# Patient Record
Sex: MALE | Race: WHITE | NOT HISPANIC OR LATINO | Employment: OTHER | URBAN - METROPOLITAN AREA
[De-identification: names, ages, dates, MRNs, and addresses within clinical notes are randomized per-mention and may not be internally consistent; named-entity substitution may affect disease eponyms.]

---

## 2017-01-03 ENCOUNTER — OUTPATIENT CASE MANAGEMENT (OUTPATIENT)
Dept: ADMINISTRATIVE | Facility: OTHER | Age: 82
End: 2017-01-03

## 2017-01-03 NOTE — PROGRESS NOTES
Name Nickolas TORRES 1931 Medical Record  # [id number]   Visit Location __ Home __ Clinic _x_ Telephonic __Other  Encounter Date:  1/3/17   Contact Type __ New Case   _x_ Follow Up  __ Monitoring  __ Case Closure Next Follow-up Date      Summary:Jacyter reports that the patient is asleep at this time. Reports that his blood sugar this morning was 96. Reports that his BP was 148/60. Reports that the patient complains of pain at times in the hip, Reports that he has an appointment on the  of this month.   Plan:Inform on DM meal planning

## 2017-01-03 NOTE — PATIENT INSTRUCTIONS
Diabetes: Understanding Carbohydrates, Fats, and Protein  Food is a source of fuel and nourishment for your body. Its also a source of pleasure. Having diabetes doesnt mean you have to eat special foods or give up desserts. Instead, your dietitian can show you how to plan meals to suit your body. To start, learn how different foods affect blood sugar.    Carbohydrates  Carbohydrates are the main source of fuel for the body. Carbohydrates raise blood sugar. Many people think carbohydrates are only found in pasta or bread. But carbohydrates are actually in many kinds of foods:  · Sugars occur naturally in foods such as fruit, milk, honey, and molasses. Sugars can also be added to many foods, from cereals and yogurt to candy and desserts. Sugars raise blood sugar.  · Starches are found in bread, cereals, pasta, and dried beans. Theyre also found in corn, peas, potatoes, yam, acorn squash, and butternut squash. Starches also raise blood sugar.   · Fiber is found in foods such as vegetables, fruits, beans, and whole grains. Unlike other carbs, fiber isnt digested or absorbed. So it doesnt raise blood sugar. In fact, fiber can help keep blood sugar from rising too fast. It also helps keep blood cholesterol at a healthy level.  Did you know?  Even though carbohydrates raise blood sugar, its best to have some in every meal. They are an important part of a healthy diet.   Fat  Fat is an energy source that can be stored until needed. Fat does not raise blood sugar. However, it can raise blood cholesterol, increasing the risk of heart disease. Fat is also high in calories, which can cause weight gain. Not all types of fat are the same.  More Healthy:  · Monounsaturated fats are mostly found in vegetable oils, such as olive, canola, and peanut oils. They are also found in avocados and some nuts. Monounsaturated fats are healthy for your heart. Thats because they lower LDL (unhealthy) cholesterol.  · Polyunsaturated  fats are mostly found in vegetable oils, such as corn, safflower, and soybean oils. They are also found in some seeds, nuts, and fish. Polyunsaturated fats lower LDL (unhealthy) cholesterol. So, choosing them instead of saturated fats is healthy for your heart. Certain unsaturated fats can help lower triglycerides.   Less Healthy:  · Saturated fats are found in animal products, such as meat, poultry, whole milk, lard, and butter. Saturated fats raise LDL cholesterol and are not healthy for your heart.  · Hydrogenated oils and trans fats are formed when vegetable oils are processed into solid fats. They are found in many processed foods. Hydrogenated oils and trans fats raise LDL cholesterol and lower HDL (healthy) cholesterol. They are not healthy for your heart.  Protein  Protein helps the body build and repair muscle and other tissue. Protein has little or no effect on blood sugar. However, many foods that contain protein also contain saturated fat. By choosing low-fat protein sources, you can get the benefits of protein without the extra fat:  · Plant protein is found in dry beans and peas, nuts, and soy products, such as tofu and soymilk. These sources tend to be cholesterol-free and low in saturated fat.  · Animal protein is found in fish, poultry, meat, cheese, milk, and eggs. These contain cholesterol and can be high in saturated fat. Aim for lean, lower-fat choices.  © 2991-9537 [employer]. [address] [place] [address]. All rights reserved. This information is not intended as a substitute for professional medical care. Always follow your healthcare professional's instructions.        Diabetes: Caring for Your Body  When you have diabetes, your body needs special care. This care helps you stay healthy and prevent complications. Exercise and healthy eating are a part of this. You can also protect yourself by taking special care of your feet, skin, and teeth.    Caring for your  feet  Follow these tips to help keep your feet healthy:  · Check your feet every day for redness, blisters, cracks, dry skin, or numbness. Use a mirror to inspect the bottoms of your feet, if needed. Or, ask for help.  · Wash your feet in warm (not hot) water. Dont soak them.  · Use an emery board to keep your toenails even with the ends of your toes. File away sharp edges. A podiatrist (foot doctor) may need to cut your toenails for you.  · Keep your skin soft and smooth by placing a thin layer of skin lotion on the tops and bottoms of your feet. Do not put lotion in between your toes.  · Always wear shoes or slippers, even inside your home. Make sure that shoes are properly fitted. Change your socks daily.  · Call your healthcare provider right away if your feet are numb or painful, or if a cut or sore doesnt heal within a few days.  Preventing skin infections  To prevent skin infections, bathe every day. Dry yourself well, especially between your toes. Wash any cuts with warm, soapy water and cover with a sterile bandage. Call your healthcare provider if a cut or sore doesn't heal in a few days, feels warm, itches, or has a bad odor.  Caring for your teeth  Follow these guidelines for healthy teeth:  · Brush your teeth twice daily.  · Floss your teeth daily.  · See your dentist at least twice yearly.  If you smoke, quit  Smoking is dangerous for everyone, especially people with diabetes. It can harm the blood vessels in your eyes, kidneys, and heart. It raises blood pressure. Smoking can also slow healing, so infections are more likely. Ask your healthcare provider about programs to help you stop smoking.   © 1402-3152 [employer]. [address] [place] [address], [place] [address]. All rights reserved. This information is not intended as a substitute for professional medical care. Always follow your healthcare professional's instructions.

## 2017-01-13 ENCOUNTER — OFFICE VISIT (OUTPATIENT)
Dept: PAIN MEDICINE | Facility: CLINIC | Age: 82
End: 2017-01-13
Attending: ANESTHESIOLOGY
Payer: MEDICARE

## 2017-01-13 VITALS
WEIGHT: 169.06 LBS | TEMPERATURE: 98 F | DIASTOLIC BLOOD PRESSURE: 59 MMHG | RESPIRATION RATE: 18 BRPM | HEIGHT: 71 IN | HEART RATE: 62 BPM | SYSTOLIC BLOOD PRESSURE: 117 MMHG | BODY MASS INDEX: 23.67 KG/M2

## 2017-01-13 DIAGNOSIS — M53.3 SACROILIAC JOINT DYSFUNCTION OF RIGHT SIDE: ICD-10-CM

## 2017-01-13 DIAGNOSIS — M16.11 PRIMARY OSTEOARTHRITIS OF RIGHT HIP: ICD-10-CM

## 2017-01-13 DIAGNOSIS — Z98.1 S/P LAMINECTOMY WITH SPINAL FUSION: Primary | ICD-10-CM

## 2017-01-13 PROCEDURE — 1125F AMNT PAIN NOTED PAIN PRSNT: CPT | Mod: S$GLB,,, | Performed by: ANESTHESIOLOGY

## 2017-01-13 PROCEDURE — 1159F MED LIST DOCD IN RCRD: CPT | Mod: S$GLB,,, | Performed by: ANESTHESIOLOGY

## 2017-01-13 PROCEDURE — 1157F ADVNC CARE PLAN IN RCRD: CPT | Mod: S$GLB,,, | Performed by: ANESTHESIOLOGY

## 2017-01-13 PROCEDURE — 99214 OFFICE O/P EST MOD 30 MIN: CPT | Mod: 25,S$GLB,, | Performed by: ANESTHESIOLOGY

## 2017-01-13 PROCEDURE — 3078F DIAST BP <80 MM HG: CPT | Mod: S$GLB,,, | Performed by: ANESTHESIOLOGY

## 2017-01-13 PROCEDURE — 3074F SYST BP LT 130 MM HG: CPT | Mod: S$GLB,,, | Performed by: ANESTHESIOLOGY

## 2017-01-13 PROCEDURE — 1160F RVW MEDS BY RX/DR IN RCRD: CPT | Mod: S$GLB,,, | Performed by: ANESTHESIOLOGY

## 2017-01-13 PROCEDURE — 99499 UNLISTED E&M SERVICE: CPT | Mod: S$GLB,,, | Performed by: ANESTHESIOLOGY

## 2017-01-13 PROCEDURE — 99999 PR PBB SHADOW E&M-EST. PATIENT-LVL III: CPT | Mod: PBBFAC,,, | Performed by: ANESTHESIOLOGY

## 2017-01-13 PROCEDURE — 20552 NJX 1/MLT TRIGGER POINT 1/2: CPT | Mod: S$GLB,,, | Performed by: ANESTHESIOLOGY

## 2017-01-13 RX ORDER — OXYCODONE AND ACETAMINOPHEN 10; 325 MG/1; MG/1
1 TABLET ORAL EVERY 8 HOURS PRN
Qty: 90 TABLET | Refills: 0 | Status: SHIPPED | OUTPATIENT
Start: 2017-01-13 | End: 2017-02-20 | Stop reason: SDUPTHER

## 2017-01-13 RX ORDER — PEN NEEDLE, DIABETIC 31 GX5/16"
NEEDLE, DISPOSABLE MISCELLANEOUS
COMMUNITY
Start: 2017-01-02 | End: 2017-01-17 | Stop reason: SDUPTHER

## 2017-01-13 RX ORDER — BUPIVACAINE HYDROCHLORIDE 2.5 MG/ML
9 INJECTION, SOLUTION EPIDURAL; INFILTRATION; INTRACAUDAL ONCE
Status: COMPLETED | OUTPATIENT
Start: 2017-01-13 | End: 2017-01-13

## 2017-01-13 RX ORDER — METHYLPREDNISOLONE ACETATE 40 MG/ML
40 INJECTION, SUSPENSION INTRA-ARTICULAR; INTRALESIONAL; INTRAMUSCULAR; SOFT TISSUE ONCE
Status: COMPLETED | OUTPATIENT
Start: 2017-01-13 | End: 2017-01-13

## 2017-01-13 RX ADMIN — METHYLPREDNISOLONE ACETATE 40 MG: 40 INJECTION, SUSPENSION INTRA-ARTICULAR; INTRALESIONAL; INTRAMUSCULAR; SOFT TISSUE at 08:01

## 2017-01-13 RX ADMIN — BUPIVACAINE HYDROCHLORIDE 22.5 MG: 2.5 INJECTION, SOLUTION EPIDURAL; INFILTRATION; INTRACAUDAL at 08:01

## 2017-01-13 NOTE — MR AVS SNAPSHOT
[facility]  [address]  [place] [address]  Phone: [phone]  Fax: [phone]                  Nickolas Blake   2017 8:30 AM   Office Visit    Description:  Male : 1931   Provider:  Liyah Parker MD   Department:  [facility]           Diagnoses this Visit        Comments    S/P laminectomy with spinal fusion    -  Primary     Sacroiliac joint dysfunction of right side         Primary osteoarthritis of right hip                To Do List           Future Appointments        Provider Department Dept Phone    2017 3:00 PM Joe Bunn MD [facility] - Internal Medicine [phone]    2017 9:00 AM Stephenie Cardona NP [facility] [phone]      Your Future Surgeries/Procedures     2017   Surgery with Liyah Parker MD   [facility]    [address]  [facility] [address] [phone]              Goals (5 Years of Data)              1/3/17    12/19/16    11/7/16    Patient/caregiver will accept life style changes to manage and improve blood sugars prior to discharge from [facility].   On track  On track  On track    Notes - Note edited  1/3/2017 10:17 AM by Melissa Montalvo RN    Overall Time to Completion  2 months from 10/17/2016    Short Term Goals  Patient/caregiver will measure and record the capillary blood glucose 2 times per day for 3 weeks.  Interventions   · Mail Blood glucose logs for home use.   · Inquire about available accucheck machine in the home  · Inquire about current frequency of obtaining blood sugars.  · Inform on the importance of home monitoring of blood sugars.   Status  · Met    ·     Patient/caregiver will verbalize 4 food items Diabetic within 3 weeks.  Interventions   · Recognize and provide educational material (CAIT).  · Encourage Dietary Compliance.  · Review eating/nutrition habits.   Status  · Partially  met    Patient/caregiver will verbalize 4 risk factors of Hypertension Diabetes within 2 months.  Interventions   · Recognize and provide educational material (KRAMES).  · Encourage Dietary Compliance.  · Review eating/nutrition habits.  · Complete medication reconciliation.  · Encourage Medication Compliance.   Status  · Partially met    Patient/caregiver will verbalize 2 signs and symptoms of Hypertension Diabetes within 2 months.   Interventions   · Recognize and provide educational material (KRAMES).   Status  · Partially met    Patient/caregiver will verbalize current HbA1c value and HbA1c goal within 2 months.  Interventions   · Empower patient/caregiver to discuss treatment plan with Physician/care team.  · Recognize and provide educational material (KRAMES).  · Encourage Dietary Compliance.  · Review eating/nutrition habits.   Status  · Partially met    Patient/caregiver will verbalize food required to create a well-balanced food plate within 2 months.  Interventions   · Recognize and provide educational material (KRAMES).  · Encourage Dietary Compliance.  · Review eating/nutrition habits.   Status  · Partially met         Clinical Reference Documents Added to Patient Instructions       Document    BLOOD SUGAR LOG, USING A (ENGLISH)        DIABETES, DIET (ENGLISH)    DIABETES, HEALTHY MEALS FOR (ENGLISH)    DIABETES, MANAGING: THE A1C TEST (ENGLISH)    DIABETES, MEAL PLANNING (ENGLISH)    DIABETES, SERVING AND PORTION SIZES, LEARNING ABOUT (ENGLISH)               These Medications        Disp Refills Start End    oxycodone-acetaminophen (PERCOCET)  mg per tablet 90 tablet 0 1/13/2017     Take 1 tablet by mouth every 8 (eight) hours as needed for Pain. - Oral    Pharmacy: [facility] - [facility] [address]  Ph #: [phone]         [facility] On Call     [facility] On Call Nurse Care Line - 24/7 Assistance  Registered nurses in the [facility] On Call Center provide  "clinical advisement, health education, appointment booking, and other advisory services.  Call for this free service at [phone].             Medications           Message regarding Medications     Verify the changes and/or additions to your medication regime listed below are the same as discussed with your clinician today.  If any of these changes or additions are incorrect, please notify your healthcare provider.        START taking these NEW medications        Refills    oxycodone-acetaminophen (PERCOCET)  mg per tablet 0    Sig: Take 1 tablet by mouth every 8 (eight) hours as needed for Pain.    Class: Print    Route: Oral      These medications were administered today        Dose Freq    bupivacaine (PF) 0.25% (2.5 mg/ml) injection 22.5 mg 9 mL Once    Sig: Inject 9 mLs (22.5 mg total) into the muscle once.    Class: Normal    Route: Intramuscular    methylPREDNISolone acetate injection 40 mg 40 mg Once    Sig: Inject 1 mL (40 mg total) into the muscle once.    Class: Normal    Route: Intramuscular           Verify that the below list of medications is an accurate representation of the medications you are currently taking.  If none reported, the list may be blank. If incorrect, please contact your healthcare provider. Carry this list with you in case of emergency.           Current Medications     ascorbic acid, vitamin C, (VITAMIN C) 250 MG tablet Take 1 tablet (250 mg total) by mouth 2 (two) times daily.    aspirin (ECOTRIN) 81 MG EC tablet Take 81 mg by mouth once daily.    atorvastatin (LIPITOR) 20 MG tablet TAKE 1 TABLET ONE TIME DAILY    BD ALCOHOL SWABS PadM USE FOUR TIMES DAILY    BD INSULIN PEN NEEDLE UF SHORT 31 gauge x 5/16" Ndle     blood sugar diagnostic (BLOOD GLUCOSE TEST) Strp 1 strip by Misc.(Non-Drug; Combo Route) route 3 (three) times daily with meals. To use with Accucheck meter    docusate sodium (COLACE) 100 MG capsule Take 1 capsule (100 mg total) by mouth once daily.    " "donepezil (ARICEPT) 10 MG tablet Take 1 tablet (10 mg total) by mouth once daily.    doxazosin (CARDURA) 4 MG tablet Take 1 tablet (4 mg total) by mouth every evening.    ferrous gluconate (FERGON) 324 MG tablet Take 1 tablet (324 mg total) by mouth 2 (two) times daily with meals.    finasteride (PROSCAR) 5 mg tablet TAKE 1 TABLET EVERY DAY    fluticasone (FLONASE) 50 mcg/actuation nasal spray 2 sprays by Each Nare route daily as needed for Allergies.    gabapentin (NEURONTIN) 300 MG capsule Take 1 capsule (300 mg total) by mouth 3 (three) times daily.    insulin aspart (NOVOLOG) 100 unit/mL InPn pen Inject 7 Units into the skin 3 (three) times daily.    insulin glargine (LANTUS) 100 unit/mL injection Inject 21 Units into the skin 2 (two) times daily. Every 12 hours.    insulin syringe-needle U-100 1/2 mL 31 gauge x 15/64" Syrg 1 Syringe by Misc.(Non-Drug; Combo Route) route 2 (two) times daily. With insulin injections twice daily    lisinopril (PRINIVIL,ZESTRIL) 2.5 MG tablet Take 1 tablet (2.5 mg total) by mouth once daily.    metoprolol succinate (TOPROL-XL) 50 MG 24 hr tablet Take 1 tablet (50 mg total) by mouth once daily. Dose increase, stop 25 mg    multivitamin (THERAGRAN) per tablet Take 1 tablet by mouth once daily.    nitroGLYCERIN (NITROSTAT) 0.4 MG SL tablet Place 1 tablet (0.4 mg total) under the tongue every 5 (five) minutes as needed for Chest pain.    omeprazole (PRILOSEC) 20 MG capsule Take 1 capsule (20 mg total) by mouth once daily.    oxycodone-acetaminophen (PERCOCET) 7.5-325 mg per tablet Take 1 tablet by mouth every 6 (six) hours as needed for Pain.    senna-docusate 8.6-50 mg (PERICOLACE) 8.6-50 mg per tablet Take 1 tablet by mouth 2 (two) times daily as needed for Constipation.    sertraline (ZOLOFT) 25 MG tablet Take 1 tablet (25 mg total) by mouth once daily.    tamsulosin (FLOMAX) 0.4 mg Cp24 Take 2 capsules (0.8 mg total) by mouth once daily.    oxycodone-acetaminophen (PERCOCET)  " "mg per tablet Take 1 tablet by mouth every 8 (eight) hours as needed for Pain.           Clinical Reference Information           Vital Signs - Last Recorded  Most recent update: 1/13/2017  8:36 AM by Shazia Muniz MA    BP Pulse Temp Resp Ht Wt    (!) 117/59 62 97.9 °F (36.6 °C) (Oral) 18 5' 11" (1.803 m) 76.7 kg (169 lb 1.5 oz)    BMI                23.58 kg/m2          Blood Pressure          Most Recent Value    BP  (!)  117/59      Allergies as of 1/13/2017     No Known Allergies      Immunizations Administered on Date of Encounter - 1/13/2017     None      Administrations This Visit     bupivacaine (PF) 0.25% (2.5 mg/ml) injection 22.5 mg     Admin Date Action Dose Route Administered By             01/13/2017 Given by Other 22.5 mg Intramuscular Deborah Del Rio LPN                    methylPREDNISolone acetate injection 40 mg     Admin Date Action Dose Route Administered By             01/13/2017 Given by Other 40 mg Intramuscular Deborah Del Rio LPN                      "

## 2017-01-13 NOTE — LETTER
January 13, 2017      Joe Bunn MD  [address]  [facility] [address]           [facility]  [address]  [facility] [address]  Phone: [phone]  Fax: [phone]          Patient: Nickolas Blake   MR Number: [id number]   YOB: 1931   Date of Visit: 1/13/2017       Dear Dr. Joe Bunn:    Thank you for referring Nickolas Blake to me for evaluation. Attached you will find relevant portions of my assessment and plan of care.    If you have questions, please do not hesitate to call me. I look forward to following Nickolas Blake along with you.    Sincerely,    Liyah Parker MD    Enclosure  CC:  No Recipients    If you would like to receive this communication electronically, please contact [email or web address] or [phone] to request more information on [employer] access.    For providers and/or their staff who would like to refer a patient to [facility], please contact us through our one-stop-shop provider referral line, [facility], at [phone].    If you feel you have received this communication in error or would no longer like to receive these types of communications, please e-mail [email or web address]

## 2017-01-13 NOTE — PROGRESS NOTES
Chronic Pain - Follow Up    Referring Physician: Joe Bunn MD    Chief Complaint:   No chief complaint on file.       SUBJECTIVE: Disclaimer: This note has been generated using voice-recognition software. There may be typographical errors that have been missed during proof-reading  Interval history 1/13/2017:  Since previous encounter the patient is status post L3-4 laminectomy and foraminotomy for severe L3-4 stenosis and right L4 radiculopathy.  He has healed from his incision sites and has been performing PT but has been having exacerbations of his pain in his area of his right SI joint and hip for which she has known arthritis and previous improvement from injections.  He was sent for evaluation for medication management for postprocedural pain and injections.  Interval history 10/25/16:  Patient arrives to clinic for a 2 week follow up after getting a Right sacroiliiac joint injection on 10/5/16. Patient stated that he did not get any relief at all from the procedure beyond one day. Patient reports his pain as an 8/10 today. Patient is currently taking Norco to assist with pain. No other health changes since last visit.      Interval history 09/19/2016:  The patient returns today for follow up of lower back and right hip pain.  He is s/p right SI joint and hip joint injection on 8/24/16 with 75% pain relief for 3 weeks.  He states that his pain returned last week.  He reports that this helped more than previous procedures.  His worst area of pain today is his right buttock.  He reports significant relief with Fentanyl 25 mcg patch.  He ran out a couple of days ago and did not contact us for a refill.  He did have some Norco left from previous prescription from PCP.  No other health changes since his last visit.  Patient reports his pain 9/10 today.  He is accompanied by his son today who is active in his care.  He denies any b/b incontinence.     Interval History 08/16/2016:  Since previous  encounter the patient has had a series of lumbar intralaminar epidural steroidal injections for known severe stenosis in his lumbar spine and spondylosis at L3-4.  The patient has had only temporary relief from the epidural steroidal injections lasting no longer than 1 week at a time.  He does continue to have her back pain from the area of the sacroiliac joint and right hip and has also previously had right knee pain.  On previous encounter in the office she had trigger point injections over the area of the sacroiliac joint which only marginally helped and also a right knee joint injection which helped him significantly.  Currently states that he does not have any knee pain.  He did have a recent MRI after being admitted for observation for lumbar radiculopathy after the epidural steroidal injections.  The MRI did not show any evidence of infection or hematoma but did confirm severe stenosis at multiple levels and spondylosis most severe at L3-4.  He was evaluated by neurosurgery in follow-up and is not currently considered a surgical candidate.  The daughter presents with the patient today and states that the patient has been unable to sleep secondary to severe pain.    Interval history 7/12/2016:  Since previous encounter the patient is status post lumbar intralaminar epidural steroidal injection by 2, 2 weeks apart on 6/14/2016 and 6/20/2016.  The patient states that he is not having significant radicular symptoms down to the feet as prior although he is having significant right-sided lower back pain and right knee pain.  He has not tried any prescription compounded pain creams although he states that he has tried BenGay which gives him some relief.  No other health changes since previous encounter.  Patient scheduled follow-up with neurosurgery next week.    Initial encounter:    Nickolas Blake presents to the clinic for the evaluation of lumbar pain. The pain started 1 month  ago following onset and  symptoms have been worsening.    Brief history:  Patient previously had an L5-S1 intralaminar epidural steroidal injection in May 2015 with greater than 6 months relief.      Pain Description:    The pain is located in the low back area and radiates to the right lower extremity .      At BEST  5/10     At WORST  10/10 on the WORST day.      On average pain is rated as 8/10.     Today the pain is rated as 9/10    The pain is described as aching      Symptoms interfere with daily activity.     Exacerbating factors: Sitting, Bending, Walking, Night Time, Morning, Lifting and Getting out of bed/chair.      Mitigating factors laying down, medications, physical therapy, rest and injections.     Patient denies night fever/night sweats, urinary incontinence, bowel incontinence, significant weight loss, significant motor weakness and loss of sensations.  Patient denies any suicidal or homicidal ideations    Pain Medications:  Current: Fentanyl 25 mcg patch- significant relief      Tried in Past:  NSAIDs -Never  TCA -Never  SNRI -Never  Anti-convulsants -Never  Muscle Relaxants -skelaxin -with some relief, recently discontinued.  Opioids-Norco 5/325mg Norco 7.5/325mg, [place]    Physical Therapy/Home Exercise: no       report:  Reviewed and consistent with medication use as prescribed.    Pain Procedures:   10/5/2016 -Right SI joint injection -no relief  8/24/16 Right SI joint and right hip joint injection- 75% relief for 3 weeks  Lumbar IL SCOTT at L5-S1  06/28/16  Lumbar IL SCOTT at L5-S1 06/14/2016 5/18/2015 L5/S1 ILESI    Chiropractor -never  Acupuncture - never  TENS unit -never  Spinal decompression -never  Joint replacement -never    Imaging:  MRI lumbar spine 7/20/2016:  Technique:  Sagittal T1, sagittal T2, sagittal STIR, axial T1 and axial T2 weighted images of the lumbar spine obtained without contrast.     Comparison: Lumbar spine MR 11/2013.  Findings:    The spinal alignment is within normal limits.  There is mild  height loss of the L1 vertebral body, similar to before.  The remaining vertebral body heights are well maintained, with no fracture.    Schmorl's nodes noted L3 and L4 vertebral bodies.  There is disc is generalized disc desiccation.  There is high STIR signal intensity in L3-L4 disc space, similar to before.      No marrow signal abnormality suspicious for an infiltrative process.      The conus is normal in appearance, and terminates at the T12-L1 level.      The adjacent soft tissue structures demonstrate  T2 hyperintense and T1 hypointense lesion in left kidney, likely representing renal cyst.      There are findings of multi-level  lumbar spondylosis, as below.    T12-L1: There is no focal disc herniation and no significant spinal stenosis or neuroforaminal narrowing.    L1-L2: There is moderate facet hypertrophy with no focal disc herniation. No significant spinal canal stenosis or neural foraminal narrowing.    L2-L3: There is asymmetrical disc bulge to the left and facet hypertrophy resulting in mild spinal canal stenosis and mild bilateral neuroforaminal narrowing.    L3-L4: There is circumferential disc bulge with superimposed right paracentral disc extrusion migrating cranially and severe facet hypertrophy resulting in severe spinal canal stenosis and severe bilateral neuroforaminal narrowing.  Additionally, there is a 0.5 cm oval lesion in the posterior right epidural space, which may represent thickened nerve root, synovial cyst, or sequestered disc fragment.    L4-L5: There is circumferential disc bulge and moderate facet hypertrophy resulting in mild spinal canal stenosis without significant neuroforaminal narrowing.    L5-S1: There is bilateral moderate facet hypertrophy resulting in moderate right and mild left neuroforaminal narrowing with no significant spinal canal stenosis.   Impression        Lumbar spondylosis most significant at L3-L4 level resulting in severe spinal canal stenosis and  severe bilateral neuroforaminal narrowing.         MRI lumbar spine 4/21/2015  Findings:    Lumbar spine alignment is within normal limits.  Once again, there is a stable mild compression deformity with anterior wedging of the L1 vertebral body, unchanged compared to prior MRI examination of November 2013.  Remaining vertebral body heights are   well-maintained.  Stable Schmorl's node impressions are present on the inferior end plates of the L1 and L3 vertebral bodies.  There is multilevel disk desiccation.   No marrow signal abnormality suspicious for an infiltrative process.      The conus is normal in appearance, and terminates at the L1 level.  There is a well circumscribed focus of T2 signal hyperintensity within the left renal parenchyma, stable from prior study and likely a cyst.      There are findings of multi-level  lumbar spondylosis, as below.    L1-L2: Bilateral facet arthropathy.  No significant central canal or neuroforaminal narrowing.    L2-L3: There is a posterior disk bulge the asymmetric to the left.  There is bilateral facet arthropathy.  There is ligamentum flavum hypertrophy. These findings results in mild- moderate bilateral neural foraminal narrowing and mild central canal   stenosis.    L3-L4: There is a circumferential disk bulge with superimposed right paracentral disk protrusion with bilateral facet arthropathy and ligamentum flavum hypertrophy.  This results in severe central canal stenosis and severe right neural foraminal   narrowing and moderate-severe left neural foraminal narrowing.    L4-L5: There is a posterior disk bulge and facet arthropathy with ligamentum flavum hypertrophy resulting in mild bilateral neural foraminal narrowing moderate central canal stenosis.    L5-S1: There is a posterior disk bulge, slightly asymmetric to the right and facet osseous hypertrophy with ligamentum flavum hypertrophy resulting in mild central canal stenosis and severe right and moderate left  neural foraminal narrowing.   Impression      Significant multilevel degenerative change of the lumbar spine, most pronounced at the L3-L4 level where there is severe central canal stenosis, severe right neuroforaminal narrowing and moderate-severe left neural foraminal narrowing.    Left renal cyst.         Xray hips bilateral 6/9/2016  2 views bilateral.    Right: There is mild DJD. No fracture dislocation bone destruction seen.    Left: There is mild DJD. No fracture dislocation bone destruction seen.    Xray lumbar spine flex/ext  Mild levoscoliosis, lordosis lumbar spine.  Osteopenia.  Tiny rim calcification overlies left L4 transverse process.  Bridging osteophytes, partial interbody disk calcification L1-L2.  Mild -- moderate spondylosis, degenerative disk.  No fracture.    Primary anterior listhesis L4 on L3 0.7-cm, L5 on L4 0.4-cm.  Facet arthropathy L3 through L5 levels      Past Medical History   Diagnosis Date    Anemia in stage 3 chronic kidney disease 11/10/2016    BPH (benign prostatic hyperplasia) 10/26/2012    Cataract     Coronary artery disease involving native coronary artery of native heart without angina pectoris     Degeneration of lumbar or lumbosacral intervertebral disc 2/4/2014    Essential hypertension 9/30/2015    GERD (gastroesophageal reflux disease)     Hyperlipidemia     Lumbar radiculopathy, right 12/9/2013    Mild cognitive impairment with memory loss 7/18/2016    Osteoarthritis of right hip 8/16/2016    Osteoarthritis of spine with radiculopathy, lumbar region     S/P percutaneous transluminal coronary angioplasty     Spinal stenosis of lumbar region at multiple levels 2/4/2014    Thoracic or lumbosacral neuritis or radiculitis, unspecified 4/21/2015    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 edema, with long-term current use of insulin 11/19/2015    Type 2 diabetes mellitus with diabetic  polyneuropathy, with long-term current use of insulin 11/19/2015    Type 2 diabetes mellitus with stage 3 chronic kidney disease, with long-term current use of insulin 11/19/2015     Past Surgical History   Procedure Laterality Date    Cyst removal       rectum    Cataract extraction      Cardiac catheterization  1980     Social History     Social History    Marital status:      Spouse name: N/A    Number of children: N/A    Years of education: N/A     Occupational History    Not on file.     Social History Main Topics    Smoking status: Former Smoker     Packs/day: 0.25     Years: 50.00     Types: Cigarettes     Quit date: 2/25/1980    Smokeless tobacco: Never Used    Alcohol use 0.6 oz/week     1 Cans of beer per week      Comment: seldom    Drug use: No    Sexual activity: Not Currently     Other Topics Concern    Not on file     Social History Narrative     Family History   Problem Relation Age of Onset    Breast cancer Mother     Cancer Brother     Heart attack Brother     No Known Problems Father     No Known Problems Sister     No Known Problems Maternal Aunt     No Known Problems Maternal Uncle     No Known Problems Paternal Aunt     No Known Problems Paternal Uncle     No Known Problems Maternal Grandmother     No Known Problems Maternal Grandfather     No Known Problems Paternal Grandmother     No Known Problems Paternal Grandfather     Colon cancer Neg Hx     Colon polyps Neg Hx     Amblyopia Neg Hx     Blindness Neg Hx     Cataracts Neg Hx     Diabetes Neg Hx     Glaucoma Neg Hx     Hypertension Neg Hx     Macular degeneration Neg Hx     Retinal detachment Neg Hx     Strabismus Neg Hx     Stroke Neg Hx     Thyroid disease Neg Hx        Review of patient's allergies indicates:  No Known Allergies    Current Outpatient Prescriptions   Medication Sig    ascorbic acid, vitamin C, (VITAMIN C) 250 MG tablet Take 1 tablet (250 mg total) by mouth 2 (two) times  "daily.    aspirin (ECOTRIN) 81 MG EC tablet Take 81 mg by mouth once daily.    atorvastatin (LIPITOR) 20 MG tablet TAKE 1 TABLET ONE TIME DAILY    BD ALCOHOL SWABS PadM USE FOUR TIMES DAILY    BD INSULIN PEN NEEDLE UF SHORT 31 gauge x 5/16" Ndle     blood sugar diagnostic (BLOOD GLUCOSE TEST) Strp 1 strip by Misc.(Non-Drug; Combo Route) route 3 (three) times daily with meals. To use with Accucheck meter (Patient taking differently: 1 strip by Misc.(Non-Drug; Combo Route) route 2 (two) times daily. To use with Accucheck meter)    docusate sodium (COLACE) 100 MG capsule Take 1 capsule (100 mg total) by mouth once daily.    donepezil (ARICEPT) 10 MG tablet Take 1 tablet (10 mg total) by mouth once daily.    doxazosin (CARDURA) 4 MG tablet Take 1 tablet (4 mg total) by mouth every evening.    ferrous gluconate (FERGON) 324 MG tablet Take 1 tablet (324 mg total) by mouth 2 (two) times daily with meals.    finasteride (PROSCAR) 5 mg tablet TAKE 1 TABLET EVERY DAY    fluticasone (FLONASE) 50 mcg/actuation nasal spray 2 sprays by Each Nare route daily as needed for Allergies.    gabapentin (NEURONTIN) 300 MG capsule Take 1 capsule (300 mg total) by mouth 3 (three) times daily.    insulin aspart (NOVOLOG) 100 unit/mL InPn pen Inject 7 Units into the skin 3 (three) times daily.    insulin glargine (LANTUS) 100 unit/mL injection Inject 21 Units into the skin 2 (two) times daily. Every 12 hours.    insulin syringe-needle U-100 1/2 mL 31 gauge x 15/64" Syrg 1 Syringe by Misc.(Non-Drug; Combo Route) route 2 (two) times daily. With insulin injections twice daily    lisinopril (PRINIVIL,ZESTRIL) 2.5 MG tablet Take 1 tablet (2.5 mg total) by mouth once daily.    metoprolol succinate (TOPROL-XL) 50 MG 24 hr tablet Take 1 tablet (50 mg total) by mouth once daily. Dose increase, stop 25 mg    multivitamin (THERAGRAN) per tablet Take 1 tablet by mouth once daily.    nitroGLYCERIN (NITROSTAT) 0.4 MG SL tablet Place 1 " "tablet (0.4 mg total) under the tongue every 5 (five) minutes as needed for Chest pain.    omeprazole (PRILOSEC) 20 MG capsule Take 1 capsule (20 mg total) by mouth once daily.    oxycodone-acetaminophen (PERCOCET) 7.5-325 mg per tablet Take 1 tablet by mouth every 6 (six) hours as needed for Pain.    senna-docusate 8.6-50 mg (PERICOLACE) 8.6-50 mg per tablet Take 1 tablet by mouth 2 (two) times daily as needed for Constipation.    sertraline (ZOLOFT) 25 MG tablet Take 1 tablet (25 mg total) by mouth once daily.    tamsulosin (FLOMAX) 0.4 mg Cp24 Take 2 capsules (0.8 mg total) by mouth once daily.    oxycodone-acetaminophen (PERCOCET)  mg per tablet Take 1 tablet by mouth every 8 (eight) hours as needed for Pain.     Current Facility-Administered Medications   Medication    methylPREDNISolone acetate injection 40 mg       REVIEW OF SYSTEMS:    GENERAL:  No weight loss, malaise or fevers.  RESPIRATORY:  Negative for cough, wheezing or shortness of breath, patient denies any recent URI.  CARDIOVASCULAR:  Negative for chest pain, leg swelling or palpitations.  Positive for hypertension .  GI:  Negative for abdominal discomfort, blood in stools or black stools or change in bowel habits. Occasional GERD controlled with omeprazole.   : CKD III , BPH  MUSCULOSKELETAL:  See HPI. Positive for back pain.  SKIN:  Negative for lesions, rash, and itching.  PSYCH:  No mood disorder or recent psychosocial stressors.  Patients sleep is disturbed secondary to pain.  HEMATOLOGY/LYMPHOLOGY:  Negative for prolonged bleeding, bruising easily or swollen nodes.  Patient is not currently taking any anti-coagulants  ENDO: IDDM.  NEURO:   No history of headaches, syncope, paralysis, seizures or tremors.  All other reviewed and negative other than HPI.    OBJECTIVE:    Visit Vitals    BP (!) 117/59    Pulse 62    Temp 97.9 °F (36.6 °C) (Oral)    Resp 18    Ht 5' 11" (1.803 m)    Wt 76.7 kg (169 lb 1.5 oz)    BMI 23.58 " kg/m2       PHYSICAL EXAMINATION:    GENERAL: Well appearing, in no acute distress, alert and oriented x3.  PSYCH:  Mood and affect appropriate.  SKIN: Skin color, texture, turgor normal, no rashes or lesions.  HEAD/FACE:  Normocephalic, atraumatic.   CV: RRR with palpation of the radial artery.  PULM: No evidence of respiratory difficulty, symmetric chest rise.  BACK: no evidence of infection from surgical site  EXTREMITIES: Peripheral joint ROM is full and pain free without obvious instability or laxity in all four extremities. No deformities, edema, or skin discoloration. Good capillary refill.  MUSCULOSKELETAL:   There is  pain with palpation over the sacroiliac joint on the right.  SHARON is positive on the right.  No pain with internal or external rotation of right hip, although Layton's does cause groin pain.  There is no pain to palpation over the greater trochanteric bursa bilaterally.  FADIRs test is negative.   Bilateral lower extremity strength is normal and symmetric.  No atrophy or tone abnormalities are noted.  NEURO: Cranial nerves grossly intact.  GAIT: Antalgic.    BMP  Lab Results   Component Value Date     12/13/2016    K 4.8 12/13/2016     12/13/2016    CO2 26 12/13/2016    BUN 21 12/13/2016    CREATININE 1.0 12/13/2016    CALCIUM 9.1 12/13/2016    ANIONGAP 6 (L) 12/13/2016    ESTGFRAFRICA >60.0 12/13/2016    EGFRNONAA >60.0 12/13/2016     Lab Results   Component Value Date    HGBA1C 8.5 (H) 12/13/2016     ASSESSMENT: 85 y.o. year old male with pain, consistent with     Encounter Diagnoses   Name Primary?    S/P laminectomy with spinal fusion Yes    Sacroiliac joint dysfunction of right side     Primary osteoarthritis of right hip      PLAN:   Reviewed previous injection images with patient today    -Trigger Point Injection:   The procedure was discussed with the patient including complications of nerve damage,  bleeding, infection, and failure of pain relief.   Trigger points  were identified by palpation and marked. Chlorhexidine prep of sites done. A mixture of 9mL 0.25% bupivacaine +40mg Depo-Medrol was prepared (10 mL total).   A 27-gauge needle was advanced to the point of maximal tenderness, and  1.5 mL  was injected after negative aspiration. All sites done in the same manner. Patient tolerated the procedure well and without complications. Sites injected included:  paraspinal muscles on the right side at the level of L5/S1 and over the area of the SI joint    I will schedule the patient for a right SI joint and Right hip injection -this combination injection has previously helped the patient for arthritic pain    -d/c fentanyl patch    Increase oxycodone/acetaminophen to 10/325 q8 PO PRN, patient understands that this is a temporary increase, and that we will wean him from this medication.     Continue PT    F/u in 3 weeks with APC    Liyah Parker  01/13/2017

## 2017-01-16 RX ORDER — DONEPEZIL HYDROCHLORIDE 10 MG/1
10 TABLET, FILM COATED ORAL DAILY
Qty: 30 TABLET | Refills: 6 | Status: SHIPPED | OUTPATIENT
Start: 2017-01-16 | End: 2017-01-17 | Stop reason: SDUPTHER

## 2017-01-16 RX ORDER — GABAPENTIN 300 MG/1
300 CAPSULE ORAL 3 TIMES DAILY
Qty: 90 CAPSULE | Refills: 3 | Status: SHIPPED | OUTPATIENT
Start: 2017-01-16 | End: 2017-04-21 | Stop reason: SDUPTHER

## 2017-01-17 ENCOUNTER — OUTPATIENT CASE MANAGEMENT (OUTPATIENT)
Dept: ADMINISTRATIVE | Facility: OTHER | Age: 82
End: 2017-01-17

## 2017-01-17 ENCOUNTER — OFFICE VISIT (OUTPATIENT)
Dept: INTERNAL MEDICINE | Facility: CLINIC | Age: 82
End: 2017-01-17
Payer: MEDICARE

## 2017-01-17 ENCOUNTER — TELEPHONE (OUTPATIENT)
Dept: INTERNAL MEDICINE | Facility: CLINIC | Age: 82
End: 2017-01-17

## 2017-01-17 VITALS
TEMPERATURE: 98 F | SYSTOLIC BLOOD PRESSURE: 120 MMHG | DIASTOLIC BLOOD PRESSURE: 50 MMHG | WEIGHT: 173.31 LBS | HEIGHT: 71 IN | HEART RATE: 74 BPM | OXYGEN SATURATION: 98 % | BODY MASS INDEX: 24.26 KG/M2

## 2017-01-17 DIAGNOSIS — E11.42 TYPE 2 DIABETES MELLITUS WITH DIABETIC POLYNEUROPATHY, WITH LONG-TERM CURRENT USE OF INSULIN: ICD-10-CM

## 2017-01-17 DIAGNOSIS — N18.30 TYPE 2 DIABETES MELLITUS WITH STAGE 3 CHRONIC KIDNEY DISEASE, WITH LONG-TERM CURRENT USE OF INSULIN: Primary | ICD-10-CM

## 2017-01-17 DIAGNOSIS — Z79.4 TYPE 2 DIABETES MELLITUS WITH BOTH EYES AFFECTED BY MILD NONPROLIFERATIVE RETINOPATHY WITHOUT MACULAR EDEMA, WITH LONG-TERM CURRENT USE OF INSULIN: ICD-10-CM

## 2017-01-17 DIAGNOSIS — M16.11 PRIMARY OSTEOARTHRITIS OF RIGHT HIP: ICD-10-CM

## 2017-01-17 DIAGNOSIS — I10 HYPERTENSION, ESSENTIAL: ICD-10-CM

## 2017-01-17 DIAGNOSIS — Z98.1 S/P LAMINECTOMY WITH SPINAL FUSION: ICD-10-CM

## 2017-01-17 DIAGNOSIS — M47.26 OSTEOARTHRITIS OF SPINE WITH RADICULOPATHY, LUMBAR REGION: ICD-10-CM

## 2017-01-17 DIAGNOSIS — E78.5 HYPERLIPIDEMIA, UNSPECIFIED HYPERLIPIDEMIA TYPE: ICD-10-CM

## 2017-01-17 DIAGNOSIS — E11.3293 TYPE 2 DIABETES MELLITUS WITH BOTH EYES AFFECTED BY MILD NONPROLIFERATIVE RETINOPATHY WITHOUT MACULAR EDEMA, WITH LONG-TERM CURRENT USE OF INSULIN: ICD-10-CM

## 2017-01-17 DIAGNOSIS — N40.1 BPH (BENIGN PROSTATIC HYPERTROPHY) WITH URINARY RETENTION: ICD-10-CM

## 2017-01-17 DIAGNOSIS — R33.8 BPH (BENIGN PROSTATIC HYPERTROPHY) WITH URINARY RETENTION: ICD-10-CM

## 2017-01-17 DIAGNOSIS — Z79.4 TYPE 2 DIABETES MELLITUS WITH STAGE 3 CHRONIC KIDNEY DISEASE, WITH LONG-TERM CURRENT USE OF INSULIN: Primary | ICD-10-CM

## 2017-01-17 DIAGNOSIS — I25.10 ASCVD (ARTERIOSCLEROTIC CARDIOVASCULAR DISEASE): ICD-10-CM

## 2017-01-17 DIAGNOSIS — E11.22 TYPE 2 DIABETES MELLITUS WITH STAGE 3 CHRONIC KIDNEY DISEASE, WITH LONG-TERM CURRENT USE OF INSULIN: Primary | ICD-10-CM

## 2017-01-17 DIAGNOSIS — Z79.4 TYPE 2 DIABETES MELLITUS WITH DIABETIC POLYNEUROPATHY, WITH LONG-TERM CURRENT USE OF INSULIN: ICD-10-CM

## 2017-01-17 DIAGNOSIS — G31.84 MILD COGNITIVE IMPAIRMENT WITH MEMORY LOSS: ICD-10-CM

## 2017-01-17 PROCEDURE — 99499 UNLISTED E&M SERVICE: CPT | Mod: S$GLB,,, | Performed by: FAMILY MEDICINE

## 2017-01-17 PROCEDURE — 3078F DIAST BP <80 MM HG: CPT | Mod: S$GLB,,, | Performed by: FAMILY MEDICINE

## 2017-01-17 PROCEDURE — 1125F AMNT PAIN NOTED PAIN PRSNT: CPT | Mod: S$GLB,,, | Performed by: FAMILY MEDICINE

## 2017-01-17 PROCEDURE — 3074F SYST BP LT 130 MM HG: CPT | Mod: S$GLB,,, | Performed by: FAMILY MEDICINE

## 2017-01-17 PROCEDURE — 1157F ADVNC CARE PLAN IN RCRD: CPT | Mod: S$GLB,,, | Performed by: FAMILY MEDICINE

## 2017-01-17 PROCEDURE — 1159F MED LIST DOCD IN RCRD: CPT | Mod: S$GLB,,, | Performed by: FAMILY MEDICINE

## 2017-01-17 PROCEDURE — 1160F RVW MEDS BY RX/DR IN RCRD: CPT | Mod: S$GLB,,, | Performed by: FAMILY MEDICINE

## 2017-01-17 PROCEDURE — 99999 PR PBB SHADOW E&M-EST. PATIENT-LVL V: CPT | Mod: PBBFAC,,, | Performed by: FAMILY MEDICINE

## 2017-01-17 PROCEDURE — 99214 OFFICE O/P EST MOD 30 MIN: CPT | Mod: S$GLB,,, | Performed by: FAMILY MEDICINE

## 2017-01-17 RX ORDER — INSULIN ASPART 100 [IU]/ML
7 INJECTION, SOLUTION INTRAVENOUS; SUBCUTANEOUS 3 TIMES DAILY
Qty: 1 BOX | Refills: 5 | Status: SHIPPED | OUTPATIENT
Start: 2017-01-17 | End: 2017-03-28 | Stop reason: SDUPTHER

## 2017-01-17 RX ORDER — LISINOPRIL 2.5 MG/1
2.5 TABLET ORAL DAILY
Qty: 90 TABLET | Refills: 1 | Status: SHIPPED | OUTPATIENT
Start: 2017-01-17 | End: 2017-07-25 | Stop reason: SDUPTHER

## 2017-01-17 RX ORDER — DOXAZOSIN 4 MG/1
4 TABLET ORAL NIGHTLY
Qty: 90 TABLET | Refills: 3 | OUTPATIENT
Start: 2017-01-17

## 2017-01-17 RX ORDER — PEN NEEDLE, DIABETIC 31 GX5/16"
1 NEEDLE, DISPOSABLE MISCELLANEOUS
Qty: 450 EACH | Refills: 11 | Status: SHIPPED | OUTPATIENT
Start: 2017-01-17 | End: 2019-01-28 | Stop reason: SDUPTHER

## 2017-01-17 RX ORDER — DONEPEZIL HYDROCHLORIDE 10 MG/1
10 TABLET, FILM COATED ORAL DAILY
Qty: 30 TABLET | Refills: 5 | Status: SHIPPED | OUTPATIENT
Start: 2017-01-17 | End: 2017-04-10 | Stop reason: SDUPTHER

## 2017-01-17 RX ORDER — TAMSULOSIN HYDROCHLORIDE 0.4 MG/1
0.8 CAPSULE ORAL DAILY
Qty: 60 CAPSULE | Refills: 5 | Status: SHIPPED | OUTPATIENT
Start: 2017-01-17 | End: 2017-03-07 | Stop reason: SDUPTHER

## 2017-01-17 RX ORDER — SERTRALINE HYDROCHLORIDE 25 MG/1
25 TABLET, FILM COATED ORAL DAILY
Qty: 30 TABLET | Refills: 5 | Status: SHIPPED | OUTPATIENT
Start: 2017-01-17 | End: 2017-05-09 | Stop reason: ALTCHOICE

## 2017-01-17 NOTE — TELEPHONE ENCOUNTER
Spoke with patient's daughter who stated that he is taking Flomax and Cardura.  Message was sent to Dr. Bunn.

## 2017-01-17 NOTE — TELEPHONE ENCOUNTER
Chart shows patient to be on both cardura 4mg and tamulosin (flomax) 0.8 mg. Please call him and determine which he is taking.     i denied the cardura refill request until we determine.    Thank you.

## 2017-01-17 NOTE — PROGRESS NOTES
Subjective:       Patient ID: Nickolas Blake is a 85 y.o. male.    Chief Complaint: Follow-up    HPI  Review of Systems   Constitutional: Negative for chills, fatigue and fever.   HENT: Negative for congestion and trouble swallowing.    Eyes: Negative for redness.   Respiratory: Negative for cough, chest tightness and shortness of breath.    Cardiovascular: Negative for chest pain, palpitations and leg swelling.   Gastrointestinal: Negative for abdominal pain and blood in stool.   Genitourinary: Negative for hematuria.   Musculoskeletal: Positive for arthralgias, back pain and gait problem. Negative for joint swelling, myalgias and neck pain.   Skin: Negative for color change and rash.   Neurological: Negative for tremors, speech difficulty, weakness, numbness and headaches.   Hematological: Negative for adenopathy. Does not bruise/bleed easily.   Psychiatric/Behavioral: Positive for sleep disturbance. Negative for behavioral problems and confusion. The patient is not nervous/anxious.        Objective:      Physical Exam   Constitutional: He is oriented to person, place, and time. He appears well-developed and well-nourished. No distress.   Neck: Neck supple.   Pulmonary/Chest: Effort normal.   Musculoskeletal: He exhibits no edema.        Right lower leg: He exhibits no edema.        Left lower leg: He exhibits no edema.   Neurological: He is alert and oriented to person, place, and time.   Skin: Skin is warm and dry. No rash noted.   Psychiatric: He has a normal mood and affect. His behavior is normal. Judgment and thought content normal.   Nursing note and vitals reviewed.      Assessment:       1. Type 2 diabetes mellitus with stage 3 chronic kidney disease, with long-term current use of insulin    2. 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 edema, with long-term current use of insulin    3. Type 2 diabetes mellitus with diabetic polyneuropathy,  "with long-term current use of insulin    4. Mild cognitive impairment with memory loss    5. Osteoarthritis of spine with radiculopathy, lumbar region    6. Primary osteoarthritis of right hip    7. S/P laminectomy with spinal fusion    8. BPH (benign prostatic hypertrophy) with urinary retention    9. Hyperlipidemia, unspecified hyperlipidemia type    10. Hypertension, essential    11. ASCVD (arteriosclerotic cardiovascular disease)        Plan:   Nickolas was seen today for follow-up.    Diagnoses and all orders for this visit:    Type 2 diabetes mellitus with stage 3 chronic kidney disease, with long-term current use of insulin  -     Ambulatory consult to Endocrinology    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 edema, with long-term current use of insulin  -     Ambulatory consult to Endocrinology  -     Ambulatory referral to Optometry    Type 2 diabetes mellitus with diabetic polyneuropathy, with long-term current use of insulin    Mild cognitive impairment with memory loss  -     Ambulatory referral to Neurology    Osteoarthritis of spine with radiculopathy, lumbar region    Primary osteoarthritis of right hip    S/P laminectomy with spinal fusion    BPH (benign prostatic hypertrophy) with urinary retention    Hyperlipidemia, unspecified hyperlipidemia type    Hypertension, essential    ASCVD (arteriosclerotic cardiovascular disease)    Other orders  -     tamsulosin (FLOMAX) 0.4 mg Cp24; Take 2 capsules (0.8 mg total) by mouth once daily.  -     sertraline (ZOLOFT) 25 MG tablet; Take 1 tablet (25 mg total) by mouth once daily.  -     donepezil (ARICEPT) 10 MG tablet; Take 1 tablet (10 mg total) by mouth once daily.  -     insulin aspart (NOVOLOG) 100 unit/mL InPn pen; Inject 7 Units into the skin 3 (three) times daily.  -     BD INSULIN PEN NEEDLE UF SHORT 31 gauge x 5/16" Ndle; Inject 1 application into the skin 5 (five) times daily.      See " meds, orders, follow up, routing and instructions sections of encounter.  The patient presents with his daughter-in-law, Angella Mc, and Gifty who are   caretakers.  There were several questions concerning his medications upon his   recent discharge.  My question was the fact that he is on two different prostate   medications.  In clarifying this and looking at his last Urology note, the   medicine he should be on is tamsulosin 0.4 mg, two tablets daily and also   finasteride.  Cardura was discontinued and I advised to this effect.  His   daughter Anne was also on the telephone during our conversations today.    There was some confusion concerning his insulin dosing.  He was taking the   following regimen:  Pen and shot in the morning.  Pen at noon.  Pen and shot at dinner.  Shot late in the evening.    I reviewed his medications and his regimen should be as follows:  Lantus 21 mg by injection twice daily.  NovoLog 7 units three times daily by FlexPen.    I advised his caretakers that he could take the early and late doses of the   Lantus along with the NovoLog and did remind him that he should not skip meals   when taking these medications.  He has had no hypoglycemic episodes.  His last   A1c was 8.5 in December, down from 10.3 earlier in the year.    He is reporting pain in the right lower abdomen radiating to the right testicle   or groin area and states that this is part of his pain symptoms.  He is not   having any other genitourinary complaints at this time.    He has been evaluated by Dr. Parker recently who stopped his fentanyl patch and   increased his oxycodone to 10 mg t.i.d.  I did delete the 7.5 mg dosing from   his medical record.  He did have x-rays of the right hip in 2016 with mild DJD.    I reiterated the fact that he should not be driving.    I reiterated the fact that he did have some assistance around the home.  They   informed me that he had some other sitters that they had to let go.  He  is   currently having a three-hour in the morning and three-hour in the afternoon.    He has no other acute complaints at this time.  I did recommend some followup   consults and I will see him back in six weeks to make sure that his medications   and care is in order as his family has put great effort to coordinate   everything.      PHYLLIS/HN  dd: 01/17/2017 17:21:16 (CST)  td: 01/18/2017 11:36:21 (CST)  Doc ID   #[id number]  Job ID #[id number]    CC:

## 2017-01-17 NOTE — MR AVS SNAPSHOT
[facility] - [facility] Medicine  [address]  [facility] [address]  Phone: [phone]  Fax: [phone]                  Nickolas Blake   2017 3:00 PM   Office Visit    Description:  Male : 1931   Provider:  Joe Bunn MD   Department:  [facility] - Internal Medicine           Reason for Visit     Follow-up           Diagnoses this Visit        Comments    Type 2 diabetes mellitus with stage 3 chronic kidney disease, with long-term current use of insulin    -  Primary     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 edema, with long-term current use of insulin         Type 2 diabetes mellitus with diabetic polyneuropathy, with long-term current use of insulin         Mild cognitive impairment with memory loss         Osteoarthritis of spine with radiculopathy, lumbar region         Primary osteoarthritis of right hip         S/P laminectomy with spinal fusion         BPH (benign prostatic hypertrophy) with urinary retention         Hyperlipidemia, unspecified hyperlipidemia type         Hypertension, essential         ASCVD (arteriosclerotic cardiovascular disease)                To Do List           Future Appointments        Provider Department Dept Phone    2017 9:00 AM Stephenie Cardona NP [facility] [phone]      Your Future Surgeries/Procedures     2017   Surgery with Liyah Parker MD   [facility] ([facility])    [address] [facility] [id number] [phone]              Goals (5 Years of Data)              1/3/17    12/19/16    11/7/16    Patient/caregiver will accept life style changes to manage and improve blood sugars prior to discharge from [facility].   On track  On track  On track    Notes - Note edited  1/3/2017 10:17 AM by Melissa Montalvo RN    Overall Time to Completion  2 months from 10/17/2016    Short Term Goals  Patient/caregiver  will measure and record the capillary blood glucose 2 times per day for 3 weeks.  Interventions   · Mail Blood glucose logs for home use.   · Inquire about available accucheck machine in the home  · Inquire about current frequency of obtaining blood sugars.  · Inform on the importance of home monitoring of blood sugars.   Status  · Met    ·     Patient/caregiver will verbalize 4 food items Diabetic within 3 weeks.  Interventions   · Recognize and provide educational material (KRAMES).  · Encourage Dietary Compliance.  · Review eating/nutrition habits.   Status  · Partially met    Patient/caregiver will verbalize 4 risk factors of Hypertension Diabetes within 2 months.  Interventions   · Recognize and provide educational material (KRAMES).  · Encourage Dietary Compliance.  · Review eating/nutrition habits.  · Complete medication reconciliation.  · Encourage Medication Compliance.   Status  · Partially met    Patient/caregiver will verbalize 2 signs and symptoms of Hypertension Diabetes within 2 months.   Interventions   · Recognize and provide educational material (KRAMES).   Status  · Partially met    Patient/caregiver will verbalize current HbA1c value and HbA1c goal within 2 months.  Interventions   · Empower patient/caregiver to discuss treatment plan with Physician/care team.  · Recognize and provide educational material (KRAMES).  · Encourage Dietary Compliance.  · Review eating/nutrition habits.   Status  · Partially met    Patient/caregiver will verbalize food required to create a well-balanced food plate within 2 months.  Interventions   · Recognize and provide educational material (KRAMES).  · Encourage Dietary Compliance.  · Review eating/nutrition habits.   Status  · Partially met         Clinical Reference Documents Added to Patient Instructions       Document    BLOOD SUGAR LOG, USING A (ENGLISH)        DIABETES, DIET (ENGLISH)    DIABETES, HEALTHY MEALS FOR (ENGLISH)    DIABETES, MANAGING: THE A1C TEST  "(ENGLISH)    DIABETES, MEAL PLANNING (ENGLISH)    DIABETES, SERVING AND PORTION SIZES, LEARNING ABOUT (ENGLISH)              Follow-Up and Disposition     Return in about 6 weeks (around 2/28/2017) for Keep appointments with specialty providers..    Follow-up and Disposition History       These Medications        Disp Refills Start End    tamsulosin (FLOMAX) 0.4 mg Cp24 60 capsule 5 1/17/2017 1/17/2018    Take 2 capsules (0.8 mg total) by mouth once daily. - Oral    Pharmacy: [facility] - [address] Ph #: [phone]       sertraline (ZOLOFT) 25 MG tablet 30 tablet 5 1/17/2017     Take 1 tablet (25 mg total) by mouth once daily. - Oral    Pharmacy: [facility] - [place] [id number] [facility] Ph #: [phone]       donepezil (ARICEPT) 10 MG tablet 30 tablet 5 1/17/2017     Take 1 tablet (10 mg total) by mouth once daily. - Oral    Pharmacy: [facility] - [address] Ph #: [phone]       insulin aspart (NOVOLOG) 100 unit/mL InPn pen 1 Box 5 1/17/2017     Inject 7 Units into the skin 3 (three) times daily. - Subcutaneous    Pharmacy: [facility] - [place] [id number] [facility] Ph #: [phone]       BD INSULIN PEN NEEDLE UF SHORT 31 gauge x 5/16" Ndle 450 each 11 1/17/2017     Inject 1 application into the skin 5 (five) times daily. - Subcutaneous    Pharmacy: [facility] - [place] [id number] [facility] Ph #: [phone]         [facility]     [facility] On Call Nurse Care Line - 24/7 Assistance  Registered nurses in the [facility] provide clinical advisement, health education, appointment booking, and other advisory services.  Call for this free service at [phone].             Medications           Message regarding Medications     Verify the changes and/or additions to your medication regime listed below are the same as " "discussed with your clinician today.  If any of these changes or additions are incorrect, please notify your healthcare provider.        CHANGE how you are taking these medications     Start Taking Instead of    BD INSULIN PEN NEEDLE UF SHORT 31 gauge x 5/16" Ndle BD INSULIN PEN NEEDLE UF SHORT 31 gauge x 5/16" Ndle    Dosage:  Inject 1 application into the skin 5 (five) times daily.     Reason for Change:  Reorder       STOP taking these medications     doxazosin (CARDURA) 4 MG tablet Take 1 tablet (4 mg total) by mouth every evening.    oxycodone-acetaminophen (PERCOCET) 7.5-325 mg per tablet Take 1 tablet by mouth every 6 (six) hours as needed for Pain.           Verify that the below list of medications is an accurate representation of the medications you are currently taking.  If none reported, the list may be blank. If incorrect, please contact your healthcare provider. Carry this list with you in case of emergency.           Current Medications     ascorbic acid, vitamin C, (VITAMIN C) 250 MG tablet Take 1 tablet (250 mg total) by mouth 2 (two) times daily.    aspirin (ECOTRIN) 81 MG EC tablet Take 81 mg by mouth once daily.    atorvastatin (LIPITOR) 20 MG tablet TAKE 1 TABLET ONE TIME DAILY    BD ALCOHOL SWABS PadM USE FOUR TIMES DAILY    BD INSULIN PEN NEEDLE UF SHORT 31 gauge x 5/16" Ndle Inject 1 application into the skin 5 (five) times daily.    blood sugar diagnostic (BLOOD GLUCOSE TEST) Strp 1 strip by Misc.(Non-Drug; Combo Route) route 3 (three) times daily with meals. To use with Accucheck meter    donepezil (ARICEPT) 10 MG tablet Take 1 tablet (10 mg total) by mouth once daily.    finasteride (PROSCAR) 5 mg tablet TAKE 1 TABLET EVERY DAY    fluticasone (FLONASE) 50 mcg/actuation nasal spray 2 sprays by Each Nare route daily as needed for Allergies.    insulin aspart (NOVOLOG) 100 unit/mL InPn pen Inject 7 Units into the skin 3 (three) times daily.    insulin glargine (LANTUS) 100 unit/mL injection " "Inject 21 Units into the skin 2 (two) times daily. Every 12 hours.    lisinopril (PRINIVIL,ZESTRIL) 2.5 MG tablet Take 1 tablet (2.5 mg total) by mouth once daily.    metoprolol succinate (TOPROL-XL) 50 MG 24 hr tablet Take 1 tablet (50 mg total) by mouth once daily. Dose increase, stop 25 mg    nitroGLYCERIN (NITROSTAT) 0.4 MG SL tablet Place 1 tablet (0.4 mg total) under the tongue every 5 (five) minutes as needed for Chest pain.    omeprazole (PRILOSEC) 20 MG capsule Take 1 capsule (20 mg total) by mouth once daily.    senna-docusate 8.6-50 mg (PERICOLACE) 8.6-50 mg per tablet Take 1 tablet by mouth 2 (two) times daily as needed for Constipation.    sertraline (ZOLOFT) 25 MG tablet Take 1 tablet (25 mg total) by mouth once daily.    tamsulosin (FLOMAX) 0.4 mg Cp24 Take 2 capsules (0.8 mg total) by mouth once daily.    docusate sodium (COLACE) 100 MG capsule Take 1 capsule (100 mg total) by mouth once daily.    ferrous gluconate (FERGON) 324 MG tablet Take 1 tablet (324 mg total) by mouth 2 (two) times daily with meals.    gabapentin (NEURONTIN) 300 MG capsule Take 1 capsule (300 mg total) by mouth 3 (three) times daily.    insulin syringe-needle U-100 1/2 mL 31 gauge x 15/64" Syrg 1 Syringe by Misc.(Non-Drug; Combo Route) route 2 (two) times daily. With insulin injections twice daily    multivitamin (THERAGRAN) per tablet Take 1 tablet by mouth once daily.    oxycodone-acetaminophen (PERCOCET)  mg per tablet Take 1 tablet by mouth every 8 (eight) hours as needed for Pain.           Clinical Reference Information           Vital Signs - Last Recorded  Most recent update: 1/17/2017  3:13 PM by Jena Flynn MA    BP Pulse Temp Ht Wt SpO2    (!) 120/50 74 98 °F (36.7 °C) 5' 11" (1.803 m) 78.6 kg (173 lb 4.5 oz) 98%    BMI                24.17 kg/m2          Blood Pressure          Most Recent Value    BP  (!)  120/50      Allergies as of 1/17/2017     No Known Allergies      Immunizations Administered on " Date of Encounter - 1/17/2017     None      Orders Placed During Today's Visit      Normal Orders This Visit    Ambulatory consult to Endocrinology     Ambulatory referral to Neurology     Ambulatory referral to Optometry       Instructions    Melatonin for sleep over the counter

## 2017-01-20 ENCOUNTER — OUTPATIENT CASE MANAGEMENT (OUTPATIENT)
Dept: ADMINISTRATIVE | Facility: OTHER | Age: 82
End: 2017-01-20

## 2017-01-20 ENCOUNTER — PATIENT MESSAGE (OUTPATIENT)
Dept: INTERNAL MEDICINE | Facility: CLINIC | Age: 82
End: 2017-01-20

## 2017-01-20 ENCOUNTER — TELEPHONE (OUTPATIENT)
Dept: INTERNAL MEDICINE | Facility: CLINIC | Age: 82
End: 2017-01-20

## 2017-01-20 NOTE — TELEPHONE ENCOUNTER
----- Message from Melissa Montalvo RN sent at 1/20/2017 10:44 AM CST -----  Contact: MERCY Gallegos this is Melissa with [facility]. The sitter with this patient reports that his BP this morning was 194/70. Pt reports that he ate peanuts last night and other snacks because his BS was 53.  Thanks

## 2017-01-20 NOTE — PATIENT INSTRUCTIONS
Hypoglycemia (Low Blood Sugar)  Too little sugar (glucose) in your blood is called hypoglycemia or low blood sugar. Low blood sugar usually means anything lower than 70 mg/dL. Talk with your healthcare provider about your target range and what level is too low for you. Diabetes itself doesnt cause low blood sugar. But some of the treatments for diabetes, such as pills or insulin, may raise your risk for it. Low blood sugar may cause you to pass out or have a seizure. So always treat low blood sugar right away, but don't overeat.  Special note: Always carry a source of fast-acting sugar and a snack in case of hypoglycemia.     What you may notice  If you have low blood sugar, you may have one or more of these symptoms:  · Shakiness or dizziness  · Cold, clammy skin or sweating  · Feelings of hunger  · Headache  · Nervousness  · A hard, fast heartbeat  · Weakness  · Confusion or irritability  · Blurred vision  · Having nightmares or waking up confused or sweating  · Numbness or tingling in the lips or tongue  What you should do  Here are tips to follow if you have hypoglycemia:   · First check your blood sugar. If it is too low (out of your target range), eat or drink 15 to 20 grams of fast-acting sugar. This may be 3 to 4 glucose tablets, 4 ounces (half a cup) of fruit juice or regular (nondiet) soda, 8 ounces (1 cup) of fat-free milk, or 1 tablespoon of honey. Dont take more than this, or your blood sugar may go too high.  · Wait 15 minutes. Then recheck your blood sugar if you can.  · If your blood sugar is still too low, repeat the steps above and check your blood sugar again. If your blood sugar still has not returned to your target range, contact your healthcare provider or seek emergency care.  · Once your blood sugar returns to target range, eat a snack or meal.  Preventing low blood sugar  Things you can do include the following:   · If your condition needs a strict treatment plan, eat your meals and  snacks at the same times each day. Dont skip meals!  · If your treatment plan lets you change when you eat and what you eat, learn how to change the time and dose of your rapid-acting insulin to match this.   · Ask your healthcare provider if it is safe for you to drink alcohol. Never drink on an empty stomach.  · Take your medicine at the prescribed times.  · Always carry a source of fast-acting sugar and a snack when youre away from home.  Other things to do  Additional tips include the following:  · Carry a medical ID card, a compact USB drive, or wear a medical alert bracelet or necklace. It should say that you have diabetes. It should also say what to do if you pass out or have a seizure.  · Make sure your family, friends, and coworkers know the signs of low blood sugar. Tell them what to do if your blood sugar falls very low and you cant treat yourself.  · Keep a glucagon emergency kit handy. Be sure your family, friends, and coworkers know how and when to use it. Check it regularly and replace the glucagon before it expires.  · Talk with your health care team about other things you can do to prevent low blood sugar.     If you have unexplained hypoglycemia or hypoglycemia several times, call your healthcare provider.   © 3657-5741 [employer]. [address] [place] [address], [place] [address]. All rights reserved. This information is not intended as a substitute for professional medical care. Always follow your healthcare professional's instructions.        Diet: Diabetes  Food is an important tool that you can use to control diabetes and stay healthy. Eating well-balanced meals in the correct amounts will help you control your blood glucose levels and prevent low blood sugar reactions. It will also help you reduce the health risks of diabetes. There is no one specific diet that is right for everyone with diabetes. But there are general guidelines to follow. A registered dietitian (ALMA) will create a  tailored diet approach thats just right for you. He or she will also help you plan healthy meals and snacks. If you have any questions, call your dietitian for advice.    Guidelines for success  Talk with your healthcare provider before starting a diabetes diet or weight loss program. If you haven't talked with a dietitian yet, ask your provider for a referral. The following guidelines can help you succeed:  · Select foods from the 6 food groups below. Your dietitian will help you find food choices within each group. He or she will also show you serving sizes and how many servings you can have at each meal.  ¨ Grains, beans, and starchy vegetables  ¨ Vegetables  ¨ Fruit  ¨ Milk or yogurt  ¨ Meat, poultry, fish, or tofu  ¨ Healthy fats  · Check your blood sugar levels as directed by your provider. Take any medicine as prescribed by your provider.  · Learn to read food labels and pick the right portion sizes.  · Eat only the amount of food in your meal plan. Eat about the same amount of food at regular times each day. Dont skip meals. Eat meals 4 to 5 hours apart, with snacks in between.  · Limit alcohol. It raises blood sugar levels. Drink water or calorie-free diet drinks that use safe sweeteners.  · Eat less fat to help lower your risk of heart disease. Use nonfat or low-fat dairy products and lean meats. Avoid fried foods. Use cooking oils that are unsaturated, such as olive, canola, or peanut oil.  · Talk with your dietitian about safe sugar substitutes.  · Avoid added salt. It can contribute to high blood pressure, which can cause heart disease. People with diabetes already have a risk of high blood pressure and heart disease.  · Stay at a healthy weight. If you need to lose weight, cut down on your portion sizes. But dont skip meals. Exercise is an important part of any weight management program. Talk with your provider about an exercise program thats right for you.  · For more information about the best  diet plan for you, talk with a registered dietitian (RD). To find an RD in your area, contact:  ¨ Academy of Nutrition and Dietetics www.eatright.org  ¨ The American Diabetes Association [phone] www.diabetes.org  © 3770-0477 [employer]. [address] [place] [address]. All rights reserved. This information is not intended as a substitute for professional medical care. Always follow your healthcare professional's instructions.        Healthy Meals for Diabetes  Ask your healthcare team to help you make a meal plan that fits your needs. Your meal plan tells you when to eat your meals and snacks, what kinds of foods to eat, and how much of each food to eat. You dont have to give up all the foods you like. But you do need to follow some guidelines.  Choose healthy carbohydrates    Starches, sugars, and fiber are all types of carbohydrates. Fiber can help lower your cholesterol and triglycerides. Fiber is also healthy for your heart. You should have 20 to 35 grams of total fiber each day. Fiber-rich foods include:  · Whole-grain breads and cereals  · Bulgur wheat  · Brown rice     · Whole-wheat pasta  · Fruits and vegetables  · Dry beans, and peas   Keep track of the amount of carbohydrates you eat. This can help you keep the right balance of physical activity and medicine. The amount of carbohydrates needed will vary for each person. It depends on many things such as your health, the medicines you take, and how active you are. Your healthcare team will help you figure out the right amount of carbohydrates for you. You may start with around 45 to 60 grams of carbohydrates per meal, depending on your situation.   Here are some examples of foods containing about 15 grams of carbohydrates (1 serving of carbohydrates):  · 1/2 cup of canned or frozen fruit  · A small piece of fresh fruit (4 ounces)  · 1 slice of bread  · 1/2 cup of oatmeal  · 1/3 cup of rice  · 4 to 6 crackers  · 1/2 English  muffin  · 1/2 cup of black beans  · 1/4 of a large baked potato (3 ounces)  · 2/3 cup of plain fat-free yogurt  · 1 cup of soup  · 1/2 cup of casserole  · 6 chicken nuggets  · 2-inch-square brownie or cake without frosting  · 2 small cookies  · 1/2 cup of ice cream or sherbet  Choose healthy protein foods  Eating protein that is low in fat can help you control your weight. It also helps keep your heart healthy. Low-fat protein foods include:  · Fish  · Plant proteins, such as dry beans and peas, nuts, and soy products like tofu and soymilk  · Lean meat with all visible fat removed  · Poultry with the skin removed  · Low-fat or nonfat milk, cheese, and yogurt  Limit unhealthy fats and sugar  Saturated and trans fats are unhealthy for your heart. They raise LDL (bad) cholesterol. Fat is also high in calories, so it can make you gain weight. To cut down on unhealthy fats and sugar, limit these foods:  · Butter or margarine  · Palm and palm kernel oils and coconut oil  · Cream  · Cheese  · [place]  · Lunch meats     · Ice cream  · Sweet bakery goods such as pies, muffins, and donuts  · Jams and jellies  · Candy bars  · Regular sodas   How much to eat  The amount of food you eat affects your blood sugar. It also affects your weight. Your healthcare team will tell you how much of each type of food you should eat.  · Use measuring cups and spoons and a food scale to measure serving sizes.  · Learn what a correct serving size looks like on your plate. This will help when you are away from home and cant measure your servings.  · Eat only the number of servings given on your meal plan for each food. Dont take seconds.  · Learn to read food labels. Be sure to look at serving size, total carbohydrates, fiber, calories, sugar, and saturated and trans fats. Look for healthier alternatives to foods that have added sugar.  · Plan ahead for parties so you can still have a good time without going overboard with unhealthy food  choices. Set a good example yourself by bringing a healthy dish to [employer].   Choose healthy snacks  When it comes to snacks, we usually think about foods with added sugar and fats. But there are many other options for healthier snack choices. Here are a few snack ideas to choose from:  Snacks with less than 5 grams of carbohydrates  · 1 piece of string cheese  · 3 celery sticks plus 1 tablespoon of peanut butter  · 5 cherry tomatoes plus 1 tablespoon of ranch dressing  · 1 hard-boiled egg  · 1/4 cup of fresh blueberries  ·  5 baby carrots  · 1 cup of light popcorn  · 1/2 cup of sugar-free gelatin  · 15 almonds  Snacks with about 10 to 20 grams of carbohydrates  · 1/3 cup of hummus plus 1 cup of fresh cut nonstarchy vegetables (carrots, green peppers, broccoli, celery, or a combination)  · 1/2 cup of fresh or canned fruit plus 1/4 cup of cottage cheese  · 1/2 cup of tuna salad with 4 crackers  · 2 rice cakes and a tablespoon of peanut butter  · 1 small apple or orange  · 3 cups light popcorn  · 1/2 of a turkey sandwich (1 slice of whole-wheat bread, 2 ounces of turkey, and mustard)  Portion sizes are important to controlling your blood sugar and staying at a healthy weight. Stock up on healthy snack items so you always have them on hand.  When to eat  Your meal plan will likely include breakfast, lunch, dinner, and some snacks.  · Try to eat your meals and snacks at about the same times each day.  · Eat all your meals and snacks. Skipping a meal or snack can make your blood sugar drop too low. It can also cause you to eat too much at the next meal or snack. Then your blood sugar could get too high.  © 7466-8724 [employer]. [address] [place] [address], [place] [address]. All rights reserved. This information is not intended as a substitute for professional medical care. Always follow your healthcare professional's instructions.        Diabetes: Meal Planning  You can help keep your blood sugar level in  your target range by eating healthy foods. Your healthcare team can help you create a low-fat, nutritious meal plan. Take an active role in your diabetes management by following your meal plan and working with your healthcare team.    Make your meal plan  A meal plan gives guidelines for the types and amounts of food you should eat. The goal is to balance food and insulin (or other diabetes medications) so your blood sugars will be in your target range. Your dietitian will help you make a flexible meal plan that includes many foods that you like.  Watch serving sizes  Your meal plan will group foods by servings. To learn how much a serving is, start by measuring food portions at each meal. Soon youll know what a serving looks like on your plate. Ask your healthcare provider about how to balance servings of different foods.  Eat from all the food groups  The basis of a healthy meal plan is variety (eating lots of different foods). Choose lean meats, fresh fruits and vegetables, whole grains, and low-fat or nonfat dairy products. Eating a wide variety of foods provides the nutrients your body needs. It can also keep you from getting bored with your meal plan.  Learn about carbohydrates, fats, and protein  · Carbohydrates are starches, sugars, and fiber. They are found in many foods, including fruit, bread, pasta, milk, and sweets. Of all the foods you eat, carbohydrates have the most effect on your blood sugar. Your dietitian may teach you about carb counting, a way to figure out the number of carbohydrates in a meal.  · Fats have the most calories. They also have the most effect on your weight and your risk of heart disease. When you have diabetes, its important to control your weight and protect your heart. Foods that are high in fat include whole milk, cheese, snack foods, and desserts.  · Protein is important for building and repairing muscles and bones. Choose low-fat protein sources, such as fish, egg whites,  and skinless chicken.  Reduce liquid sugars  Extra calories from sodas, sports drinks, and fruit drinks make it hard to keep blood sugar in range. Cut as many liquid sugars from your meal plan as you can.  This includes most fruit juices, which are often high in natural or added sugar. Instead, drink plenty of water and other sugar-free beverages.  Eat less fat  If you need to lose weight, try to reduce the amount of fat in your diet. This can also help lower your cholesterol level to keep blood vessels healthier. Cut fat by using only small amounts of liquid oil for cooking. Read food labels carefully to avoid foods with unhealthy trans fats.  Timing your meals  When it comes to blood sugar control, when you eat is as important as what you eat. You may need to eat several small meals spaced evenly throughout the day to stay in your target range. So dont skip breakfast or wait until late in the day to get most of your calories. Doing so can cause your blood sugar to rise too high or fall too low.   © 8896-3521 [employer]. [address] [place] [address]. All rights reserved. This information is not intended as a substitute for professional medical care. Always follow your healthcare professional's instructions.        Diabetes: Learning About Serving and Portion Sizes  Servings and portions. Whats the difference? These terms can be very confusing. But learning to measure serving sizes can help you figure out how many carbohydrates (carbs) and other foods you eat each day. They are also powerful tools for managing your weight.  Servings and portions     A good rule of thumb: Devote half your plate to vegetables and green salad. Split the other half between protein and starchy carbohydrates. Fruit makes a good dessert.   Many different words are used to describe amounts of food. If your health care provider uses a term youre not sure of, dont be afraid to ask. It helps to know the  difference between servings and portions:  · A serving size is a fixed size. Food producers use this term to describe their products. For example, the label on a cereal box could say that 1 cup of dry cereal = 1 serving.  · A portion (also called a helping) is how much you eat or how much you put on your plate at a meal. For example, you might eat 2 cups of cereal at breakfast.  Using serving information  The portion you choose to eat (such as 2 cups of cereal) may be more than one serving as listed on the food label (such as 1 cup of cereal). Thats why it helps to measure or weigh the food you eat. Because the food label values are based on servings, youll need to know how many servings you eat at one sitting.     Ounces: 2 to 3 ounces is about the size of your palm.       1 Cup: 1 cup (or a medium-sized piece) is about the size of your fist.       1/2 Cup: 1/2 cup is about the size of your cupped hand.      One tablespoon is about the size of your thumb.  One teaspoon is about the size of the tip of your thumb.  Keeping track of serving sizes  When youre planning for a snack or a meal, keep servings in mind. If you dont have measuring cups or a scale handy, there are ways to eyeball serving sizes, such as comparing your food to the size of your hand (see pictures above).  Managing portion sizes  If your weight is a concern, reducing your portions can help. You can eat more than one serving of a food at once. But to keep from eating too much at one meal, learn how to manage your portions. A portion is the amount of each type of food on your plate. See the plate diagram for an example of balanced portions.  © 9468-6018 [employer]. [address] [place] [address], [place] [address]. All rights reserved. This information is not intended as a substitute for professional medical care. Always follow your healthcare professional's instructions.        Diabetes: Shopping for and Preparing Meals  Having  "diabetes doesnt mean you have to shop in a special aisle or look for special foods. But you will need to make choices. By comparing items and reading food labels, you can find the healthiest foods for you and your family.  Comparing items    When you shop, compare items to find the best ones for your needs. Keep these facts in mind:  · No sugar added does not mean a product is sugar-free.  · "Sugar-free" means less than 1/2 gram (g) of sugar per serving.  · Fat free means less than 1/2 g of fat per serving. This does not necessarily mean the product is low in calories.  · Low fat means 3 g fat or less per serving. Reduced fat or less fat means 25% less fat than the regular version. Some of this fat may be saturated or trans fat. And calories per serving may be similar to the regular version.  Making small changes  Dont try to change all of your eating habits at once. Here are some ideas to start with:  · Try fat-free or low-fat cheese, milk, and yogurt. Also try leaner cuts of meat. This will help you cut down on saturated fat.  · Try whole-grain breads, brown rice, and whole-wheat pasta.  · Load up on fresh or frozen vegetables. If you buy canned, choose low-sodium vegetables.  · Avoid processed foods as much as possible. They tend to be low in fiber and high in trans fats and sodium.  · Try tofu, soymilk, or meat substitutes.?They can help you cut cholesterol and saturated fat out of your diet.  Learning to read food labels  To find healthy foods that help you control blood sugar, learn how to read food labels. Look for the Nutrition Facts label on packaged foods. It will tell you how much carbohydrate, sugar, fat, and fiber is in each serving. Then, you can decide whether or not the food fits into your meal plan.  Using the food label  So, once you have the food label, what do you do with it? The food label helps in many ways. Use it to:  · Compare items and decide which is the best for your health " needs.  · Track the number of carbohydrates in your portions.  · Figure out how many servings of a food you can have and still stay within the number of carbohydrates for that meal.  Planning meals  For good blood sugar control, plan what and when youll eat. Start by creating a meal plan that includes all the food groups. Then, time your meals to help keep your blood sugar level steady. You may need to adjust your plan for special situations.  Eat from all the food groups  The basis of a healthy meal plan is variety (eating many different types of foods). Look for lean meats, fresh fruits and vegetables, whole grains, and low-fat or non-fat dairy products. Eating a wide variety of foods provides the nutrients your body needs. It can also keep you from getting bored with your meal plan.  Reduce liquid sugars  Extra calories from sodas, sports drinks, and fruit drinks make it hard to keep blood sugar in range. Cut as many liquid sugars from your meal plan as you can. This includes most fruit juices, which are often high in natural or added sugar. Instead, drink plenty of water and other sugar-free beverages.  Eat less fat  If you need to lose some weight, try to reduce the amount of fat in your diet. This can also help lower your cholesterol level to keep blood vessels healthier. Cut fat by using only small amounts of liquid oil for cooking. Read food labels carefully to avoid foods with unhealthy trans fats.  Timing your meals  When it comes to blood sugar control, when you eat is as important as what you eat. You may need to eat several small meals spaced evenly throughout the day to stay in your target range. So dont skip breakfast or wait until late in the day to get most of your calories. Doing so can cause your blood sugar to rise too high or fall too low.  Cooking wisely     Aim for meals that include foods from all the food groups.    · Broil, steam, bake, or grill meats and vegetables, instead of  "frying.  · Instead of cream-based sauces or sugary glazes, flavor foods with vegetable purée, lemon or lime juice, or herb seasonings.  · Remove skin from chicken and turkey before serving.  · Look in cookbooks for easy, low-fat, low-sugar recipes. When making your usual recipes, cut sugar by 1/2 and fat by 1/3.  © 3676-4035 [employer]. [address] [place] [address], [place] [address]. All rights reserved. This information is not intended as a substitute for professional medical care. Always follow your healthcare professional's instructions.        Diabetes: Shopping for and Preparing Meals  Having diabetes doesnt mean you have to shop in a special aisle or look for special foods. But you will need to make choices. By comparing items and reading food labels, you can find the healthiest foods for you and your family.  Comparing items    When you shop, compare items to find the best ones for your needs. Keep these facts in mind:  · No sugar added does not mean a product is sugar-free.  · "Sugar-free" means less than 1/2 gram (g) of sugar per serving.  · Fat free means less than 1/2 g of fat per serving. This does not necessarily mean the product is low in calories.  · Low fat means 3 g fat or less per serving. Reduced fat or less fat means 25% less fat than the regular version. Some of this fat may be saturated or trans fat. And calories per serving may be similar to the regular version.  Making small changes  Dont try to change all of your eating habits at once. Here are some ideas to start with:  · Try fat-free or low-fat cheese, milk, and yogurt. Also try leaner cuts of meat. This will help you cut down on saturated fat.  · Try whole-grain breads, brown rice, and whole-wheat pasta.  · Load up on fresh or frozen vegetables. If you buy canned, choose low-sodium vegetables.  · Avoid processed foods as much as possible. They tend to be low in fiber and high in trans fats and sodium.  · Try tofu, " soymilk, or meat substitutes.?They can help you cut cholesterol and saturated fat out of your diet.  Learning to read food labels  To find healthy foods that help you control blood sugar, learn how to read food labels. Look for the Nutrition Facts label on packaged foods. It will tell you how much carbohydrate, sugar, fat, and fiber is in each serving. Then, you can decide whether or not the food fits into your meal plan.  Using the food label  So, once you have the food label, what do you do with it? The food label helps in many ways. Use it to:  · Compare items and decide which is the best for your health needs.  · Track the number of carbohydrates in your portions.  · Figure out how many servings of a food you can have and still stay within the number of carbohydrates for that meal.  Planning meals  For good blood sugar control, plan what and when youll eat. Start by creating a meal plan that includes all the food groups. Then, time your meals to help keep your blood sugar level steady. You may need to adjust your plan for special situations.  Eat from all the food groups  The basis of a healthy meal plan is variety (eating many different types of foods). Look for lean meats, fresh fruits and vegetables, whole grains, and low-fat or non-fat dairy products. Eating a wide variety of foods provides the nutrients your body needs. It can also keep you from getting bored with your meal plan.  Reduce liquid sugars  Extra calories from sodas, sports drinks, and fruit drinks make it hard to keep blood sugar in range. Cut as many liquid sugars from your meal plan as you can. This includes most fruit juices, which are often high in natural or added sugar. Instead, drink plenty of water and other sugar-free beverages.  Eat less fat  If you need to lose some weight, try to reduce the amount of fat in your diet. This can also help lower your cholesterol level to keep blood vessels healthier. Cut fat by using only small  amounts of liquid oil for cooking. Read food labels carefully to avoid foods with unhealthy trans fats.  Timing your meals  When it comes to blood sugar control, when you eat is as important as what you eat. You may need to eat several small meals spaced evenly throughout the day to stay in your target range. So dont skip breakfast or wait until late in the day to get most of your calories. Doing so can cause your blood sugar to rise too high or fall too low.  Cooking wisely     Aim for meals that include foods from all the food groups.    · Broil, steam, bake, or grill meats and vegetables, instead of frying.  · Instead of cream-based sauces or sugary glazes, flavor foods with vegetable purée, lemon or lime juice, or herb seasonings.  · Remove skin from chicken and turkey before serving.  · Look in cookbooks for easy, low-fat, low-sugar recipes. When making your usual recipes, cut sugar by 1/2 and fat by 1/3.  © 0650-2951 [employer]. [address] [place] [address], [place] [address]. All rights reserved. This information is not intended as a substitute for professional medical care. Always follow your healthcare professional's instructions.

## 2017-01-20 NOTE — TELEPHONE ENCOUNTER
BSs 54 and 70s last 2 days.    Discussed with Anne - decrease lantus to 10 units twice a day.    Decrease novolog to 5 before meals.    Call me next week with update.    BP was elevated - monitor and ER if sustained over 200 sustained.

## 2017-01-20 NOTE — TELEPHONE ENCOUNTER
Forwarded message to Dr. Bunn as high priority from Melissa Hess RN stating patient's blood pressure was 194/70 to advise

## 2017-01-20 NOTE — PROGRESS NOTES
Name Nickolas TORRES 1931 Medical Record  # [id number]   Visit Location __ Home __ Clinic x__ Telephonic __Other  Encounter Date:  17   Contact Type __ New Case   _x_ Follow Up  __ Monitoring  __ Case Closure Next Follow-up Date      Summary:Pt gave permission to speak with private care ira Payton. Sitter reports that the patient's blood sugar last night was 53 and 57 yesterday morning. Sitter reports that his BP this morning was 194/70. Pt reports that he ate peanuts last night and a lot of other snacks because his daughter was concerned about his blood sugar reading. Pt reports that he is pleased that his blood sugars have been between 70 to 120. Sitter reports that the patient have been experiencing new onset of memory lost. Jacyter reports that she just started working with the patient and would like information resent on diabetes and blood pressure.     Plan:Inform on DM meal planning HTN

## 2017-01-25 ENCOUNTER — HOSPITAL ENCOUNTER (OUTPATIENT)
Facility: OTHER | Age: 82
Discharge: HOME | End: 2017-01-25
Attending: ANESTHESIOLOGY | Admitting: ANESTHESIOLOGY
Payer: MEDICARE

## 2017-01-25 VITALS
HEIGHT: 71 IN | TEMPERATURE: 99 F | WEIGHT: 178 LBS | BODY MASS INDEX: 24.92 KG/M2 | SYSTOLIC BLOOD PRESSURE: 185 MMHG | OXYGEN SATURATION: 99 % | DIASTOLIC BLOOD PRESSURE: 68 MMHG | HEART RATE: 66 BPM | RESPIRATION RATE: 18 BRPM

## 2017-01-25 DIAGNOSIS — M16.11 PRIMARY OSTEOARTHRITIS OF RIGHT HIP: Primary | ICD-10-CM

## 2017-01-25 DIAGNOSIS — M53.3 SACROILIAC JOINT DYSFUNCTION OF RIGHT SIDE: ICD-10-CM

## 2017-01-25 LAB — POCT GLUCOSE: 96 MG/DL (ref 70–110)

## 2017-01-25 PROCEDURE — 63600175 PHARM REV CODE 636 W HCPCS: Performed by: ANESTHESIOLOGY

## 2017-01-25 PROCEDURE — 27096 INJECT SACROILIAC JOINT: CPT | Mod: RT,,, | Performed by: ANESTHESIOLOGY

## 2017-01-25 PROCEDURE — 25500020 PHARM REV CODE 255: Performed by: ANESTHESIOLOGY

## 2017-01-25 PROCEDURE — 27096 INJECT SACROILIAC JOINT: CPT | Performed by: ANESTHESIOLOGY

## 2017-01-25 PROCEDURE — 82947 ASSAY GLUCOSE BLOOD QUANT: CPT | Performed by: ANESTHESIOLOGY

## 2017-01-25 PROCEDURE — 25000003 PHARM REV CODE 250: Performed by: ANESTHESIOLOGY

## 2017-01-25 PROCEDURE — 20610 DRAIN/INJ JOINT/BURSA W/O US: CPT | Performed by: ANESTHESIOLOGY

## 2017-01-25 RX ORDER — LIDOCAINE HYDROCHLORIDE 10 MG/ML
10 INJECTION INFILTRATION; PERINEURAL
Status: COMPLETED | OUTPATIENT
Start: 2017-01-25 | End: 2017-01-25

## 2017-01-25 RX ORDER — BUPIVACAINE HYDROCHLORIDE 2.5 MG/ML
10 INJECTION, SOLUTION EPIDURAL; INFILTRATION; INTRACAUDAL ONCE
Status: COMPLETED | OUTPATIENT
Start: 2017-01-25 | End: 2017-01-25

## 2017-01-25 RX ORDER — METHYLPREDNISOLONE ACETATE 40 MG/ML
INJECTION, SUSPENSION INTRA-ARTICULAR; INTRALESIONAL; INTRAMUSCULAR; SOFT TISSUE
Status: DISCONTINUED | OUTPATIENT
Start: 2017-01-25 | End: 2017-01-25 | Stop reason: HOSPADM

## 2017-01-25 RX ORDER — METHYLPREDNISOLONE ACETATE 40 MG/ML
40 INJECTION, SUSPENSION INTRA-ARTICULAR; INTRALESIONAL; INTRAMUSCULAR; SOFT TISSUE ONCE
Status: DISCONTINUED | OUTPATIENT
Start: 2017-01-25 | End: 2017-01-25 | Stop reason: HOSPADM

## 2017-01-25 NOTE — OP NOTE
Sacroiliac Joint Injection under Fluoroscopy    Date of procedure 01/25/2017    Time-out taken to identify patient and procedure side prior to starting the procedure.                                                                 PROCEDURE:  rightsacroiliac joint injection under fluoroscopy.    REASON FOR PROCEDURE: right sacroiliac joint pain/sacroiliitis.    PHYSICIAN: Liyah Parker MD  ASSISTANTS: >None    MEDICATIONS INJECTED:  Depo-Medrol 40mg and 4 mL Bupivacaine 0.25%    LOCAL ANESTHETIC USED: Xylocaine 1% 5mL    SEDATION MEDICATIONS: None    ESTIMATED BLOOD LOSS:  None.    COMPLICATIONS:  None.    TECHNIQUE:   Laying in the prone position, the patient was prepped and draped in the usual sterile fashion using ChloraPrep and fenestrated drape.  The area was determined under fluoroscopy.  Local Xylocaine was injected by raising a wheel and going down to the periosteum using a 27-gauge hypodermic needle.  The 3.5 inch 22-gauge spinal needle was introduce into the right sacroiliac joint.  Negative pressure applied to confirm no intravascular placement.  Omnipaque was injected to confirm placement and to confirm that there was no vascular runoff.  The medication was then injected slowly.  The patient tolerated the procedure well.      The patient was monitored for approximately 30 minutes after the procedure.  Patient was given post procedure and discharge instructions to follow at home.  We will see the patient back in two weeks or the patient may call to inform of status. The patient was discharged in a stable condition

## 2017-01-25 NOTE — PLAN OF CARE
Patient tolerated procedure well. Pt reports improved pain. VS stable, AO x 3, GCS 15 noted. Pt assisted up for first time. Steady on feet, strong and steady gait noted. All discharge instructions given. Caregiver informed to monitor patient's glucose levels 3-4 times daily.

## 2017-01-25 NOTE — IP AVS SNAPSHOT
[facility]  [address] [place], [place] [id number]  Phone: [phone]           Patient Discharge Instructions     Our goal is to set you up for success. This packet includes information on your condition, medications, and your home care. It will help you to care for yourself so you don't get sicker and need to go back to the hospital.     Please ask your nurse if you have any questions.        There are many details to remember when preparing to leave the hospital. Here is what you will need to do:    1. Take your medicine. If you are prescribed medications, review your Medication List in the following pages. You may have new medications to  at the pharmacy and others that you'll need to stop taking. Review the instructions for how and when to take your medications. Talk with your doctor or nurses if you are unsure of what to do.     2. Go to your follow-up appointments. Specific follow-up information is listed in the following pages. Your may be contacted by a transition nurse or clinical provider about future appointments. Be sure we have all of the phone numbers to reach you, if needed. Please contact your provider's office if you are unable to make an appointment.     3. Watch for warning signs. Your doctor or nurse will give you detailed warning signs to watch for and when to call for assistance. These instructions may also include educational information about your condition. If you experience any of warning signs to your health, call your doctor.               [facility]  Unless otherwise directed by your provider, please contact [facility], our nurse care line that is available for 24/7 assistance.     [phone] (toll-free)    Registered nurses in the [facility] provide clinical advisement, health education, appointment booking, and other advisory services.                    ** Verify the list of medication(s) below is accurate and up to  "date. Carry this with you in case of emergency. If your medications have changed, please notify your healthcare provider.             Medication List      ASK your doctor about these medications        Additional Info                      ascorbic acid (vitamin C) 250 MG tablet   Commonly known as:  VITAMIN C   Refills:  0   Dose:  250 mg    Instructions:  Take 1 tablet (250 mg total) by mouth 2 (two) times daily.     Begin Date    AM    Noon    PM    Bedtime       aspirin 81 MG EC tablet   Commonly known as:  ECOTRIN   Refills:  0   Dose:  81 mg   Indications:  Myocardial Infarction Prevention    Instructions:  Take 81 mg by mouth once daily.     Begin Date    AM    Noon    PM    Bedtime       atorvastatin 20 MG tablet   Commonly known as:  LIPITOR   Quantity:  90 tablet   Refills:  0    Instructions:  TAKE 1 TABLET ONE TIME DAILY     Begin Date    AM    Noon    PM    Bedtime       BD ALCOHOL SWABS Padm   Quantity:  400 each   Refills:  11   Generic drug:  alcohol swabs    Instructions:  USE FOUR TIMES DAILY     Begin Date    AM    Noon    PM    Bedtime       BD INSULIN PEN NEEDLE UF SHORT 31 gauge x 5/16" Ndle   Quantity:  450 each   Refills:  11   Dose:  1 application   Generic drug:  pen needle, diabetic    Instructions:  Inject 1 application into the skin 5 (five) times daily.     Begin Date    AM    Noon    PM    Bedtime       blood sugar diagnostic Strp   Commonly known as:  BLOOD GLUCOSE TEST   Quantity:  100 strip   Refills:  11   Dose:  1 strip    Instructions:  1 strip by Misc.(Non-Drug; Combo Route) route 3 (three) times daily with meals. To use with Accucheck meter     Begin Date    AM    Noon    PM    Bedtime       docusate sodium 100 MG capsule   Commonly known as:  COLACE   Refills:  0   Dose:  100 mg    Instructions:  Take 1 capsule (100 mg total) by mouth once daily.     Begin Date    AM    Noon    PM    Bedtime       donepezil 10 MG tablet   Commonly known as:  ARICEPT   Quantity:  30 tablet " "  Refills:  5   Dose:  10 mg    Instructions:  Take 1 tablet (10 mg total) by mouth once daily.     Begin Date    AM    Noon    PM    Bedtime       ferrous gluconate 324 MG tablet   Commonly known as:  FERGON   Refills:  0   Dose:  324 mg    Instructions:  Take 1 tablet (324 mg total) by mouth 2 (two) times daily with meals.     Begin Date    AM    Noon    PM    Bedtime       finasteride 5 mg tablet   Commonly known as:  PROSCAR   Quantity:  90 tablet   Refills:  3    Instructions:  TAKE 1 TABLET EVERY DAY     Begin Date    AM    Noon    PM    Bedtime       fluticasone 50 mcg/actuation nasal spray   Commonly known as:  FLONASE   Quantity:  1 Bottle   Refills:  5   Dose:  2 spray    Instructions:  2 sprays by Each Nare route daily as needed for Allergies.     Begin Date    AM    Noon    PM    Bedtime       gabapentin 300 MG capsule   Commonly known as:  NEURONTIN   Quantity:  90 capsule   Refills:  3   Dose:  300 mg    Instructions:  Take 1 capsule (300 mg total) by mouth 3 (three) times daily.     Begin Date    AM    Noon    PM    Bedtime       insulin aspart 100 unit/mL Inpn pen   Commonly known as:  NovoLOG   Quantity:  1 Box   Refills:  5   Dose:  7 Units    Instructions:  Inject 7 Units into the skin 3 (three) times daily.     Begin Date    AM    Noon    PM    Bedtime       insulin glargine 100 unit/mL injection   Commonly known as:  LANTUS   Quantity:  40 mL   Refills:  3   Dose:  21 Units    Instructions:  Inject 21 Units into the skin 2 (two) times daily. Every 12 hours.     Begin Date    AM    Noon    PM    Bedtime       insulin syringe-needle U-100 1/2 mL 31 gauge x 15/64" Syrg   Quantity:  100 Syringe   Refills:  12   Dose:  1 Syringe    Instructions:  1 Syringe by Misc.(Non-Drug; Combo Route) route 2 (two) times daily. With insulin injections twice daily     Begin Date    AM    Noon    PM    Bedtime       lisinopril 2.5 MG tablet   Commonly known as:  PRINIVIL,ZESTRIL   Quantity:  90 tablet   Refills:  1 "   Dose:  2.5 mg    Instructions:  Take 1 tablet (2.5 mg total) by mouth once daily.     Begin Date    AM    Noon    PM    Bedtime       metoprolol succinate 50 MG 24 hr tablet   Commonly known as:  TOPROL-XL   Quantity:  90 tablet   Refills:  3   Dose:  50 mg    Instructions:  Take 1 tablet (50 mg total) by mouth once daily. Dose increase, stop 25 mg     Begin Date    AM    Noon    PM    Bedtime       multivitamin per tablet   Commonly known as:  THERAGRAN   Refills:  0   Dose:  1 tablet    Instructions:  Take 1 tablet by mouth once daily.     Begin Date    AM    Noon    PM    Bedtime       nitroGLYCERIN 0.4 MG SL tablet   Commonly known as:  NITROSTAT   Quantity:  25 tablet   Refills:  11   Dose:  0.4 mg    Instructions:  Place 1 tablet (0.4 mg total) under the tongue every 5 (five) minutes as needed for Chest pain.     Begin Date    AM    Noon    PM    Bedtime       omeprazole 20 MG capsule   Commonly known as:  PRILOSEC   Quantity:  90 capsule   Refills:  1   Dose:  20 mg    Instructions:  Take 1 capsule (20 mg total) by mouth once daily.     Begin Date    AM    Noon    PM    Bedtime       oxycodone-acetaminophen  mg per tablet   Commonly known as:  PERCOCET   Quantity:  90 tablet   Refills:  0   Dose:  1 tablet    Instructions:  Take 1 tablet by mouth every 8 (eight) hours as needed for Pain.     Begin Date    AM    Noon    PM    Bedtime       senna-docusate 8.6-50 mg 8.6-50 mg per tablet   Commonly known as:  PERICOLACE   Refills:  0   Dose:  1 tablet    Instructions:  Take 1 tablet by mouth 2 (two) times daily as needed for Constipation.     Begin Date    AM    Noon    PM    Bedtime       sertraline 25 MG tablet   Commonly known as:  ZOLOFT   Quantity:  30 tablet   Refills:  5   Dose:  25 mg    Instructions:  Take 1 tablet (25 mg total) by mouth once daily.     Begin Date    AM    Noon    PM    Bedtime       tamsulosin 0.4 mg Cp24   Commonly known as:  FLOMAX   Quantity:  60 capsule   Refills:  5   Dose:   0.8 mg    Instructions:  Take 2 capsules (0.8 mg total) by mouth once daily.     Begin Date    AM    Noon    PM    Bedtime                  Please bring to all follow up appointments:    1. A copy of your discharge instructions.  2. All medicines you are currently taking in their original bottles.  3. Identification and insurance card.    Please arrive 15 minutes ahead of scheduled appointment time.    Please call 24 hours in advance if you must reschedule your appointment and/or time.        Your Scheduled Appointments     Feb 09, 2017  1:00 PM CST   New Patient with SUSAN Houston neftali - Optometry ([facility] )    [address]  [place] [address]   [phone]            Feb 14, 2017 10:00 AM CST   Consult with Jose Johansen MD   [facility] - Neurology ([facility])    [address]  [place] [address]   [phone]            Feb 20, 2017 10:00 AM CST   Established Patient Visit with Liyah Parker MD   [facility] - Pain Management ([facility])    [address]  [place] [address]   [phone]            Mar 07, 2017 10:40 AM CST   Established Patient Visit with Joe Bunn MD   [facility] - Internal Medicine ([facility])    [address]  [place] [address]   [phone]            Mar 28, 2017  4:00 PM CDT   CONSULT with PIERCE Sanders neftali - Endo/Diab/Metab ([facility] )    [address]  [place] [address]   [phone]              Your Future Surgeries/Procedures     Jan 25, 2017   Surgery with Liyah Parker MD   [facility] ([facility])    [address] [facility] [id number] [phone]                  Discharge Instructions       Home Care Instructions Pain Management:    1. DIET:   You may resume your normal diet today.   2. BATHING:   You may shower with luke warm water.  3. DRESSING:   You may remove your bandage today.   4.  ACTIVITY LEVEL:   You may resume your normal activities 24 hrs after your procedure.  5. MEDICATIONS:   You may resume your normal medications today.   6. SPECIAL INSTRUCTIONS:   No heat to the injection site for 24 hrs including, bath or shower, heating pad, moist heat, or hot tubs.    Use ice pack to injection site for any pain or discomfort.  Apply ice packs for 20 minute intervals as needed.   If you have received any sedatives by mouth today you may not drive for 12 hours.    If you have received any sedation through your IV, you may not drive for 24 hrs.     PLEASE CALL YOUR DOCTOR IF:  1. Redness or swelling around the injection site.  2. Fever of 101 degrees  3. Drainage (pus) from the injection site.  4. For any continuous bleeding (some dried blood over the incision is normal.)    FOR EMERGENCIES:   If any unusual problems or difficulties occur during clinic hours, call [phone] or 137.         Admission Information     Date & Time Provider Department CSN    1/25/2017  1:01 PM Liyah Parker MD [facility] [id number]      Care Providers     Provider Role Specialty Primary office phone    Liyah Parker MD Attending Provider Pain Medicine [phone]      Recent Lab Values        12/22/2014 3/27/2015 10/2/2015 2/15/2016 5/19/2016 7/19/2016 8/19/2016 12/13/2016      9:02 AM  8:10 AM  7:58 AM  7:48 AM  8:00 AM  5:42 PM  8:55 AM 11:57 AM    A1C 9.0 (H) 7.8 (H) 9.5 (H) 9.7 (H) 10.3 (H) 10.3 (H) 10.3 (H) 8.5 (H)    Comment for A1C at  5:42 PM on 7/19/2016:  According to ADA guidelines, hemoglobin A1C <7.0% represents  optimal control in non-pregnant diabetic patients.  Different  metrics may apply to specific populations.   Standards of Medical Care in Diabetes - 2016.  For the purpose of screening for the presence of diabetes:  <5.7%     Consistent with the absence of diabetes  5.7-6.4%  Consistent with increasing risk for diabetes   (prediabetes)  >or=6.5%  Consistent with  diabetes  Currently no consensus exists for use of hemoglobin A1C  for diagnosis of diabetes for children.      Comment for A1C at  8:55 AM on 8/19/2016:  According to ADA guidelines, hemoglobin A1C <7.0% represents  optimal control in non-pregnant diabetic patients.  Different  metrics may apply to specific populations.   Standards of Medical Care in Diabetes - 2016.  For the purpose of screening for the presence of diabetes:  <5.7%     Consistent with the absence of diabetes  5.7-6.4%  Consistent with increasing risk for diabetes   (prediabetes)  >or=6.5%  Consistent with diabetes  Currently no consensus exists for use of hemoglobin A1C  for diagnosis of diabetes for children.      Comment for A1C at 11:57 AM on 12/13/2016:  According to ADA guidelines, hemoglobin A1C <7.0% represents  optimal control in non-pregnant diabetic patients.  Different  metrics may apply to specific populations.   Standards of Medical Care in Diabetes - 2016.  For the purpose of screening for the presence of diabetes:  <5.7%     Consistent with the absence of diabetes  5.7-6.4%  Consistent with increasing risk for diabetes   (prediabetes)  >or=6.5%  Consistent with diabetes  Currently no consensus exists for use of hemoglobin A1C  for diagnosis of diabetes for children.        Allergies as of 1/25/2017     No Known Allergies      Advance Directives     An advance directive is a document which, in the event you are no longer able to make decisions for yourself, tells your healthcare team what kind of treatment you do or do not want to receive, or who you would like to make those decisions for you.  If you do not currently have an advance directive, [facility] encourages you to create one.  For more information call:  [phone]), [phone] ([phone]),  or log on to [email or web address].        Smoking Cessation     If you would like to quit smoking:   You may be eligible for free services if you are a [place]  resident and started smoking cigarettes before September 1, 1988.  Call the Smoking Cessation Trust (SCT) toll free at (836) 300-6326 or (928) 594-7903.   Call 1-800-QUIT-NOW if you do not meet the above criteria.            Language Assistance Services     ATTENTION: Language assistance services are available, free of charge. Please call [phone].      ATENCIÓN: Si habla español, tiene a mo disposición servicios gratuitos de asistencia lingüística. Llame al [phone].     CHÚ Ý: N?u b?n nói Ti?ng Vi?t, có các d?ch v? h? tr? ngôn ng? mi?n phí dành cho b?n. G?i s? [phone].        Chronic Kindey Disease Education             Diabetes Discharge Instructions                                    [facility] complies with applicable Federal civil rights laws and does not discriminate on the basis of race, color, national origin, age, disability, or sex.

## 2017-01-25 NOTE — OP NOTE
Hip Injection/Arthrogram POSTERIOR APPROACH  Time-out taken to identify patient and procedure side prior to starting      the procedure.                                                                                                                                         PROCEDURE:  Right  hip joint injection under fluoroscopy.      PREOPERATIVE DIAGNOSIS:  Right hip arthritis       POSTOPERATIVE DIAGNOSIS: same    PHYSICIAN: Liyah Parker MD    ASSISTANTS:  None                                                                                   EBL: None                                                                                                                                                         SPECIMENS: None                                                                               COMPLICATIONS:  None.                                                                                                                                     TECHNIQUE:  With the patient lying in the prone position The area overlying the hip and femoral neck was identified using fluoroscopy.  The area was prepped and draped in the usual sterile fashion.  Local anesthetic was used, given by raising a wheal and    going down to the hub of the 27-gauge needle.  A 5 inch 22-gauge needle was introduced under fluoroscopy until the tip reached the area of the hip joint capsule over the neck of the femur.  When the tip of the needle was thought to be in appropriate position, 1 mL of 300mg/ml radio opaque contrast was injected to confirm proper placement.  4 mL of 0.25% bupivacaine + 1mL of 40mg/mL depomedrol was then injected slowly.  Displacement of the contrast after injection of the medication confirmed that the medication went into the area of the hip joint capsule.  The patient tolerated the procedure well.                                                                                                                                                The patient was monitored for 20-30 minutes after the procedure and was      given post procedure and discharge instructions to follow at home.  The      patient is to follow-up with me in two weeks by calling.   The patient was discharged in a stable condtion.

## 2017-01-25 NOTE — DISCHARGE SUMMARY
"Discharge Note  Short Stay      SUMMARY     Admit Date: 1/25/2017    Attending Physician: Liyah Parker      Discharge Physician: Liyah Parker      Discharge Date: 1/25/2017 2:36 PM     PROCEDURE:  Right  hip joint injection under fluoroscopy.  Right SI joint injection    PREOPERATIVE DIAGNOSIS:  Right hip arthritis, right sacroiliitis      Disposition: Home or self care    Patient Instructions:   Current Discharge Medication List      CONTINUE these medications which have NOT CHANGED    Details   ascorbic acid, vitamin C, (VITAMIN C) 250 MG tablet Take 1 tablet (250 mg total) by mouth 2 (two) times daily.      aspirin (ECOTRIN) 81 MG EC tablet Take 81 mg by mouth once daily.      atorvastatin (LIPITOR) 20 MG tablet TAKE 1 TABLET ONE TIME DAILY  Qty: 90 tablet, Refills: 0      BD ALCOHOL SWABS PadM USE FOUR TIMES DAILY  Qty: 400 each, Refills: 11      BD INSULIN PEN NEEDLE UF SHORT 31 gauge x 5/16" Ndle Inject 1 application into the skin 5 (five) times daily.  Qty: 450 each, Refills: 11      blood sugar diagnostic (BLOOD GLUCOSE TEST) Strp 1 strip by Misc.(Non-Drug; Combo Route) route 3 (three) times daily with meals. To use with Accucheck meter  Qty: 100 strip, Refills: 11    Associated Diagnoses: Type 2 diabetes mellitus with diabetic chronic kidney disease      docusate sodium (COLACE) 100 MG capsule Take 1 capsule (100 mg total) by mouth once daily.  Refills: 0      donepezil (ARICEPT) 10 MG tablet Take 1 tablet (10 mg total) by mouth once daily.  Qty: 30 tablet, Refills: 5      ferrous gluconate (FERGON) 324 MG tablet Take 1 tablet (324 mg total) by mouth 2 (two) times daily with meals.      finasteride (PROSCAR) 5 mg tablet TAKE 1 TABLET EVERY DAY  Qty: 90 tablet, Refills: 3      fluticasone (FLONASE) 50 mcg/actuation nasal spray 2 sprays by Each Nare route daily as needed for Allergies.  Qty: 1 Bottle, Refills: 5      gabapentin (NEURONTIN) 300 MG capsule Take 1 capsule (300 mg total) by mouth 3 " "(three) times daily.  Qty: 90 capsule, Refills: 3      insulin aspart (NOVOLOG) 100 unit/mL InPn pen Inject 7 Units into the skin 3 (three) times daily.  Qty: 1 Box, Refills: 5      insulin glargine (LANTUS) 100 unit/mL injection Inject 21 Units into the skin 2 (two) times daily. Every 12 hours.  Qty: 40 mL, Refills: 3    Associated Diagnoses: Type 2 diabetes mellitus with diabetic chronic kidney disease      insulin syringe-needle U-100 1/2 mL 31 gauge x 15/64" Syrg 1 Syringe by Misc.(Non-Drug; Combo Route) route 2 (two) times daily. With insulin injections twice daily  Qty: 100 Syringe, Refills: 12    Associated Diagnoses: Type 2 diabetes mellitus with diabetic chronic kidney disease      lisinopril (PRINIVIL,ZESTRIL) 2.5 MG tablet Take 1 tablet (2.5 mg total) by mouth once daily.  Qty: 90 tablet, Refills: 1      metoprolol succinate (TOPROL-XL) 50 MG 24 hr tablet Take 1 tablet (50 mg total) by mouth once daily. Dose increase, stop 25 mg  Qty: 90 tablet, Refills: 3      multivitamin (THERAGRAN) per tablet Take 1 tablet by mouth once daily.      oxycodone-acetaminophen (PERCOCET)  mg per tablet Take 1 tablet by mouth every 8 (eight) hours as needed for Pain.  Qty: 90 tablet, Refills: 0      senna-docusate 8.6-50 mg (PERICOLACE) 8.6-50 mg per tablet Take 1 tablet by mouth 2 (two) times daily as needed for Constipation.      sertraline (ZOLOFT) 25 MG tablet Take 1 tablet (25 mg total) by mouth once daily.  Qty: 30 tablet, Refills: 5      tamsulosin (FLOMAX) 0.4 mg Cp24 Take 2 capsules (0.8 mg total) by mouth once daily.  Qty: 60 capsule, Refills: 5      nitroGLYCERIN (NITROSTAT) 0.4 MG SL tablet Place 1 tablet (0.4 mg total) under the tongue every 5 (five) minutes as needed for Chest pain.  Qty: 25 tablet, Refills: 11      omeprazole (PRILOSEC) 20 MG capsule Take 1 capsule (20 mg total) by mouth once daily.  Qty: 90 capsule, Refills: 1             Resume home diet and activity    "

## 2017-01-25 NOTE — DISCHARGE INSTRUCTIONS

## 2017-01-30 RX ORDER — METOPROLOL SUCCINATE 50 MG/1
50 TABLET, EXTENDED RELEASE ORAL DAILY
Qty: 90 TABLET | Refills: 3 | Status: SHIPPED | OUTPATIENT
Start: 2017-01-30 | End: 2018-01-02 | Stop reason: SDUPTHER

## 2017-02-03 ENCOUNTER — OUTPATIENT CASE MANAGEMENT (OUTPATIENT)
Dept: ADMINISTRATIVE | Facility: OTHER | Age: 82
End: 2017-02-03

## 2017-02-03 NOTE — PROGRESS NOTES
Name Nickolas Blake  1931 Medical Record  # [id number]   Visit Location __ Home __ Clinic _x_ Telephonic __Other  Encounter Date:     Contact Type __ New Case   _x_ Follow Up  __ Monitoring  __ Case Closure Next Follow-up Date      Summary:Call placed to [phone] with no answer.

## 2017-02-09 ENCOUNTER — PATIENT MESSAGE (OUTPATIENT)
Dept: INTERNAL MEDICINE | Facility: CLINIC | Age: 82
End: 2017-02-09

## 2017-02-10 ENCOUNTER — OUTPATIENT CASE MANAGEMENT (OUTPATIENT)
Dept: ADMINISTRATIVE | Facility: OTHER | Age: 82
End: 2017-02-10

## 2017-02-10 NOTE — PROGRESS NOTES
Name Nickolas TORRES 1931 Medical Record  # [id number]   Visit Location __ Home __ Clinic _x_ Telephonic __Other  Encounter Date:  2/10/17   Contact Type __ New Case   _x_ Follow Up  __ Monitoring  __ Case Closure Next Follow-up Date      Summary:Pt reports that he is doing fine this morning. Pt reports that he has some pain that he believes is due to the cold weather. Pt reports that his blood sugar this morning was 123. Pt reports that he is watching his food intake. Pt reports that he is walking without difficulties. Pt reports that he has a sitter with him at this time.       Plan:Inform pt on DM risk factors and diet

## 2017-02-10 NOTE — PATIENT INSTRUCTIONS
Eating Out When You Have Diabetes  Eating right is an important part of keeping your blood sugar in your target range. You just need to make healthy choices.    Tips for restaurant meals  When you eat away from home try these tips:  · Try to schedule your dining-out meal at your normal meal time. Make a reservation if possible, so you don't have to wait to eat. If you can't make a reservation, try to arrive at the restaurant at a less-busy time to cut down your wait time. Eat a small fruit or starch snack at your regular mealtime if your restaurant meal is going to be later than usual.   · Call ahead to see if the restaurant can meet your dietary needs if you've never been there before. Or you can go online to see the menu ahead of time.  · Carry some crackers with you in case the restaurant needs you to wait until you can be served.  · Ask how foods are prepared before you order.  · Instead of fried, sautéed, or breaded foods, choose ones that are broiled, steamed, grilled, or baked.  · Ask for sauces, gravies, and dressings on the side.  · Only eat an amount that fits your meal plan. Remember: You can take home the leftovers.  · Save dessert for special occasions. Then choose a small dessert or share one with a friend or family member.  Make healthy choices  Fast food  · Garden salad with light dressing on the side  · Baked potato with vegetables or herbs  · Broiled, roasted, or grilled chicken sandwich  · Sliced turkey or lean roast beef sandwich  Mexican  · Chicken enchilada, without cheese or sour cream   · Small burrito with whole beans and chicken  · Whole beans (not refried) and rice  · Chicken or fish fajitas  Steakhouse  · Grilled or broiled lean cuts of beef  · Baked potato with vegetables or herbs  · Broiled or baked chicken. Dont eat the skin.  · Steamed vegetables  Asian  · Steamed dumplings or potstickers  · Broiled, boiled, or steamed meats or fish  · Sushi or sashimi  · Steamed rice or boiled  noodles. One serving is equal to 1/3 cup.  Date Last Reviewed: 6/1/2016  © 2380-2055 [employer]. [address] [place] [address], [place] [address]. All rights reserved. This information is not intended as a substitute for professional medical care. Always follow your healthcare professional's instructions.        Your Diabetes Foot Care Program    Every day you depend on your feet to keep you moving. But when you have diabetes, your feet need special care. Even a small foot problem can become very serious. So dont take your feet for granted. By working with your diabetes healthcare team, you can learn how to protect your feet and keep them healthy.  Evaluating your feet  An evaluation helps your healthcare provider check the condition of your feet. The evaluation includes a review of your diabetes history and overall health. It may also include a foot exam, X-rays, or other tests. These can help show problems beneath the skin that you cant see or feel.  Medical history  You will be asked about your overall health and any history of foot problems. Youll also discuss your diabetes history, such as whether your blood sugar level has changed over time. It also includes questions about sensations of pain, tingling, pins and needles, or numbness. Your healthcare provider will also want to know if you have high blood pressure and heart disease, or if you smoke. Be sure to mention any medicines (including over-the-counter), supplements, or herbal remedies you take.  Foot exam  A foot exam checks the condition of different parts of your foot. First, your skin and nails are examined for any signs of infection. Blood flow is checked by feeling for the pulses in each foot. You may also have tests to study the nerves in the foot. These include using a small filament (wire) to see how sensitive your feet are. In certain cases, you will be asked to walk a short distance to check for bone, joint, and muscle  problems.  Diagnostic tests  If needed, your healthcare provider will suggest certain tests to learn more about your feet. These include:  · Doppler tests to measure blood flow in the feet and lower leg.  · X-rays, which can show bone or joint problems.  · Other imaging tests, such as an MRI (magnetic resonance imaging), bone scan, and CT (computed tomography) scan. These can help show bone infections.  · Other tests, such as vascular tests, which study the blood flow in your feet and legs. You may also have nerve studies to learn how sensitive your feet are.  Creating a foot care program  Based on the evaluation, your healthcare provider will create a foot care program for you. Your program may be as simple as starting a daily self-care routine and changing the types of shoes your wear. It may also involve treating minor foot problems, such as a corn or blister. In some cases, surgery will be needed to treat an infection or mechanical problems, such as hammer toes.  Preventing problems  When you have diabetes, its easier to prevent problems than to treat them later on. So see your healthcare team for regular checkups and foot care. Your healthcare team can also help you learn more about caring for your feet at home. For example, you may be told to avoid walking barefoot. Or you may be told that special footwear is needed to protect your feet.  Have regular checkups  Foot problems can develop quickly. So be sure to follow your healthcare teams schedule for regular checkups. During office visits, take off your shoes and socks as soon as you get in the exam room. Ask your healthcare provider to examine your feet for problems. This will make it easier to find and treat small skin irritations before they get worse. Regular checkups can also help keep track of the blood flow and feeling in your feet. If you have neuropathy (lack of feeling in your feet), you will need to have checkups more often.  Learn about  self-care  The more you know about diabetes and your feet, the easier it will be to prevent problems. Members of your healthcare team can teach you how to inspect your feet and teach you to look for warning signs. They can also give you other foot care tips. During office visits, be sure to ask any questions you have.  Date Last Reviewed: 7/1/2016 © 2000-2016 [employer]. [address] [place] [address], [place] [address]. All rights reserved. This information is not intended as a substitute for professional medical care. Always follow your healthcare professional's instructions.        Diabetes: Caring for Your Body    When you have diabetes, your body needs special care. This care helps you stay healthy and prevent complications. Exercise and healthy eating are a part of this. You can also protect yourself by taking special care of your feet, skin, and teeth.  Caring for your feet  Follow these tips to help keep your feet healthy:  · Check your feet every day for redness, blisters, cracks, dry skin, or numbness. Use a mirror to inspect the bottoms of your feet, if needed. Or, ask for help.  · Wash your feet in warm (not hot) water. Dont soak them.  · Use an emery board to keep your toenails even with the ends of your toes. File away sharp edges. A podiatrist (foot doctor) may need to cut your toenails for you.  · Keep your skin soft and smooth by placing a thin layer of skin lotion on the tops and bottoms of your feet. Do not put lotion in between your toes.  · Always wear shoes or slippers, even inside your home. Make sure that shoes are properly fitted. Change your socks daily.  · Call your healthcare provider right away if your feet are numb or painful, or if a cut or sore doesnt heal within a few days.  Preventing skin infections  To prevent skin infections, bathe every day. Dry yourself well, especially between your toes. Wash any cuts with warm, soapy water and cover with a sterile bandage. Call your  healthcare provider if a cut or sore doesn't heal in a few days, feels warm, itches, or has a bad odor.  Caring for your teeth  Follow these guidelines for healthy teeth:  · Brush your teeth twice daily.  · Floss your teeth daily.  · See your dentist at least twice yearly.  If you smoke, quit  Smoking is dangerous for everyone, especially people with diabetes. It can harm the blood vessels in your eyes, kidneys, and heart. It raises blood pressure. Smoking can also slow healing, so infections are more likely. Ask your healthcare provider about programs to help you stop smoking.   Date Last Reviewed: 3/1/2016  © 6784-7719 [employer]. [address] [place] [address], [place] [address]. All rights reserved. This information is not intended as a substitute for professional medical care. Always follow your healthcare professional's instructions.        Diabetes: Exams and Tests    For your diabetes care, you may see your primary care provider or a specialist 2 to 4 times a year. This page lists some of the regular exams and tests recommended for people with diabetes. To learn more, contact the American Diabetes Association (375-546-3692, www.diabetes.org).  Tests and immunizations  These should be done at least as often as stated below:  · Blood pressure check: every healthcare provider visit  · A1C: at first, every 3 months; if controlled, then every 3 to 6 months   · Cholesterol and blood lipid tests: at least every 12 months.  · Urine tests for kidney function: every 12 months  · Flu shots: once a year  · Pneumonia shots: talk with your healthcare provider about which pneumonia vaccines are right for you  · Hepatitis B shots: as soon as possible if youre under 60, or as advised by your healthcare provider if youre older than 60  · Shingles vaccine after age 60, even if you have already had shingles   · Other tests or vaccines: as advised by your healthcare provider  · Individualized medical nutrition therapy:  at least once, then as needed  · Stop smoking counseling, if you still smoke, at each visit   Regular exams  The following exams help keep you healthy:  · Foot exams. Nerve and blood vessel problems can affect your feet sooner than other parts of your body. Make sure that your healthcare provider checks your feet at every office visit.  · Eye exams. You can have problems with your eyes even if you dont have trouble seeing. An ophthalmologist (eye healthcare provider) or specially trained optometrist will give you a dilated eye exam at least once a year. If you see dark spots, see poorly in dim light, have eye pain or pressure, or notice any other problems, tell your healthcare provider right away.  · Dental exams. Gum disease (also called periodontal disease) and other mouth problems are common in people with diabetes. To help prevent these problems, see your dentist two or more times a year.  Ask your healthcare provider what other exams youll need on a regular basis.  Date Last Reviewed: 6/1/2016  © 0214-7469 [employer]. [address] [place] [address], [place] [address]. All rights reserved. This information is not intended as a substitute for professional medical care. Always follow your healthcare professional's instructions.

## 2017-02-14 ENCOUNTER — OFFICE VISIT (OUTPATIENT)
Dept: NEUROLOGY | Facility: CLINIC | Age: 82
End: 2017-02-14
Payer: MEDICARE

## 2017-02-14 VITALS
BODY MASS INDEX: 23.83 KG/M2 | HEIGHT: 71 IN | DIASTOLIC BLOOD PRESSURE: 66 MMHG | WEIGHT: 170.19 LBS | HEART RATE: 68 BPM | SYSTOLIC BLOOD PRESSURE: 134 MMHG

## 2017-02-14 DIAGNOSIS — F01.50 MIXED ALZHEIMER'S AND VASCULAR DEMENTIA: Primary | ICD-10-CM

## 2017-02-14 DIAGNOSIS — Z79.4 TYPE 2 DIABETES MELLITUS WITH STAGE 3 CHRONIC KIDNEY DISEASE, WITH LONG-TERM CURRENT USE OF INSULIN: ICD-10-CM

## 2017-02-14 DIAGNOSIS — I25.10 CORONARY ARTERY DISEASE INVOLVING NATIVE CORONARY ARTERY OF NATIVE HEART WITHOUT ANGINA PECTORIS: Chronic | ICD-10-CM

## 2017-02-14 DIAGNOSIS — F02.80 MIXED ALZHEIMER'S AND VASCULAR DEMENTIA: Primary | ICD-10-CM

## 2017-02-14 DIAGNOSIS — N18.30 TYPE 2 DIABETES MELLITUS WITH STAGE 3 CHRONIC KIDNEY DISEASE, WITH LONG-TERM CURRENT USE OF INSULIN: ICD-10-CM

## 2017-02-14 DIAGNOSIS — I10 HYPERTENSION, ESSENTIAL: ICD-10-CM

## 2017-02-14 DIAGNOSIS — E11.22 TYPE 2 DIABETES MELLITUS WITH STAGE 3 CHRONIC KIDNEY DISEASE, WITH LONG-TERM CURRENT USE OF INSULIN: ICD-10-CM

## 2017-02-14 DIAGNOSIS — G30.9 MIXED ALZHEIMER'S AND VASCULAR DEMENTIA: Primary | ICD-10-CM

## 2017-02-14 PROCEDURE — 3075F SYST BP GE 130 - 139MM HG: CPT | Mod: S$GLB,,, | Performed by: PSYCHIATRY & NEUROLOGY

## 2017-02-14 PROCEDURE — 1160F RVW MEDS BY RX/DR IN RCRD: CPT | Mod: S$GLB,,, | Performed by: PSYCHIATRY & NEUROLOGY

## 2017-02-14 PROCEDURE — 99999 PR PBB SHADOW E&M-EST. PATIENT-LVL III: CPT | Mod: PBBFAC,,, | Performed by: PSYCHIATRY & NEUROLOGY

## 2017-02-14 PROCEDURE — 3078F DIAST BP <80 MM HG: CPT | Mod: S$GLB,,, | Performed by: PSYCHIATRY & NEUROLOGY

## 2017-02-14 PROCEDURE — 99499 UNLISTED E&M SERVICE: CPT | Mod: S$GLB,,, | Performed by: PSYCHIATRY & NEUROLOGY

## 2017-02-14 PROCEDURE — 1159F MED LIST DOCD IN RCRD: CPT | Mod: S$GLB,,, | Performed by: PSYCHIATRY & NEUROLOGY

## 2017-02-14 PROCEDURE — 99215 OFFICE O/P EST HI 40 MIN: CPT | Mod: S$GLB,,, | Performed by: PSYCHIATRY & NEUROLOGY

## 2017-02-14 PROCEDURE — 1157F ADVNC CARE PLAN IN RCRD: CPT | Mod: S$GLB,,, | Performed by: PSYCHIATRY & NEUROLOGY

## 2017-02-14 NOTE — PATIENT INSTRUCTIONS
Patient has been stable with medications.  He will continue the Aricept 5 mg daily in the morning with food.  May use melatonin 5 mg at bedtime when necessary for sleep.  Continue present level of activities at home as he is much more secure and stable from a behavioral point of view at home.  Continue with present supervised care.  Control of vascular risk factors especially hypertension and diabetes.

## 2017-02-14 NOTE — MR AVS SNAPSHOT
[facility]  [address]  [place] [address]  Phone: [phone]  Fax: [phone]                  Nickolas Blake   2017 10:00 AM   Office Visit    Description:  Male : 1931   Provider:  Jose Johansen MD   Department:  [facility]           Reason for Visit     Memory Loss                To Do List           Future Appointments        Provider Department Dept Phone    2017 10:00 AM SUSAN [place] - Optometry [phone]    2017 10:00 AM Liyah Parker MD [facility] [phone]    3/7/2017 10:40 AM MD Andi Shelton - Internal Medicine [phone]    3/28/2017 4:00 PM PIERCE Sanders - Endo/Diab/Metab [phone]    8/15/2017 9:40 AM Jose Johansen MD [facility] [phone]      Goals (5 Years of Data)              2/10/17    1/20/17    1/3/17    Patient/caregiver will accept life style changes to manage and improve blood sugars prior to discharge from [facility].   On track  On track  On track    Notes - Note edited  2/10/2017 10:42 AM by Melissa Montalvo RN    Overall Time to Completion  2 months from 10/17/2016    Short Term Goals  Patient/caregiver will measure and record the capillary blood glucose 2 times per day for 3 weeks.  Interventions   · Mail Blood glucose logs for home use.   · Inquire about available accucheck machine in the home  · Inquire about current frequency of obtaining blood sugars.  · Inform on the importance of home monitoring of blood sugars.   Status  · Met    ·     Patient/caregiver will verbalize 4 food items Diabetic within 3 weeks.  Interventions   · Recognize and provide educational material (CAIT).  · Encourage Dietary Compliance.  · Review eating/nutrition habits.   Status  · Partially met    Patient/caregiver will verbalize 4 risk factors of Hypertension Diabetes within 2 months.  Interventions   · Recognize and provide  educational material (KRAMES).  · Encourage Dietary Compliance.  · Review eating/nutrition habits.  · Complete medication reconciliation.  · Encourage Medication Compliance.   Status  · Met    Patient/caregiver will verbalize 2 signs and symptoms of Hypertension Diabetes within 2 months.   Interventions   · Recognize and provide educational material (KRAMES).   Status  · Met    Patient/caregiver will verbalize current HbA1c value and HbA1c goal within 2 months.  Interventions   · Empower patient/caregiver to discuss treatment plan with Physician/care team.  · Recognize and provide educational material (KRAMES).  · Encourage Dietary Compliance.  · Review eating/nutrition habits.   Status  · Partially met    Patient/caregiver will verbalize food required to create a well-balanced food plate within 2 months.  Interventions   · Recognize and provide educational material (KRAMES).  · Encourage Dietary Compliance.  · Review eating/nutrition habits.   Status  · Partially met         Clinical Reference Documents Added to Patient Instructions       Document    BLOOD SUGAR LOG, USING A (ENGLISH)        DIABETES, DIET (ENGLISH)    DIABETES, HEALTHY MEALS FOR (ENGLISH)    DIABETES, MANAGING: THE A1C TEST (ENGLISH)    DIABETES, MEAL PLANNING (ENGLISH)    DIABETES, SERVING AND PORTION SIZES, LEARNING ABOUT (ENGLISH)              [facility]     [facility] - 24/7 Assistance  Registered nurses in the [facility] provide clinical advisement, health education, appointment booking, and other advisory services.  Call for this free service at [phone].             Medications           Message regarding Medications     Verify the changes and/or additions to your medication regime listed below are the same as discussed with your clinician today.  If any of these changes or additions are incorrect, please notify your healthcare provider.             Verify that the below list of medications is an  "accurate representation of the medications you are currently taking.  If none reported, the list may be blank. If incorrect, please contact your healthcare provider. Carry this list with you in case of emergency.           Current Medications     ascorbic acid, vitamin C, (VITAMIN C) 250 MG tablet Take 1 tablet (250 mg total) by mouth 2 (two) times daily.    aspirin (ECOTRIN) 81 MG EC tablet Take 81 mg by mouth once daily.    atorvastatin (LIPITOR) 20 MG tablet TAKE 1 TABLET ONE TIME DAILY    BD ALCOHOL SWABS PadM USE FOUR TIMES DAILY    BD INSULIN PEN NEEDLE UF SHORT 31 gauge x 5/16" Ndle Inject 1 application into the skin 5 (five) times daily.    blood sugar diagnostic (BLOOD GLUCOSE TEST) Strp 1 strip by Misc.(Non-Drug; Combo Route) route 3 (three) times daily with meals. To use with Accucheck meter    donepezil (ARICEPT) 10 MG tablet Take 1 tablet (10 mg total) by mouth once daily.    finasteride (PROSCAR) 5 mg tablet TAKE 1 TABLET EVERY DAY    fluticasone (FLONASE) 50 mcg/actuation nasal spray 2 sprays by Each Nare route daily as needed for Allergies.    gabapentin (NEURONTIN) 300 MG capsule Take 1 capsule (300 mg total) by mouth 3 (three) times daily.    insulin aspart (NOVOLOG) 100 unit/mL InPn pen Inject 7 Units into the skin 3 (three) times daily.    insulin glargine (LANTUS) 100 unit/mL injection Inject 21 Units into the skin 2 (two) times daily. Every 12 hours.    insulin syringe-needle U-100 1/2 mL 31 gauge x 15/64" Syrg 1 Syringe by Misc.(Non-Drug; Combo Route) route 2 (two) times daily. With insulin injections twice daily    lisinopril (PRINIVIL,ZESTRIL) 2.5 MG tablet Take 1 tablet (2.5 mg total) by mouth once daily.    metoprolol succinate (TOPROL-XL) 50 MG 24 hr tablet Take 1 tablet (50 mg total) by mouth once daily. Dose increase, stop 25 mg    oxycodone-acetaminophen (PERCOCET)  mg per tablet Take 1 tablet by mouth every 8 (eight) hours as needed for Pain.    sertraline (ZOLOFT) 25 MG tablet " "Take 1 tablet (25 mg total) by mouth once daily.    tamsulosin (FLOMAX) 0.4 mg Cp24 Take 2 capsules (0.8 mg total) by mouth once daily.    docusate sodium (COLACE) 100 MG capsule Take 1 capsule (100 mg total) by mouth once daily.    ferrous gluconate (FERGON) 324 MG tablet Take 1 tablet (324 mg total) by mouth 2 (two) times daily with meals.    multivitamin (THERAGRAN) per tablet Take 1 tablet by mouth once daily.    nitroGLYCERIN (NITROSTAT) 0.4 MG SL tablet Place 1 tablet (0.4 mg total) under the tongue every 5 (five) minutes as needed for Chest pain.    omeprazole (PRILOSEC) 20 MG capsule Take 1 capsule (20 mg total) by mouth once daily.    senna-docusate 8.6-50 mg (PERICOLACE) 8.6-50 mg per tablet Take 1 tablet by mouth 2 (two) times daily as needed for Constipation.           Clinical Reference Information           Your Vitals Were     BP Pulse Height Weight BMI    134/66 (BP Location: Right arm, Patient Position: Sitting, BP Method: Automatic) 68 5' 11" (1.803 m) 77.2 kg (170 lb 3.1 oz) 23.74 kg/m2      Blood Pressure          Most Recent Value    BP  134/66      Allergies as of 2/14/2017     No Known Allergies      Immunizations Administered on Date of Encounter - 2/14/2017     None      Language Assistance Services     ATTENTION: Language assistance services are available, free of charge. Please call [phone].      ATENCIÓN: Si habla español, tiene a mo disposición servicios gratuitos de asistencia lingüística. Llame al [phone].     [facility] Ý: N?u b?n nói Ti?ng Vi?t, có các d?ch v? h? tr? ngôn ng? mi?n phí dành cho b?n. G?i s? [phone].         [facility] complies with applicable Federal civil rights laws and does not discriminate on the basis of race, color, national origin, age, disability, or sex.        "

## 2017-02-14 NOTE — PROGRESS NOTES
Subjective:       Patient ID: Nickolas Blake is a 85 y.o. male.    Chief Complaint:  Memory Loss      History of Present Illness  HPI This is an 85-year-old male who returns in follow-up of his cognitive difficulties.  He had been seen by Sammie Ibarra NP, at the memory disorders clinic in the past and was last seen by her about 6 months ago.  He has had problems with retention of recent information, getting confused easily specially outside of the house, word finding difficulty, and repeating himself.  The patient now lives in his own house and has caregivers ([facility] [place]) 7 days a week.  He was initially resistant to them coming in but has now become comfortable.  One of them was present today and was the primary historian.  They manage his medications, take care of his meals and his system with his day-to-day activities.  His ambulation is limited because of chronic back problems.  He does not drive anymore.  No recent behavior problems are reported.  He is presently on Aricept 5 mg daily in the morning which she has tolerated well.  He was advised melatonin 5 mg at bedtime for sleep however does not take on a regular basis and his caregiver does report that occasionally he has been having sleep difficulties.       Review of Systems  Review of Systems   Constitutional: Negative.  Negative for activity change and appetite change.   HENT: Positive for hearing loss.    Eyes: Negative.  Negative for visual disturbance.   Respiratory: Negative.  Negative for cough and shortness of breath.    Cardiovascular: Negative.  Negative for chest pain and palpitations.   Gastrointestinal: Negative for diarrhea.   Genitourinary: Negative for urgency.   Musculoskeletal: Positive for back pain and gait problem.        Right hip pain   Skin: Negative.    Neurological:        Memory loss   Hematological: Negative.    Psychiatric/Behavioral: Positive for confusion, decreased concentration and sleep disturbance.        Objective:      Neurologic Exam    Physical Exam   Constitutional: He appears well-developed and well-nourished.   HENT:   Head: Normocephalic and atraumatic.   Right Ear: Hearing normal.   Left Ear: Hearing normal.   Eyes:   Fundus examination shows sharp disc margins.  Pupils are equal and reactive with EOM being full without nystagmus   Neck: Normal range of motion. Neck supple. Carotid bruit is not present.   Neurological: He is alert. He has normal reflexes. He displays no atrophy. No cranial nerve deficit (Visual fields at bedside testing essentially normal.  No facial asymmetry noted with facial movements and sensory exam being normal/symmetrical.  Corneals/gag reflexes normal.  Tongue & palate movements normal.  Hearing unimpaired.  Shoulder shrug was norm) or sensory deficit. He exhibits normal muscle tone. He displays a negative Romberg sign. Coordination and gait (walks a little stiffly because of back pain.  Uses cane for stability) normal. He displays no Babinski's sign on the right side. He displays no Babinski's sign on the left side.   Mental status examination: Pupils is oriented to place and person but not to time.  He has some difficulty recalling recent information but did recall breakfast that he had this morning.  Immediate recall is 0/3 after 2 minutes.  Cannot spell backwards or with serial sevens.  Judgment and insight is fair.  Language functions are intact with no evidence of aphasia or dysarthria.  Comprehension is unimpaired.  He is able to follow one or two-step commands.  Processing of information was a little slow.  Affect is appropriate, mood was even.  No thought disorder is noted.   Vitals reviewed.        Assessment:        1. Mixed Alzheimer's and vascular dementia    2. Type 2 diabetes mellitus with stage 3 chronic kidney disease, with long-term current use of insulin    3. Hypertension, essential    4. Coronary artery disease involving native coronary artery of native heart  without angina pectoris            Plan:         Patient has been stable with medications.  He will continue the Aricept 5 mg daily in the morning with food.  May use melatonin 5 mg at bedtime when necessary for sleep.  Continue present level of activities at home as he is much more secure and stable from a behavioral point of view at home.  Continue with present supervised care.  Control of vascular risk factors especially hypertension and diabetes.  Follow-up in 6 months if stable.

## 2017-02-20 ENCOUNTER — OFFICE VISIT (OUTPATIENT)
Dept: PAIN MEDICINE | Facility: CLINIC | Age: 82
End: 2017-02-20
Attending: ANESTHESIOLOGY
Payer: MEDICARE

## 2017-02-20 VITALS
SYSTOLIC BLOOD PRESSURE: 137 MMHG | HEIGHT: 71 IN | TEMPERATURE: 98 F | BODY MASS INDEX: 24.07 KG/M2 | WEIGHT: 171.94 LBS | RESPIRATION RATE: 18 BRPM | HEART RATE: 63 BPM | DIASTOLIC BLOOD PRESSURE: 59 MMHG

## 2017-02-20 DIAGNOSIS — Z98.1 S/P LAMINECTOMY WITH SPINAL FUSION: ICD-10-CM

## 2017-02-20 DIAGNOSIS — M53.3 SACROILIAC JOINT DYSFUNCTION OF RIGHT SIDE: Primary | ICD-10-CM

## 2017-02-20 PROCEDURE — 99999 PR PBB SHADOW E&M-EST. PATIENT-LVL IV: CPT | Mod: PBBFAC,,, | Performed by: ANESTHESIOLOGY

## 2017-02-20 PROCEDURE — 99499 UNLISTED E&M SERVICE: CPT | Mod: S$GLB,,, | Performed by: ANESTHESIOLOGY

## 2017-02-20 PROCEDURE — 99213 OFFICE O/P EST LOW 20 MIN: CPT | Mod: GC,S$GLB,, | Performed by: ANESTHESIOLOGY

## 2017-02-20 PROCEDURE — 3078F DIAST BP <80 MM HG: CPT | Mod: S$GLB,,, | Performed by: ANESTHESIOLOGY

## 2017-02-20 PROCEDURE — 1157F ADVNC CARE PLAN IN RCRD: CPT | Mod: S$GLB,,, | Performed by: ANESTHESIOLOGY

## 2017-02-20 PROCEDURE — 3075F SYST BP GE 130 - 139MM HG: CPT | Mod: S$GLB,,, | Performed by: ANESTHESIOLOGY

## 2017-02-20 PROCEDURE — 1159F MED LIST DOCD IN RCRD: CPT | Mod: S$GLB,,, | Performed by: ANESTHESIOLOGY

## 2017-02-20 PROCEDURE — 1125F AMNT PAIN NOTED PAIN PRSNT: CPT | Mod: S$GLB,,, | Performed by: ANESTHESIOLOGY

## 2017-02-20 RX ORDER — OXYCODONE AND ACETAMINOPHEN 10; 325 MG/1; MG/1
1 TABLET ORAL EVERY 8 HOURS PRN
Qty: 90 TABLET | Refills: 0 | Status: SHIPPED | OUTPATIENT
Start: 2017-02-20 | End: 2018-05-31

## 2017-02-20 NOTE — PROGRESS NOTES
"  Chronic Pain - Follow Up    Referring Physician: No ref. provider found    Chief Complaint:   Chief Complaint   Patient presents with    Back Pain        SUBJECTIVE: Disclaimer: This note has been generated using voice-recognition software. There may be typographical errors that have been missed during proof-reading    Interval History 2/20/17:   Mr. Blake presents today in follow up from a right SIJ and Right HIP injection on 1/25/17.  He states that his pain was benefitted for "a couple o' days" but that the pain ultimately returned on the right side without changes.  He endorses a slow weight loss from 228 down to 178lbs presently citing decreased appetite in general and "better sugar control".  He denies any radiations down the leg or numbness or tingling in the feet.  He is accompanied by his caregiver today who is learning about the history of his care throughout the visit.  He continues to benefit from intermittent Percocet 10/325 mg TID PRN which he takes only a few times a week - his caregiver reports a different pattern of use nightly when he complains of his pain at its worst and with great benefit from the medication.  She describes his need for a refill of the medicine.    Interval history 1/13/2017:  Since previous encounter the patient is status post L3-4 laminectomy and foraminotomy on 11/11/16  for severe L3-4 stenosis and right L4 radiculopathy.  He has healed from his incision sites and has been performing PT but has been having exacerbations of his pain in his area of his right SI joint and hip for which she has known arthritis and previous improvement from injections.  He was sent for evaluation for medication management for postprocedural pain and injections.  Interval history 10/25/16:  Patient arrives to clinic for a 2 week follow up after getting a Right sacroiliiac joint injection on 10/5/16. Patient stated that he did not get any relief at all from the procedure beyond one day. Patient " reports his pain as an 8/10 today. Patient is currently taking Norco to assist with pain. No other health changes since last visit.      Interval history 09/19/2016:  The patient returns today for follow up of lower back and right hip pain.  He is s/p right SI joint and hip joint injection on 8/24/16 with 75% pain relief for 3 weeks.  He states that his pain returned last week.  He reports that this helped more than previous procedures.  His worst area of pain today is his right buttock.  He reports significant relief with Fentanyl 25 mcg patch.  He ran out a couple of days ago and did not contact us for a refill.  He did have some Norco left from previous prescription from PCP.  No other health changes since his last visit.  Patient reports his pain 9/10 today.  He is accompanied by his son today who is active in his care.  He denies any b/b incontinence.     Interval History 08/16/2016:  Since previous encounter the patient has had a series of lumbar intralaminar epidural steroidal injections for known severe stenosis in his lumbar spine and spondylosis at L3-4.  The patient has had only temporary relief from the epidural steroidal injections lasting no longer than 1 week at a time.  He does continue to have her back pain from the area of the sacroiliac joint and right hip and has also previously had right knee pain.  On previous encounter in the office she had trigger point injections over the area of the sacroiliac joint which only marginally helped and also a right knee joint injection which helped him significantly.  Currently states that he does not have any knee pain.  He did have a recent MRI after being admitted for observation for lumbar radiculopathy after the epidural steroidal injections.  The MRI did not show any evidence of infection or hematoma but did confirm severe stenosis at multiple levels and spondylosis most severe at L3-4.  He was evaluated by neurosurgery in follow-up and is not currently  considered a surgical candidate.  The daughter presents with the patient today and states that the patient has been unable to sleep secondary to severe pain.    Interval history 7/12/2016:  Since previous encounter the patient is status post lumbar intralaminar epidural steroidal injection by 2, 2 weeks apart on 6/14/2016 and 6/20/2016.  The patient states that he is not having significant radicular symptoms down to the feet as prior although he is having significant right-sided lower back pain and right knee pain.  He has not tried any prescription compounded pain creams although he states that he has tried BenGay which gives him some relief.  No other health changes since previous encounter.  Patient scheduled follow-up with neurosurgery next week.    Initial encounter:    Nickolas Blake presents to the clinic for the evaluation of lumbar pain. The pain started 1 month  ago following onset and symptoms have been worsening.    Brief history:  Patient previously had an L5-S1 intralaminar epidural steroidal injection in May 2015 with greater than 6 months relief.      Pain Description:    The pain is located in the low back area and radiates to the right lower extremity .      At BEST  5/10     At WORST  10/10 on the WORST day.      On average pain is rated as 8/10.     Today the pain is rated as 9/10    The pain is described as aching      Symptoms interfere with daily activity.     Exacerbating factors: Sitting, Bending, Walking, Night Time, Morning, Lifting and Getting out of bed/chair.      Mitigating factors laying down, medications, physical therapy, rest and injections.     Patient denies night fever/night sweats, urinary incontinence, bowel incontinence, significant weight loss, significant motor weakness and loss of sensations.  Patient denies any suicidal or homicidal ideations    Pain Medications:  Current: Fentanyl 25 mcg patch- significant relief      Tried in Past:  NSAIDs -Never  TCA -Never  SNRI  -Never  Anti-convulsants -Never  Muscle Relaxants -skelaxin -with some relief, recently discontinued.  Opioids-Norco 5/325mg Norco 7.5/325mg, [place]    Physical Therapy/Home Exercise: no       report:  Reviewed and consistent with medication use as prescribed.    Pain Procedures:   1/25/17 - R SIJ injection & R Hip injection - 2 days of partial relief.  10/5/2016 -Right SI joint injection -no relief  8/24/16 Right SI joint and right hip joint injection- 75% relief for 3 weeks  Lumbar IL SCOTT at L5-S1  06/28/16  Lumbar IL SCOTT at L5-S1 06/14/2016 5/18/2015 L5/S1 ILESI      Chiropractor -never  Acupuncture - never  TENS unit -never  Spinal decompression -never  Joint replacement -never    Imaging:  MRI lumbar spine 7/20/2016:  Technique:  Sagittal T1, sagittal T2, sagittal STIR, axial T1 and axial T2 weighted images of the lumbar spine obtained without contrast.     Comparison: Lumbar spine MR 11/2013.  Findings:    The spinal alignment is within normal limits.  There is mild height loss of the L1 vertebral body, similar to before.  The remaining vertebral body heights are well maintained, with no fracture.    Schmorl's nodes noted L3 and L4 vertebral bodies.  There is disc is generalized disc desiccation.  There is high STIR signal intensity in L3-L4 disc space, similar to before.      No marrow signal abnormality suspicious for an infiltrative process.      The conus is normal in appearance, and terminates at the T12-L1 level.      The adjacent soft tissue structures demonstrate  T2 hyperintense and T1 hypointense lesion in left kidney, likely representing renal cyst.      There are findings of multi-level  lumbar spondylosis, as below.    T12-L1: There is no focal disc herniation and no significant spinal stenosis or neuroforaminal narrowing.    L1-L2: There is moderate facet hypertrophy with no focal disc herniation. No significant spinal canal stenosis or neural foraminal narrowing.    L2-L3: There is  asymmetrical disc bulge to the left and facet hypertrophy resulting in mild spinal canal stenosis and mild bilateral neuroforaminal narrowing.    L3-L4: There is circumferential disc bulge with superimposed right paracentral disc extrusion migrating cranially and severe facet hypertrophy resulting in severe spinal canal stenosis and severe bilateral neuroforaminal narrowing.  Additionally, there is a 0.5 cm oval lesion in the posterior right epidural space, which may represent thickened nerve root, synovial cyst, or sequestered disc fragment.    L4-L5: There is circumferential disc bulge and moderate facet hypertrophy resulting in mild spinal canal stenosis without significant neuroforaminal narrowing.    L5-S1: There is bilateral moderate facet hypertrophy resulting in moderate right and mild left neuroforaminal narrowing with no significant spinal canal stenosis.   Impression        Lumbar spondylosis most significant at L3-L4 level resulting in severe spinal canal stenosis and severe bilateral neuroforaminal narrowing.         MRI lumbar spine 4/21/2015  Findings:    Lumbar spine alignment is within normal limits.  Once again, there is a stable mild compression deformity with anterior wedging of the L1 vertebral body, unchanged compared to prior MRI examination of November 2013.  Remaining vertebral body heights are   well-maintained.  Stable Schmorl's node impressions are present on the inferior end plates of the L1 and L3 vertebral bodies.  There is multilevel disk desiccation.   No marrow signal abnormality suspicious for an infiltrative process.      The conus is normal in appearance, and terminates at the L1 level.  There is a well circumscribed focus of T2 signal hyperintensity within the left renal parenchyma, stable from prior study and likely a cyst.      There are findings of multi-level  lumbar spondylosis, as below.    L1-L2: Bilateral facet arthropathy.  No significant central canal or neuroforaminal  narrowing.    L2-L3: There is a posterior disk bulge the asymmetric to the left.  There is bilateral facet arthropathy.  There is ligamentum flavum hypertrophy. These findings results in mild- moderate bilateral neural foraminal narrowing and mild central canal   stenosis.    L3-L4: There is a circumferential disk bulge with superimposed right paracentral disk protrusion with bilateral facet arthropathy and ligamentum flavum hypertrophy.  This results in severe central canal stenosis and severe right neural foraminal   narrowing and moderate-severe left neural foraminal narrowing.    L4-L5: There is a posterior disk bulge and facet arthropathy with ligamentum flavum hypertrophy resulting in mild bilateral neural foraminal narrowing moderate central canal stenosis.    L5-S1: There is a posterior disk bulge, slightly asymmetric to the right and facet osseous hypertrophy with ligamentum flavum hypertrophy resulting in mild central canal stenosis and severe right and moderate left neural foraminal narrowing.   Impression      Significant multilevel degenerative change of the lumbar spine, most pronounced at the L3-L4 level where there is severe central canal stenosis, severe right neuroforaminal narrowing and moderate-severe left neural foraminal narrowing.    Left renal cyst.         Xray hips bilateral 6/9/2016  2 views bilateral.    Right: There is mild DJD. No fracture dislocation bone destruction seen.    Left: There is mild DJD. No fracture dislocation bone destruction seen.    Xray lumbar spine flex/ext  Mild levoscoliosis, lordosis lumbar spine.  Osteopenia.  Tiny rim calcification overlies left L4 transverse process.  Bridging osteophytes, partial interbody disk calcification L1-L2.  Mild -- moderate spondylosis, degenerative disk.  No fracture.    Primary anterior listhesis L4 on L3 0.7-cm, L5 on L4 0.4-cm.  Facet arthropathy L3 through L5 levels      Past Medical History   Diagnosis Date    Anemia in stage 3  chronic kidney disease 11/10/2016    BPH (benign prostatic hyperplasia) 10/26/2012    Cataract     Coronary artery disease involving native coronary artery of native heart without angina pectoris     Degeneration of lumbar or lumbosacral intervertebral disc 2/4/2014    Essential hypertension 9/30/2015    GERD (gastroesophageal reflux disease)     Hyperlipidemia     Lumbar radiculopathy, right 12/9/2013    Mild cognitive impairment with memory loss 7/18/2016    Osteoarthritis of right hip 8/16/2016    Osteoarthritis of spine with radiculopathy, lumbar region     S/P percutaneous transluminal coronary angioplasty     Spinal stenosis of lumbar region at multiple levels 2/4/2014    Thoracic or lumbosacral neuritis or radiculitis, unspecified 4/21/2015    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 edema, with long-term current use of insulin 11/19/2015    Type 2 diabetes mellitus with diabetic polyneuropathy, with long-term current use of insulin 11/19/2015    Type 2 diabetes mellitus with stage 3 chronic kidney disease, with long-term current use of insulin 11/19/2015     Past Surgical History   Procedure Laterality Date    Cyst removal       rectum    Cataract extraction      Cardiac catheterization  1980     Social History     Social History    Marital status:      Spouse name: N/A    Number of children: N/A    Years of education: N/A     Occupational History    Not on file.     Social History Main Topics    Smoking status: Former Smoker     Packs/day: 0.25     Years: 50.00     Types: Cigarettes     Quit date: 2/25/1980    Smokeless tobacco: Never Used    Alcohol use 0.6 oz/week     1 Cans of beer per week      Comment: seldom    Drug use: No    Sexual activity: Not Currently     Other Topics Concern    Not on file     Social History Narrative     Family History   Problem Relation Age of Onset    Breast cancer Mother      "Cancer Brother     Heart attack Brother     No Known Problems Father     No Known Problems Sister     No Known Problems Maternal Aunt     No Known Problems Maternal Uncle     No Known Problems Paternal Aunt     No Known Problems Paternal Uncle     No Known Problems Maternal Grandmother     No Known Problems Maternal Grandfather     No Known Problems Paternal Grandmother     No Known Problems Paternal Grandfather     Colon cancer Neg Hx     Colon polyps Neg Hx     Amblyopia Neg Hx     Blindness Neg Hx     Cataracts Neg Hx     Diabetes Neg Hx     Glaucoma Neg Hx     Hypertension Neg Hx     Macular degeneration Neg Hx     Retinal detachment Neg Hx     Strabismus Neg Hx     Stroke Neg Hx     Thyroid disease Neg Hx        Review of patient's allergies indicates:  No Known Allergies    Current Outpatient Prescriptions   Medication Sig    ascorbic acid, vitamin C, (VITAMIN C) 250 MG tablet Take 1 tablet (250 mg total) by mouth 2 (two) times daily.    aspirin (ECOTRIN) 81 MG EC tablet Take 81 mg by mouth once daily.    atorvastatin (LIPITOR) 20 MG tablet TAKE 1 TABLET ONE TIME DAILY    BD ALCOHOL SWABS PadM USE FOUR TIMES DAILY    BD INSULIN PEN NEEDLE UF SHORT 31 gauge x 5/16" Ndle Inject 1 application into the skin 5 (five) times daily.    blood sugar diagnostic (BLOOD GLUCOSE TEST) Strp 1 strip by Misc.(Non-Drug; Combo Route) route 3 (three) times daily with meals. To use with Accucheck meter (Patient taking differently: 1 strip by Misc.(Non-Drug; Combo Route) route 2 (two) times daily. To use with Accucheck meter)    docusate sodium (COLACE) 100 MG capsule Take 1 capsule (100 mg total) by mouth once daily.    donepezil (ARICEPT) 10 MG tablet Take 1 tablet (10 mg total) by mouth once daily.    ferrous gluconate (FERGON) 324 MG tablet Take 1 tablet (324 mg total) by mouth 2 (two) times daily with meals.    finasteride (PROSCAR) 5 mg tablet TAKE 1 TABLET EVERY DAY    fluticasone (FLONASE) " "50 mcg/actuation nasal spray 2 sprays by Each Nare route daily as needed for Allergies.    gabapentin (NEURONTIN) 300 MG capsule Take 1 capsule (300 mg total) by mouth 3 (three) times daily.    insulin aspart (NOVOLOG) 100 unit/mL InPn pen Inject 7 Units into the skin 3 (three) times daily.    insulin glargine (LANTUS) 100 unit/mL injection Inject 21 Units into the skin 2 (two) times daily. Every 12 hours.    insulin syringe-needle U-100 1/2 mL 31 gauge x 15/64" Syrg 1 Syringe by Misc.(Non-Drug; Combo Route) route 2 (two) times daily. With insulin injections twice daily    lisinopril (PRINIVIL,ZESTRIL) 2.5 MG tablet Take 1 tablet (2.5 mg total) by mouth once daily.    metoprolol succinate (TOPROL-XL) 50 MG 24 hr tablet Take 1 tablet (50 mg total) by mouth once daily. Dose increase, stop 25 mg    multivitamin (THERAGRAN) per tablet Take 1 tablet by mouth once daily.    nitroGLYCERIN (NITROSTAT) 0.4 MG SL tablet Place 1 tablet (0.4 mg total) under the tongue every 5 (five) minutes as needed for Chest pain.    omeprazole (PRILOSEC) 20 MG capsule Take 1 capsule (20 mg total) by mouth once daily.    oxycodone-acetaminophen (PERCOCET)  mg per tablet Take 1 tablet by mouth every 8 (eight) hours as needed for Pain.    senna-docusate 8.6-50 mg (PERICOLACE) 8.6-50 mg per tablet Take 1 tablet by mouth 2 (two) times daily as needed for Constipation.    sertraline (ZOLOFT) 25 MG tablet Take 1 tablet (25 mg total) by mouth once daily.    tamsulosin (FLOMAX) 0.4 mg Cp24 Take 2 capsules (0.8 mg total) by mouth once daily.     No current facility-administered medications for this visit.        REVIEW OF SYSTEMS:    GENERAL:  Significant for gradual unintentional 40 lbs weight loss, Denies malaise or fevers.  RESPIRATORY:  Negative for cough, wheezing or shortness of breath, patient denies any recent URI.  CARDIOVASCULAR:  Negative for chest pain, leg swelling or palpitations.  Positive for hypertension .  GI:  " "Negative for abdominal discomfort, blood in stools or black stools or change in bowel habits. Occasional GERD controlled with omeprazole.   : CKD III , BPH  MUSCULOSKELETAL:  See HPI. Positive for back pain.  SKIN:  Negative for lesions, rash, and itching.  PSYCH:  No mood disorder or recent psychosocial stressors.  Patients sleep is disturbed secondary to pain.  HEMATOLOGY/LYMPHOLOGY:  Negative for prolonged bleeding, bruising easily or swollen nodes.  Patient is not currently taking any anti-coagulants  ENDO: IDDM.  NEURO:   No history of headaches, syncope, paralysis, seizures or tremors.  All other reviewed and negative other than HPI.    OBJECTIVE:    Visit Vitals    BP (!) 137/59    Pulse 63    Temp 98.2 °F (36.8 °C) (Oral)    Resp 18    Ht 5' 11" (1.803 m)    Wt 78 kg (171 lb 15.3 oz)    BMI 23.98 kg/m2       PHYSICAL EXAMINATION:    GENERAL: Well appearing, in no acute distress, alert and oriented x3.  PSYCH:  Mood and affect appropriate.  SKIN: Skin color, texture, turgor normal, no rashes or lesions.  HEAD/FACE:  Normocephalic, atraumatic.   CV: RRR with palpation of the radial artery.  PULM: No evidence of respiratory difficulty, symmetric chest rise.  BACK: well healed surgical scar.  EXTREMITIES: Peripheral joint ROM is full and pain free without obvious instability or laxity in all four extremities. No deformities, edema, or skin discoloration. Good capillary refill.  MUSCULOSKELETAL:   There is  pain with palpation over the sacroiliac joint on the right.  SHARON is positive on the right.  No pain with internal or external rotation of right hip.  There is no pain to palpation over the greater trochanteric bursa bilaterally.  FADIRs test is negative.   Bilateral lower extremity strength is normal and symmetric.  No atrophy or tone abnormalities are noted.  NEURO: Cranial nerves grossly intact.  GAIT: Antalgic.    BMP  Lab Results   Component Value Date     12/13/2016    K 4.8 12/13/2016    "  12/13/2016    CO2 26 12/13/2016    BUN 21 12/13/2016    CREATININE 1.0 12/13/2016    CALCIUM 9.1 12/13/2016    ANIONGAP 6 (L) 12/13/2016    ESTGFRAFRICA >60.0 12/13/2016    EGFRNONAA >60.0 12/13/2016     Lab Results   Component Value Date    HGBA1C 8.5 (H) 12/13/2016     ASSESSMENT: 85 y.o. year old male with pain, consistent with     Encounter Diagnoses   Name Primary?    Sacroiliac joint dysfunction of right side Yes    S/P laminectomy with spinal fusion      PLAN:   Reviewed previous injections and images with patient today    - Scheduled the patient for a right SI joint Cooled RFA, SIJ injections have previously provided short-term relief but not long-term benefit.    - If he does not receive lasting relief from the R SIJ Cooled RFA then we will repeat imaging of MRI Lumbar for tentative planning of SCS Trial.  Patient and caregiver educational materials and consultation provided at length today in the clinic.    - Refilled his Percocet 10/325 mg q8 PO PRN. Patient understands that this is a temporary refill intended to bridge while we search for effective interventional treatments for his pain generator, and that we will wean him from this medication.         F/u in 2 weeks with Dr. Parker for discussion of results from the intervention and possibility of SCS trial.      Dipak Dougherty DO  Pain Fellow PGY5     I reviewed and edited the fellow's note, I conducted my own interview and physical examination and agree with the findings.      Liyah Parker  02/20/2017

## 2017-02-23 ENCOUNTER — OUTPATIENT CASE MANAGEMENT (OUTPATIENT)
Dept: ADMINISTRATIVE | Facility: OTHER | Age: 82
End: 2017-02-23

## 2017-02-23 NOTE — PROGRESS NOTES
Pt reports that he is doing well with his blood sugars. Pt reports that he still has the sitters with him during the day. Pt reports that his diet is much better.   Plan: minor

## 2017-02-27 DIAGNOSIS — E11.22 TYPE 2 DIABETES MELLITUS WITH DIABETIC CHRONIC KIDNEY DISEASE: Chronic | ICD-10-CM

## 2017-03-07 ENCOUNTER — OFFICE VISIT (OUTPATIENT)
Dept: INTERNAL MEDICINE | Facility: CLINIC | Age: 82
End: 2017-03-07
Payer: MEDICARE

## 2017-03-07 ENCOUNTER — LAB VISIT (OUTPATIENT)
Dept: LAB | Facility: HOSPITAL | Age: 82
End: 2017-03-07
Attending: FAMILY MEDICINE
Payer: MEDICARE

## 2017-03-07 ENCOUNTER — IMMUNIZATION (OUTPATIENT)
Dept: INTERNAL MEDICINE | Facility: CLINIC | Age: 82
End: 2017-03-07
Payer: MEDICARE

## 2017-03-07 VITALS
HEIGHT: 71 IN | OXYGEN SATURATION: 96 % | BODY MASS INDEX: 24.57 KG/M2 | HEART RATE: 68 BPM | WEIGHT: 175.5 LBS | SYSTOLIC BLOOD PRESSURE: 120 MMHG | DIASTOLIC BLOOD PRESSURE: 46 MMHG

## 2017-03-07 DIAGNOSIS — M53.3 SACROILIAC JOINT DYSFUNCTION OF RIGHT SIDE: ICD-10-CM

## 2017-03-07 DIAGNOSIS — D51.9 ANEMIA DUE TO VITAMIN B12 DEFICIENCY, UNSPECIFIED B12 DEFICIENCY TYPE: ICD-10-CM

## 2017-03-07 DIAGNOSIS — Z79.4 TYPE 2 DIABETES MELLITUS WITH DIABETIC POLYNEUROPATHY, WITH LONG-TERM CURRENT USE OF INSULIN: ICD-10-CM

## 2017-03-07 DIAGNOSIS — E11.42 TYPE 2 DIABETES MELLITUS WITH DIABETIC POLYNEUROPATHY, WITH LONG-TERM CURRENT USE OF INSULIN: ICD-10-CM

## 2017-03-07 DIAGNOSIS — I70.0 AORTIC ATHEROSCLEROSIS: ICD-10-CM

## 2017-03-07 DIAGNOSIS — Z23 ENCOUNTER FOR IMMUNIZATION: Primary | ICD-10-CM

## 2017-03-07 DIAGNOSIS — F02.80 MIXED ALZHEIMER'S AND VASCULAR DEMENTIA: ICD-10-CM

## 2017-03-07 DIAGNOSIS — G30.9 MIXED ALZHEIMER'S AND VASCULAR DEMENTIA: ICD-10-CM

## 2017-03-07 DIAGNOSIS — Z98.1 S/P LAMINECTOMY WITH SPINAL FUSION: ICD-10-CM

## 2017-03-07 DIAGNOSIS — M48.061 SPINAL STENOSIS OF LUMBAR REGION AT MULTIPLE LEVELS: ICD-10-CM

## 2017-03-07 DIAGNOSIS — F01.50 MIXED ALZHEIMER'S AND VASCULAR DEMENTIA: ICD-10-CM

## 2017-03-07 DIAGNOSIS — D64.9 ANEMIA, UNSPECIFIED TYPE: ICD-10-CM

## 2017-03-07 DIAGNOSIS — I10 HYPERTENSION, ESSENTIAL: ICD-10-CM

## 2017-03-07 DIAGNOSIS — M16.11 PRIMARY OSTEOARTHRITIS OF RIGHT HIP: ICD-10-CM

## 2017-03-07 LAB
ERYTHROCYTE [DISTWIDTH] IN BLOOD BY AUTOMATED COUNT: 17.8 %
FERRITIN SERPL-MCNC: 22 NG/ML
HCT VFR BLD AUTO: 37.5 %
HGB BLD-MCNC: 12.2 G/DL
IRON SERPL-MCNC: 57 UG/DL
MCH RBC QN AUTO: 26.8 PG
MCHC RBC AUTO-ENTMCNC: 32.5 %
MCV RBC AUTO: 82 FL
NEUTROPHILS # BLD AUTO: 3.5 K/UL
PLATELET # BLD AUTO: 263 K/UL
PMV BLD AUTO: 10.9 FL
RBC # BLD AUTO: 4.56 M/UL
VIT B12 SERPL-MCNC: 562 PG/ML
WBC # BLD AUTO: 5.47 K/UL

## 2017-03-07 PROCEDURE — 99499 UNLISTED E&M SERVICE: CPT | Mod: S$GLB,,, | Performed by: FAMILY MEDICINE

## 2017-03-07 PROCEDURE — 1159F MED LIST DOCD IN RCRD: CPT | Mod: S$GLB,,, | Performed by: FAMILY MEDICINE

## 2017-03-07 PROCEDURE — 99214 OFFICE O/P EST MOD 30 MIN: CPT | Mod: S$GLB,,, | Performed by: FAMILY MEDICINE

## 2017-03-07 PROCEDURE — G0008 ADMIN INFLUENZA VIRUS VAC: HCPCS | Mod: S$GLB,,, | Performed by: FAMILY MEDICINE

## 2017-03-07 PROCEDURE — 1157F ADVNC CARE PLAN IN RCRD: CPT | Mod: S$GLB,,, | Performed by: FAMILY MEDICINE

## 2017-03-07 PROCEDURE — 3078F DIAST BP <80 MM HG: CPT | Mod: S$GLB,,, | Performed by: FAMILY MEDICINE

## 2017-03-07 PROCEDURE — 83540 ASSAY OF IRON: CPT

## 2017-03-07 PROCEDURE — 99999 PR PBB SHADOW E&M-EST. PATIENT-LVL III: CPT | Mod: PBBFAC,,, | Performed by: FAMILY MEDICINE

## 2017-03-07 PROCEDURE — 82728 ASSAY OF FERRITIN: CPT

## 2017-03-07 PROCEDURE — 3074F SYST BP LT 130 MM HG: CPT | Mod: S$GLB,,, | Performed by: FAMILY MEDICINE

## 2017-03-07 PROCEDURE — 82607 VITAMIN B-12: CPT

## 2017-03-07 PROCEDURE — 1125F AMNT PAIN NOTED PAIN PRSNT: CPT | Mod: S$GLB,,, | Performed by: FAMILY MEDICINE

## 2017-03-07 PROCEDURE — 1160F RVW MEDS BY RX/DR IN RCRD: CPT | Mod: S$GLB,,, | Performed by: FAMILY MEDICINE

## 2017-03-07 PROCEDURE — 85027 COMPLETE CBC AUTOMATED: CPT

## 2017-03-07 PROCEDURE — 36415 COLL VENOUS BLD VENIPUNCTURE: CPT

## 2017-03-07 PROCEDURE — 90662 IIV NO PRSV INCREASED AG IM: CPT | Mod: S$GLB,,, | Performed by: FAMILY MEDICINE

## 2017-03-07 RX ORDER — ACETAMINOPHEN 500 MG/1
1 CAPSULE, LIQUID FILLED ORAL EVERY 8 HOURS PRN
Status: ON HOLD | COMMUNITY
Start: 2017-02-27 | End: 2021-12-18 | Stop reason: SDUPTHER

## 2017-03-07 RX ORDER — TAMSULOSIN HYDROCHLORIDE 0.4 MG/1
0.8 CAPSULE ORAL DAILY
Qty: 60 CAPSULE | Refills: 5 | Status: SHIPPED | OUTPATIENT
Start: 2017-03-07 | End: 2017-03-07 | Stop reason: SDUPTHER

## 2017-03-07 RX ORDER — TAMSULOSIN HYDROCHLORIDE 0.4 MG/1
0.8 CAPSULE ORAL DAILY
Qty: 180 CAPSULE | Refills: 1 | Status: SHIPPED | OUTPATIENT
Start: 2017-03-07 | End: 2017-06-16 | Stop reason: SDUPTHER

## 2017-03-07 NOTE — MR AVS SNAPSHOT
[facility]  [address]  [facility] [address]  Phone: [phone]  Fax: [phone]                  Nickolas Blake   3/7/2017 10:40 AM   Office Visit    Description:  Male : 1931   Provider:  Joe Bunn MD   Department:  [facility]           Reason for Visit     Follow-up     Otalgia           Diagnoses this Visit        Comments    Encounter for immunization    -  Primary     Spinal stenosis of lumbar region at multiple levels         Type 2 diabetes mellitus with diabetic polyneuropathy, with long-term current use of insulin         Mixed Alzheimer's and vascular dementia         Primary osteoarthritis of right hip         S/P laminectomy with spinal fusion         Hypertension, essential         Sacroiliac joint dysfunction of right side         Anemia, unspecified type         Anemia due to vitamin B12 deficiency, unspecified B12 deficiency type                To Do List           Future Appointments        Provider Department Dept Phone    3/28/2017 4:00 PM PIERCE Sanders - Endo/Diab/Metab [phone]    8/15/2017 9:40 AM Jose Johansen MD [facility] Neurology [phone]      Goals (5 Years of Data)              2/23/17    2/10/17    1/20/17    COMPLETED: Patient/caregiver will accept life style changes to manage and improve blood sugars prior to discharge from OPCM.   On track  On track  On track    Notes - Note edited  2017 12:04 PM by Melissa Montalvo RN    Overall Time to Completion  2 months from 10/17/2016    Short Term Goals  Patient/caregiver will measure and record the capillary blood glucose 2 times per day for 3 weeks.  Interventions   · Mail Blood glucose logs for home use.   · Inquire about available accucheck machine in the home  · Inquire about current frequency of obtaining blood sugars.  · Inform on the importance of home monitoring of blood sugars.    Status  · Met    ·     Patient/caregiver will verbalize 4 food items Diabetic within 3 weeks.  Interventions   · Recognize and provide educational material (KRAMES).  · Encourage Dietary Compliance.  · Review eating/nutrition habits.   Status  · Met    Patient/caregiver will verbalize 4 risk factors of Hypertension Diabetes within 2 months.  Interventions   · Recognize and provide educational material (KRAMES).  · Encourage Dietary Compliance.  · Review eating/nutrition habits.  · Complete medication reconciliation.  · Encourage Medication Compliance.   Status  · Met    Patient/caregiver will verbalize 2 signs and symptoms of Hypertension Diabetes within 2 months.   Interventions   · Recognize and provide educational material (KRAMES).   Status  · Met    Patient/caregiver will verbalize current HbA1c value and HbA1c goal within 2 months.  Interventions   · Empower patient/caregiver to discuss treatment plan with Physician/care team.  · Recognize and provide educational material (KRAMES).  · Encourage Dietary Compliance.  · Review eating/nutrition habits.   Status  · Met    Patient/caregiver will verbalize food required to create a well-balanced food plate within 2 months.  Interventions   · Recognize and provide educational material (KRAMES).  · Encourage Dietary Compliance.  · Review eating/nutrition habits.   Status  · Met         Clinical Reference Documents Added to Patient Instructions       Document    BLOOD SUGAR LOG, USING A (ENGLISH)        DIABETES, DIET (ENGLISH)    DIABETES, HEALTHY MEALS FOR (ENGLISH)    DIABETES, MANAGING: THE A1C TEST (ENGLISH)    DIABETES, MEAL PLANNING (ENGLISH)    DIABETES, SERVING AND PORTION SIZES, LEARNING ABOUT (ENGLISH)              Follow-Up and Disposition     Return in about 3 months (around 6/7/2017) for after test results to discuss, to reassess treatment for condition or medication, Keep appointments with specialty providers..    Follow-up and Disposition History     "  [facility] On [facility]     [facility] On [facility] Nurse Care Line - 24/7 Assistance  Registered nurses in the [facility] On Call Center provide clinical advisement, health education, appointment booking, and other advisory services.  Call for this free service at [phone].             Medications           Message regarding Medications     Verify the changes and/or additions to your medication regime listed below are the same as discussed with your clinician today.  If any of these changes or additions are incorrect, please notify your healthcare provider.        STOP taking these medications     multivitamin (THERAGRAN) per tablet Take 1 tablet by mouth once daily.           Verify that the below list of medications is an accurate representation of the medications you are currently taking.  If none reported, the list may be blank. If incorrect, please contact your healthcare provider. Carry this list with you in case of emergency.           Current Medications     acetaminophen (TYLENOL) 500 mg Cap     ascorbic acid, vitamin C, (VITAMIN C) 250 MG tablet Take 1 tablet (250 mg total) by mouth 2 (two) times daily.    aspirin (ECOTRIN) 81 MG EC tablet Take 81 mg by mouth once daily.    atorvastatin (LIPITOR) 20 MG tablet TAKE 1 TABLET ONE TIME DAILY    BD ALCOHOL SWABS PadM USE FOUR TIMES DAILY    BD INSULIN PEN NEEDLE UF SHORT 31 gauge x 5/16" Ndle Inject 1 application into the skin 5 (five) times daily.    blood sugar diagnostic (ACCU-CHEK NEWTON PLUS TEST STRP) Strp TEST TWO TIMES DAILY WITH MEALS    donepezil (ARICEPT) 10 MG tablet Take 1 tablet (10 mg total) by mouth once daily.    finasteride (PROSCAR) 5 mg tablet TAKE 1 TABLET EVERY DAY    fluticasone (FLONASE) 50 mcg/actuation nasal spray 2 sprays by Each Nare route daily as needed for Allergies.    gabapentin (NEURONTIN) 300 MG capsule Take 1 capsule (300 mg total) by mouth 3 (three) times daily.    insulin aspart (NOVOLOG) 100 unit/mL InPn pen Inject 7 Units into the " "skin 3 (three) times daily.    insulin glargine (LANTUS) 100 unit/mL injection Inject 21 Units into the skin 2 (two) times daily. Every 12 hours.    insulin syringe-needle U-100 1/2 mL 31 gauge x 15/64" Syrg 1 Syringe by Misc.(Non-Drug; Combo Route) route 2 (two) times daily. With insulin injections twice daily    lisinopril (PRINIVIL,ZESTRIL) 2.5 MG tablet Take 1 tablet (2.5 mg total) by mouth once daily.    metoprolol succinate (TOPROL-XL) 50 MG 24 hr tablet Take 1 tablet (50 mg total) by mouth once daily. Dose increase, stop 25 mg    omeprazole (PRILOSEC) 20 MG capsule Take 1 capsule (20 mg total) by mouth once daily.    oxycodone-acetaminophen (PERCOCET)  mg per tablet Take 1 tablet by mouth every 8 (eight) hours as needed for Pain.    sertraline (ZOLOFT) 25 MG tablet Take 1 tablet (25 mg total) by mouth once daily.    tamsulosin (FLOMAX) 0.4 mg Cp24 Take 2 capsules (0.8 mg total) by mouth once daily.    docusate sodium (COLACE) 100 MG capsule Take 1 capsule (100 mg total) by mouth once daily.    ferrous gluconate (FERGON) 324 MG tablet Take 1 tablet (324 mg total) by mouth 2 (two) times daily with meals.    nitroGLYCERIN (NITROSTAT) 0.4 MG SL tablet Place 1 tablet (0.4 mg total) under the tongue every 5 (five) minutes as needed for Chest pain.    senna-docusate 8.6-50 mg (PERICOLACE) 8.6-50 mg per tablet Take 1 tablet by mouth 2 (two) times daily as needed for Constipation.           Clinical Reference Information           Your Vitals Were     BP Pulse Height Weight SpO2 BMI    120/46 (BP Location: Right arm, Patient Position: Sitting) 68 5' 11" (1.803 m) 79.6 kg (175 lb 7.8 oz) 96% 24.48 kg/m2      Blood Pressure          Most Recent Value    BP  (!)  120/46      Allergies as of 3/7/2017     No Known Allergies      Immunizations Administered on Date of Encounter - 3/7/2017     None      Orders Placed During Today's Visit      Normal Orders This Visit    Ambulatory referral to Pain Clinic     Future " Labs/Procedures Expected by Expires    CBC Oncology  3/7/2017 3/7/2018    Ferritin  3/7/2017 3/7/2018    Iron  3/7/2017 5/6/2018    MRI Pelvis Without Contrast  3/7/2017 3/7/2018    Vitamin B12  3/7/2017 3/7/2018      Language Assistance Services     ATTENTION: Language assistance services are available, free of charge. Please call [phone].      ATENCIÓN: Si habla español, tiene a mo disposición servicios gratuitos de asistencia lingüística. Llame al [phone].     CHÚ Ý: N?u b?n nói Ti?ng Vi?t, có các d?ch v? h? tr? ngôn ng? mi?n phí dành cho b?n. G?i s? [phone].         [facility] complies with applicable Federal civil rights laws and does not discriminate on the basis of race, color, national origin, age, disability, or sex.

## 2017-03-07 NOTE — PROGRESS NOTES
Subjective:       Patient ID: Nickolas Blake is a 85 y.o. male.    Chief Complaint: Follow-up and Otalgia (possible ear infection right side)    HPI  Review of Systems   Constitutional: Positive for fatigue. Negative for chills and fever.   HENT: Positive for ear pain. Negative for congestion and trouble swallowing.    Eyes: Negative for redness.   Respiratory: Negative for cough, chest tightness and shortness of breath.    Cardiovascular: Negative for chest pain, palpitations and leg swelling.   Gastrointestinal: Negative for abdominal pain and blood in stool.   Genitourinary: Negative for hematuria.   Musculoskeletal: Positive for arthralgias, back pain and gait problem. Negative for joint swelling, myalgias and neck pain.   Skin: Negative for color change and rash.   Neurological: Positive for weakness. Negative for tremors, speech difficulty, numbness and headaches.   Hematological: Negative for adenopathy. Does not bruise/bleed easily.   Psychiatric/Behavioral: Positive for dysphoric mood and sleep disturbance. Negative for behavioral problems and confusion. The patient is not nervous/anxious.        Objective:      Physical Exam   Constitutional: He is oriented to person, place, and time. He appears well-developed and well-nourished. No distress.   HENT:   Head: Normocephalic.   Right Ear: Tympanic membrane, external ear and ear canal normal.   Left Ear: Tympanic membrane, external ear and ear canal normal.   Nose: Nose normal.   Mouth/Throat: Uvula is midline, oropharynx is clear and moist and mucous membranes are normal. No oropharyngeal exudate.   Eyes: Conjunctivae are normal. Right eye exhibits no discharge. Left eye exhibits no discharge. No scleral icterus.   Neck: Normal range of motion. Neck supple. No thyromegaly present.   Cardiovascular: Normal rate, regular rhythm and intact distal pulses.  Exam reveals no gallop and no friction rub.    Murmur heard.  Pulmonary/Chest: Effort normal. No  respiratory distress. He has no wheezes. He has no rales. He exhibits no tenderness.   Abdominal: Soft. There is no tenderness.   Musculoskeletal: He exhibits no edema.   Lymphadenopathy:     He has no cervical adenopathy.   Neurological: He is alert and oriented to person, place, and time.   Skin: Skin is warm and dry. No rash noted. He is not diaphoretic.   Nursing note and vitals reviewed.      Assessment:       1. Encounter for immunization    2. Spinal stenosis of lumbar region at multiple levels    3. Type 2 diabetes mellitus with diabetic polyneuropathy, with long-term current use of insulin    4. Mixed Alzheimer's and vascular dementia    5. Primary osteoarthritis of right hip    6. S/P laminectomy with spinal fusion    7. Hypertension, essential    8. Sacroiliac joint dysfunction of right side    9. Anemia, unspecified type    10. Anemia due to vitamin B12 deficiency, unspecified B12 deficiency type         Plan:   Nickolas was seen today for follow-up and otalgia.    Diagnoses and all orders for this visit:    Encounter for immunization  -     Influenza - High Dose (65+) (PF) (IM)    Spinal stenosis of lumbar region at multiple levels    Type 2 diabetes mellitus with diabetic polyneuropathy, with long-term current use of insulin    Mixed Alzheimer's and vascular dementia    Primary osteoarthritis of right hip    S/P laminectomy with spinal fusion    Hypertension, essential    Sacroiliac joint dysfunction of right side  -     Ambulatory referral to Pain Clinic  -     MRI Pelvis Without Contrast; Future    Anemia, unspecified type  -     Ferritin; Future  -     Iron; Future  -     CBC Oncology; Future  -     Vitamin B12; Future    Anemia due to vitamin B12 deficiency, unspecified B12 deficiency type   -     Vitamin B12; Future      See meds, orders, follow up, routing and instructions sections of encounter.  An 85-year-old, he is in with pain, in for followup today. His complaints   include back and hip  pain, right SI area, nonradiating.  No weakness in the   extremities.  Present constantly without change.  He states shots have not   helped.  They did seek a second opinion out of system.  No records are   available.  His caretaker presents today and states he sleeps most of the day.    He was supposed to see Pain Clinic this month.  No appointments are seen on the   inventory.  It sounds like the outside consultant recommended a nerve stimulator   or other similar device.    Complaining of right ear pain.     RECOMMENDATIONS:  He has a history of anemia, recheck at this time.  Consider   Hematology/Oncology followup.  His last visit that I can see was in 2013.  His   most recent laboratory with me was except he did have an elevated A1c and is   following with Endocrinology.     See current orders and follow up in approximately three months.      PHYLLIS/HN  dd: 03/07/2017 11:57:06 (CST)  td: 03/08/2017 07:31:11 (CST)  Doc ID   #[id number]  Job ID #[id number]    CC:

## 2017-03-15 ENCOUNTER — TELEPHONE (OUTPATIENT)
Dept: INTERNAL MEDICINE | Facility: CLINIC | Age: 82
End: 2017-03-15

## 2017-03-15 NOTE — TELEPHONE ENCOUNTER
Prior authorization for Tamsulosin has been submitted electronically to [employer]. Awaiting a response (may take up to 48-72 hours for response).

## 2017-03-28 ENCOUNTER — OFFICE VISIT (OUTPATIENT)
Dept: ENDOCRINOLOGY | Facility: CLINIC | Age: 82
End: 2017-03-28
Payer: MEDICARE

## 2017-03-28 VITALS
SYSTOLIC BLOOD PRESSURE: 128 MMHG | HEIGHT: 71 IN | WEIGHT: 179.69 LBS | HEART RATE: 74 BPM | RESPIRATION RATE: 16 BRPM | DIASTOLIC BLOOD PRESSURE: 64 MMHG | BODY MASS INDEX: 25.16 KG/M2

## 2017-03-28 DIAGNOSIS — F01.50 MIXED ALZHEIMER'S AND VASCULAR DEMENTIA: ICD-10-CM

## 2017-03-28 DIAGNOSIS — Z79.4 TYPE 2 DIABETES MELLITUS WITH DIABETIC POLYNEUROPATHY, WITH LONG-TERM CURRENT USE OF INSULIN: Primary | ICD-10-CM

## 2017-03-28 DIAGNOSIS — I10 HYPERTENSION, ESSENTIAL: ICD-10-CM

## 2017-03-28 DIAGNOSIS — N18.30 TYPE 2 DIABETES MELLITUS WITH STAGE 3 CHRONIC KIDNEY DISEASE, WITH LONG-TERM CURRENT USE OF INSULIN: ICD-10-CM

## 2017-03-28 DIAGNOSIS — F02.80 MIXED ALZHEIMER'S AND VASCULAR DEMENTIA: ICD-10-CM

## 2017-03-28 DIAGNOSIS — E11.42 TYPE 2 DIABETES MELLITUS WITH DIABETIC POLYNEUROPATHY, WITH LONG-TERM CURRENT USE OF INSULIN: Primary | ICD-10-CM

## 2017-03-28 DIAGNOSIS — E11.22 TYPE 2 DIABETES MELLITUS WITH STAGE 3 CHRONIC KIDNEY DISEASE, WITH LONG-TERM CURRENT USE OF INSULIN: ICD-10-CM

## 2017-03-28 DIAGNOSIS — I25.10 CORONARY ARTERY DISEASE INVOLVING NATIVE CORONARY ARTERY OF NATIVE HEART WITHOUT ANGINA PECTORIS: Chronic | ICD-10-CM

## 2017-03-28 DIAGNOSIS — Z79.4 TYPE 2 DIABETES MELLITUS WITH STAGE 3 CHRONIC KIDNEY DISEASE, WITH LONG-TERM CURRENT USE OF INSULIN: ICD-10-CM

## 2017-03-28 DIAGNOSIS — G30.9 MIXED ALZHEIMER'S AND VASCULAR DEMENTIA: ICD-10-CM

## 2017-03-28 DIAGNOSIS — E78.5 HYPERLIPIDEMIA, UNSPECIFIED HYPERLIPIDEMIA TYPE: ICD-10-CM

## 2017-03-28 PROCEDURE — 1157F ADVNC CARE PLAN IN RCRD: CPT | Mod: S$GLB,,, | Performed by: INTERNAL MEDICINE

## 2017-03-28 PROCEDURE — 99214 OFFICE O/P EST MOD 30 MIN: CPT | Mod: S$GLB,,, | Performed by: INTERNAL MEDICINE

## 2017-03-28 PROCEDURE — 3078F DIAST BP <80 MM HG: CPT | Mod: S$GLB,,, | Performed by: INTERNAL MEDICINE

## 2017-03-28 PROCEDURE — 1159F MED LIST DOCD IN RCRD: CPT | Mod: S$GLB,,, | Performed by: INTERNAL MEDICINE

## 2017-03-28 PROCEDURE — 3074F SYST BP LT 130 MM HG: CPT | Mod: S$GLB,,, | Performed by: INTERNAL MEDICINE

## 2017-03-28 PROCEDURE — 99999 PR PBB SHADOW E&M-EST. PATIENT-LVL III: CPT | Mod: PBBFAC,,, | Performed by: INTERNAL MEDICINE

## 2017-03-28 PROCEDURE — 1160F RVW MEDS BY RX/DR IN RCRD: CPT | Mod: S$GLB,,, | Performed by: INTERNAL MEDICINE

## 2017-03-28 PROCEDURE — 1126F AMNT PAIN NOTED NONE PRSNT: CPT | Mod: S$GLB,,, | Performed by: INTERNAL MEDICINE

## 2017-03-28 RX ORDER — INSULIN ASPART 100 [IU]/ML
9 INJECTION, SOLUTION INTRAVENOUS; SUBCUTANEOUS
Qty: 1 BOX | Refills: 6 | Status: SHIPPED | OUTPATIENT
Start: 2017-03-28 | End: 2017-07-28 | Stop reason: SDUPTHER

## 2017-03-28 NOTE — MR AVS SNAPSHOT
[facility]/Diab/Metab  [address]  [facility] [address]  Phone: [phone]  Fax: [phone]                  Nickolas Blake   3/28/2017 4:00 PM   Office Visit    Description:  Male : 1931   Provider:  Josephine Arias NP   Department:  [facility] - Endo/Diab/Metab           Reason for Visit     Diabetes Mellitus           Diagnoses this Visit        Comments    Type 2 diabetes mellitus with diabetic polyneuropathy, with long-term current use of insulin    -  Primary            To Do List           Future Appointments        Provider Department Dept Phone    2017 10:00 AM Barbra Caputo OD [facility] - Optometry [phone]    2017 9:40 AM Joe Bunn MD [facility] - Internal Medicine [phone]    8/15/2017 9:40 AM Jose Johansen MD [facility] [place] Neurology [phone]      Goals (5 Years of Data)              2/23/17    2/10/17    1/20/17    COMPLETED: Patient/caregiver will accept life style changes to manage and improve blood sugars prior to discharge from OPCM.   On track  On track  On track    Notes - Note edited  2017 12:04 PM by Melissa Montalvo RN    Overall Time to Completion  2 months from 10/17/2016    Short Term Goals  Patient/caregiver will measure and record the capillary blood glucose 2 times per day for 3 weeks.  Interventions   · Mail Blood glucose logs for home use.   · Inquire about available accucheck machine in the home  · Inquire about current frequency of obtaining blood sugars.  · Inform on the importance of home monitoring of blood sugars.   Status  · Met    ·     Patient/caregiver will verbalize 4 food items Diabetic within 3 weeks.  Interventions   · Recognize and provide educational material (KRABROOKE).  · Encourage Dietary Compliance.  · Review eating/nutrition habits.   Status  · Met    Patient/caregiver will verbalize 4 risk factors of Hypertension Diabetes within 2 months.  Interventions   · Recognize and  provide educational material (CAIT).  · Encourage Dietary Compliance.  · Review eating/nutrition habits.  · Complete medication reconciliation.  · Encourage Medication Compliance.   Status  · Met    Patient/caregiver will verbalize 2 signs and symptoms of Hypertension Diabetes within 2 months.   Interventions   · Recognize and provide educational material (ALYSIAMES).   Status  · Met    Patient/caregiver will verbalize current HbA1c value and HbA1c goal within 2 months.  Interventions   · Empower patient/caregiver to discuss treatment plan with Physician/care team.  · Recognize and provide educational material (CAIT).  · Encourage Dietary Compliance.  · Review eating/nutrition habits.   Status  · Met    Patient/caregiver will verbalize food required to create a well-balanced food plate within 2 months.  Interventions   · Recognize and provide educational material (ALYSIAMES).  · Encourage Dietary Compliance.  · Review eating/nutrition habits.   Status  · Met         Clinical Reference Documents Added to Patient Instructions       Document    BLOOD SUGAR LOG, USING A (ENGLISH)        DIABETES, DIET (ENGLISH)    DIABETES, HEALTHY MEALS FOR (ENGLISH)    DIABETES, MANAGING: THE A1C TEST (ENGLISH)    DIABETES, MEAL PLANNING (ENGLISH)    DIABETES, SERVING AND PORTION SIZES, LEARNING ABOUT (ENGLISH)              Follow-Up and Disposition     Return in about 4 months (around 7/28/2017).       These Medications        Disp Refills Start End    insulin aspart (NOVOLOG) 100 unit/mL InPn pen 1 Box 6 3/28/2017     Inject 9 Units into the skin 3 (three) times daily with meals. - Subcutaneous    Pharmacy: [facility]/pharmacy #[id number] - [place], [place] - [address] AT [facility] Ph #: [phone]         [facility] On Call     [facility] On Call Nurse Care Line - 24/7 Assistance  Registered nurses in the [facility] Call Center provide clinical advisement, health education, appointment booking, and other advisory services.  Call  "for this free service at [phone].             Medications           Message regarding Medications     Verify the changes and/or additions to your medication regime listed below are the same as discussed with your clinician today.  If any of these changes or additions are incorrect, please notify your healthcare provider.        CHANGE how you are taking these medications     Start Taking Instead of    insulin aspart (NOVOLOG) 100 unit/mL InPn pen insulin aspart (NOVOLOG) 100 unit/mL InPn pen    Dosage:  Inject 9 Units into the skin 3 (three) times daily with meals. Dosage:  Inject 7 Units into the skin 3 (three) times daily.    Reason for Change:  Reorder            Verify that the below list of medications is an accurate representation of the medications you are currently taking.  If none reported, the list may be blank. If incorrect, please contact your healthcare provider. Carry this list with you in case of emergency.           Current Medications     acetaminophen (TYLENOL) 500 mg Cap     ascorbic acid, vitamin C, (VITAMIN C) 250 MG tablet Take 1 tablet (250 mg total) by mouth 2 (two) times daily.    aspirin (ECOTRIN) 81 MG EC tablet Take 81 mg by mouth once daily.    atorvastatin (LIPITOR) 20 MG tablet TAKE 1 TABLET ONE TIME DAILY    BD ALCOHOL SWABS PadM USE FOUR TIMES DAILY    BD INSULIN PEN NEEDLE UF SHORT 31 gauge x 5/16" Ndle Inject 1 application into the skin 5 (five) times daily.    blood sugar diagnostic (ACCU-CHEK NEWTON PLUS TEST STRP) Strp TEST TWO TIMES DAILY WITH MEALS    docusate sodium (COLACE) 100 MG capsule Take 1 capsule (100 mg total) by mouth once daily.    donepezil (ARICEPT) 10 MG tablet Take 1 tablet (10 mg total) by mouth once daily.    ferrous gluconate (FERGON) 324 MG tablet Take 1 tablet (324 mg total) by mouth 2 (two) times daily with meals.    finasteride (PROSCAR) 5 mg tablet TAKE 1 TABLET EVERY DAY    fluticasone (FLONASE) 50 mcg/actuation nasal spray 2 sprays by Each Nare " "route daily as needed for Allergies.    gabapentin (NEURONTIN) 300 MG capsule Take 1 capsule (300 mg total) by mouth 3 (three) times daily.    insulin aspart (NOVOLOG) 100 unit/mL InPn pen Inject 9 Units into the skin 3 (three) times daily with meals.    insulin glargine (LANTUS) 100 unit/mL injection Inject 21 Units into the skin 2 (two) times daily. Every 12 hours.    insulin syringe-needle U-100 1/2 mL 31 gauge x 15/64" Syrg 1 Syringe by Misc.(Non-Drug; Combo Route) route 2 (two) times daily. With insulin injections twice daily    lisinopril (PRINIVIL,ZESTRIL) 2.5 MG tablet Take 1 tablet (2.5 mg total) by mouth once daily.    metoprolol succinate (TOPROL-XL) 50 MG 24 hr tablet Take 1 tablet (50 mg total) by mouth once daily. Dose increase, stop 25 mg    nitroGLYCERIN (NITROSTAT) 0.4 MG SL tablet Place 1 tablet (0.4 mg total) under the tongue every 5 (five) minutes as needed for Chest pain.    omeprazole (PRILOSEC) 20 MG capsule Take 1 capsule (20 mg total) by mouth once daily.    oxycodone-acetaminophen (PERCOCET)  mg per tablet Take 1 tablet by mouth every 8 (eight) hours as needed for Pain.    senna-docusate 8.6-50 mg (PERICOLACE) 8.6-50 mg per tablet Take 1 tablet by mouth 2 (two) times daily as needed for Constipation.    sertraline (ZOLOFT) 25 MG tablet Take 1 tablet (25 mg total) by mouth once daily.    tamsulosin (FLOMAX) 0.4 mg Cp24 Take 2 capsules (0.8 mg total) by mouth once daily.           Clinical Reference Information           Your Vitals Were     BP Pulse Resp Height Weight BMI    128/64 (BP Location: Left arm, Patient Position: Sitting, BP Method: Manual) 74 16 5' 11" (1.803 m) 81.5 kg (179 lb 10.8 oz) 25.06 kg/m2      Blood Pressure          Most Recent Value    BP  128/64      Allergies as of 3/28/2017     No Known Allergies      Immunizations Administered on Date of Encounter - 3/28/2017     None      Orders Placed During Today's Visit     Future Labs/Procedures Expected by Expires    " Comprehensive metabolic panel  3/28/2017 (Approximate) 3/23/2018    Hemoglobin A1c  3/28/2017 5/27/2018      Language Assistance Services     ATTENTION: Language assistance services are available, free of charge. Please call [phone].      ATENCIÓN: Si habla lamine, tiene a mo disposición servicios gratuitos de asistencia lingüística. Llame al [phone].     CHÚ Ý: N?u b?n nói Ti?ng Vi?t, có các d?ch v? h? tr? ngôn ng? mi?n phí dành cho b?n. G?i s? [phone].         [facility] complies with applicable Federal civil rights laws and does not discriminate on the basis of race, color, national origin, age, disability, or sex.

## 2017-03-28 NOTE — PROGRESS NOTES
CC: Nickolas Blake arrives today for T2DM follow up      HPI: Nickolas Blake was diagnosed with T2 Dm in .   No DM hospitalizations.     Known complications: neuropathy, CKD, and also h/o NPDR     Was followed in [facility]. Began in chronic DM clinic 2016  This is the patient's initial visit with me today. He was last seen by HAL Tapia in 2016     CURRENT DIABETIC MEDS:   Lantus 21 units bid  Novolog      Other medications tried:   Metformin   starlix   Glipizide    Sitter arrives today with Logs   Fastin, 122, 94, 63, 162, 150, 99, 155, 154, 226, 111, 130, 148, 150  Lunch: 144, 161, 144, 194, 186, 220, 138, 173, 158  Bedtime: 167, 160, 126, 172, 256, 154     Hypoglycemia: BG of 63 (fasting) , pt cannot recall if he experienced symptoms   Type of Glucose Meter: Accu-chek    Physical Activity: No formal exercise. Limited by spinal stenosis- physical therapy.     24 hour dietary recall:   Breakfast: 2 waffles with sugar free syrup, banana, 1 glass of milk   Lunch: pizza   Dinner: turkey meatloaf, green vegetables   Mainly drinks water, diet soda     Last Eye Exam: 2016 with Dr. Simpson   Last Podiatry Exam: pt cannot recall     Review of Systems   Constitutional: Positive for malaise/fatigue.   Eyes: Negative for blurred vision and double vision.   Respiratory: Negative for shortness of breath.    Cardiovascular: Negative for chest pain and palpitations.   Gastrointestinal: Negative for constipation and diarrhea.   Genitourinary: Positive for frequency. Negative for dysuria.   Musculoskeletal: Positive for joint pain and myalgias. Negative for falls.   Skin: Negative for rash.   Neurological: Negative for dizziness, tremors, seizures, weakness and headaches.   Endo/Heme/Allergies: Negative for polydipsia.   Psychiatric/Behavioral: Negative for depression. The patient is not nervous/anxious.        Vital Signs  /64 (BP Location: Left arm, Patient Position: Sitting, BP  "Method: Manual)  Pulse 74  Resp 16  Ht 5' 11" (1.803 m)  Wt 81.5 kg (179 lb 10.8 oz)  BMI 25.06 kg/m2    Hemoglobin A1C   Date Value Ref Range Status   12/13/2016 8.5 (H) 4.5 - 6.2 % Final     Comment:     According to ADA guidelines, hemoglobin A1C <7.0% represents  optimal control in non-pregnant diabetic patients.  Different  metrics may apply to specific populations.   Standards of Medical Care in Diabetes - 2016.  For the purpose of screening for the presence of diabetes:  <5.7%     Consistent with the absence of diabetes  5.7-6.4%  Consistent with increasing risk for diabetes   (prediabetes)  >or=6.5%  Consistent with diabetes  Currently no consensus exists for use of hemoglobin A1C  for diagnosis of diabetes for children.     08/19/2016 10.3 (H) 4.5 - 6.2 % Final     Comment:     According to ADA guidelines, hemoglobin A1C <7.0% represents  optimal control in non-pregnant diabetic patients.  Different  metrics may apply to specific populations.   Standards of Medical Care in Diabetes - 2016.  For the purpose of screening for the presence of diabetes:  <5.7%     Consistent with the absence of diabetes  5.7-6.4%  Consistent with increasing risk for diabetes   (prediabetes)  >or=6.5%  Consistent with diabetes  Currently no consensus exists for use of hemoglobin A1C  for diagnosis of diabetes for children.     07/19/2016 10.3 (H) 4.5 - 6.2 % Final     Comment:     According to ADA guidelines, hemoglobin A1C <7.0% represents  optimal control in non-pregnant diabetic patients.  Different  metrics may apply to specific populations.   Standards of Medical Care in Diabetes - 2016.  For the purpose of screening for the presence of diabetes:  <5.7%     Consistent with the absence of diabetes  5.7-6.4%  Consistent with increasing risk for diabetes   (prediabetes)  >or=6.5%  Consistent with diabetes  Currently no consensus exists for use of hemoglobin A1C  for diagnosis of diabetes for children.         Chemistry     "    Component Value Date/Time     03/28/2017 1708    K 5.3 (H) 03/28/2017 1708     03/28/2017 1708    CO2 28 03/28/2017 1708    BUN 22 03/28/2017 1708    CREATININE 1.2 03/28/2017 1708     (H) 03/28/2017 1708        Component Value Date/Time    CALCIUM 9.0 03/28/2017 1708    ALKPHOS 125 03/28/2017 1708    AST 22 03/28/2017 1708    ALT 25 03/28/2017 1708    BILITOT 0.5 03/28/2017 1708          Physical Exam   Constitutional: He appears well-developed. No distress.   Neck: No thyromegaly present.   Cardiovascular: Normal rate.    Pulses:       Dorsalis pedis pulses are 2+ on the right side, and 2+ on the left side.   Pulmonary/Chest: Effort normal.   Abdominal: Soft.   Musculoskeletal:        Right foot: There is no deformity.        Left foot: There is no deformity.   Feet:   Right Foot:   Protective Sensation: 6 sites tested. 6 sites sensed.   Skin Integrity: Positive for dry skin. Negative for ulcer, blister, skin breakdown, erythema, warmth or callus.   Left Foot:   Protective Sensation: 6 sites tested. 6 sites sensed.   Skin Integrity: Positive for dry skin. Negative for ulcer, blister, skin breakdown, erythema, warmth or callus.   Neurological: He is alert.   Feet    Shoes appropriate  sensation intact to vibration and monofilament      injection sites are ok. No lipo hypertropthy or atrophy     Skin: Skin is warm and dry.   Psychiatric: He has a normal mood and affect.   Nursing note and vitals reviewed.        Assessment/Plan    1. Type 2 diabetes mellitus with diabetic chronic kidney disease   - adjust Lantus to 18 units bid   - continue Novolog 7/7/7   - send log in 2 weeks for review and adjustments   - reviewed signs and symptoms of hypoglycemia along with appropriate treatment protocol   - discussed blood glucose goals, avoid hypoglycemia (BG <80 )   - may consider use of DDP4i   - Call for BG concerns or repeatedly outside parameters.      2. Type 2 diabetes mellitus with diabetic  polyneuropathy - Optimize BG     3. 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 Follows with Ophth.   - eye exam is due      4. CKD stage 3 due to type 2 diabetes mellitus - Optimize BG  - encouraged to follow a low sodium diet      5. Hypertension, essential -  - on ACEi per ADA recommendations   - follow a low salt diet      6. Hyperlipidemia -  - on statin per ADA recommendations   - follow a low fat, low chol diet      7. Mixed Alzheimer's and Vascular dementia - on Aricept   - followed by Neurology        FOLLOW UP  Return in about 4 months (around 7/28/2017).     Orders Placed This Encounter   Procedures    Comprehensive metabolic panel     Standing Status:   Future     Number of Occurrences:   1     Standing Expiration Date:   3/23/2018    Hemoglobin A1c     Standing Status:   Future     Number of Occurrences:   1     Standing Expiration Date:   5/27/2018

## 2017-04-10 RX ORDER — DONEPEZIL HYDROCHLORIDE 10 MG/1
10 TABLET, FILM COATED ORAL DAILY
Qty: 30 TABLET | Refills: 5 | Status: SHIPPED | OUTPATIENT
Start: 2017-04-10 | End: 2017-04-19 | Stop reason: SDUPTHER

## 2017-04-14 RX ORDER — ISOPROPYL ALCOHOL 0.75 G/1
SWAB TOPICAL
Qty: 90 EACH | Refills: 11 | Status: SHIPPED | OUTPATIENT
Start: 2017-04-14 | End: 2017-08-21 | Stop reason: SDUPTHER

## 2017-04-19 RX ORDER — DONEPEZIL HYDROCHLORIDE 10 MG/1
10 TABLET, FILM COATED ORAL DAILY
Qty: 30 TABLET | Refills: 5 | Status: SHIPPED | OUTPATIENT
Start: 2017-04-19 | End: 2017-04-20 | Stop reason: SDUPTHER

## 2017-04-19 RX ORDER — DONEPEZIL HYDROCHLORIDE 10 MG/1
10 TABLET, FILM COATED ORAL DAILY
Qty: 90 TABLET | Refills: 1 | Status: SHIPPED | OUTPATIENT
Start: 2017-04-19 | End: 2017-04-19 | Stop reason: SDUPTHER

## 2017-04-19 NOTE — TELEPHONE ENCOUNTER
Refill request pended to Dr. Bunn for authorization.     Initial rx was supposed to go to [facility], but the quantity of Aricept was insufficient. Here is the corrected quantity of medication to be sent to mail order. Thanks

## 2017-04-19 NOTE — TELEPHONE ENCOUNTER
----- Message from Daisha Valero sent at 4/19/2017  3:53 PM CDT -----  Contact: Keke [phone]  Patient medication donepezil (ARICEPT) 10 MG tablet  was sent to [facility] , patient need an 10 day supplies to be sent [facility] . Patient will be out tomorrow.    Please advise , Thanks  !

## 2017-04-20 RX ORDER — DONEPEZIL HYDROCHLORIDE 10 MG/1
10 TABLET, FILM COATED ORAL DAILY
Qty: 30 TABLET | Refills: 5 | Status: SHIPPED | OUTPATIENT
Start: 2017-04-20 | End: 2017-04-21 | Stop reason: SDUPTHER

## 2017-04-20 NOTE — TELEPHONE ENCOUNTER
----- Message from Veronique Richardson sent at 4/20/2017 11:43 AM CDT -----  Contact: Rodrigo/Sarah/[phone]  Sarah said that she is calling in regards to needing to get a rx for donepezil (ARICEPT) 10 MG tablet for a 10 day supply, she stated that she has an old rx for this medication and if nothing has changed she can just give pt a 10 day supply off of that rx. Please call and advise            Thank you

## 2017-04-21 RX ORDER — GABAPENTIN 300 MG/1
300 CAPSULE ORAL 3 TIMES DAILY
Qty: 90 CAPSULE | Refills: 3 | Status: SHIPPED | OUTPATIENT
Start: 2017-04-21 | End: 2017-04-25 | Stop reason: SDUPTHER

## 2017-04-21 RX ORDER — DONEPEZIL HYDROCHLORIDE 10 MG/1
10 TABLET, FILM COATED ORAL DAILY
Qty: 30 TABLET | Refills: 5 | Status: SHIPPED | OUTPATIENT
Start: 2017-04-21 | End: 2017-07-25 | Stop reason: SDUPTHER

## 2017-04-21 NOTE — TELEPHONE ENCOUNTER
----- Message from Ana Petit sent at 4/21/2017  9:41 AM CDT -----  Contact: Call caregiver, Angella, [phone]  RX request - refill or new RX.  Is this a refill or new RX:  New  RX name and strength: Donepezil 10 mg  Directions: 1 T Q D  Is this a 30 day or 90 day RX:  30   Pharmacy name and phone #: [facility], [phone]  Comments:  Angella states pharmacy has not received rx as of 4-21-17, Pt is currently out of medication      RX request - refill or new RX.  Is this a refill or new RX:  New  RX name and strength: Gabapentin 300 mg  Directions: 1 C TID  Is this a 30 day or 90 day RX:  30  Pharmacy name and phone #: [facility], [phone]  Comments:  Pt is currently out of medication

## 2017-04-24 PROBLEM — I70.0 AORTIC ATHEROSCLEROSIS: Status: ACTIVE | Noted: 2017-04-24

## 2017-04-25 RX ORDER — GABAPENTIN 300 MG/1
300 CAPSULE ORAL 3 TIMES DAILY
Qty: 90 CAPSULE | Refills: 3 | Status: SHIPPED | OUTPATIENT
Start: 2017-04-25 | End: 2017-08-21 | Stop reason: SDUPTHER

## 2017-04-26 ENCOUNTER — LAB VISIT (OUTPATIENT)
Dept: LAB | Facility: HOSPITAL | Age: 82
End: 2017-04-26
Attending: FAMILY MEDICINE
Payer: MEDICARE

## 2017-04-26 DIAGNOSIS — E11.22 TYPE 2 DIABETES MELLITUS WITH STAGE 3 CHRONIC KIDNEY DISEASE, WITH LONG-TERM CURRENT USE OF INSULIN: Chronic | ICD-10-CM

## 2017-04-26 DIAGNOSIS — Z79.4 TYPE 2 DIABETES MELLITUS WITH STAGE 3 CHRONIC KIDNEY DISEASE, WITH LONG-TERM CURRENT USE OF INSULIN: Chronic | ICD-10-CM

## 2017-04-26 DIAGNOSIS — N18.30 TYPE 2 DIABETES MELLITUS WITH STAGE 3 CHRONIC KIDNEY DISEASE, WITH LONG-TERM CURRENT USE OF INSULIN: Chronic | ICD-10-CM

## 2017-04-26 LAB
ANION GAP SERPL CALC-SCNC: 9 MMOL/L
BUN SERPL-MCNC: 21 MG/DL
CALCIUM SERPL-MCNC: 9.2 MG/DL
CHLORIDE SERPL-SCNC: 108 MMOL/L
CHOLEST/HDLC SERPL: 3 {RATIO}
CO2 SERPL-SCNC: 26 MMOL/L
CREAT SERPL-MCNC: 1 MG/DL
EST. GFR  (AFRICAN AMERICAN): >60 ML/MIN/1.73 M^2
EST. GFR  (NON AFRICAN AMERICAN): >60 ML/MIN/1.73 M^2
ESTIMATED AVG GLUCOSE: 189 MG/DL
GLUCOSE SERPL-MCNC: 101 MG/DL
HBA1C MFR BLD HPLC: 8.2 %
HDL/CHOLESTEROL RATIO: 33.8 %
HDLC SERPL-MCNC: 160 MG/DL
HDLC SERPL-MCNC: 54 MG/DL
LDLC SERPL CALC-MCNC: 93.2 MG/DL
NONHDLC SERPL-MCNC: 106 MG/DL
POTASSIUM SERPL-SCNC: 4.3 MMOL/L
SODIUM SERPL-SCNC: 143 MMOL/L
TRIGL SERPL-MCNC: 64 MG/DL

## 2017-04-26 PROCEDURE — 80048 BASIC METABOLIC PNL TOTAL CA: CPT

## 2017-04-26 PROCEDURE — 80061 LIPID PANEL: CPT

## 2017-04-26 PROCEDURE — 83036 HEMOGLOBIN GLYCOSYLATED A1C: CPT

## 2017-04-26 PROCEDURE — 36415 COLL VENOUS BLD VENIPUNCTURE: CPT | Mod: PO

## 2017-04-28 ENCOUNTER — PATIENT MESSAGE (OUTPATIENT)
Dept: RESEARCH | Facility: HOSPITAL | Age: 82
End: 2017-04-28

## 2017-05-02 RX ORDER — ATORVASTATIN CALCIUM 20 MG/1
20 TABLET, FILM COATED ORAL DAILY
Qty: 90 TABLET | Refills: 0 | Status: SHIPPED | OUTPATIENT
Start: 2017-05-02 | End: 2017-08-21 | Stop reason: SDUPTHER

## 2017-05-09 ENCOUNTER — OFFICE VISIT (OUTPATIENT)
Dept: OPTOMETRY | Facility: CLINIC | Age: 82
End: 2017-05-09
Payer: MEDICARE

## 2017-05-09 DIAGNOSIS — H43.813 PVD (POSTERIOR VITREOUS DETACHMENT), BILATERAL: ICD-10-CM

## 2017-05-09 DIAGNOSIS — Z79.4 TYPE 2 DIABETES MELLITUS WITH DIABETIC POLYNEUROPATHY, WITH LONG-TERM CURRENT USE OF INSULIN: Primary | ICD-10-CM

## 2017-05-09 DIAGNOSIS — H02.834 DERMATOCHALASIS OF BOTH UPPER EYELIDS: ICD-10-CM

## 2017-05-09 DIAGNOSIS — Z96.1 PSEUDOPHAKIA OF BOTH EYES: ICD-10-CM

## 2017-05-09 DIAGNOSIS — Z79.4 TYPE 2 DIABETES MELLITUS WITH BOTH EYES AFFECTED BY MILD NONPROLIFERATIVE RETINOPATHY WITHOUT MACULAR EDEMA, WITH LONG-TERM CURRENT USE OF INSULIN: ICD-10-CM

## 2017-05-09 DIAGNOSIS — H02.831 DERMATOCHALASIS OF BOTH UPPER EYELIDS: ICD-10-CM

## 2017-05-09 DIAGNOSIS — I10 HYPERTENSION, ESSENTIAL: ICD-10-CM

## 2017-05-09 DIAGNOSIS — E11.3293 TYPE 2 DIABETES MELLITUS WITH BOTH EYES AFFECTED BY MILD NONPROLIFERATIVE RETINOPATHY WITHOUT MACULAR EDEMA, WITH LONG-TERM CURRENT USE OF INSULIN: ICD-10-CM

## 2017-05-09 DIAGNOSIS — E11.42 TYPE 2 DIABETES MELLITUS WITH DIABETIC POLYNEUROPATHY, WITH LONG-TERM CURRENT USE OF INSULIN: Primary | ICD-10-CM

## 2017-05-09 PROCEDURE — 99999 PR PBB SHADOW E&M-EST. PATIENT-LVL II: CPT | Mod: PBBFAC,,, | Performed by: OPTOMETRIST

## 2017-05-09 PROCEDURE — 92014 COMPRE OPH EXAM EST PT 1/>: CPT | Mod: S$GLB,,, | Performed by: OPTOMETRIST

## 2017-05-09 RX ORDER — DULOXETIN HYDROCHLORIDE 30 MG/1
30 CAPSULE, DELAYED RELEASE ORAL DAILY
Refills: 0 | COMMUNITY
Start: 2017-04-26 | End: 2017-10-31 | Stop reason: SDUPTHER

## 2017-05-11 NOTE — PROGRESS NOTES
HPI     Patient's age: 85 y.o.    Approximate date of last eye examination:  1/7/16  Name of last eye doctor seen: Dr. Simpson    Pt states that he is here for regular yearly check up not having any   problems with eyes    Wears glasses? Yes, mainly use for reading      Wears CLs?:  no          Headaches?  no  Eye pain/discomfort?  nono                                                                                     Flashes?  no  Floaters?  no  Diplopia/Double vision?  no    Patient's Ocular History:         Any eye surgeries? yes        Family history of eye disease?  no    Significant patient medical history:         1. Diabetes?  yes       If yes, IDDM or NIDDM? both   2. HBP?  Yes, medication and diet control                 ! OTC eyedrops currently using:  Lubricant drops - OU PRN  seldom   ! Prescription eye meds currently using:  None    Hemoglobin A1C       Date                     Value               Ref Range             Status                04/26/2017               8.2 (H)             4.5 - 6.2 %           Final                    03/28/2017               7.9 (H)             4.5 - 6.2 %           Final                     12/13/2016               8.5 (H)             4.5 - 6.2 %           Final                          Last edited by Terri Roger MA on 5/9/2017  9:45 AM.         Assessment /Plan     For exam results, see Encounter Report.          PVD (posterior vitreous detachment), bilateral   Stable, monitor     Type II or unspecified type diabetes mellitus without mention of complication, uncontrolled  HTN (hypertension)  No retinopathy, monitor yearly     Astigmatism, bilateral  Pseudophakia of both eyes  Rx specs, Good BCVA with specs     Dermatochalasia, unspecified laterality  Monitor         RTC 1 year, sooner PRN

## 2017-06-05 DIAGNOSIS — Z79.4 TYPE 2 DIABETES MELLITUS WITH BOTH EYES AFFECTED BY MILD NONPROLIFERATIVE RETINOPATHY WITHOUT MACULAR EDEMA, WITH LONG-TERM CURRENT USE OF INSULIN: Primary | ICD-10-CM

## 2017-06-05 DIAGNOSIS — E11.3293 TYPE 2 DIABETES MELLITUS WITH BOTH EYES AFFECTED BY MILD NONPROLIFERATIVE RETINOPATHY WITHOUT MACULAR EDEMA, WITH LONG-TERM CURRENT USE OF INSULIN: Primary | ICD-10-CM

## 2017-06-06 ENCOUNTER — OUTPATIENT CASE MANAGEMENT (OUTPATIENT)
Dept: ADMINISTRATIVE | Facility: OTHER | Age: 82
End: 2017-06-06

## 2017-06-06 ENCOUNTER — OFFICE VISIT (OUTPATIENT)
Dept: INTERNAL MEDICINE | Facility: CLINIC | Age: 82
End: 2017-06-06
Payer: MEDICARE

## 2017-06-06 VITALS
HEIGHT: 71 IN | HEART RATE: 80 BPM | WEIGHT: 190.5 LBS | OXYGEN SATURATION: 97 % | DIASTOLIC BLOOD PRESSURE: 68 MMHG | BODY MASS INDEX: 26.67 KG/M2 | SYSTOLIC BLOOD PRESSURE: 138 MMHG

## 2017-06-06 DIAGNOSIS — E11.22 TYPE 2 DIABETES MELLITUS WITH STAGE 3 CHRONIC KIDNEY DISEASE, WITH LONG-TERM CURRENT USE OF INSULIN: ICD-10-CM

## 2017-06-06 DIAGNOSIS — M47.26 OSTEOARTHRITIS OF SPINE WITH RADICULOPATHY, LUMBAR REGION: ICD-10-CM

## 2017-06-06 DIAGNOSIS — Z79.4 TYPE 2 DIABETES MELLITUS WITH STAGE 3 CHRONIC KIDNEY DISEASE, WITH LONG-TERM CURRENT USE OF INSULIN: ICD-10-CM

## 2017-06-06 DIAGNOSIS — M48.061 SPINAL STENOSIS OF LUMBAR REGION AT MULTIPLE LEVELS: ICD-10-CM

## 2017-06-06 DIAGNOSIS — E11.42 TYPE 2 DIABETES MELLITUS WITH DIABETIC POLYNEUROPATHY, WITH LONG-TERM CURRENT USE OF INSULIN: ICD-10-CM

## 2017-06-06 DIAGNOSIS — Z79.4 TYPE 2 DIABETES MELLITUS WITH BOTH EYES AFFECTED BY MILD NONPROLIFERATIVE RETINOPATHY WITHOUT MACULAR EDEMA, WITH LONG-TERM CURRENT USE OF INSULIN: ICD-10-CM

## 2017-06-06 DIAGNOSIS — F02.80 MIXED ALZHEIMER'S AND VASCULAR DEMENTIA: ICD-10-CM

## 2017-06-06 DIAGNOSIS — I10 HYPERTENSION, ESSENTIAL: ICD-10-CM

## 2017-06-06 DIAGNOSIS — Z79.4 TYPE 2 DIABETES MELLITUS WITH DIABETIC POLYNEUROPATHY, WITH LONG-TERM CURRENT USE OF INSULIN: ICD-10-CM

## 2017-06-06 DIAGNOSIS — F01.50 MIXED ALZHEIMER'S AND VASCULAR DEMENTIA: ICD-10-CM

## 2017-06-06 DIAGNOSIS — E11.3293 TYPE 2 DIABETES MELLITUS WITH MILD NONPROLIFERATIVE RETINOPATHY OF BOTH EYES WITHOUT MACULAR EDEMA, UNSPECIFIED LONG TERM INSULIN USE STATUS: Primary | ICD-10-CM

## 2017-06-06 DIAGNOSIS — E11.3293 TYPE 2 DIABETES MELLITUS WITH BOTH EYES AFFECTED BY MILD NONPROLIFERATIVE RETINOPATHY WITHOUT MACULAR EDEMA, WITH LONG-TERM CURRENT USE OF INSULIN: ICD-10-CM

## 2017-06-06 DIAGNOSIS — I25.10 CORONARY ARTERY DISEASE INVOLVING NATIVE CORONARY ARTERY OF NATIVE HEART WITHOUT ANGINA PECTORIS: Chronic | ICD-10-CM

## 2017-06-06 DIAGNOSIS — E78.5 HYPERLIPIDEMIA, UNSPECIFIED HYPERLIPIDEMIA TYPE: ICD-10-CM

## 2017-06-06 DIAGNOSIS — N18.30 TYPE 2 DIABETES MELLITUS WITH STAGE 3 CHRONIC KIDNEY DISEASE, WITH LONG-TERM CURRENT USE OF INSULIN: ICD-10-CM

## 2017-06-06 DIAGNOSIS — G30.9 MIXED ALZHEIMER'S AND VASCULAR DEMENTIA: ICD-10-CM

## 2017-06-06 PROCEDURE — 90732 PPSV23 VACC 2 YRS+ SUBQ/IM: CPT | Mod: S$GLB,,, | Performed by: FAMILY MEDICINE

## 2017-06-06 PROCEDURE — 99214 OFFICE O/P EST MOD 30 MIN: CPT | Mod: S$GLB,,, | Performed by: FAMILY MEDICINE

## 2017-06-06 PROCEDURE — G0009 ADMIN PNEUMOCOCCAL VACCINE: HCPCS | Mod: S$GLB,,, | Performed by: FAMILY MEDICINE

## 2017-06-06 PROCEDURE — 99999 PR PBB SHADOW E&M-EST. PATIENT-LVL IV: CPT | Mod: PBBFAC,,, | Performed by: FAMILY MEDICINE

## 2017-06-06 PROCEDURE — 99499 UNLISTED E&M SERVICE: CPT | Mod: S$GLB,,, | Performed by: FAMILY MEDICINE

## 2017-06-06 PROCEDURE — 1125F AMNT PAIN NOTED PAIN PRSNT: CPT | Mod: S$GLB,,, | Performed by: FAMILY MEDICINE

## 2017-06-06 PROCEDURE — 1159F MED LIST DOCD IN RCRD: CPT | Mod: S$GLB,,, | Performed by: FAMILY MEDICINE

## 2017-06-06 NOTE — PROGRESS NOTES
Subjective:       Patient ID: Nickolas Blake is a 86 y.o. male.    Chief Complaint: Follow-up    HPI  Review of Systems   Constitutional: Positive for fatigue. Negative for chills and fever.   HENT: Negative for congestion and trouble swallowing.    Eyes: Negative for redness.   Respiratory: Negative for cough, chest tightness and shortness of breath.    Cardiovascular: Negative for chest pain, palpitations and leg swelling.   Gastrointestinal: Negative for abdominal pain and blood in stool.   Genitourinary: Negative for hematuria.   Musculoskeletal: Positive for arthralgias, back pain, gait problem, myalgias and neck stiffness. Negative for joint swelling and neck pain.   Skin: Negative for color change and rash.   Neurological: Negative for tremors, speech difficulty, weakness, numbness and headaches.   Hematological: Negative for adenopathy. Does not bruise/bleed easily.   Psychiatric/Behavioral: Positive for decreased concentration. Negative for behavioral problems, confusion and sleep disturbance. The patient is not nervous/anxious.        Objective:      Physical Exam   Constitutional: He is oriented to person, place, and time. He appears well-developed and well-nourished. No distress.   Eyes: No scleral icterus.   Neck: Normal range of motion. Neck supple. Carotid bruit is not present.   Cardiovascular: Normal rate, regular rhythm, normal heart sounds and intact distal pulses.  Exam reveals no gallop and no friction rub.    No murmur heard.  Pulmonary/Chest: Effort normal and breath sounds normal. No respiratory distress. He has no wheezes. He has no rales.   Abdominal: Soft. Bowel sounds are normal.   Musculoskeletal: He exhibits no edema.        Right lower leg: He exhibits no edema.        Left lower leg: He exhibits no edema.   Neurological: He is alert and oriented to person, place, and time. He displays no tremor. No cranial nerve deficit. Coordination and gait normal.   Skin: Skin is warm and dry.  No rash noted. He is not diaphoretic. No erythema.   Psychiatric: He has a normal mood and affect. His behavior is normal. Judgment and thought content normal.   Nursing note and vitals reviewed.      Assessment:       1. Type 2 diabetes mellitus with mild nonproliferative retinopathy of both eyes without macular edema, unspecified long term insulin use status    2. Spinal stenosis of lumbar region at multiple levels    3. Hypertension, essential    4. Coronary artery disease involving native coronary artery of native heart without angina pectoris    5. Hyperlipidemia, unspecified hyperlipidemia type    6. Mixed Alzheimer's and vascular dementia    7. Osteoarthritis of spine with radiculopathy, lumbar region    8. 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 edema, with long-term current use of insulin    9. Type 2 diabetes mellitus with diabetic polyneuropathy, with long-term current use of insulin    10. Type 2 diabetes mellitus with stage 3 chronic kidney disease, with long-term current use of insulin        Plan:   Nickolas was seen today for follow-up.    Diagnoses and all orders for this visit:    Type 2 diabetes mellitus with mild nonproliferative retinopathy of both eyes without macular edema, unspecified long term insulin use status    Spinal stenosis of lumbar region at multiple levels    Hypertension, essential    Coronary artery disease involving native coronary artery of native heart without angina pectoris  -     Ambulatory referral to Cardiology    Hyperlipidemia, unspecified hyperlipidemia type    Mixed Alzheimer's and vascular dementia    Osteoarthritis of spine with radiculopathy, lumbar region    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 edema, with long-term current use of insulin    Type 2 diabetes mellitus with diabetic polyneuropathy, with long-term current use of  insulin    Type 2 diabetes mellitus with stage 3 chronic kidney disease, with long-term current use of insulin    Other orders  -     Cancel: Ambulatory referral to Outpatient Case Management  -     Pneumococcal Polysaccharide Vaccine (23 Valent) (SQ/IM)      See meds, orders, follow up, routing and instructions sections of encounter.  An 86-year-old established male patient 3-4 month up.  He is complaining of   similar issues at his last visit, which is pain in the right SI area radiating   to the right lower extremity.  He is under the care of both internal and   external Pain Management Services.  His medications are noted.    Concerning his diabetes, he states he is currently taking 7 units NovoLog and 18   units of Lantus, which is not reflecting in his medical record.    He sees Patricia Brown, and his recent laboratory was reviewed.  No other acute   issues were brought for our attention today.  We will follow him up in   approximately six months.  There is no evidence of chronic renal disease thus I   think he is clear for an implanted spinal stimulator should he choose to pursue   an outside physician for that.  I asked him to also follow up with his internal   pain management physicians, continue his current medications.    ADDENDUM TO PHYSICAL EXAMINATION:  He was able to stand and walk, additionally   both toe and heel walk were performed without difficulty today.      PHYLLIS/HN  dd: 06/06/2017 12:21:01 (CDT)  td: 06/07/2017 04:11:12 (CDT)  Doc ID   #[id number]  Job ID #[id number]    CC:

## 2017-06-06 NOTE — PROGRESS NOTES
For your Information:    Please note the following patient has been assigned to Melissa Montalvo RN with Outpatient Complex Care Mgmt for screening.    Reason:  High Risk    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 edema, with long-term current use of insulin [E11.2102, Z79.4]    Please contact [facility] at ext [phone] with questions.    Thank you    Ana Parker, SSC

## 2017-06-06 NOTE — PATIENT INSTRUCTIONS
See meds, orders, follow up and instructions sections of encounter. Clear for nerve stimulator and cc to referring physician. Current creatinine is 1.0.

## 2017-06-13 ENCOUNTER — OUTPATIENT CASE MANAGEMENT (OUTPATIENT)
Dept: ADMINISTRATIVE | Facility: OTHER | Age: 82
End: 2017-06-13

## 2017-06-13 NOTE — PROGRESS NOTES
Call placed to [phone] and received pt's daughter. Reports that she is on another call and is not at the pt's home at this time. Reports that she put her phone number because the patient is hard of hearing. Reports that the best time to talk to the patient is between 9 and 12 o'clock when the sitter is present.

## 2017-06-15 ENCOUNTER — PATIENT MESSAGE (OUTPATIENT)
Dept: INTERNAL MEDICINE | Facility: CLINIC | Age: 82
End: 2017-06-15

## 2017-06-16 ENCOUNTER — OUTPATIENT CASE MANAGEMENT (OUTPATIENT)
Dept: ADMINISTRATIVE | Facility: OTHER | Age: 82
End: 2017-06-16

## 2017-06-16 RX ORDER — TAMSULOSIN HYDROCHLORIDE 0.4 MG/1
0.8 CAPSULE ORAL DAILY
Qty: 180 CAPSULE | Refills: 0 | Status: SHIPPED | OUTPATIENT
Start: 2017-06-16 | End: 2017-08-21 | Stop reason: SDUPTHER

## 2017-06-16 NOTE — PROGRESS NOTES
Previously received consent from pt and daughter to speak with pt's sitters on his behalf. Sitter reports that the pt's blood sugars had been over 200. Reports that the pt had been taking his medications without assistance. Reports that 3 weeks ago the sitters started to give the pt his medications and adm the insulin. Reports that the patient loves to go out to eat. Reports that the patient is determined to eat the meals he wants without regard of diabetic diet regimen. Sitter reports that the previously mailed diabetic information is in the home for reference. Reports that the patient complains of back pain. Pt's hemoglobin A1c of 7.9 on 3/28/17. Sitter reports that the patient's blood sugar and medication compliance has improved with the presence of the sitters.   Plan: Inform on Eating out with diabetes and some complications of DM.

## 2017-06-19 ENCOUNTER — OUTPATIENT CASE MANAGEMENT (OUTPATIENT)
Dept: ADMINISTRATIVE | Facility: OTHER | Age: 82
End: 2017-06-19

## 2017-06-19 NOTE — PROGRESS NOTES
Please note an Outpatient Complex Care Management welcome packet and consent form was created and mailed to the patient on 6/19/17.    Please contact \[facility] [place] [facility]\"" at [phone] with any questions.    Thank you,    Melissa Polanco, SSC

## 2017-06-19 NOTE — LETTER
June 19, 2017    Nickolas Blake  [address]  [place] [address]             Outpatient Case Management  [address]  [place] [address] Dear Nickolas Blake:    We understand that receiving many services from different doctors and healthcare providers is overwhelming. There are appointments to make, transportation to arrange, dietary instructions to understand, and new medications to obtain.    This is where [facility] can help.     You are eligible to receive Outpatient Case Management services when you have healthcare needs that require the coordination of many providers, treatments, and services. You also qualify if you need assistance with a new treatment plan.     There is no charge for this support. You may have been referred to this program from your doctor(s), hospital staff member(s), or insurance company but you always have a choice to participate or not participate. To participate, you must give us your permission to be enrolled.     When you are enrolled in the [facility] program, the  who is assigned to you is    Melissa Montalvo RN  [phone]    Depending on your needs and wishes, your  may speak with you by phone, visit you at your place of living (for example your home, skilled nursing facility, or rehabilitation facility), or meet you at your doctors office.     Your  will tell you why you have been selected to participate in the program and will complete an assessment of your needs. Then a personalized plan of care will be developed with you and or your caregiver.             Here are examples of the services your [facility]  provides.     Coordinate communication among multiple providers.   Arrange for transportation, doctors visits, durable medical equipment, home care services, and special clinics.    Provide coaching on how to manage your health condition.     Answer questions about your health condition.   Help you understand your doctors treatment plan.    Provide additional instruction about your health condition, treatments, and medications.    Help you obtain information about your insurance coverage.    Advocate for your individual needs.    Request a Licensed Clinical  (LCSW) to visit you if you need their services. LCSWs help with long term planning (discussing placement options, advanced planning directives), financial planning, and assistance (for example rent, utilities, medication funding).     Your  will coordinate their activities with other outpatient services you are receiving. All services provided by [facility]  are coordinated with and communicated to your primary care physician.    Our goal is to help you manage your health condition(s) safely within your living environment, whether that is your home or a medical facility. We want to help you function at the healthiest and highest level possible.     Sincerely,      Elio Hough MD  Medical Director    Enclosures:    Frequently Asked Questions  Patient Rights and Responsibilities   Reporting a Grievance or Complaint  Consent/Release of Information  Stamped Addressed Envelope                  Frequently Asked Questions about [facility]    What is [facility]?  Outpatient Case management is not Home healthcare services. [facility]  do not provide hands-on care. [facility]  will work with your doctor to arrange for home health services, if needed. Home health services have a limited duration and there are some restrictions as to who can get these services. There is no prescribed limit to the amount of time you receive [facility] services. [facility]  are not agents of your insurance company. However, [facility]  can help you obtain information from your insurance company.     Who are the [facility] ?  [facility]  are Registered Nurses and Social Workers. It is important to remember that you and your  are a team that works together with your primary care physician to create your individualized plan of care. The ultimate goal of your care plan is to help you implement your doctors treatment plan and to help you function at the highest level of health possible.     What are my rights as a patient?  It is important for you to know and understand your rights and responsibilities while receiving services from the [facility] program. We have enclosed a complete description of your rights and responsibilities. You can help to make your care more effective when you understand your right and responsibilities.     What is needed to be enrolled in the program?  You are only enrolled in the [facility] Program when you give us your consent to participate. You will find enclosed a consent form. You are receiving this letter because you or your caregivers have given us a verbal consent to enroll you in [facility] Program. We ask that you sign and return the enclosed written consent in the stamped self-addressed envelope.                           Patient Rights and Responsibilities    We consider you a partner in your care. When you are well informed, participate in treatment decisions and communicate openly with your doctor and other healthcare professionals, you help make your care more effective.     While you are in the Outpatient Case Management Program, your rights include the following:     You have a right to be provided services in a non-discriminatory manner in accordance with the provisions of Title VI of the Civil Rights Act of 1964, Section 504 of the Rehabilitation Act of  1973, the Age Discrimination Act of 1975, the Americans with Disabilities Act as well as any other applicable Federal and State laws and regulations.     You have the right to a reasonable, timely response to your request or need for care, as well as the right to considerate and respectful care including an environment that preserves dignity and contributes to a positive self-image. You are responsible for being considerate and respectful of our staff.     You have a right to information regarding patient rights, advocacy services and complaint mechanisms, and the right to prompt resolution of any complaint. You or a designee has the right to participate in the resolution of ethical issues surrounding your care. You have a right to file a complaint if you feel that your rights have been infringed, without fear or penalty from [facility] or the federal government. You may file a complaint with the Director of Outpatient Case Management by calling [phone]. At any time, you may lodge a grievance with the [facility] and [facility] by calling [phone], or the Joint Commission on Accreditation of Healthcare Organizations at [phone].     You, or someone acting on your behalf, have the right to understandable information on your health status, treatment and progress in order to make decisions. You have the right to know the nature, risks and alternatives to treatment. You have the right to be informed, when appropriate, regarding the outcome of the care that has been provided. You have the right to refuse treatment to the extent permitted by law, and the right to be informed of the alternatives and consequences of refusing treatment.     You, in collaboration with your physician, have the right to make decisions regarding care and the right to participate in the development and implementation of the plan of care and effective pain management. You have the right to know the name and  professional status of those responsible for the delivery of your care and treatment.       You have a right, within legal guidelines, to have a guardian, next-of-kin or legal designee exercises your patient rights when you are unable to do so. You have the right for your wishes regarding end-of-life decisions to be addressed by the healthcare team through advance directives. You have the right to personal privacy and confidentiality and to expect confidentiality of all records and communications pertaining to your care.      You have the right to receive communications about your health information confidentially. You have the right to request restrictions on the uses and disclosures of your health information. You have the right to inspect, copy, request amendments and receive an accounting of to whom we have disclosed your health information.     You have the right to be provided with interpretation services if you do not speak English; to alternative communication techniques if you are hearing or vision impaired; and to have any other resources needed on your behalf to ensure effective communication. These services are provided free of charge.     You have a right to personal safety (free from mental, physical, sexual and verbal abuse, neglect and exploitation). You have the right to access protective and advocacy services.     Advance Directives  A Patient Advocate is available to meet with patients to answer questions regarding advance directives.    Living Will  A document that outlines what medical treatment the patient does or does not want in the event the patient becomes unable to make those decisions at the appropriate time.    Durable Medical Power of   A document by which the patient designates an individual to be responsible for making medical decisions in the event the patient becomes unable to do so.    HIPAA Notice of Privacy Practices  Your medical information is governed by federal  privacy laws. HIPAA protects private medical information and how that information is disclosed. If you have a question regarding the HIPAA Notice of Privacy Practices, or if you believe your privacy rights have been violated, you may call our designated hotline at [phone].            Quality Improvement  Because we consistently strive to improve the care and service provided to our patients, we welcome your feedback. Your comments are an important part of our quality improvement process, as we like to know what we are doing right and which areas are in need of improvement. Our policy is to listen, be responsive and provide you with an appropriate and timely follow-up to your questions or concerns. Our goal is active patient and family involvement in all aspects of the care process.                                                                                  Reporting a Grievance or Complaint    During your time with the [facility] team you may have a grievance or complaint with our services. Your Patients Bill of Rights gives patients, families, and caregivers the right to express concerns and grievances and the right to expect a reasonable and timely response.     Your presentation of your concerns is not viewed negatively. It is an opportunity for us to improve the quality of our care and services we provide to you.     You may report your concerns directly to your , or you can phone in a complaint to:     Director of Outpatient Case Management  [phone]    You may also send a complaint letter to:    Director of Outpatient Case Management Services  [address] [place] [id number]    Tell us the details of your complaint and provide us with a contact phone number so we can contact you to obtain additional information. We will return a call to you within two business days of our receipt of your complaint, and to request additional information as  needed. If you choose to mail a letter, your complaint may take a few days longer to reach us.     All grievances will be addressed as quickly as possible. A grievance or complaint that involves situations or practices which place patients in immediate danger will be addressed as an urgent matter. We will work to resolve all other complaints within seven days of receipt. By that time, you will receive a phone call with either the resolution of your complaint, or a plan for corrective action. A formal written response will be sent to you within 30 days of receipt of your grievance.     If a resolution cannot be completed within 30 days, a letter will be sent to you or your family member with an estimated time for the final response.    Additionally, all patients have the right to file complaints with external agencies, without exception. Complaints/grievances can be addressed to the following agencies:            Patient Safety or Quality of Care Concerns  Office of Quality Monitoring   [facility] [place] [address]  [phone] Toll Free    HIPPA Privacy/Security Concerns  Office for Civil Rights Region IV  [place] Department of Health & Human Services  [address] [place] [address], [address]  [place] [address] [phone] Phone  [phone] TDD  [phone] Toll Free    Medicare/Medicaid Billing Concerns  [place] for Medicare & Medicaid Services  Region [address]  [address] [place] [address], [address]  [place] [address] [phone] Phone  [phone] Toll Free    General Concerns  [place] [facility] ([facility])  [phone] Toll Free Complaint Hotline                                                              Consent Form/Release of Information    By signing--     (1) I agree I have read the Outpatient Case Management information provided to me;     (2) I agree to voluntarily participate in the Outpatient Case Management program;     (3) I understand I  must consent to participation in the Outpatient Case Management program during my first interview with my ;    (4) I consent to the discussion and release of my personal health information to my healthcare team (including my personal physician, my medical home care team, any specialty physician(s), and my [facility] team);     (5) I agree my consent is valid for the length of time I am receiving Outpatient Case Management;    (6) I agree to referrals to community resources which my Case Management team recommends for me. I agree to the release of my personal information and personal health information as necessary to referral sources.    ___________________________________________________________________  Patients Printed Name     ___________________________________________________________________  Patients Signature       Date    If patient is in being cared for, please complete this section:     ___________________________________________________________________  Printed Name of Person Caring For Patient   Relationship To Patient    ___________________________________________________________________   Signature of Person Caring For Patient     Date    PLEASE SIGN AND RETURN IN THE ENCLOSED PRE-ADDRESSED ENVELOPE.

## 2017-06-21 DIAGNOSIS — E11.22 TYPE 2 DIABETES MELLITUS WITH DIABETIC CHRONIC KIDNEY DISEASE: Chronic | ICD-10-CM

## 2017-06-22 ENCOUNTER — OFFICE VISIT (OUTPATIENT)
Dept: INTERNAL MEDICINE | Facility: CLINIC | Age: 82
End: 2017-06-22
Payer: MEDICARE

## 2017-06-22 VITALS
WEIGHT: 179.25 LBS | DIASTOLIC BLOOD PRESSURE: 74 MMHG | HEIGHT: 71 IN | HEART RATE: 76 BPM | BODY MASS INDEX: 25.1 KG/M2 | SYSTOLIC BLOOD PRESSURE: 142 MMHG

## 2017-06-22 DIAGNOSIS — F01.50 MIXED ALZHEIMER'S AND VASCULAR DEMENTIA: ICD-10-CM

## 2017-06-22 DIAGNOSIS — I25.10 CORONARY ARTERY DISEASE INVOLVING NATIVE CORONARY ARTERY OF NATIVE HEART WITHOUT ANGINA PECTORIS: Chronic | ICD-10-CM

## 2017-06-22 DIAGNOSIS — F02.80 MIXED ALZHEIMER'S AND VASCULAR DEMENTIA: ICD-10-CM

## 2017-06-22 DIAGNOSIS — I70.0 AORTIC ATHEROSCLEROSIS: ICD-10-CM

## 2017-06-22 DIAGNOSIS — N18.30 STAGE 3 CHRONIC KIDNEY DISEASE: ICD-10-CM

## 2017-06-22 DIAGNOSIS — G30.9 MIXED ALZHEIMER'S AND VASCULAR DEMENTIA: ICD-10-CM

## 2017-06-22 DIAGNOSIS — E11.42 TYPE 2 DIABETES MELLITUS WITH DIABETIC POLYNEUROPATHY, WITH LONG-TERM CURRENT USE OF INSULIN: ICD-10-CM

## 2017-06-22 DIAGNOSIS — M48.061 SPINAL STENOSIS OF LUMBAR REGION AT MULTIPLE LEVELS: ICD-10-CM

## 2017-06-22 DIAGNOSIS — I77.9 BILATERAL CAROTID ARTERY DISEASE: ICD-10-CM

## 2017-06-22 DIAGNOSIS — E11.22 TYPE 2 DIABETES MELLITUS WITH STAGE 3 CHRONIC KIDNEY DISEASE, WITH LONG-TERM CURRENT USE OF INSULIN: ICD-10-CM

## 2017-06-22 DIAGNOSIS — M47.26 OSTEOARTHRITIS OF SPINE WITH RADICULOPATHY, LUMBAR REGION: ICD-10-CM

## 2017-06-22 DIAGNOSIS — Z79.4 TYPE 2 DIABETES MELLITUS WITH DIABETIC POLYNEUROPATHY, WITH LONG-TERM CURRENT USE OF INSULIN: ICD-10-CM

## 2017-06-22 DIAGNOSIS — I10 HYPERTENSION, ESSENTIAL: ICD-10-CM

## 2017-06-22 DIAGNOSIS — Z00.00 ENCOUNTER FOR PREVENTIVE HEALTH EXAMINATION: Primary | ICD-10-CM

## 2017-06-22 DIAGNOSIS — N18.30 ANEMIA IN STAGE 3 CHRONIC KIDNEY DISEASE: ICD-10-CM

## 2017-06-22 DIAGNOSIS — N18.30 TYPE 2 DIABETES MELLITUS WITH STAGE 3 CHRONIC KIDNEY DISEASE, WITH LONG-TERM CURRENT USE OF INSULIN: ICD-10-CM

## 2017-06-22 DIAGNOSIS — M81.0 AGE-RELATED OSTEOPOROSIS WITHOUT CURRENT PATHOLOGICAL FRACTURE: ICD-10-CM

## 2017-06-22 DIAGNOSIS — E11.3293 TYPE 2 DIABETES MELLITUS WITH BOTH EYES AFFECTED BY MILD NONPROLIFERATIVE RETINOPATHY WITHOUT MACULAR EDEMA, WITH LONG-TERM CURRENT USE OF INSULIN: ICD-10-CM

## 2017-06-22 DIAGNOSIS — Z79.4 TYPE 2 DIABETES MELLITUS WITH BOTH EYES AFFECTED BY MILD NONPROLIFERATIVE RETINOPATHY WITHOUT MACULAR EDEMA, WITH LONG-TERM CURRENT USE OF INSULIN: ICD-10-CM

## 2017-06-22 DIAGNOSIS — I25.10 ASCVD (ARTERIOSCLEROTIC CARDIOVASCULAR DISEASE): ICD-10-CM

## 2017-06-22 DIAGNOSIS — M53.3 SACROILIAC JOINT DYSFUNCTION OF RIGHT SIDE: ICD-10-CM

## 2017-06-22 DIAGNOSIS — M47.812 CERVICAL SPINE ARTHRITIS: ICD-10-CM

## 2017-06-22 DIAGNOSIS — E78.5 HYPERLIPIDEMIA, UNSPECIFIED HYPERLIPIDEMIA TYPE: ICD-10-CM

## 2017-06-22 DIAGNOSIS — Z79.4 TYPE 2 DIABETES MELLITUS WITH OTHER CIRCULATORY COMPLICATION, WITH LONG-TERM CURRENT USE OF INSULIN: ICD-10-CM

## 2017-06-22 DIAGNOSIS — E11.59 TYPE 2 DIABETES MELLITUS WITH OTHER CIRCULATORY COMPLICATION, WITH LONG-TERM CURRENT USE OF INSULIN: ICD-10-CM

## 2017-06-22 DIAGNOSIS — Z79.4 TYPE 2 DIABETES MELLITUS WITH STAGE 3 CHRONIC KIDNEY DISEASE, WITH LONG-TERM CURRENT USE OF INSULIN: ICD-10-CM

## 2017-06-22 DIAGNOSIS — E11.42 DIABETIC POLYNEUROPATHY ASSOCIATED WITH TYPE 2 DIABETES MELLITUS: ICD-10-CM

## 2017-06-22 DIAGNOSIS — E04.2 MULTINODULAR THYROID: ICD-10-CM

## 2017-06-22 DIAGNOSIS — D63.1 ANEMIA IN STAGE 3 CHRONIC KIDNEY DISEASE: ICD-10-CM

## 2017-06-22 PROCEDURE — G0439 PPPS, SUBSEQ VISIT: HCPCS | Mod: S$GLB,,, | Performed by: NURSE PRACTITIONER

## 2017-06-22 PROCEDURE — 99499 UNLISTED E&M SERVICE: CPT | Mod: S$GLB,,, | Performed by: NURSE PRACTITIONER

## 2017-06-22 PROCEDURE — 99999 PR PBB SHADOW E&M-EST. PATIENT-LVL V: CPT | Mod: PBBFAC,,, | Performed by: NURSE PRACTITIONER

## 2017-06-22 RX ORDER — INSULIN GLARGINE 100 [IU]/ML
INJECTION, SOLUTION SUBCUTANEOUS
Qty: 40 ML | Refills: 3 | Status: SHIPPED | OUTPATIENT
Start: 2017-06-22 | End: 2017-07-28 | Stop reason: SDUPTHER

## 2017-06-22 NOTE — PATIENT INSTRUCTIONS
Counseling and Referral of Other Preventative  (Italic type indicates deductible and co-insurance are waived)    Patient Name: Nickolas Blake  Today's Date: 6/22/2017      SERVICE LIMITATIONS RECOMMENDATION    Vaccines    · Pneumococcal (once after 65)    · Influenza (annually)    · Hepatitis B (if medium/high risk)    · Prevnar 13      Hepatitis B medium/high risk factors:       - End-stage renal disease       - Hemophiliacs who received Factor VII or         IX concentrates       - Clients of institutions for the mentally             retarded       - Persons who live in the same house as          a HepB carrier       - Homosexual men       - Illicit injectable drug abusers     Pneumococcal: Done, no repeat necessary     Influenza: Due fall 2017     Hepatitis B: N/A     Prevnar 13: Done, no repeat necessary    Prostate cancer screening (annually to age 75)     Prostate specific antigen (PSA) Shared decision making with Provider. Sometimes a co-pay may be required if the patient decides to have this test. The USPSTF no longer recommends prostate cancer screening routinely in medicine: not to follow    Colorectal cancer screening (to age 75)    · Fecal occult blood test (annual)  · Flexible sigmoidoscopy (5y)  · Screening colonoscopy (10y)  · Barium enema   N/A    Diabetes self-management training (no USPSTF recommendations)  Requires referral by treating physician for patient with diabetes or renal disease. 10 hours of initial DSMT sessions of no less than 30 minutes each in a continuous 12-month period. 2 hours of follow-up DSMT in subsequent years.  per PCP    Glaucoma screening (no USPSTF recommendation)  Diabetes mellitus, family history   , age 50 or over    American, age 65 or over  Done this year, repeat every year    Medical nutrition therapy for diabetes or renal disease (no recommended schedule)  Requires referral by treating physician for patient with diabetes or renal disease  or kidney transplant within the past 3 years.  Can be provided in same year as diabetes self-management training (DSMT), and CMS recommends medical nutrition therapy take place after DSMT. Up to 3 hours for initial year and 2 hours in subsequent years.  per PCP    Cardiovascular screening blood tests (every 5 years)  · Fasting lipid panel  Order as a panel if possible  Done this year, repeat every year    Diabetes screening tests (at least every 3 years, Medicare covers annually or at 6-month intervals for prediabetic patients)  · Fasting blood sugar (FBS) or glucose tolerance test (GTT)  Patient must be diagnosed with one of the following:       - Hypertension       - Dyslipidemia       - Obesity (BMI 30kg/m2)       - Previous elevated impaired FBS or GTT       ... or any two of the following:       - Overweight (BMI 25 but <30)       - Family history of diabetes       - Age 65 or older       - History of gestational diabetes or birth of baby weighing more than 9 pounds  Last done 4/2017, recommend to repeat every 6  months    Abdominal aortic aneurysm screening (once)  · Sonogram   Limited to patients who meet one of the following criteria:       - Men who are 65-75 years old and have smoked more than 100 cigarette in their lifetime       - Anyone with a family history of abdominal aortic aneurysm       - Anyone recommended for screening by the USPSTF  N/A    HIV screening (annually for increased risk patients)  · HIV-1 and HIV-2 by EIA, or TRUDY, rapid antibody test or oral mucosa transudate  Patients must be at increased risk for HIV infection per USPSTF guidelines or pregnant. Tests covered annually for patient at increased risk or as requested by the patient. Pregnant patients may receive up to 3 tests during pregnancy.  Risks discussed, screening is not recommended    Smoking cessation counseling (up to 8 sessions per year)  Patients must be asymptomatic of tobacco-related conditions to receive as a  preventative service.  Non-smoker    Subsequent annual wellness visit  At least 12 months since last AWV  Return in one year     The following information is provided to all patients.  This information is to help you find resources for any of the problems found today that may be affecting your health:                Living healthy guide: www.[facility].[place]      Understanding Diabetes: www.diabetes.org      Eating healthy: www.cdc.gov/healthyweight      CDC home safety checklist: www.cdc.gov/steadi/patient.html      Agency on Aging: www.goea.[place].[facility]      Alcoholics anonymous (AA): www.aa.org      Physical Activity: www.jorge alberto.nih.gov/hj6ayxs      Tobacco use: www.quitwithusla.org

## 2017-06-22 NOTE — PROGRESS NOTES
"Nickolas Blake presented for a  Medicare AWV and comprehensive Health Risk Assessment today. The following components were reviewed and updated:    · Medical history  · Family History  · Social history  · Allergies and Current Medications  · Health Risk Assessment  · Health Maintenance  · Care Team     ** See Completed Assessments for Annual Wellness Visit within the encounter summary.**       The following assessments were completed:  · Living Situation  · CAGE  · Depression Screening  · Timed Get Up and Go  · Whisper Test  · Cognitive Function Screening  · Nutrition Screening  · ADL Screening  · PAQ Screening    Vitals:    06/22/17 1005   BP: (!) 142/74   BP Location: Left arm   Pulse: 76   Weight: 81.3 kg (179 lb 3.7 oz)   Height: 5' 11" (1.803 m)     Body mass index is 25 kg/m².  Physical Exam   Constitutional: He appears well-developed and well-nourished.   HENT:   Head: Normocephalic.   Cardiovascular: Normal rate, regular rhythm and normal heart sounds.    No murmur heard.  Pulmonary/Chest: Effort normal and breath sounds normal.   Abdominal: Soft. Bowel sounds are normal.   Musculoskeletal: He exhibits no edema.   Gait antalgic   Neurological: He is alert. He exhibits normal muscle tone.   Skin: Skin is warm and dry.   Psychiatric: He has a normal mood and affect.   Nursing note and vitals reviewed.        Diagnoses and health risks identified today and associated recommendations/orders:    1. Encounter for preventive health examination  Here for Health Risk Assessment. Follow up in one year.    2. Hypertension, essential  Chronic, stable on current medications. Followed by PCP.    3. Coronary artery disease involving native coronary artery of native heart without angina pectoris  Chronic, stable on current medications. Followed by PCP.    4. Aortic atherosclerosis  Chronic, stable on current medications. Noted on CXR 11/09/16. Followed by PCP.    5. ASCVD (arteriosclerotic cardiovascular disease)  Chronic, " stable on current medications. Followed by PCP.    6. Bilateral carotid artery disease  Chronic, stable on current medications. 1-39% stenosis bilaterally noted on Ultrasound 10/13/15. Followed by PCP.    7. Type 2 diabetes mellitus with other circulatory complication, with long-term current use of insulin  Chronic, stable on current medications. Followed by PCP, Endocrinology.    8. Type 2 diabetes mellitus with diabetic polyneuropathy, with long-term current use of insulin  Chronic, stable on current medications. Followed by PCP, Endocrinology.  - Ambulatory Referral to Podiatry    9. Diabetic polyneuropathy associated with type 2 diabetes mellitus  Chronic, stable on current medications. Followed by PCP.    10. Type 2 diabetes mellitus with stage 3 chronic kidney disease, with long-term current use of insulin  Chronic, stable on current medications. Followed by PCP, Endocrinology.    11. Stage 3 chronic kidney disease  Chronic, stable.  Followed by PCP.    12. 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 edema, with long-term current use of insulin  Chronic, stable on current medications. Followed by Optometry.     13. Mixed Alzheimer's and vascular dementia  Chronic, stable on current medication. Followed by PCP, Neurology.    14. Anemia in stage 3 chronic kidney disease  Chronic, stable. Followed by PCP.    15. Multinodular thyroid  Chronic, stable.  Followed by PCP.    16. Hyperlipidemia, unspecified hyperlipidemia type  Chronic, stable on current medication. Followed by PCP.    17. Age-related osteoporosis without current pathological fracture  Chronic, stable. . Followed by PCP.    18. Spinal stenosis of lumbar region at multiple levels  Chronic, stable on current medications. Followed by PCP, Pain Management.    19. Sacroiliac joint dysfunction of right side  Chronic, stable on current medications. Followed by PCP, Pain Management.    20.  Osteoarthritis of spine with radiculopathy, lumbar region  Chronic, stable on current medications. Followed by PCP, Pain Managment.    21. Cervical spine arthritis  Chronic, stable on current medications. Noted on XR Cervical Spine 4/11/05. Followed by PCP, Pain Management.      Provided Nickolas with a 5-10 year written screening schedule and personal prevention plan. Recommendations were developed using the USPSTF age appropriate recommendations. Education, counseling, and referrals were provided as needed. After Visit Summary printed and given to patient which includes a list of additional screenings\tests needed.    Return in about 5 months (around 12/3/2017).with PCP.    Elizabeth Rodriguez NP

## 2017-06-23 ENCOUNTER — OUTPATIENT CASE MANAGEMENT (OUTPATIENT)
Dept: ADMINISTRATIVE | Facility: OTHER | Age: 82
End: 2017-06-23

## 2017-06-23 ENCOUNTER — TELEPHONE (OUTPATIENT)
Dept: INTERNAL MEDICINE | Facility: CLINIC | Age: 82
End: 2017-06-23

## 2017-06-23 DIAGNOSIS — E11.9 TYPE 2 DIABETES MELLITUS WITHOUT COMPLICATION, WITH LONG-TERM CURRENT USE OF INSULIN: Primary | ICD-10-CM

## 2017-06-23 DIAGNOSIS — Z79.4 TYPE 2 DIABETES MELLITUS WITHOUT COMPLICATION, WITH LONG-TERM CURRENT USE OF INSULIN: Primary | ICD-10-CM

## 2017-06-23 NOTE — TELEPHONE ENCOUNTER
----- Message from Melissa Montalvo RN sent at 6/23/2017  9:13 AM CDT -----  Contact: MERCY Gallegos this is Melissa with [facility]. Pt's sitter reports that his BP this morning was 189/94 before taking his morning medications. I called back for a F/U, but the pt was asleep at the time and the sitter did not want to arouse him from his sleep. She reports that she will recheck and call me with a f/u. Pt had no complaint of HA, blurred vision.    Pt is also requesting a prescription for a new blood glucose monitor. Can Dr. Bunn write a prescription for a new monitor (covered by [employer])?   Thank you

## 2017-06-23 NOTE — PROGRESS NOTES
Pt's sitter reports that the patient's BP this morning was elevated. Reports reading of 189/94. Denies headache or blurred vision. Reports that the patient just took his medications. Reports BS reading of 230.  Sitter request information on the process for the patient to obtain a new blood glucose monitor. Informed sitter on s/s of HTN. Ms. Perales, the sitter, reports that there is no change in the pt's complaint of pain. Informed sitter to recheck the pt's BP in 30 minutes. Informed that this cm will call back for F/U on BP reading. Call placed to f/u. Sitter reports that the pt is now laying down and she does not want to wake him. Reports that she will recheck when he awakes and call this cm with a f/u. Informed that a message will be sent to the PCP. Plan: Review s/s of HTN.

## 2017-06-26 NOTE — TELEPHONE ENCOUNTER
Please schedule a follow up if BP remains hi and a script for diabetic supplies was printed - please provide to patient. Thank you.

## 2017-06-27 NOTE — TELEPHONE ENCOUNTER
Left message for patient to call office.  When he calls back see notes from Dr. Bunn to schedule a follow-up appt if BP is high.

## 2017-06-30 ENCOUNTER — OUTPATIENT CASE MANAGEMENT (OUTPATIENT)
Dept: ADMINISTRATIVE | Facility: OTHER | Age: 82
End: 2017-06-30

## 2017-07-03 ENCOUNTER — OUTPATIENT CASE MANAGEMENT (OUTPATIENT)
Dept: ADMINISTRATIVE | Facility: OTHER | Age: 82
End: 2017-07-03

## 2017-07-13 ENCOUNTER — OUTPATIENT CASE MANAGEMENT (OUTPATIENT)
Dept: ADMINISTRATIVE | Facility: OTHER | Age: 82
End: 2017-07-13

## 2017-07-13 NOTE — PROGRESS NOTES
Call placed to [phone] with message left containing contact information.  Call placed to pt's daughter Anne Rizzo. Daughter reports that the patient continues to have complaints of pain in the back. Daughter reports that she is unsure about the pt's blood sugars. Daughter reports that the sitters have not informed her of any problems with the pt's blood sugars.  Reports that she will find out why the sitters are not answering the telephone at the pt's home. Informed daughter on hemogloblin A1c. Plan: Review complications of DM.

## 2017-07-25 ENCOUNTER — NURSE TRIAGE (OUTPATIENT)
Dept: ADMINISTRATIVE | Facility: CLINIC | Age: 82
End: 2017-07-25

## 2017-07-25 ENCOUNTER — OFFICE VISIT (OUTPATIENT)
Dept: INTERNAL MEDICINE | Facility: CLINIC | Age: 82
End: 2017-07-25
Payer: MEDICARE

## 2017-07-25 VITALS
BODY MASS INDEX: 27.22 KG/M2 | DIASTOLIC BLOOD PRESSURE: 82 MMHG | HEART RATE: 80 BPM | OXYGEN SATURATION: 96 % | HEIGHT: 71 IN | SYSTOLIC BLOOD PRESSURE: 150 MMHG | WEIGHT: 194.44 LBS

## 2017-07-25 DIAGNOSIS — G30.9 MIXED ALZHEIMER'S AND VASCULAR DEMENTIA: ICD-10-CM

## 2017-07-25 DIAGNOSIS — N18.30 STAGE 3 CHRONIC KIDNEY DISEASE: ICD-10-CM

## 2017-07-25 DIAGNOSIS — F02.80 MIXED ALZHEIMER'S AND VASCULAR DEMENTIA: ICD-10-CM

## 2017-07-25 DIAGNOSIS — M48.061 SPINAL STENOSIS OF LUMBAR REGION AT MULTIPLE LEVELS: ICD-10-CM

## 2017-07-25 DIAGNOSIS — Z79.4 TYPE 2 DIABETES MELLITUS WITH DIABETIC POLYNEUROPATHY, WITH LONG-TERM CURRENT USE OF INSULIN: ICD-10-CM

## 2017-07-25 DIAGNOSIS — F01.50 MIXED ALZHEIMER'S AND VASCULAR DEMENTIA: ICD-10-CM

## 2017-07-25 DIAGNOSIS — E11.42 TYPE 2 DIABETES MELLITUS WITH DIABETIC POLYNEUROPATHY, WITH LONG-TERM CURRENT USE OF INSULIN: ICD-10-CM

## 2017-07-25 DIAGNOSIS — N40.0 BENIGN PROSTATIC HYPERPLASIA WITHOUT LOWER URINARY TRACT SYMPTOMS: ICD-10-CM

## 2017-07-25 DIAGNOSIS — I10 HYPERTENSION, ESSENTIAL: Primary | ICD-10-CM

## 2017-07-25 PROCEDURE — 1125F AMNT PAIN NOTED PAIN PRSNT: CPT | Mod: S$GLB,,, | Performed by: NURSE PRACTITIONER

## 2017-07-25 PROCEDURE — 99499 UNLISTED E&M SERVICE: CPT | Mod: S$GLB,,, | Performed by: NURSE PRACTITIONER

## 2017-07-25 PROCEDURE — 1159F MED LIST DOCD IN RCRD: CPT | Mod: S$GLB,,, | Performed by: NURSE PRACTITIONER

## 2017-07-25 PROCEDURE — 99214 OFFICE O/P EST MOD 30 MIN: CPT | Mod: S$GLB,,, | Performed by: NURSE PRACTITIONER

## 2017-07-25 PROCEDURE — 99999 PR PBB SHADOW E&M-EST. PATIENT-LVL IV: CPT | Mod: PBBFAC,,, | Performed by: NURSE PRACTITIONER

## 2017-07-25 RX ORDER — DONEPEZIL HYDROCHLORIDE 10 MG/1
10 TABLET, FILM COATED ORAL DAILY
Qty: 90 TABLET | Refills: 3 | Status: SHIPPED | OUTPATIENT
Start: 2017-07-25 | End: 2017-07-25 | Stop reason: SDUPTHER

## 2017-07-25 RX ORDER — LISINOPRIL 2.5 MG/1
5 TABLET ORAL DAILY
Qty: 90 TABLET | Refills: 1 | Status: SHIPPED | OUTPATIENT
Start: 2017-07-25 | End: 2017-08-10 | Stop reason: SDUPTHER

## 2017-07-25 RX ORDER — LISINOPRIL 2.5 MG/1
5 TABLET ORAL DAILY
Qty: 90 TABLET | Refills: 1 | Status: SHIPPED | OUTPATIENT
Start: 2017-07-25 | End: 2017-07-25 | Stop reason: SDUPTHER

## 2017-07-25 RX ORDER — DONEPEZIL HYDROCHLORIDE 10 MG/1
10 TABLET, FILM COATED ORAL DAILY
Qty: 10 TABLET | Refills: 0 | Status: SHIPPED | OUTPATIENT
Start: 2017-07-25 | End: 2017-07-25 | Stop reason: SDUPTHER

## 2017-07-25 RX ORDER — DONEPEZIL HYDROCHLORIDE 10 MG/1
10 TABLET, FILM COATED ORAL DAILY
Qty: 10 TABLET | Refills: 0 | Status: SHIPPED | OUTPATIENT
Start: 2017-07-25 | End: 2017-08-04

## 2017-07-25 NOTE — PROGRESS NOTES
INTERNAL MEDICINE PROGRESS/URGENT CARE NOTE    CHIEF COMPLAINT     Chief Complaint   Patient presents with    Hypertension     186/98 this after AM after meds    Irregular Heart Beat     110 this AM    Hyperglycemia     258    Medication Refill     10 day supply of ARICEPT and 90 day supply       HPI     Nickolas Blake is a 86 y.o. male who presents for an urgent visit today.    HTN- bp elevated this morning 186/98. Complaint with lisinopril, metoprolol.   Pulse was also elevated 110's. Pt denies chest pain or heart racing. Does reports intermittent palpitations approx once every 2 weeks. Did not feel this today. Reports 140-150/80.     DM- CBG reading was elevated this morning 258. Caregiver states CBG readings have been elevated at 110-170's. She reports there is a correlation with eating. Last night ate fruit cup around 8:30-9:00 pm.   Compliant with lantus 21 units BID and Novolog 8 units three times per day.     BPH- compliant with Proscar and Flomax.     Lower back pain- followed by dr. gutierrez at [facility]. Last epidural 2/2017. Takes percocet nightly, doesn't like to take during the day.     Denies fever, chills, sob, chest pain. Denies urinary symptoms. Denies abd pain, n/v/d/c.     Past Medical History:  Past Medical History:   Diagnosis Date    Anemia in stage 3 chronic kidney disease 11/10/2016    BPH (benign prostatic hyperplasia) 10/26/2012    Cataract     Coronary artery disease involving native coronary artery of native heart without angina pectoris     Degeneration of lumbar or lumbosacral intervertebral disc 2/4/2014    Essential hypertension 9/30/2015    GERD (gastroesophageal reflux disease)     Hyperlipidemia     Lumbar radiculopathy, right 12/9/2013    Mild cognitive impairment with memory loss 7/18/2016    Osteoarthritis of right hip 8/16/2016    Osteoarthritis of spine with radiculopathy, lumbar region     S/P percutaneous transluminal coronary angioplasty     Spinal stenosis  "of lumbar region at multiple levels 2/4/2014    Thoracic or lumbosacral neuritis or radiculitis, unspecified 4/21/2015    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 edema, with long-term current use of insulin 11/19/2015    Type 2 diabetes mellitus with diabetic polyneuropathy, with long-term current use of insulin 11/19/2015    Type 2 diabetes mellitus with stage 3 chronic kidney disease, with long-term current use of insulin 11/19/2015    Type 2 diabetes with peripheral circulatory disorder, controlled        Home Medications:  Prior to Admission medications    Medication Sig Start Date End Date Taking? Authorizing Provider   ascorbic acid, vitamin C, (VITAMIN C) 250 MG tablet Take 1 tablet (250 mg total) by mouth 2 (two) times daily. 11/28/16  Yes Anthony Sethi NP   aspirin (ECOTRIN) 81 MG EC tablet Take 81 mg by mouth once daily.   Yes Historical Provider, MD   atorvastatin (LIPITOR) 20 MG tablet Take 1 tablet (20 mg total) by mouth once daily. 5/2/17  Yes Joe Bunn MD   BD INSULIN PEN NEEDLE UF SHORT 31 gauge x 5/16" Ndle Inject 1 application into the skin 5 (five) times daily. 1/17/17  Yes Joe Bunn MD   blood sugar diagnostic (ACCU-CHEK ENWTON PLUS TEST STRP) Strp TEST TWO TIMES DAILY WITH MEALS 2/27/17  Yes KERRI Galindo   donepezil (ARICEPT) 10 MG tablet Take 1 tablet (10 mg total) by mouth once daily. 4/21/17  Yes Joe Bunn MD   duloxetine (CYMBALTA) 30 MG capsule Take 30 mg by mouth once daily. 4/26/17  Yes Historical Provider, MD   finasteride (PROSCAR) 5 mg tablet TAKE 1 TABLET EVERY DAY 3/3/16  Yes Joe Bunn MD   gabapentin (NEURONTIN) 300 MG capsule Take 1 capsule (300 mg total) by mouth 3 (three) times daily. 4/25/17 4/25/18 Yes Joe Bunn MD   insulin aspart (NOVOLOG) 100 unit/mL InPn pen Inject 9 Units into the skin 3 (three) times daily with meals. 3/28/17  Yes Josephine SWENSON" "PIERCE Arias   LANTUS 100 unit/mL injection INJECT 36 UNITS INTO THE SKIN ONCE DAILY AT THE SAME TIME EVERY DAY 6/22/17  Yes Josephine Arias NP   lisinopril (PRINIVIL,ZESTRIL) 2.5 MG tablet Take 1 tablet (2.5 mg total) by mouth once daily. 1/17/17  Yes Joe Bunn MD   metoprolol succinate (TOPROL-XL) 50 MG 24 hr tablet Take 1 tablet (50 mg total) by mouth once daily. Dose increase, stop 25 mg 1/30/17  Yes Joe Bunn MD   oxycodone-acetaminophen (PERCOCET)  mg per tablet Take 1 tablet by mouth every 8 (eight) hours as needed for Pain.  Patient taking differently: Take 1 tablet by mouth nightly as needed for Pain.  2/20/17  Yes Liyah Parker MD   tamsulosin (FLOMAX) 0.4 mg Cp24 Take 2 capsules (0.8 mg total) by mouth once daily. 6/16/17  Yes Neena Wright MD   acetaminophen (TYLENOL) 500 mg Cap Take 1 capsule by mouth every 6 to 8 hours as needed.  2/27/17   Historical Provider, MD   BD ALCOHOL SWABS PadM USE FOUR TIMES DAILY 4/14/17   Joe Bunn MD   fluticasone (FLONASE) 50 mcg/actuation nasal spray 2 sprays by Each Nare route daily as needed for Allergies. 7/20/16   Jeferson Serrano MD   insulin syringe-needle U-100 1/2 mL 31 gauge x 15/64" Syrg 1 Syringe by Misc.(Non-Drug; Combo Route) route 2 (two) times daily. With insulin injections twice daily 5/30/16   KERRI Galindo   nitroGLYCERIN (NITROSTAT) 0.4 MG SL tablet Place 1 tablet (0.4 mg total) under the tongue every 5 (five) minutes as needed for Chest pain. 11/11/14 9/30/17  Joe Bunn MD   senna-docusate 8.6-50 mg (PERICOLACE) 8.6-50 mg per tablet Take 1 tablet by mouth 2 (two) times daily as needed for Constipation. 11/28/16   Anthony Sethi NP       Review of Systems:  Review of Systems   Constitutional: Negative for chills, fatigue, fever and unexpected weight change.   HENT: Negative for congestion, ear pain, hearing loss, postnasal drip and sinus pressure.    Eyes: Negative " "for pain, redness and visual disturbance.   Respiratory: Negative for cough and shortness of breath.    Cardiovascular: Negative for chest pain and palpitations.   Gastrointestinal: Negative for abdominal distention and abdominal pain.   Endocrine: Negative for polydipsia, polyphagia and polyuria.   Genitourinary: Negative for dysuria, frequency, hematuria and urgency.   Musculoskeletal: Positive for back pain. Negative for arthralgias, gait problem and myalgias.   Skin: Negative for pallor and rash.   Allergic/Immunologic: Negative for environmental allergies and immunocompromised state.   Neurological: Negative for dizziness, weakness, light-headedness and headaches.   Hematological: Negative for adenopathy. Does not bruise/bleed easily.   Psychiatric/Behavioral: Negative for behavioral problems, confusion and sleep disturbance. The patient is not nervous/anxious.        Health Maintainence:   Immunizations:  Health Maintenance       Date Due Completion Date    Influenza Vaccine 08/01/2017 3/7/2017    Hemoglobin A1c 10/26/2017 4/26/2017    Foot Exam 03/28/2018 3/28/2017    Override on 6/9/2016: Done    Lipid Panel 04/26/2018 4/26/2017    Eye Exam 05/09/2018 5/9/2017    PROSTATE-SPECIFIC ANTIGEN 06/06/2018 6/6/2017 (Not Clinical)    Override on 6/6/2017: Not Clinically Appropriate    TETANUS VACCINE 06/22/2027 6/22/2017 (Declined)    Override on 6/22/2017: Declined           PHYSICAL EXAM     BP (!) 162/84 (BP Location: Left arm, Patient Position: Sitting, BP Method: Manual)   Pulse 80   Ht 5' 11" (1.803 m)   Wt 88.2 kg (194 lb 7.1 oz)   SpO2 96%   BMI 27.12 kg/m²     Physical Exam   Constitutional: He is oriented to person, place, and time. He appears well-developed and well-nourished.   HENT:   Head: Normocephalic.   Right Ear: External ear normal.   Left Ear: External ear normal.   Nose: Nose normal.   Mouth/Throat: Oropharynx is clear and moist. No oropharyngeal exudate.   Eyes: Pupils are equal, round, " and reactive to light.   Neck: No JVD present. No tracheal deviation present. No thyromegaly present.   Cardiovascular: Normal rate, regular rhythm and intact distal pulses.  Exam reveals no gallop and no friction rub.    No murmur heard.  Pulmonary/Chest: Breath sounds normal. No respiratory distress. He has no wheezes. He has no rales. He exhibits no tenderness.   Abdominal: Soft. Bowel sounds are normal. He exhibits no distension. There is no tenderness.   Musculoskeletal: Normal range of motion. He exhibits no edema or tenderness.   Lymphadenopathy:     He has no cervical adenopathy.   Neurological: He is alert and oriented to person, place, and time.   Skin: Skin is warm and dry. No rash noted.   Psychiatric: He has a normal mood and affect. His behavior is normal.   Vitals reviewed.      LABS     Lab Results   Component Value Date    HGBA1C 8.2 (H) 04/26/2017     CMP  Sodium   Date Value Ref Range Status   04/26/2017 143 136 - 145 mmol/L Final     Potassium   Date Value Ref Range Status   04/26/2017 4.3 3.5 - 5.1 mmol/L Final     Chloride   Date Value Ref Range Status   04/26/2017 108 95 - 110 mmol/L Final     CO2   Date Value Ref Range Status   04/26/2017 26 23 - 29 mmol/L Final     Glucose   Date Value Ref Range Status   04/26/2017 101 70 - 110 mg/dL Final     BUN, Bld   Date Value Ref Range Status   04/26/2017 21 8 - 23 mg/dL Final     Creatinine   Date Value Ref Range Status   04/26/2017 1.0 0.5 - 1.4 mg/dL Final     Calcium   Date Value Ref Range Status   04/26/2017 9.2 8.7 - 10.5 mg/dL Final     Total Protein   Date Value Ref Range Status   03/28/2017 7.1 6.0 - 8.4 g/dL Final     Albumin   Date Value Ref Range Status   03/28/2017 3.5 3.5 - 5.2 g/dL Final     Total Bilirubin   Date Value Ref Range Status   03/28/2017 0.5 0.1 - 1.0 mg/dL Final     Comment:     For infants and newborns, interpretation of results should be based  on gestational age, weight and in agreement with  clinical  observations.  Premature Infant recommended reference ranges:  Up to 24 hours.............<8.0 mg/dL  Up to 48 hours............<12.0 mg/dL  3-5 days..................<15.0 mg/dL  6-29 days.................<15.0 mg/dL       Alkaline Phosphatase   Date Value Ref Range Status   03/28/2017 125 55 - 135 U/L Final     AST   Date Value Ref Range Status   03/28/2017 22 10 - 40 U/L Final     ALT   Date Value Ref Range Status   03/28/2017 25 10 - 44 U/L Final     Anion Gap   Date Value Ref Range Status   04/26/2017 9 8 - 16 mmol/L Final     eGFR if    Date Value Ref Range Status   04/26/2017 >60.0 >60 mL/min/1.73 m^2 Final     eGFR if non    Date Value Ref Range Status   04/26/2017 >60.0 >60 mL/min/1.73 m^2 Final     Comment:     Calculation used to obtain the estimated glomerular filtration  rate (eGFR) is the CKD-EPI equation. Since race is unknown   in our information system, the eGFR values for   -American and Non--American patients are given   for each creatinine result.       Lab Results   Component Value Date    WBC 5.47 03/07/2017    HGB 12.2 (L) 03/07/2017    HCT 37.5 (L) 03/07/2017    MCV 82 03/07/2017     03/07/2017     Lab Results   Component Value Date    CHOL 160 04/26/2017    CHOL 133 12/13/2016    CHOL 145 10/02/2015     Lab Results   Component Value Date    HDL 54 04/26/2017    HDL 37 (L) 12/13/2016    HDL 33 (L) 10/02/2015     Lab Results   Component Value Date    LDLCALC 93.2 04/26/2017    LDLCALC 64.0 12/13/2016    LDLCALC 68.0 10/02/2015     Lab Results   Component Value Date    TRIG 64 04/26/2017    TRIG 160 (H) 12/13/2016    TRIG 220 (H) 10/02/2015     Lab Results   Component Value Date    CHOLHDL 33.8 04/26/2017    CHOLHDL 27.8 12/13/2016    CHOLHDL 22.8 10/02/2015     Lab Results   Component Value Date    TSH 0.541 02/15/2016       ASSESSMENT/PLAN     Nickolas Morganes is a 86 y.o. male with  Past Medical History:   Diagnosis Date     Anemia in stage 3 chronic kidney disease 11/10/2016    BPH (benign prostatic hyperplasia) 10/26/2012    Cataract     Coronary artery disease involving native coronary artery of native heart without angina pectoris     Degeneration of lumbar or lumbosacral intervertebral disc 2/4/2014    Essential hypertension 9/30/2015    GERD (gastroesophageal reflux disease)     Hyperlipidemia     Lumbar radiculopathy, right 12/9/2013    Mild cognitive impairment with memory loss 7/18/2016    Osteoarthritis of right hip 8/16/2016    Osteoarthritis of spine with radiculopathy, lumbar region     S/P percutaneous transluminal coronary angioplasty     Spinal stenosis of lumbar region at multiple levels 2/4/2014    Thoracic or lumbosacral neuritis or radiculitis, unspecified 4/21/2015    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 edema, with long-term current use of insulin 11/19/2015    Type 2 diabetes mellitus with diabetic polyneuropathy, with long-term current use of insulin 11/19/2015    Type 2 diabetes mellitus with stage 3 chronic kidney disease, with long-term current use of insulin 11/19/2015    Type 2 diabetes with peripheral circulatory disorder, controlled      Hypertension, essential-Above goal. BP logs from home above goal as well. Will increase lisinopril to 5mg daily. Low Na diet. RTC in 1 month for f/u with Dr. Mata and for BP check.   -     lisinopril (PRINIVIL,ZESTRIL) 2.5 MG tablet; Take 2 tablets (5 mg total) by mouth once daily.  Dispense: 90 tablet; Refill: 1      Type 2 diabetes mellitus with diabetic polyneuropathy, with long-term current use of insulin- CBG readings poorly controlled. Will cont lantus and novolog. Discussed compliance with low sugar/carb diet. Pt and caregivers state understanding.     Stage 3 chronic kidney disease- Stable. Needs better CBG and BP control.     Spinal stenosis of lumbar region at multiple levels-  Pain continued. Cont f/u with pain mgmt. May use pain meds as directed. Cont Neurontin.     Benign prostatic hyperplasia without lower urinary tract symptoms- controlled. Cont flomax and proscar.     Alzheimer   -     donepezil (ARICEPT) 10 MG tablet; Take 1 tablet (10 mg total) by mouth once daily.  Dispense: 10 tablet; Refill: 0        Follow up with PCP in 1 month     Patient education provided from [facility]. Patient was counseled on when and how to seek emergent care.       Sarah JORDAN, YOLY, FNP-c   Department of Internal Medicine - [facility]  11:18 AM

## 2017-07-25 NOTE — TELEPHONE ENCOUNTER
Reason for Disposition   Patient wants to be seen    Protocols used: ST HIGH BLOOD PRESSURE-A-OH  Spoke with caregiver, who states Mr. Jim blood pressure and blood sugar has been elevated after taking medications. She states he has been sleeping more than normal, but has no other symptoms. Current blood pressure is 181/95 with pulse of 110 and his blood sugar was 258.

## 2017-07-28 ENCOUNTER — OFFICE VISIT (OUTPATIENT)
Dept: ENDOCRINOLOGY | Facility: CLINIC | Age: 82
End: 2017-07-28
Payer: MEDICARE

## 2017-07-28 ENCOUNTER — OUTPATIENT CASE MANAGEMENT (OUTPATIENT)
Dept: ADMINISTRATIVE | Facility: OTHER | Age: 82
End: 2017-07-28

## 2017-07-28 ENCOUNTER — OFFICE VISIT (OUTPATIENT)
Dept: PODIATRY | Facility: CLINIC | Age: 82
End: 2017-07-28
Payer: MEDICARE

## 2017-07-28 VITALS
BODY MASS INDEX: 27.58 KG/M2 | HEART RATE: 61 BPM | WEIGHT: 197 LBS | RESPIRATION RATE: 18 BRPM | SYSTOLIC BLOOD PRESSURE: 154 MMHG | HEIGHT: 71 IN | DIASTOLIC BLOOD PRESSURE: 79 MMHG

## 2017-07-28 VITALS
HEART RATE: 72 BPM | DIASTOLIC BLOOD PRESSURE: 80 MMHG | WEIGHT: 197.56 LBS | HEIGHT: 71 IN | BODY MASS INDEX: 27.66 KG/M2 | SYSTOLIC BLOOD PRESSURE: 144 MMHG

## 2017-07-28 DIAGNOSIS — E11.49 TYPE II DIABETES MELLITUS WITH NEUROLOGICAL MANIFESTATIONS: Primary | ICD-10-CM

## 2017-07-28 DIAGNOSIS — E04.2 MULTINODULAR THYROID: ICD-10-CM

## 2017-07-28 DIAGNOSIS — B35.1 ONYCHOMYCOSIS DUE TO DERMATOPHYTE: ICD-10-CM

## 2017-07-28 DIAGNOSIS — I10 HYPERTENSION, ESSENTIAL: ICD-10-CM

## 2017-07-28 DIAGNOSIS — E11.65 TYPE 2 DIABETES MELLITUS WITH HYPERGLYCEMIA, WITH LONG-TERM CURRENT USE OF INSULIN: Primary | ICD-10-CM

## 2017-07-28 DIAGNOSIS — Z79.4 TYPE 2 DIABETES MELLITUS WITH HYPERGLYCEMIA, WITH LONG-TERM CURRENT USE OF INSULIN: Primary | ICD-10-CM

## 2017-07-28 DIAGNOSIS — I25.10 CORONARY ARTERY DISEASE INVOLVING NATIVE CORONARY ARTERY OF NATIVE HEART WITHOUT ANGINA PECTORIS: Chronic | ICD-10-CM

## 2017-07-28 DIAGNOSIS — L85.3 DRY SKIN: ICD-10-CM

## 2017-07-28 DIAGNOSIS — F01.50 MIXED ALZHEIMER'S AND VASCULAR DEMENTIA: ICD-10-CM

## 2017-07-28 DIAGNOSIS — E78.2 MIXED HYPERLIPIDEMIA: ICD-10-CM

## 2017-07-28 DIAGNOSIS — F02.80 MIXED ALZHEIMER'S AND VASCULAR DEMENTIA: ICD-10-CM

## 2017-07-28 DIAGNOSIS — G30.9 MIXED ALZHEIMER'S AND VASCULAR DEMENTIA: ICD-10-CM

## 2017-07-28 PROBLEM — E11.42 POLYNEUROPATHY, DIABETIC: Status: RESOLVED | Noted: 2017-06-22 | Resolved: 2017-07-28

## 2017-07-28 PROBLEM — E11.59 TYPE 2 DIABETES MELLITUS WITH CIRCULATORY DISORDER: Status: RESOLVED | Noted: 2017-06-22 | Resolved: 2017-07-28

## 2017-07-28 PROBLEM — N18.30 STAGE 3 CHRONIC KIDNEY DISEASE: Status: RESOLVED | Noted: 2017-06-22 | Resolved: 2017-07-28

## 2017-07-28 PROCEDURE — 1125F AMNT PAIN NOTED PAIN PRSNT: CPT | Mod: S$GLB,,, | Performed by: INTERNAL MEDICINE

## 2017-07-28 PROCEDURE — 99999 PR PBB SHADOW E&M-EST. PATIENT-LVL IV: CPT | Mod: PBBFAC,,, | Performed by: INTERNAL MEDICINE

## 2017-07-28 PROCEDURE — 1159F MED LIST DOCD IN RCRD: CPT | Mod: S$GLB,,, | Performed by: INTERNAL MEDICINE

## 2017-07-28 PROCEDURE — 11721 DEBRIDE NAIL 6 OR MORE: CPT | Mod: Q9,S$GLB,, | Performed by: PODIATRIST

## 2017-07-28 PROCEDURE — 99999 PR PBB SHADOW E&M-EST. PATIENT-LVL III: CPT | Mod: PBBFAC,,, | Performed by: PODIATRIST

## 2017-07-28 PROCEDURE — 99203 OFFICE O/P NEW LOW 30 MIN: CPT | Mod: 25,S$GLB,, | Performed by: PODIATRIST

## 2017-07-28 PROCEDURE — 99214 OFFICE O/P EST MOD 30 MIN: CPT | Mod: S$GLB,,, | Performed by: INTERNAL MEDICINE

## 2017-07-28 PROCEDURE — 99499 UNLISTED E&M SERVICE: CPT | Mod: S$GLB,,, | Performed by: PODIATRIST

## 2017-07-28 RX ORDER — INSULIN ASPART 100 [IU]/ML
12 INJECTION, SOLUTION INTRAVENOUS; SUBCUTANEOUS
Qty: 1 BOX | Refills: 6 | Status: SHIPPED | OUTPATIENT
Start: 2017-07-28 | End: 2017-11-28

## 2017-07-28 RX ORDER — INSULIN GLARGINE 100 [IU]/ML
21 INJECTION, SOLUTION SUBCUTANEOUS 2 TIMES DAILY
Qty: 40 ML | Refills: 3
Start: 2017-07-28 | End: 2017-08-29 | Stop reason: SDUPTHER

## 2017-07-28 NOTE — LETTER
July 28, 2017      Elizabeth Rodriguez, NP  [address]  [place] [address]           [facility] - Podiatry  [address]  [place] [address]  Phone: [phone]          Patient: Nickolas Blake   MR Number: [id number]   YOB: 1931   Date of Visit: 7/28/2017       Dear Elizabeth Rodriguez:    Thank you for referring Nickolas Blake to me for evaluation. Attached you will find relevant portions of my assessment and plan of care.    If you have questions, please do not hesitate to call me. I look forward to following Nickolas Blake along with you.    Sincerely,    Annmarie Keller, SUSAN    Enclosure  CC:  No Recipients    If you would like to receive this communication electronically, please contact [email or web address] or [phone] to request more information on [employer] access.    For providers and/or their staff who would like to refer a patient to [facility], please contact us through our one-stop-shop provider referral line, [facility], at [phone].    If you feel you have received this communication in error or would no longer like to receive these types of communications, please e-mail [email or web address]

## 2017-07-28 NOTE — PROGRESS NOTES
"CC: Nickolas Blake arrives today for T2DM follow up      HPI: Nickolas Blake was diagnosed with T2DM in .   No DM hospitalizations.     Known complications: mild neuropathy, prior history of retinopathy which has resolved     Was followed in [facility]. Began in chronic DM clinic 2016     CURRENT DIABETIC MEDS:   Lantus 21 units bid  Novolog  ( started in 2017 by PCP )     The patient was previously on Metformin, Starlix and Glipizide at one time  He is unclear when the medications were discontinued and I did not see note of this in his chart     The patient's sitter ( Angella)  arrives today with Logs:   Fastin, 182, 226, 116, 258, 254, 186, 290  Lunch: 194, 265, 190   Bedtime: 251, 387, 113, 226, 229    Hypoglycemia: no recent symptoms or episodes   Type of Glucose Meter: Accu-chek    Physical Activity: No formal exercise. Limited by spinal stenosis- physical therapy.     Dietary recall: eats 3 meals daily with last meal between 5-6 pm  He takes a sugar free snack with milk at 8pm     Last Eye Exam: 2017 , with Dr. Caputo   No evidence of retinopathy     Last Podiatry Exam: sitter reports appointment scheduled for today at 11:00     Review of Systems   Constitutional: Positive for malaise/fatigue.   Eyes: Negative for blurred vision and double vision.   Respiratory: Negative for shortness of breath.    Cardiovascular: Negative for chest pain and palpitations.   Gastrointestinal: Negative for constipation and diarrhea.   Genitourinary: Positive for frequency. Negative for dysuria.   Musculoskeletal: Positive for back pain, joint pain and myalgias. Negative for falls.   Skin: Negative for rash.   Neurological: Negative for dizziness, tremors, seizures, weakness and headaches.   Endo/Heme/Allergies: Negative for polydipsia.   Psychiatric/Behavioral: Negative for depression. The patient is not nervous/anxious.      Vital Signs  BP (!) 144/80   Pulse 72   Ht 5' 11" (1.803 m)   Wt " 89.6 kg (197 lb 8.5 oz)   BMI 27.55 kg/m²     Hemoglobin A1C   Date Value Ref Range Status   04/26/2017 8.2 (H) 4.5 - 6.2 % Final     Comment:     According to ADA guidelines, hemoglobin A1C <7.0% represents  optimal control in non-pregnant diabetic patients.  Different  metrics may apply to specific populations.   Standards of Medical Care in Diabetes - 2016.  For the purpose of screening for the presence of diabetes:  <5.7%     Consistent with the absence of diabetes  5.7-6.4%  Consistent with increasing risk for diabetes   (prediabetes)  >or=6.5%  Consistent with diabetes  Currently no consensus exists for use of hemoglobin A1C  for diagnosis of diabetes for children.     03/28/2017 7.9 (H) 4.5 - 6.2 % Final     Comment:     According to ADA guidelines, hemoglobin A1C <7.0% represents  optimal control in non-pregnant diabetic patients.  Different  metrics may apply to specific populations.   Standards of Medical Care in Diabetes - 2016.  For the purpose of screening for the presence of diabetes:  <5.7%     Consistent with the absence of diabetes  5.7-6.4%  Consistent with increasing risk for diabetes   (prediabetes)  >or=6.5%  Consistent with diabetes  Currently no consensus exists for use of hemoglobin A1C  for diagnosis of diabetes for children.     12/13/2016 8.5 (H) 4.5 - 6.2 % Final     Comment:     According to ADA guidelines, hemoglobin A1C <7.0% represents  optimal control in non-pregnant diabetic patients.  Different  metrics may apply to specific populations.   Standards of Medical Care in Diabetes - 2016.  For the purpose of screening for the presence of diabetes:  <5.7%     Consistent with the absence of diabetes  5.7-6.4%  Consistent with increasing risk for diabetes   (prediabetes)  >or=6.5%  Consistent with diabetes  Currently no consensus exists for use of hemoglobin A1C  for diagnosis of diabetes for children.         Chemistry        Component Value Date/Time     04/26/2017 0753    K 4.3  04/26/2017 0753     04/26/2017 0753    CO2 26 04/26/2017 0753    BUN 21 04/26/2017 0753    CREATININE 1.0 04/26/2017 0753     04/26/2017 0753        Component Value Date/Time    CALCIUM 9.2 04/26/2017 0753    ALKPHOS 125 03/28/2017 1708    AST 22 03/28/2017 1708    ALT 25 03/28/2017 1708    BILITOT 0.5 03/28/2017 1708          Physical Exam   Constitutional: He appears well-developed. No distress.   Neck: No thyromegaly present.   Cardiovascular: Normal rate and regular rhythm.    Pulses:       Dorsalis pedis pulses are 1+ on the right side, and 1+ on the left side.   Pulmonary/Chest: Effort normal.   Abdominal: Soft.   Musculoskeletal:        Right foot: There is no deformity.        Left foot: There is no deformity.   Feet:   Right Foot:   Protective Sensation: 10 sites tested. 8 sites sensed.   Skin Integrity: Positive for dry skin. Negative for ulcer, blister, skin breakdown, erythema, warmth or callus.   Left Foot:   Protective Sensation: 10 sites tested. 8 sites sensed.   Skin Integrity: Positive for dry skin. Negative for ulcer, blister, skin breakdown, erythema, warmth or callus.   Neurological: He is alert.   Feet    Shoes appropriate  Vibratory sensation intact bilaterally       Injection sites are ok. No lipo hypertropthy or atrophy     Skin: Skin is warm and dry.   Psychiatric: He has a normal mood and affect.   Nursing note and vitals reviewed.        Assessment/Plan:  1. Type 2 diabetes mellitus with hyperglycemia, with long-term current use of insulin  Comprehensive metabolic panel    Hemoglobin A1c    insulin aspart (NOVOLOG) 100 unit/mL InPn pen    insulin glargine (LANTUS) 100 unit/mL injection   2. Hypertension, essential     3. Mixed hyperlipidemia     4. Multinodular thyroid     5. Coronary artery disease involving native coronary artery of native heart without angina pectoris     6. Mixed Alzheimer's and vascular dementia       1. Type 2 diabetes with hyperglycemia with long term  current use of insulin   - suboptimal control at this time considering age and co-morbidities   - continue Lantus 21 units bid   - increase Novolog to 12/12/12   - continue to smbg 4 x daily and send log in 2-3 weeks for review and adjustments   - reviewed signs and symptoms of hypoglycemia along with appropriate treatment protocol   - continue dietary and lifestyle modifications     2. HTN   - slightly above goal   - lisinopril recently increased to 5 mg daily   - encouraged compliance with medication regimen   - follow up with PCP for further recommendations     3. HLD   - on statin per ADA recommendations   - encouraged to follow a low fat, low chol diet     4. MNG   - reviewed thyroid ultrasound from 2016   - nodules do not meet criteria for FNA   - recommend repeat study every 1-2 years     5. CAD   - stable   - followed by Cardiology     6. Mixed Alzheimer's and vascular dementia   - stable with current regimen   - followed by Neurology     FOLLOW UP  Return in about 4 months (around 11/28/2017).     Orders Placed This Encounter   Procedures    Comprehensive metabolic panel     Standing Status:   Future     Standing Expiration Date:   7/23/2018    Hemoglobin A1c     Standing Status:   Future     Standing Expiration Date:   9/26/2018

## 2017-07-29 NOTE — PROGRESS NOTES
Subjective:      Patient ID: Nickolas Blake is a 86 y.o. male.    Chief Complaint: PCP (Sarah Jacinto NP 7/25/17); Diabetic Foot Exam; and Nail Care    Nickolas is a 86 y.o. male who presents to the clinic for evaluation and treatment of high risk feet. Nickolas has a past medical history of Anemia in stage 3 chronic kidney disease (11/10/2016); BPH (benign prostatic hyperplasia) (10/26/2012); Cataract; Coronary artery disease involving native coronary artery of native heart without angina pectoris; Degeneration of lumbar or lumbosacral intervertebral disc (2/4/2014); Essential hypertension (9/30/2015); GERD (gastroesophageal reflux disease); Hyperlipidemia; Lumbar radiculopathy, right (12/9/2013); Mild cognitive impairment with memory loss (7/18/2016); Osteoarthritis of right hip (8/16/2016); Osteoarthritis of spine with radiculopathy, lumbar region; S/P percutaneous transluminal coronary angioplasty; Spinal stenosis of lumbar region at multiple levels (2/4/2014); Thoracic or lumbosacral neuritis or radiculitis, unspecified (4/21/2015); 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 edema, with long-term current use of insulin (11/19/2015); Type 2 diabetes mellitus with diabetic polyneuropathy, with long-term current use of insulin (11/19/2015); Type 2 diabetes mellitus with stage 3 chronic kidney disease, with long-term current use of insulin (11/19/2015); and Type 2 diabetes with peripheral circulatory disorder, controlled. The patient's chief complaint is long, thick toenails. This patient has documented high risk feet requiring routine maintenance secondary to diabetes mellitis and those secondary complications of diabetes, as mentioned..    PCP: Joe Klein MD    Date Last Seen by PCP:   Chief Complaint   Patient presents with    PCP     Sarah Jacinto NP 7/25/17    Diabetic Foot Exam    Nail Care         Patient Active  "Problem List   Diagnosis    Benign prostatic hyperplasia without lower urinary tract symptoms    Hyperlipidemia    Multinodular thyroid    Pulmonary nodule    Coronary artery disease involving native coronary artery of native heart without angina pectoris    Spinal stenosis of lumbar region at multiple levels    Type 2 diabetes mellitus with stage 3 chronic kidney disease, with long-term current use of insulin    Type 2 diabetes mellitus with diabetic polyneuropathy, with long-term current use of insulin    Mixed Alzheimer's and vascular dementia    Osteoarthritis of spine with radiculopathy, lumbar region    Anemia in stage 3 chronic kidney disease    S/P laminectomy with spinal fusion    Sacroiliac joint dysfunction of right side    Osteoporosis    Hypertension, essential    ASCVD (arteriosclerotic cardiovascular disease)    Anemia    Aortic atherosclerosis    Bilateral carotid artery disease    Cervical spine arthritis       Current Outpatient Prescriptions on File Prior to Visit   Medication Sig Dispense Refill    acetaminophen (TYLENOL) 500 mg Cap Take 1 capsule by mouth every 6 to 8 hours as needed.       ascorbic acid, vitamin C, (VITAMIN C) 250 MG tablet Take 1 tablet (250 mg total) by mouth 2 (two) times daily.      aspirin (ECOTRIN) 81 MG EC tablet Take 81 mg by mouth once daily.      atorvastatin (LIPITOR) 20 MG tablet Take 1 tablet (20 mg total) by mouth once daily. 90 tablet 0    BD ALCOHOL SWABS PadM USE FOUR TIMES DAILY 90 each 11    BD INSULIN PEN NEEDLE UF SHORT 31 gauge x 5/16" Ndle Inject 1 application into the skin 5 (five) times daily. 450 each 11    blood sugar diagnostic (ACCU-CHEK [place] PLUS TEST STRP) Strp TEST TWO TIMES DAILY WITH MEALS 200 strip 3    donepezil (ARICEPT) 10 MG tablet Take 1 tablet (10 mg total) by mouth once daily. 10 tablet 0    duloxetine (CYMBALTA) 30 MG capsule Take 30 mg by mouth once daily.  0    finasteride (PROSCAR) 5 mg tablet TAKE 1 " "TABLET EVERY DAY 90 tablet 3    fluticasone (FLONASE) 50 mcg/actuation nasal spray 2 sprays by Each Nare route daily as needed for Allergies. 1 Bottle 5    gabapentin (NEURONTIN) 300 MG capsule Take 1 capsule (300 mg total) by mouth 3 (three) times daily. 90 capsule 3    insulin aspart (NOVOLOG) 100 unit/mL InPn pen Inject 12 Units into the skin 3 (three) times daily with meals. 1 Box 6    insulin glargine (LANTUS) 100 unit/mL injection Inject 21 Units into the skin 2 (two) times daily. 40 mL 3    insulin syringe-needle U-100 1/2 mL 31 gauge x 15/64" Syrg 1 Syringe by Misc.(Non-Drug; Combo Route) route 2 (two) times daily. With insulin injections twice daily 100 Syringe 12    lisinopril (PRINIVIL,ZESTRIL) 2.5 MG tablet Take 2 tablets (5 mg total) by mouth once daily. 90 tablet 1    metoprolol succinate (TOPROL-XL) 50 MG 24 hr tablet Take 1 tablet (50 mg total) by mouth once daily. Dose increase, stop 25 mg 90 tablet 3    nitroGLYCERIN (NITROSTAT) 0.4 MG SL tablet Place 1 tablet (0.4 mg total) under the tongue every 5 (five) minutes as needed for Chest pain. 25 tablet 11    oxycodone-acetaminophen (PERCOCET)  mg per tablet Take 1 tablet by mouth every 8 (eight) hours as needed for Pain. (Patient taking differently: Take 1 tablet by mouth nightly as needed for Pain. ) 90 tablet 0    senna-docusate 8.6-50 mg (PERICOLACE) 8.6-50 mg per tablet Take 1 tablet by mouth 2 (two) times daily as needed for Constipation.      tamsulosin (FLOMAX) 0.4 mg Cp24 Take 2 capsules (0.8 mg total) by mouth once daily. 180 capsule 0    [DISCONTINUED] insulin aspart (NOVOLOG) 100 unit/mL InPn pen Inject 9 Units into the skin 3 (three) times daily with meals. 1 Box 6    [DISCONTINUED] LANTUS 100 unit/mL injection INJECT 36 UNITS INTO THE SKIN ONCE DAILY AT THE SAME TIME EVERY DAY (Patient taking differently: INJECT 21 UNITS INTO THE SKIN ONCE DAILY AT THE SAME TIME EVERY DAY) 40 mL 3     No current facility-administered " medications on file prior to visit.        Review of patient's allergies indicates:  No Known Allergies    Past Surgical History:   Procedure Laterality Date    ADENOIDECTOMY      CARDIAC CATHETERIZATION  1980    CATARACT EXTRACTION      CYST REMOVAL      rectum    EYE SURGERY      TONSILLECTOMY         Family History   Problem Relation Age of Onset    Breast cancer Mother     Cancer Brother     Heart attack Brother     No Known Problems Father     No Known Problems Sister     No Known Problems Maternal Aunt     No Known Problems Maternal Uncle     No Known Problems Paternal Aunt     No Known Problems Paternal Uncle     No Known Problems Maternal Grandmother     No Known Problems Maternal Grandfather     No Known Problems Paternal Grandmother     No Known Problems Paternal Grandfather     Colon cancer Neg Hx     Colon polyps Neg Hx     Amblyopia Neg Hx     Blindness Neg Hx     Cataracts Neg Hx     Diabetes Neg Hx     Glaucoma Neg Hx     Hypertension Neg Hx     Macular degeneration Neg Hx     Retinal detachment Neg Hx     Strabismus Neg Hx     Stroke Neg Hx     Thyroid disease Neg Hx        Social History     Social History    Marital status:      Spouse name: N/A    Number of children: N/A    Years of education: N/A     Occupational History    Not on file.     Social History Main Topics    Smoking status: Former Smoker     Packs/day: 0.25     Years: 50.00     Types: Cigarettes     Quit date: 2/25/1980    Smokeless tobacco: Never Used    Alcohol use No    Drug use: No    Sexual activity: Not Currently     Other Topics Concern    Not on file     Social History Narrative    No narrative on file                 Hemoglobin A1C   Date Value Ref Range Status   04/26/2017 8.2 (H) 4.5 - 6.2 % Final     Comment:     According to ADA guidelines, hemoglobin A1C <7.0% represents  optimal control in non-pregnant diabetic patients.  Different  metrics may apply to specific  populations.   Standards of Medical Care in Diabetes - 2016.  For the purpose of screening for the presence of diabetes:  <5.7%     Consistent with the absence of diabetes  5.7-6.4%  Consistent with increasing risk for diabetes   (prediabetes)  >or=6.5%  Consistent with diabetes  Currently no consensus exists for use of hemoglobin A1C  for diagnosis of diabetes for children.     03/28/2017 7.9 (H) 4.5 - 6.2 % Final     Comment:     According to ADA guidelines, hemoglobin A1C <7.0% represents  optimal control in non-pregnant diabetic patients.  Different  metrics may apply to specific populations.   Standards of Medical Care in Diabetes - 2016.  For the purpose of screening for the presence of diabetes:  <5.7%     Consistent with the absence of diabetes  5.7-6.4%  Consistent with increasing risk for diabetes   (prediabetes)  >or=6.5%  Consistent with diabetes  Currently no consensus exists for use of hemoglobin A1C  for diagnosis of diabetes for children.     12/13/2016 8.5 (H) 4.5 - 6.2 % Final     Comment:     According to ADA guidelines, hemoglobin A1C <7.0% represents  optimal control in non-pregnant diabetic patients.  Different  metrics may apply to specific populations.   Standards of Medical Care in Diabetes - 2016.  For the purpose of screening for the presence of diabetes:  <5.7%     Consistent with the absence of diabetes  5.7-6.4%  Consistent with increasing risk for diabetes   (prediabetes)  >or=6.5%  Consistent with diabetes  Currently no consensus exists for use of hemoglobin A1C  for diagnosis of diabetes for children.         Review of Systems   Constitution: Negative for chills, decreased appetite and fever.   Cardiovascular: Negative for leg swelling.   Skin: Positive for dry skin and nail changes.   Musculoskeletal: Negative for arthritis, joint pain, joint swelling and myalgias.   Gastrointestinal: Negative for nausea and vomiting.   Neurological: Negative for loss of balance, numbness and  "paresthesias.             Objective:       Vitals:    07/28/17 1132   BP: (!) 154/79   Pulse: 61   Resp: 18   Weight: 89.4 kg (197 lb)   Height: 5' 11" (1.803 m)   PainSc: 0-No pain        Physical Exam   Constitutional: He is oriented to person, place, and time. He appears well-developed and well-nourished.   Cardiovascular:   Dorsalis pedis and posterior tibial pulses are diminished Bilaterally. Toes are cool to touch. Feet are warm proximally.There is decreased digital hair. Skin is atrophic, slightly hyperpigmented, and mildly edematous       Musculoskeletal: Normal range of motion. He exhibits no edema or tenderness.   Adequate joint range of motion without pain, limitation, nor crepitation Bilateral feet and ankle joints. Muscle strength is 5/5 in all groups bilaterally.         Neurological: He is alert and oriented to person, place, and time.   [place] 5.07 monofilamant testing is diminished Jos feet. Sharp/dull sensation diminished Bilaterally. Light touch absent Bilaterally.       Skin: Skin is warm and dry. No ecchymosis and no lesion noted. No erythema.   Nails x 10  are elongated by  2-5mm's, thickened by 2-4 mm's, dystrophic, and are darkened in  coloration . Xerosis Bilaterally. No open lesions noted.    Xerosis b/l feet    Psychiatric: He has a normal mood and affect. His behavior is normal.             Assessment:       Encounter Diagnoses   Name Primary?    Type II diabetes mellitus with neurological manifestations Yes    Onychomycosis due to dermatophyte     Dry skin          Plan:       Nickolas was seen today for pcp, diabetic foot exam and nail care.    Diagnoses and all orders for this visit:    Type II diabetes mellitus with neurological manifestations    Onychomycosis due to dermatophyte    Dry skin      I counseled the patient on his conditions, their implications and medical management.        - Shoe inspection. Diabetic Foot Education. Patient reminded of the importance of " good nutrition and blood sugar control to help prevent podiatric complications of diabetes. Patient instructed on proper foot hygeine. We discussed wearing proper shoe gear, daily foot inspections, never walking without protective shoe gear, never putting sharp instruments to feet, routine podiatric nail visits every 2-3 months.      - With patient's permission, nails were aggressively reduced and debrided x 10 to their soft tissue attachment mechanically and with electric , removing all offending nail and debris. Patient relates relief following the procedure. He will continue to monitor the areas daily, inspect his feet, wear protective shoe gear when ambulatory, moisturizer to maintain skin integrity and follow in this office in approximately 2-3 months, sooner p.r.n.

## 2017-07-31 ENCOUNTER — TELEPHONE (OUTPATIENT)
Dept: INTERNAL MEDICINE | Facility: CLINIC | Age: 82
End: 2017-07-31

## 2017-07-31 NOTE — TELEPHONE ENCOUNTER
Spoke with daughter- Anne to inform her that appt for dad in September needs to be rescheduled. Anne stated that the sitter will call back at a later time to reschedule father's appt due to transportation issues. 9/8/17 appt has been cancelled. Anne verbalized understanding of above information.

## 2017-07-31 NOTE — TELEPHONE ENCOUNTER
----- Message from Dalila Hurley sent at 7/31/2017  3:04 PM CDT -----  Contact: call daughter  magnolia   at [phone]  Patient is returning a phone call.  Who left a message for the patient: javed    Does patient know what this is regarding:  Appointment   Comments:

## 2017-08-01 ENCOUNTER — LAB VISIT (OUTPATIENT)
Dept: LAB | Facility: HOSPITAL | Age: 82
End: 2017-08-01
Attending: FAMILY MEDICINE
Payer: MEDICARE

## 2017-08-01 DIAGNOSIS — Z79.4 TYPE 2 DIABETES MELLITUS WITH HYPERGLYCEMIA, WITH LONG-TERM CURRENT USE OF INSULIN: ICD-10-CM

## 2017-08-01 DIAGNOSIS — E11.65 TYPE 2 DIABETES MELLITUS WITH HYPERGLYCEMIA, WITH LONG-TERM CURRENT USE OF INSULIN: ICD-10-CM

## 2017-08-01 LAB
ALBUMIN SERPL BCP-MCNC: 3.6 G/DL
ALP SERPL-CCNC: 75 U/L
ALT SERPL W/O P-5'-P-CCNC: 25 U/L
ANION GAP SERPL CALC-SCNC: 7 MMOL/L
AST SERPL-CCNC: 26 U/L
BILIRUB SERPL-MCNC: 0.9 MG/DL
BUN SERPL-MCNC: 23 MG/DL
CALCIUM SERPL-MCNC: 8.8 MG/DL
CHLORIDE SERPL-SCNC: 104 MMOL/L
CO2 SERPL-SCNC: 28 MMOL/L
CREAT SERPL-MCNC: 1.2 MG/DL
EST. GFR  (AFRICAN AMERICAN): >60 ML/MIN/1.73 M^2
EST. GFR  (NON AFRICAN AMERICAN): 54.4 ML/MIN/1.73 M^2
ESTIMATED AVG GLUCOSE: 212 MG/DL
GLUCOSE SERPL-MCNC: 238 MG/DL
HBA1C MFR BLD HPLC: 9 %
POTASSIUM SERPL-SCNC: 4.7 MMOL/L
PROT SERPL-MCNC: 7.2 G/DL
SODIUM SERPL-SCNC: 139 MMOL/L

## 2017-08-01 PROCEDURE — 80053 COMPREHEN METABOLIC PANEL: CPT

## 2017-08-01 PROCEDURE — 36415 COLL VENOUS BLD VENIPUNCTURE: CPT | Mod: PO

## 2017-08-01 PROCEDURE — 83036 HEMOGLOBIN GLYCOSYLATED A1C: CPT

## 2017-08-02 ENCOUNTER — OUTPATIENT CASE MANAGEMENT (OUTPATIENT)
Dept: ADMINISTRATIVE | Facility: OTHER | Age: 82
End: 2017-08-02

## 2017-08-02 NOTE — PROGRESS NOTES
Pt's hemoglobin A1c on 8/1/17 of 9.0. Elevation in hemoglobin A1c noted. Pt reports that he will have to monitor his intake of rice, pasta, and bread. Pt reports that the sitter is not present currently in the home. Reviewed on ways to prevent complications due to DM. Plan: Review a well balanced plate.

## 2017-08-10 ENCOUNTER — OFFICE VISIT (OUTPATIENT)
Dept: INTERNAL MEDICINE | Facility: CLINIC | Age: 82
End: 2017-08-10
Attending: FAMILY MEDICINE
Payer: MEDICARE

## 2017-08-10 VITALS
HEART RATE: 81 BPM | WEIGHT: 194.38 LBS | OXYGEN SATURATION: 98 % | SYSTOLIC BLOOD PRESSURE: 115 MMHG | BODY MASS INDEX: 27.21 KG/M2 | DIASTOLIC BLOOD PRESSURE: 60 MMHG | HEIGHT: 71 IN

## 2017-08-10 DIAGNOSIS — I10 HYPERTENSION, ESSENTIAL: Primary | ICD-10-CM

## 2017-08-10 DIAGNOSIS — F02.80 MIXED ALZHEIMER'S AND VASCULAR DEMENTIA: ICD-10-CM

## 2017-08-10 DIAGNOSIS — F01.50 MIXED ALZHEIMER'S AND VASCULAR DEMENTIA: ICD-10-CM

## 2017-08-10 DIAGNOSIS — G30.9 MIXED ALZHEIMER'S AND VASCULAR DEMENTIA: ICD-10-CM

## 2017-08-10 DIAGNOSIS — M47.26 OSTEOARTHRITIS OF SPINE WITH RADICULOPATHY, LUMBAR REGION: ICD-10-CM

## 2017-08-10 DIAGNOSIS — M54.5 LOW BACK PAIN, UNSPECIFIED BACK PAIN LATERALITY, UNSPECIFIED CHRONICITY, WITH SCIATICA PRESENCE UNSPECIFIED: ICD-10-CM

## 2017-08-10 DIAGNOSIS — I25.10 CORONARY ARTERY DISEASE INVOLVING NATIVE CORONARY ARTERY OF NATIVE HEART WITHOUT ANGINA PECTORIS: Chronic | ICD-10-CM

## 2017-08-10 DIAGNOSIS — Z79.4 TYPE 2 DIABETES MELLITUS WITH DIABETIC POLYNEUROPATHY, WITH LONG-TERM CURRENT USE OF INSULIN: ICD-10-CM

## 2017-08-10 DIAGNOSIS — E11.42 TYPE 2 DIABETES MELLITUS WITH DIABETIC POLYNEUROPATHY, WITH LONG-TERM CURRENT USE OF INSULIN: ICD-10-CM

## 2017-08-10 PROCEDURE — 99999 PR PBB SHADOW E&M-EST. PATIENT-LVL III: CPT | Mod: PBBFAC,,, | Performed by: FAMILY MEDICINE

## 2017-08-10 PROCEDURE — 99499 UNLISTED E&M SERVICE: CPT | Mod: S$GLB,,, | Performed by: FAMILY MEDICINE

## 2017-08-10 PROCEDURE — 3008F BODY MASS INDEX DOCD: CPT | Mod: S$GLB,,, | Performed by: FAMILY MEDICINE

## 2017-08-10 PROCEDURE — 1159F MED LIST DOCD IN RCRD: CPT | Mod: S$GLB,,, | Performed by: FAMILY MEDICINE

## 2017-08-10 PROCEDURE — 1125F AMNT PAIN NOTED PAIN PRSNT: CPT | Mod: S$GLB,,, | Performed by: FAMILY MEDICINE

## 2017-08-10 PROCEDURE — 99214 OFFICE O/P EST MOD 30 MIN: CPT | Mod: S$GLB,,, | Performed by: FAMILY MEDICINE

## 2017-08-10 RX ORDER — LISINOPRIL 10 MG/1
10 TABLET ORAL DAILY
Qty: 30 TABLET | Refills: 2 | Status: SHIPPED | OUTPATIENT
Start: 2017-08-10 | End: 2017-12-14 | Stop reason: SDUPTHER

## 2017-08-10 NOTE — PROGRESS NOTES
Subjective:       Patient ID: Nickolas Blake is a 86 y.o. male.    Chief Complaint: Follow-up    HPI  Review of Systems   Constitutional: Negative for chills, fatigue and fever.   HENT: Negative for congestion and trouble swallowing.    Eyes: Negative for redness.   Respiratory: Negative for cough, chest tightness and shortness of breath.    Cardiovascular: Negative for chest pain, palpitations and leg swelling.   Gastrointestinal: Positive for abdominal pain. Negative for blood in stool.   Genitourinary: Negative for hematuria.   Musculoskeletal: Positive for back pain. Negative for arthralgias, gait problem, joint swelling, myalgias and neck pain.   Skin: Negative for color change and rash.   Neurological: Negative for tremors, speech difficulty, weakness, numbness and headaches.   Hematological: Negative for adenopathy. Does not bruise/bleed easily.   Psychiatric/Behavioral: Negative for behavioral problems, confusion and sleep disturbance. The patient is not nervous/anxious.        Objective:      Physical Exam   Constitutional: He is oriented to person, place, and time. He appears well-developed and well-nourished.   Eyes: No scleral icterus.   Neck: Normal range of motion. Neck supple. Carotid bruit is not present.   Cardiovascular: Normal rate, regular rhythm and intact distal pulses.  Exam reveals distant heart sounds. Exam reveals no gallop and no friction rub.    No murmur heard.  Pulmonary/Chest: Effort normal. No respiratory distress. He has decreased breath sounds. He has no wheezes. He has no rales.   Abdominal: Soft. Bowel sounds are normal. He exhibits no distension. There is no tenderness. There is no guarding.   Musculoskeletal: He exhibits no edema.   Neurological: He is alert and oriented to person, place, and time. He displays no tremor. No cranial nerve deficit. Coordination and gait normal.   Skin: Skin is warm and dry. No rash noted. He is not diaphoretic. No erythema.   Psychiatric: He  has a normal mood and affect. His behavior is normal. Judgment and thought content normal.   Nursing note and vitals reviewed.      Assessment:       1. Hypertension, essential    2. Low back pain, unspecified back pain laterality, unspecified chronicity, with sciatica presence unspecified    3. Coronary artery disease involving native coronary artery of native heart without angina pectoris    4. Osteoarthritis of spine with radiculopathy, lumbar region    5. Type 2 diabetes mellitus with diabetic polyneuropathy, with long-term current use of insulin    6. Mixed Alzheimer's and vascular dementia        Plan:   Nickolas was seen today for follow-up.    Diagnoses and all orders for this visit:    Hypertension, essential  -     Hypertension Digital Medicine ([facility]) Enrollment Order  -     Hypertension Digital Medicine ([facility]): Assign Onboarding Questionnaires    Low back pain, unspecified back pain laterality, unspecified chronicity, with sciatica presence unspecified    Coronary artery disease involving native coronary artery of native heart without angina pectoris    Osteoarthritis of spine with radiculopathy, lumbar region    Type 2 diabetes mellitus with diabetic polyneuropathy, with long-term current use of insulin    Mixed Alzheimer's and vascular dementia    Other orders  -     lisinopril 10 MG tablet; Take 1 tablet (10 mg total) by mouth once daily.      See meds, orders, follow up, routing and instructions sections of encounter.  HISTORY OF PRESENT ILLNESS:  An 86-year-old established male patient.  He is in   for a hypertension followup.  He had seen our nurse practitioner recently, was   placed on 10 mg dosing of his lisinopril.  They have a home blood pressure   monitor that has been reading elevated with elevated pulse.  Today, however, I   got a reasonable blood pressure of 115/approximately 60 with a normal pulse.    Other complaints include an abdominal pain from his sitter, but not the patient   and back  pain from the patient located to the right trochanteric area.    PHYSICAL EXAMINATION:  He had minimal abdominal tenderness with no definite   mass.  He had his typical gait with no focal neurologic deficits.    RECOMMENDATIONS:  Blood pressure followup in one month, ____ hypertension   management referral.  Continue care with outside physicians for pain management   and second opinion for spinal stenosis.      PHYLLIS/HN  dd: 08/10/2017 10:44:13 (CDT)  td: 08/10/2017 22:45:23 (CDT)  Doc ID   #[id number]  Job ID #[id number]    CC:

## 2017-08-15 ENCOUNTER — OFFICE VISIT (OUTPATIENT)
Dept: NEUROLOGY | Facility: CLINIC | Age: 82
End: 2017-08-15
Payer: MEDICARE

## 2017-08-15 VITALS
DIASTOLIC BLOOD PRESSURE: 74 MMHG | HEART RATE: 84 BPM | WEIGHT: 192.88 LBS | SYSTOLIC BLOOD PRESSURE: 140 MMHG | HEIGHT: 71 IN | BODY MASS INDEX: 27 KG/M2

## 2017-08-15 DIAGNOSIS — Z79.4 TYPE 2 DIABETES MELLITUS WITH DIABETIC POLYNEUROPATHY, WITH LONG-TERM CURRENT USE OF INSULIN: ICD-10-CM

## 2017-08-15 DIAGNOSIS — F02.80 MIXED ALZHEIMER'S AND VASCULAR DEMENTIA: Primary | ICD-10-CM

## 2017-08-15 DIAGNOSIS — M48.061 SPINAL STENOSIS OF LUMBAR REGION AT MULTIPLE LEVELS: ICD-10-CM

## 2017-08-15 DIAGNOSIS — G30.9 MIXED ALZHEIMER'S AND VASCULAR DEMENTIA: Primary | ICD-10-CM

## 2017-08-15 DIAGNOSIS — I10 HYPERTENSION, ESSENTIAL: ICD-10-CM

## 2017-08-15 DIAGNOSIS — E11.42 TYPE 2 DIABETES MELLITUS WITH DIABETIC POLYNEUROPATHY, WITH LONG-TERM CURRENT USE OF INSULIN: ICD-10-CM

## 2017-08-15 DIAGNOSIS — D63.1 ANEMIA IN STAGE 3 CHRONIC KIDNEY DISEASE: ICD-10-CM

## 2017-08-15 DIAGNOSIS — F01.50 MIXED ALZHEIMER'S AND VASCULAR DEMENTIA: Primary | ICD-10-CM

## 2017-08-15 DIAGNOSIS — I25.10 CORONARY ARTERY DISEASE INVOLVING NATIVE CORONARY ARTERY OF NATIVE HEART WITHOUT ANGINA PECTORIS: Chronic | ICD-10-CM

## 2017-08-15 DIAGNOSIS — N18.30 ANEMIA IN STAGE 3 CHRONIC KIDNEY DISEASE: ICD-10-CM

## 2017-08-15 PROCEDURE — 99214 OFFICE O/P EST MOD 30 MIN: CPT | Mod: S$GLB,,, | Performed by: PSYCHIATRY & NEUROLOGY

## 2017-08-15 PROCEDURE — 1125F AMNT PAIN NOTED PAIN PRSNT: CPT | Mod: S$GLB,,, | Performed by: PSYCHIATRY & NEUROLOGY

## 2017-08-15 PROCEDURE — 99999 PR PBB SHADOW E&M-EST. PATIENT-LVL III: CPT | Mod: PBBFAC,,, | Performed by: PSYCHIATRY & NEUROLOGY

## 2017-08-15 PROCEDURE — 99499 UNLISTED E&M SERVICE: CPT | Mod: S$GLB,,, | Performed by: PSYCHIATRY & NEUROLOGY

## 2017-08-15 PROCEDURE — 1159F MED LIST DOCD IN RCRD: CPT | Mod: S$GLB,,, | Performed by: PSYCHIATRY & NEUROLOGY

## 2017-08-15 PROCEDURE — 3008F BODY MASS INDEX DOCD: CPT | Mod: S$GLB,,, | Performed by: PSYCHIATRY & NEUROLOGY

## 2017-08-15 NOTE — LETTER
August 15, 2017      Joe Bunn MD  [address]  [facility] [address]           [facility]  [address]  [facility] [address]  Phone: [phone]  Fax: [phone]          Patient: Nickolas Blaek   MR Number: [id number]   YOB: 1931   Date of Visit: 8/15/2017       Dear Dr. Joe Bunn:    Thank you for referring Nickolas Blake to me for evaluation. Attached you will find relevant portions of my assessment and plan of care.    If you have questions, please do not hesitate to call me. I look forward to following Nickolas Blake along with you.    Sincerely,    Jose Johansen MD    Enclosure  CC:  No Recipients    If you would like to receive this communication electronically, please contact [email or web address] or [phone] to request more information on [facility] access.    For providers and/or their staff who would like to refer a patient to [facility], please contact us through our one-stop-shop provider referral line, [facility], at [phone].    If you feel you have received this communication in error or would no longer like to receive these types of communications, please e-mail [email or web address]

## 2017-08-15 NOTE — PATIENT INSTRUCTIONS
Patient has been stable with medications.  He will continue the Aricept 5 mg daily in the morning with food.  May use melatonin 5 mg at bedtime when necessary for sleep.  Continue present level of activities at home as he is much more secure and stable from a behavioral point of view at home.  Continue with present supervised care.  Control of vascular risk factors especially hypertension and diabetes.  Advised his caregiver that his hemoglobin A1c was elevated and that good control of diabetes is essential.

## 2017-08-15 NOTE — PROGRESS NOTES
Subjective:       Patient ID: Nickolas Blake is a 86 y.o. male.    Chief Complaint:  Memory Loss      History of Present Illness  HPI  This is an 86-year-old male who returns in follow-up of his cognitive difficulties.  He had been seen by Sammie Ibarra NP, at the memory disorders clinic in the past and was last seen by me about 6 months ago.  He has had problems with retention of recent information, getting confused easily specially outside of the house, word finding difficulty, and repeating himself.  The patient now lives in his own house and has caregivers ([facility]) 7 days a week.  He was initially resistant to them coming in but has now become comfortable.  One of them was present today and was the primary historian.  They manage his medications, take care of his meals and his system with his day-to-day activities.  His ambulation is limited because of chronic back problems.  He does not drive anymore.  No recent behavior problems are reported.  He is presently on Aricept 5 mg daily in the morning which she has tolerated well.  They do report that he does not keep to a diet when they are not there and a recent hemoglobin A1c was 9.0.       Review of Systems  Review of Systems   Constitutional: Negative.  Negative for activity change and appetite change.   HENT: Positive for hearing loss.    Eyes: Negative.  Negative for visual disturbance.   Respiratory: Negative.  Negative for cough and shortness of breath.    Cardiovascular: Negative.  Negative for chest pain and palpitations.   Gastrointestinal: Negative for diarrhea.   Genitourinary: Negative for urgency.   Musculoskeletal: Positive for back pain and gait problem.        Right hip pain   Skin: Negative.    Neurological:        Memory loss   Hematological: Negative.    Psychiatric/Behavioral: Positive for confusion, decreased concentration and sleep disturbance.       Objective:      Neurologic Exam      Physical Exam   Constitutional: He appears  well-developed and well-nourished.   HENT:   Head: Normocephalic and atraumatic.   Right Ear: Hearing normal.   Left Ear: Hearing normal.   Eyes:   Fundus examination shows sharp disc margins.  Pupils are equal and reactive with EOM being full without nystagmus   Neck: Normal range of motion. Neck supple. Carotid bruit is not present.   Neurological: He is alert. He has normal reflexes. He displays no atrophy. No cranial nerve deficit (Visual fields at bedside testing essentially normal.  No facial asymmetry noted with facial movements and sensory exam being normal/symmetrical.  Corneals/gag reflexes normal.  Tongue & palate movements normal.  Hearing unimpaired.  Shoulder shrug was norm) or sensory deficit. He exhibits normal muscle tone. He displays a negative Romberg sign. Coordination and gait (walks a little stiffly because of back pain.  Uses cane for stability) normal. He displays no Babinski's sign on the right side. He displays no Babinski's sign on the left side.   Mental status examination: Pupils is oriented to place and person but not to time.  He has some difficulty recalling recent information but did recall breakfast that he had this morning.  Immediate recall is 0/3 after 2 minutes.  Cannot spell backwards or with serial sevens.  Judgment and insight is fair.  Language functions are intact with no evidence of aphasia or dysarthria.  Comprehension is unimpaired.  He is able to follow one or two-step commands.  Processing of information was a little slow.  Affect is appropriate, mood was even.  No thought disorder is noted.   Vitals reviewed.        Assessment:        1. Mixed Alzheimer's and vascular dementia    2. Type 2 diabetes mellitus with diabetic polyneuropathy, with long-term current use of insulin    3. Spinal stenosis of lumbar region at multiple levels    4. Hypertension, essential    5. Coronary artery disease involving native coronary artery of native heart without angina pectoris    6.  Anemia in stage 3 chronic kidney disease            Plan:         Patient has been stable with medications.  He will continue the Aricept 5 mg daily in the morning with food.  May use melatonin 5 mg at bedtime when necessary for sleep.  Continue present level of activities at home as he is much more secure and stable from a behavioral point of view at home.  Continue with present supervised care.  Control of vascular risk factors especially hypertension and diabetes.  Advised his caregiver that his hemoglobin A1c was elevated and that good control of diabetes is essential.  Follow-up in 6 months if stable.

## 2017-08-16 ENCOUNTER — OUTPATIENT CASE MANAGEMENT (OUTPATIENT)
Dept: ADMINISTRATIVE | Facility: OTHER | Age: 82
End: 2017-08-16

## 2017-08-21 ENCOUNTER — TELEPHONE (OUTPATIENT)
Dept: INTERNAL MEDICINE | Facility: CLINIC | Age: 82
End: 2017-08-21

## 2017-08-21 DIAGNOSIS — E11.22 TYPE 2 DIABETES MELLITUS WITH DIABETIC CHRONIC KIDNEY DISEASE: Chronic | ICD-10-CM

## 2017-08-21 RX ORDER — ATORVASTATIN CALCIUM 20 MG/1
TABLET, FILM COATED ORAL
Qty: 90 TABLET | Refills: 0 | Status: SHIPPED | OUTPATIENT
Start: 2017-08-21 | End: 2017-09-20 | Stop reason: SDUPTHER

## 2017-08-21 RX ORDER — GABAPENTIN 300 MG/1
CAPSULE ORAL
Qty: 270 CAPSULE | Refills: 3 | Status: SHIPPED | OUTPATIENT
Start: 2017-08-21 | End: 2017-08-22 | Stop reason: SDUPTHER

## 2017-08-21 RX ORDER — TAMSULOSIN HYDROCHLORIDE 0.4 MG/1
0.8 CAPSULE ORAL DAILY
Qty: 60 CAPSULE | Refills: 0 | Status: SHIPPED | OUTPATIENT
Start: 2017-08-21 | End: 2019-01-24 | Stop reason: SDUPTHER

## 2017-08-21 RX ORDER — ISOPROPYL ALCOHOL 0.75 G/1
SWAB TOPICAL
Qty: 200 EACH | Refills: 11 | Status: SHIPPED | OUTPATIENT
Start: 2017-08-21 | End: 2020-12-17 | Stop reason: SDUPTHER

## 2017-08-21 RX ORDER — FINASTERIDE 5 MG/1
TABLET, FILM COATED ORAL
Qty: 90 TABLET | Refills: 3 | Status: SHIPPED | OUTPATIENT
Start: 2017-08-21

## 2017-08-21 RX ORDER — TAMSULOSIN HYDROCHLORIDE 0.4 MG/1
0.8 CAPSULE ORAL DAILY
Qty: 180 CAPSULE | Refills: 3 | Status: SHIPPED | OUTPATIENT
Start: 2017-08-21 | End: 2017-08-21 | Stop reason: SDUPTHER

## 2017-08-21 NOTE — TELEPHONE ENCOUNTER
----- Message from Joe Bunn MD sent at 8/21/2017  3:36 PM CDT -----  Contact: liz - [phone]       ----- Message -----  From: Addi Adam MA  Sent: 8/21/2017   3:28 PM  To: Oni DANI[facility] Staff    Requesting to have a tamsulosin (FLOMAX) 0.4 mg Cp24 written by Dr Bunn. Also, requesting to have a 2 week supply sent to [facility]/pharmacy #[id number] [facility], [place] - [address] AT [facility]. Will be out of of this medication before the mail order is received. Please call. Thanks!

## 2017-08-22 NOTE — TELEPHONE ENCOUNTER
----- Message from Nicole Brar sent at 8/21/2017 10:08 AM CDT -----  Contact: amari/caregiver/[phone]  Amari called in regards needing to talk with Dr Bunn about sending a 10 days supplies gabapentin (NEURONTIN) 300 MG capsule to [facility] pharmacy [phone]. Stated that [employer] fax some paper Friday in regards patient prescription. Please call and advise.       Thank you!!!

## 2017-08-23 ENCOUNTER — OFFICE VISIT (OUTPATIENT)
Dept: CARDIOLOGY | Facility: CLINIC | Age: 82
End: 2017-08-23
Attending: FAMILY MEDICINE
Payer: MEDICARE

## 2017-08-23 VITALS
HEIGHT: 71 IN | WEIGHT: 192.25 LBS | HEART RATE: 82 BPM | DIASTOLIC BLOOD PRESSURE: 68 MMHG | BODY MASS INDEX: 26.91 KG/M2 | SYSTOLIC BLOOD PRESSURE: 150 MMHG

## 2017-08-23 DIAGNOSIS — I70.0 AORTIC ATHEROSCLEROSIS: ICD-10-CM

## 2017-08-23 DIAGNOSIS — Z79.4 TYPE 2 DIABETES MELLITUS WITH STAGE 3 CHRONIC KIDNEY DISEASE, WITH LONG-TERM CURRENT USE OF INSULIN: ICD-10-CM

## 2017-08-23 DIAGNOSIS — I77.9 BILATERAL CAROTID ARTERY DISEASE: ICD-10-CM

## 2017-08-23 DIAGNOSIS — E11.22 TYPE 2 DIABETES MELLITUS WITH STAGE 3 CHRONIC KIDNEY DISEASE, WITH LONG-TERM CURRENT USE OF INSULIN: ICD-10-CM

## 2017-08-23 DIAGNOSIS — N18.30 CKD (CHRONIC KIDNEY DISEASE), STAGE III: ICD-10-CM

## 2017-08-23 DIAGNOSIS — N18.30 TYPE 2 DIABETES MELLITUS WITH STAGE 3 CHRONIC KIDNEY DISEASE, WITH LONG-TERM CURRENT USE OF INSULIN: ICD-10-CM

## 2017-08-23 DIAGNOSIS — E78.2 MIXED HYPERLIPIDEMIA: ICD-10-CM

## 2017-08-23 DIAGNOSIS — I10 HYPERTENSION, ESSENTIAL: ICD-10-CM

## 2017-08-23 DIAGNOSIS — I25.10 CORONARY ARTERY DISEASE INVOLVING NATIVE CORONARY ARTERY OF NATIVE HEART WITHOUT ANGINA PECTORIS: Primary | Chronic | ICD-10-CM

## 2017-08-23 PROCEDURE — 1125F AMNT PAIN NOTED PAIN PRSNT: CPT | Mod: S$GLB,,, | Performed by: NURSE PRACTITIONER

## 2017-08-23 PROCEDURE — 3008F BODY MASS INDEX DOCD: CPT | Mod: S$GLB,,, | Performed by: NURSE PRACTITIONER

## 2017-08-23 PROCEDURE — 99214 OFFICE O/P EST MOD 30 MIN: CPT | Mod: S$GLB,,, | Performed by: NURSE PRACTITIONER

## 2017-08-23 PROCEDURE — 1159F MED LIST DOCD IN RCRD: CPT | Mod: S$GLB,,, | Performed by: NURSE PRACTITIONER

## 2017-08-23 PROCEDURE — 99999 PR PBB SHADOW E&M-EST. PATIENT-LVL IV: CPT | Mod: PBBFAC,,, | Performed by: NURSE PRACTITIONER

## 2017-08-23 PROCEDURE — 99499 UNLISTED E&M SERVICE: CPT | Mod: S$GLB,,, | Performed by: NURSE PRACTITIONER

## 2017-08-23 RX ORDER — NITROGLYCERIN 0.4 MG/1
0.4 TABLET SUBLINGUAL EVERY 5 MIN PRN
Qty: 25 TABLET | Refills: 11 | Status: SHIPPED | OUTPATIENT
Start: 2017-08-23 | End: 2017-08-23 | Stop reason: SDUPTHER

## 2017-08-23 RX ORDER — NITROGLYCERIN 0.4 MG/1
0.4 TABLET SUBLINGUAL EVERY 5 MIN PRN
Qty: 25 TABLET | Refills: 11 | Status: ON HOLD | OUTPATIENT
Start: 2017-08-23 | End: 2019-11-11 | Stop reason: CLARIF

## 2017-08-23 RX ORDER — DONEPEZIL HYDROCHLORIDE 10 MG/1
10 TABLET, FILM COATED ORAL NIGHTLY
COMMUNITY
End: 2019-07-09

## 2017-08-23 RX ORDER — GABAPENTIN 300 MG/1
300 CAPSULE ORAL 3 TIMES DAILY
Qty: 30 CAPSULE | Refills: 0 | Status: SHIPPED | OUTPATIENT
Start: 2017-08-23 | End: 2018-05-31 | Stop reason: SDUPTHER

## 2017-08-23 NOTE — PROGRESS NOTES
"Mr. Blake is a patient of Dr. Mendoza and was last seen in [facility] Cardiology 6/3/2016.      Subjective:   Patient ID:  Nickolas Blake is a 86 y.o. male who presents for follow-up of Coronary Artery Disease  .   HPI:    Mr. Blake is an 85yo male with a PMHx of CAD (s/p POBA in 1980), HTN, HLD, DM II, and CKD. Today he has no cardiac complaints. Mr. Blake denies chest pain with exertion or at rest, palpitations, SOB, LAUREN, dizziness, syncope, leg edema, claudication, PND, or orthopnea. He is currently taking ASA 81mg and atorvastatin 20mg for CAD management. LDL is 93.2 on 4/26/2017. He is also taking metoprolol 50mg daily and lisinopril 10mg daily. He takes his BP daily which ranges from 140-160 systolic. HA1C is 9. Creatinine is 1.2. Hepatic transaminases are within normal limits. He is not active and is limited by severe lower back and hip pain.    Recent Cardiac Tests:    EKG (11/9/2016):  Normal sinus rhythm  Normal ECG  When compared with ECG of 25-MAY-2016 18:39,  No significant change was found    Current Outpatient Prescriptions   Medication Sig    acetaminophen (TYLENOL) 500 mg Cap Take 1 capsule by mouth every 6 to 8 hours as needed.     ascorbic acid, vitamin C, (VITAMIN C) 250 MG tablet Take 1 tablet (250 mg total) by mouth 2 (two) times daily.    aspirin (ECOTRIN) 81 MG EC tablet Take 81 mg by mouth once daily.    atorvastatin (LIPITOR) 20 MG tablet TAKE 1 TABLET ONE TIME DAILY    BD ALCOHOL SWABS PadM USE FOUR TIMES DAILY    BD INSULIN PEN NEEDLE UF SHORT 31 gauge x 5/16" Ndle Inject 1 application into the skin 5 (five) times daily.    BD INSULIN SYRINGE ULTRA-FINE 1/2 mL 31 gauge x 15/64" Syrg USE WITH INSULIN INJECTIONS TWICE DAILY    blood sugar diagnostic (ACCU-CHEK NEWTON PLUS TEST STRP) Strp TEST TWO TIMES DAILY WITH MEALS    donepezil (ARICEPT) 10 MG tablet Take 1 tablet (10 mg total) by mouth once daily.    duloxetine (CYMBALTA) 30 MG capsule Take 30 mg by mouth once daily.    " finasteride (PROSCAR) 5 mg tablet TAKE 1 TABLET EVERY DAY    fluticasone (FLONASE) 50 mcg/actuation nasal spray 2 sprays by Each Nare route daily as needed for Allergies.    gabapentin (NEURONTIN) 300 MG capsule Take 1 capsule (300 mg total) by mouth 3 (three) times daily.    insulin aspart (NOVOLOG) 100 unit/mL InPn pen Inject 12 Units into the skin 3 (three) times daily with meals.    insulin glargine (LANTUS) 100 unit/mL injection Inject 21 Units into the skin 2 (two) times daily.    lisinopril 10 MG tablet Take 1 tablet (10 mg total) by mouth once daily.    metoprolol succinate (TOPROL-XL) 50 MG 24 hr tablet Take 1 tablet (50 mg total) by mouth once daily. Dose increase, stop 25 mg    nitroGLYCERIN (NITROSTAT) 0.4 MG SL tablet Place 1 tablet (0.4 mg total) under the tongue every 5 (five) minutes as needed for Chest pain.    oxycodone-acetaminophen (PERCOCET)  mg per tablet Take 1 tablet by mouth every 8 (eight) hours as needed for Pain. (Patient taking differently: Take 1 tablet by mouth nightly as needed for Pain. )    senna-docusate 8.6-50 mg (PERICOLACE) 8.6-50 mg per tablet Take 1 tablet by mouth 2 (two) times daily as needed for Constipation.    tamsulosin (FLOMAX) 0.4 mg Cp24 Take 2 capsules (0.8 mg total) by mouth once daily.     No current facility-administered medications for this visit.        Review of Systems   Constitution: Negative for malaise/fatigue.   HENT: Negative for headaches.    Eyes: Negative for blurred vision.   Cardiovascular: Negative for chest pain, claudication, dyspnea on exertion, irregular heartbeat, leg swelling, orthopnea, palpitations, paroxysmal nocturnal dyspnea and syncope.   Respiratory: Negative for shortness of breath.    Hematologic/Lymphatic: Negative for bleeding problem.   Skin: Negative for rash.   Musculoskeletal: Positive for arthritis, back pain and joint pain. Negative for myalgias.   Gastrointestinal: Positive for heartburn. Negative for  "abdominal pain, constipation, diarrhea and nausea.   Genitourinary: Negative for hematuria.   Neurological: Negative for loss of balance and numbness.   Psychiatric/Behavioral: Negative for altered mental status.   Allergic/Immunologic: Negative for persistent infections.     Objective:     Right Arm BP - Sitting: 160/70 (17 0925)  Left Arm BP - Sittin/68 (17 0925)    BP (!) 150/68   Pulse 82   Ht 5' 11" (1.803 m)   Wt 87.2 kg (192 lb 3.9 oz)   BMI 26.81 kg/m²     Physical Exam   Constitutional: He is oriented to person, place, and time. He appears well-developed and well-nourished.   HENT:   Head: Normocephalic.   Nose: Nose normal.   Eyes: Pupils are equal, round, and reactive to light.   Neck: No JVD present. Carotid bruit is not present.   Cardiovascular: Normal rate, regular rhythm, S1 normal and S2 normal.  Exam reveals no gallop.    Murmur heard.   Harsh midsystolic murmur is present with a grade of 1/6  at the upper right sternal border, upper left sternal border  Pulses:       Carotid pulses are 2+ on the right side, and 2+ on the left side.       Radial pulses are 2+ on the right side, and 2+ on the left side.        Dorsalis pedis pulses are 2+ on the right side, and 2+ on the left side.   Pulmonary/Chest: Breath sounds normal. No respiratory distress.   Abdominal: Soft. Bowel sounds are normal. He exhibits no distension. There is no tenderness.   Musculoskeletal: Normal range of motion. He exhibits no edema.   Neurological: He is alert and oriented to person, place, and time.   Skin: Skin is warm and dry. No erythema.   Psychiatric: He has a normal mood and affect. His speech is normal and behavior is normal.   Nursing note and vitals reviewed.    Lab Results   Component Value Date     2017    K 4.7 2017    MG 1.9 2016     2017    CO2 28 2017    BUN 23 2017    CREATININE 1.2 2017     (H) 2017    HGBA1C 9.0 (H) " 08/01/2017    AST 26 08/01/2017    ALT 25 08/01/2017    ALBUMIN 3.6 08/01/2017    PROT 7.2 08/01/2017    BILITOT 0.9 08/01/2017    WBC 5.47 03/07/2017    HGB 12.2 (L) 03/07/2017    HCT 37.5 (L) 03/07/2017    HCT 24 (L) 11/11/2016    MCV 82 03/07/2017     03/07/2017    TSH 0.541 02/15/2016    CHOL 160 04/26/2017    HDL 54 04/26/2017    LDLCALC 93.2 04/26/2017    TRIG 64 04/26/2017       Recent Labs  Lab 07/19/16  2048 11/09/16  1424   INR 0.9 0.9        Test(s) Reviewed  I have reviewed the following in detail:  [] Stress test   [] Angiography   [] Echocardiogram   [x] Labs   [] Other:         Assessment:         1. Coronary artery disease involving native coronary artery of native heart without angina pectoris. On ASA and statin. Patient has no angina, SOB or other signs of ischemia. No unstable arrhythmia. No symptoms of heart failure.      2. Mixed hyperlipidemia. LDL increased from last year. 93.2 on atorvastatin 20mg. Patient confirms medication compliance and no significant lifestyle changes. Will repeat lipid panel to determine if lipid lowering medication needs to be increased.     3. Hypertension, essential. Elevated. Repeat /65. Will obtain one week of BP measures to determine if antihypertension medication needs to be increased.     4. Bilateral carotid artery disease. On ASA and statin. Asymptomatic.      5. Aortic atherosclerosis. On ASA and statin.     6. Type 2 diabetes mellitus with stage 3 chronic kidney disease, with long-term current use of insulin. HA1C 9.     7. CKD (chronic kidney disease), stage III. Creatinine 1.2.     Plan:     Nickolas was seen today for coronary artery disease.    Diagnoses and all orders for this visit:    Coronary artery disease involving native coronary artery of native heart without angina pectoris  -     nitroGLYCERIN (NITROSTAT) 0.4 MG SL tablet; Place 1 tablet (0.4 mg total) under the tongue every 5 (five) minutes as needed for Chest pain.  -     Lipid  panel; Future; Expected date: 08/30/2017    Mixed hyperlipidemia  -     Lipid panel; Future; Expected date: 08/30/2017    Hypertension, essential    Bilateral carotid artery disease    Aortic atherosclerosis    Type 2 diabetes mellitus with stage 3 chronic kidney disease, with long-term current use of insulin    CKD (chronic kidney disease), stage III    Other orders  -     donepezil (ARICEPT) 10 MG tablet; Take 10 mg by mouth every evening.      Take blood pressures twice daily for one week. Report measures to clinic through [facility].  Fasting blood test in one week.  Low salt diet.  Continue other medications as prescribed.  Patient has been encouraged to exercise a minimum of 30 minutes daily, 5 times per week.   Return to clinic in one year.    Return in about 1 year (around 8/23/2018).    ------------------------------------------------------------------    YOLY Lawrence, NP-C  Consult Cardiology

## 2017-08-23 NOTE — LETTER
August 23, 2017      Joe Bunn MD  [address]  [place] [address]           [facility] - Cardiology  [address]  [place] [address]  Phone: [phone]          Patient: Nickolas Blake   MR Number: [id number]   YOB: 1931   Date of Visit: 8/23/2017       Dear Dr. Joe Bunn:    Thank you for referring Nickolas Blake to me for evaluation. Attached you will find relevant portions of my assessment and plan of care.    If you have questions, please do not hesitate to call me. I look forward to following Nickolas Blake along with you.    Sincerely,    Norma Polanco, NP    Enclosure  CC:  No Recipients    If you would like to receive this communication electronically, please contact [email or web address] or [phone] to request more information on [facility] access.    For providers and/or their staff who would like to refer a patient to [facility], please contact us through our one-stop-shop provider referral line, [facility] , at [phone].    If you feel you have received this communication in error or would no longer like to receive these types of communications, please e-mail [email or web address]

## 2017-08-23 NOTE — PATIENT INSTRUCTIONS
Take blood pressures twice daily for one week. Report measures to clinic through [facility].  Fasting blood test in one week.  Low salt diet.  Continue other medications as prescribed.  Patient has been encouraged to exercise a minimum of 30 minutes daily, 5 times per week.   Return to clinic in one year.    ---    Low-Salt Diet  This diet removes foods that are high in salt. It also limits the amount of salt you use when cooking. It is most often used for people with high blood pressure, edema (fluid retention), and kidney, liver, or heart disease.  Table salt contains the mineral sodium. Your body needs sodium to work normally. But too much sodium can make your health problems worse. Your healthcare provider is recommending a low-salt (also called low-sodium) diet for you. Your total daily allowance of salt is 1,500 to 2,300 milligrams (mg). It is less than 1 teaspoon of table salt. This means you can have only about 500 to 700 mg of sodium at each meal. People with certain health problems should limit salt intake to the lower end of the recommended range.    When you cook, dont add much salt. If you can cook without using salt, even better. Dont add salt to your food at the table.  When shopping, read food labels. Salt is often called sodium on the label. Choose foods that are salt-free, low salt, or very low salt. Note that foods with reduced salt may not lower your salt intake enough.    Beans, potatoes, and pasta  Ok: Dry beans, split peas, lentils, potatoes, rice, macaroni, pasta, spaghetti without added salt  Avoid: Potato chips, tortilla chips, and similar products  Breads and cereals  Ok: Low-sodium breads, rolls, cereals, and cakes; low-salt crackers, matzo crackers  Avoid: Salted crackers, pretzels, popcorn, [place]er toast, pancakes, muffins  Dairy  Ok: Milk, chocolate milk, hot chocolate mix, low-salt cheeses, and yogurt  Avoid: Processed cheese and cheese spreads; Roquefort, Camembert, and cottage  cheese; buttermilk, instant breakfast drink  Desserts  Ok: Ice cream, frozen yogurt, juice bars, gelatin, cookies and pies, sugar, honey, jelly, hard candy  Avoid: Most pies, cakes and cookies prepared or processed with salt; instant pudding  Drinks  Ok: Tea, coffee, fizzy (carbonated) drinks, juices  Avoid: Flavored coffees, electrolyte replacement drinks, sports drinks  Meats  Ok: All fresh meat, fish, poultry, low-salt tuna, eggs, egg substitute  Avoid: Smoked, pickled, brine-cured, or salted meats and fish. This includes katz, chipped beef, corned beef, hot dogs, deli meats, ham, kosher meats, salt pork, sausage, canned tuna, salted codfish, smoked salmon, herring, sardines, or anchovies.  Seasonings and spices  Ok: Most seasonings are okay. Good substitutes for salt include: fresh herb blends, hot sauce, lemon, garlic, nichole, vinegar, dry mustard, parsley, cilantro, horseradish, tomato paste, regular margarine, mayonnaise, unsalted butter, cream cheese, vegetable oil, cream, low-salt salad dressing and gravy.  Avoid: Regular ketchup, relishes, pickles, soy sauce, teriyaki sauce, Worcestershire sauce, BBQ sauce, tartar sauce, meat tenderizer, chili sauce, regular gravy, regular salad dressing, salted butter  Soups  Ok: Low-salt soups and broths made with allowed foods  Avoid: Bouillon cubes, soups with smoked or salted meats, regular soup and broth  Vegetables  Ok: Most vegetables are okay; also low-salt tomato and vegetable juices  Avoid: Sauerkraut and other brine-soaked vegetables; pickles and other pickled vegetables; tomato juice, olives  Date Last Reviewed: 8/1/2016  © 0538-6668 [employer]. [address] [place] [address]. All rights reserved. This information is not intended as a substitute for professional medical care. Always follow your healthcare professional's instructions.

## 2017-08-24 ENCOUNTER — OUTPATIENT CASE MANAGEMENT (OUTPATIENT)
Dept: ADMINISTRATIVE | Facility: OTHER | Age: 82
End: 2017-08-24

## 2017-08-24 NOTE — PROGRESS NOTES
Angella, the pt's sitter, reports that the patient is unable to talk at the present time. Reports that the pt's blood sugar this morning was 141 and 212 at noon. Reports that on yesterday the pt's blood sugar was 159 in the morning, 215 at noon, and 134 in pm. Reports that she is aware of the instructions from the cardiology appt on yesterday. Reviewed s/s and risk factors of HTN.Reports that there are no new concerns or questions. Plan: close case.

## 2017-08-29 DIAGNOSIS — Z79.4 TYPE 2 DIABETES MELLITUS WITH HYPERGLYCEMIA, WITH LONG-TERM CURRENT USE OF INSULIN: ICD-10-CM

## 2017-08-29 DIAGNOSIS — E11.65 TYPE 2 DIABETES MELLITUS WITH HYPERGLYCEMIA, WITH LONG-TERM CURRENT USE OF INSULIN: ICD-10-CM

## 2017-08-29 RX ORDER — INSULIN GLARGINE 100 [IU]/ML
21 INJECTION, SOLUTION SUBCUTANEOUS 2 TIMES DAILY
Qty: 40 ML | Refills: 3 | Status: SHIPPED | OUTPATIENT
Start: 2017-08-29 | End: 2017-11-28 | Stop reason: SDUPTHER

## 2017-08-29 NOTE — TELEPHONE ENCOUNTER
----- Message from Fallon Madera sent at 8/29/2017 10:17 AM CDT -----  Contact: self     [phone]  traci  -  Calling to speak with the nurse in regards to the pt medication   Thanks,

## 2017-08-31 ENCOUNTER — LAB VISIT (OUTPATIENT)
Dept: LAB | Facility: HOSPITAL | Age: 82
End: 2017-08-31
Attending: NURSE PRACTITIONER
Payer: MEDICARE

## 2017-08-31 DIAGNOSIS — E78.2 MIXED HYPERLIPIDEMIA: ICD-10-CM

## 2017-08-31 DIAGNOSIS — I25.10 CORONARY ARTERY DISEASE INVOLVING NATIVE CORONARY ARTERY OF NATIVE HEART WITHOUT ANGINA PECTORIS: Chronic | ICD-10-CM

## 2017-08-31 LAB
CHOLEST SERPL-MCNC: 155 MG/DL
CHOLEST/HDLC SERPL: 4 {RATIO}
HDLC SERPL-MCNC: 39 MG/DL
HDLC SERPL: 25.2 %
LDLC SERPL CALC-MCNC: 86.6 MG/DL
NONHDLC SERPL-MCNC: 116 MG/DL
TRIGL SERPL-MCNC: 147 MG/DL

## 2017-08-31 PROCEDURE — 36415 COLL VENOUS BLD VENIPUNCTURE: CPT | Mod: PO

## 2017-08-31 PROCEDURE — 80061 LIPID PANEL: CPT

## 2017-09-07 ENCOUNTER — TELEPHONE (OUTPATIENT)
Dept: CARDIOLOGY | Facility: CLINIC | Age: 82
End: 2017-09-07

## 2017-09-20 ENCOUNTER — TELEPHONE (OUTPATIENT)
Dept: CARDIOLOGY | Facility: CLINIC | Age: 82
End: 2017-09-20

## 2017-09-20 DIAGNOSIS — I25.10 CORONARY ARTERY DISEASE INVOLVING NATIVE CORONARY ARTERY OF NATIVE HEART WITHOUT ANGINA PECTORIS: Primary | Chronic | ICD-10-CM

## 2017-09-20 DIAGNOSIS — E78.2 MIXED HYPERLIPIDEMIA: ICD-10-CM

## 2017-09-20 RX ORDER — ATORVASTATIN CALCIUM 40 MG/1
40 TABLET, FILM COATED ORAL DAILY
Qty: 90 TABLET | Refills: 3 | Status: SHIPPED | OUTPATIENT
Start: 2017-09-20

## 2017-09-20 NOTE — TELEPHONE ENCOUNTER
Spoke with Keke, patient's other caregiver who handles his medications. He has been taking all his anti-hypertensive medications in the AM. Asked her to move metoprolol dose to the PM and to send a week or 2 of BPs to clinic after. Understanding verbalized.

## 2017-09-20 NOTE — TELEPHONE ENCOUNTER
Called Angella his caregiver to discuss his lipid results. She says that patient does not have much control over his diet and is taken out to eat frequently. Will increase atorvastatin to 40mg and retest in 6 weeks. Understanding verbalized.

## 2017-09-22 ENCOUNTER — OFFICE VISIT (OUTPATIENT)
Dept: INTERNAL MEDICINE | Facility: CLINIC | Age: 82
End: 2017-09-22
Attending: FAMILY MEDICINE
Payer: MEDICARE

## 2017-09-22 ENCOUNTER — IMMUNIZATION (OUTPATIENT)
Dept: INTERNAL MEDICINE | Facility: CLINIC | Age: 82
End: 2017-09-22
Payer: MEDICARE

## 2017-09-22 VITALS
HEART RATE: 78 BPM | WEIGHT: 194 LBS | DIASTOLIC BLOOD PRESSURE: 70 MMHG | BODY MASS INDEX: 27.16 KG/M2 | HEIGHT: 71 IN | SYSTOLIC BLOOD PRESSURE: 136 MMHG

## 2017-09-22 DIAGNOSIS — N18.30 TYPE 2 DIABETES MELLITUS WITH STAGE 3 CHRONIC KIDNEY DISEASE, WITH LONG-TERM CURRENT USE OF INSULIN: Primary | ICD-10-CM

## 2017-09-22 DIAGNOSIS — E11.22 TYPE 2 DIABETES MELLITUS WITH STAGE 3 CHRONIC KIDNEY DISEASE, WITH LONG-TERM CURRENT USE OF INSULIN: Primary | ICD-10-CM

## 2017-09-22 DIAGNOSIS — M48.061 SPINAL STENOSIS OF LUMBAR REGION AT MULTIPLE LEVELS: ICD-10-CM

## 2017-09-22 DIAGNOSIS — I77.9 BILATERAL CAROTID ARTERY DISEASE: ICD-10-CM

## 2017-09-22 DIAGNOSIS — I10 HYPERTENSION, ESSENTIAL: ICD-10-CM

## 2017-09-22 DIAGNOSIS — M53.3 SACROILIAC JOINT DYSFUNCTION OF RIGHT SIDE: ICD-10-CM

## 2017-09-22 DIAGNOSIS — F02.80 MIXED ALZHEIMER'S AND VASCULAR DEMENTIA: ICD-10-CM

## 2017-09-22 DIAGNOSIS — F01.50 MIXED ALZHEIMER'S AND VASCULAR DEMENTIA: ICD-10-CM

## 2017-09-22 DIAGNOSIS — G30.9 MIXED ALZHEIMER'S AND VASCULAR DEMENTIA: ICD-10-CM

## 2017-09-22 DIAGNOSIS — Z79.4 TYPE 2 DIABETES MELLITUS WITH STAGE 3 CHRONIC KIDNEY DISEASE, WITH LONG-TERM CURRENT USE OF INSULIN: Primary | ICD-10-CM

## 2017-09-22 DIAGNOSIS — N18.30 CKD (CHRONIC KIDNEY DISEASE), STAGE III: ICD-10-CM

## 2017-09-22 DIAGNOSIS — E78.5 HYPERLIPIDEMIA, UNSPECIFIED HYPERLIPIDEMIA TYPE: ICD-10-CM

## 2017-09-22 DIAGNOSIS — E11.42 TYPE 2 DIABETES MELLITUS WITH DIABETIC POLYNEUROPATHY, WITH LONG-TERM CURRENT USE OF INSULIN: ICD-10-CM

## 2017-09-22 DIAGNOSIS — I25.10 CORONARY ARTERY DISEASE INVOLVING NATIVE CORONARY ARTERY OF NATIVE HEART WITHOUT ANGINA PECTORIS: Chronic | ICD-10-CM

## 2017-09-22 DIAGNOSIS — N40.0 BENIGN PROSTATIC HYPERPLASIA WITHOUT LOWER URINARY TRACT SYMPTOMS: ICD-10-CM

## 2017-09-22 DIAGNOSIS — Z79.4 TYPE 2 DIABETES MELLITUS WITH DIABETIC POLYNEUROPATHY, WITH LONG-TERM CURRENT USE OF INSULIN: ICD-10-CM

## 2017-09-22 DIAGNOSIS — L98.9 SKIN LESION: ICD-10-CM

## 2017-09-22 PROCEDURE — 90662 IIV NO PRSV INCREASED AG IM: CPT | Mod: S$GLB,,, | Performed by: FAMILY MEDICINE

## 2017-09-22 PROCEDURE — G0008 ADMIN INFLUENZA VIRUS VAC: HCPCS | Mod: S$GLB,,, | Performed by: FAMILY MEDICINE

## 2017-09-22 PROCEDURE — 3008F BODY MASS INDEX DOCD: CPT | Mod: S$GLB,,, | Performed by: FAMILY MEDICINE

## 2017-09-22 PROCEDURE — 99214 OFFICE O/P EST MOD 30 MIN: CPT | Mod: S$GLB,,, | Performed by: FAMILY MEDICINE

## 2017-09-22 PROCEDURE — 1159F MED LIST DOCD IN RCRD: CPT | Mod: S$GLB,,, | Performed by: FAMILY MEDICINE

## 2017-09-22 PROCEDURE — 99999 PR PBB SHADOW E&M-EST. PATIENT-LVL III: CPT | Mod: PBBFAC,,, | Performed by: FAMILY MEDICINE

## 2017-09-22 PROCEDURE — 1125F AMNT PAIN NOTED PAIN PRSNT: CPT | Mod: S$GLB,,, | Performed by: FAMILY MEDICINE

## 2017-09-22 PROCEDURE — 99499 UNLISTED E&M SERVICE: CPT | Mod: S$GLB,,, | Performed by: FAMILY MEDICINE

## 2017-09-22 NOTE — PROGRESS NOTES
Subjective:       Patient ID: Nickolas Blake is a 86 y.o. male.    Chief Complaint: Follow-up    HPI  Review of Systems   Constitutional: Positive for fatigue. Negative for chills and fever.   HENT: Negative for congestion and trouble swallowing.    Eyes: Negative for redness.   Respiratory: Negative for cough, chest tightness and shortness of breath.    Cardiovascular: Negative for chest pain, palpitations and leg swelling.   Gastrointestinal: Negative for abdominal pain and blood in stool.   Genitourinary: Negative for hematuria.   Musculoskeletal: Positive for back pain. Negative for arthralgias, gait problem, joint swelling, myalgias and neck pain.   Skin: Negative for color change and rash.   Neurological: Negative for tremors, speech difficulty, weakness, numbness and headaches.   Hematological: Negative for adenopathy. Does not bruise/bleed easily.   Psychiatric/Behavioral: Positive for decreased concentration and dysphoric mood. Negative for behavioral problems, confusion and sleep disturbance. The patient is not nervous/anxious.        Objective:      Physical Exam   Constitutional: He is oriented to person, place, and time. He appears well-developed and well-nourished.   Eyes: No scleral icterus.   Neck: Normal range of motion. Neck supple. Carotid bruit is not present.   Cardiovascular: Normal rate, regular rhythm and intact distal pulses.  Exam reveals distant heart sounds. Exam reveals no gallop and no friction rub.    No murmur heard.  Pulmonary/Chest: Effort normal. No respiratory distress. He has decreased breath sounds. He has no wheezes. He has no rales.   Abdominal: Soft. Bowel sounds are normal. He exhibits no distension. There is no tenderness.   Musculoskeletal: He exhibits no edema.   Neurological: He is alert and oriented to person, place, and time. He displays no tremor. No cranial nerve deficit. Coordination and gait normal.   Skin: Skin is warm and dry. No rash noted. He is not  diaphoretic. No erythema.   Psychiatric: He has a normal mood and affect. His behavior is normal. Judgment and thought content normal.   Nursing note and vitals reviewed.      Assessment:       1. Type 2 diabetes mellitus with stage 3 chronic kidney disease, with long-term current use of insulin    2. Type 2 diabetes mellitus with diabetic polyneuropathy, with long-term current use of insulin    3. Spinal stenosis of lumbar region at multiple levels    4. Sacroiliac joint dysfunction of right side    5. Mixed Alzheimer's and vascular dementia    6. Hypertension, essential    7. Hyperlipidemia, unspecified hyperlipidemia type    8. Coronary artery disease involving native coronary artery of native heart without angina pectoris    9. CKD (chronic kidney disease), stage III    10. Bilateral carotid artery disease    11. Benign prostatic hyperplasia without lower urinary tract symptoms    12. Skin lesion        Plan:   Nickolas was seen today for follow-up.    Diagnoses and all orders for this visit:    Type 2 diabetes mellitus with stage 3 chronic kidney disease, with long-term current use of insulin    Type 2 diabetes mellitus with diabetic polyneuropathy, with long-term current use of insulin    Spinal stenosis of lumbar region at multiple levels    Sacroiliac joint dysfunction of right side    Mixed Alzheimer's and vascular dementia    Hypertension, essential    Hyperlipidemia, unspecified hyperlipidemia type    Coronary artery disease involving native coronary artery of native heart without angina pectoris    CKD (chronic kidney disease), stage III    Bilateral carotid artery disease    Benign prostatic hyperplasia without lower urinary tract symptoms    Skin lesion  -     Ambulatory referral to Dermatology      See meds, orders, follow up, routing and instructions sections of encounter.  HISTORY OF PRESENT ILLNESS:  This is a gentleman for semi-annual followup.  He   is presenting with a caretaker.    There are no  specific complaints.  He downplays any metabolic issues at this   time, states his home sugars are running in the 140-160 range.  His last A1c was   9.0 on August 21st and his last LDL was 86.  He is under the care of our   Endocrinology Service.    The patient was a Damage [employer] in the [employer] from [employer] to 1954.  States he   served above the [employer].    His insulin doses were reviewed.  He is having no new problems at this time.    All in all, stating about the same as he has been in the past.  Keep followup   appointments with his consultants and follow up with me in approximately six   months.      PHYLLIS/FERNANDA  dd: 09/22/2017 10:50:03 (CDT)  td: 09/23/2017 00:29:01 (CDT)  Doc ID   #[id number]  Job ID #[id number]    CC:

## 2017-10-03 DIAGNOSIS — E11.9 DIABETES MELLITUS WITHOUT COMPLICATION: ICD-10-CM

## 2017-10-12 ENCOUNTER — TELEPHONE (OUTPATIENT)
Dept: INTERNAL MEDICINE | Facility: CLINIC | Age: 82
End: 2017-10-12

## 2017-10-12 NOTE — TELEPHONE ENCOUNTER
----- Message from Mary Aldana sent at 10/9/2017  9:48 AM CDT -----  Contact: Self/ [phone]   Pt is calling to speak with someone in the office about his medication. Please call and advise     Thank you

## 2017-10-13 ENCOUNTER — TELEPHONE (OUTPATIENT)
Dept: CARDIOLOGY | Facility: CLINIC | Age: 82
End: 2017-10-13

## 2017-10-13 NOTE — TELEPHONE ENCOUNTER
----- Message from Miriam Brody sent at 10/13/2017  9:08 AM CDT -----  Contact: Pt called  Caregiver of pt called(Angella Bey), requesting to speak with Tamela in order to discuss BP readings. Pt of PIERCE Polanco and LOV 8/23/17. Ph for [phone]. Thank you

## 2017-10-20 ENCOUNTER — TELEPHONE (OUTPATIENT)
Dept: INTERNAL MEDICINE | Facility: CLINIC | Age: 82
End: 2017-10-20

## 2017-10-25 ENCOUNTER — PATIENT MESSAGE (OUTPATIENT)
Dept: INTERNAL MEDICINE | Facility: CLINIC | Age: 82
End: 2017-10-25

## 2017-10-25 DIAGNOSIS — F01.50 MIXED ALZHEIMER'S AND VASCULAR DEMENTIA: Primary | ICD-10-CM

## 2017-10-25 DIAGNOSIS — R45.4 ANGER: ICD-10-CM

## 2017-10-25 DIAGNOSIS — G30.9 MIXED ALZHEIMER'S AND VASCULAR DEMENTIA: Primary | ICD-10-CM

## 2017-10-25 DIAGNOSIS — F02.80 MIXED ALZHEIMER'S AND VASCULAR DEMENTIA: Primary | ICD-10-CM

## 2017-10-25 NOTE — TELEPHONE ENCOUNTER
I think we should get a geriatric psychiatry consult. Please see referral orders (urgent) and schedule patient for a consult with psychiatry. Thank you.

## 2017-10-25 NOTE — TELEPHONE ENCOUNTER
Spoke with daughter and explained that his current issues are best handled in psych clinic. This can be framed as help with his sleep, nightmares and pain issues. She agrees.

## 2017-10-25 NOTE — TELEPHONE ENCOUNTER
Please see Dr. Quevedo' note to me and see if you can schedule that appointment with the family on 10/31. Please coordinate with Kyra De Los Santos in the psych department. Thank you.

## 2017-10-25 NOTE — TELEPHONE ENCOUNTER
Spoke with Anne, informed her of the time slots available for Tuesday, 10/31 and explained the limited availability. She states that she doesn't think Mr. Blake would be willing to see a psychiatrist if it is labeled psychiatrist. I informed her that I am not sure how the office looks, she is requesting that you call her to speak with her at [phone].   Please advise.

## 2017-10-25 NOTE — TELEPHONE ENCOUNTER
Pt's daughter states that he has been ranting, and over obsessing about unrealstic things.Patient daughter stated that when looking over bank accounts he became upset and stated he was going to run out of money and he has sent the  nurse home.. She also states that when reassured he will be okay and everything will be fine, Mr. Blake becomes very angry. His daughter, Anne, states  That the Cymbalta has helped some but at this point she does not believe it works anymore and would like to discuss medication options as she is very concerned about her father's behavior. Please advise.    Patsy (Lester), MA

## 2017-10-31 ENCOUNTER — LAB VISIT (OUTPATIENT)
Dept: LAB | Facility: HOSPITAL | Age: 82
End: 2017-10-31
Attending: FAMILY MEDICINE
Payer: MEDICARE

## 2017-10-31 ENCOUNTER — OFFICE VISIT (OUTPATIENT)
Dept: PSYCHIATRY | Facility: CLINIC | Age: 82
End: 2017-10-31
Payer: MEDICARE

## 2017-10-31 VITALS
SYSTOLIC BLOOD PRESSURE: 192 MMHG | WEIGHT: 191.38 LBS | DIASTOLIC BLOOD PRESSURE: 78 MMHG | BODY MASS INDEX: 26.79 KG/M2 | HEART RATE: 72 BPM | HEIGHT: 71 IN

## 2017-10-31 DIAGNOSIS — F02.80 MIXED ALZHEIMER'S AND VASCULAR DEMENTIA: Primary | ICD-10-CM

## 2017-10-31 DIAGNOSIS — I25.10 CORONARY ARTERY DISEASE INVOLVING NATIVE CORONARY ARTERY OF NATIVE HEART WITHOUT ANGINA PECTORIS: Chronic | ICD-10-CM

## 2017-10-31 DIAGNOSIS — E78.2 MIXED HYPERLIPIDEMIA: ICD-10-CM

## 2017-10-31 DIAGNOSIS — F01.50 MIXED ALZHEIMER'S AND VASCULAR DEMENTIA: Primary | ICD-10-CM

## 2017-10-31 DIAGNOSIS — G30.9 MIXED ALZHEIMER'S AND VASCULAR DEMENTIA: Primary | ICD-10-CM

## 2017-10-31 LAB
ALBUMIN SERPL BCP-MCNC: 3.5 G/DL
ALP SERPL-CCNC: 92 U/L
ALT SERPL W/O P-5'-P-CCNC: 66 U/L
ANION GAP SERPL CALC-SCNC: 6 MMOL/L
AST SERPL-CCNC: 44 U/L
BILIRUB SERPL-MCNC: 0.8 MG/DL
BUN SERPL-MCNC: 24 MG/DL
CALCIUM SERPL-MCNC: 9.1 MG/DL
CHLORIDE SERPL-SCNC: 107 MMOL/L
CHOLEST SERPL-MCNC: 158 MG/DL
CHOLEST/HDLC SERPL: 3.6 {RATIO}
CO2 SERPL-SCNC: 27 MMOL/L
CREAT SERPL-MCNC: 1.2 MG/DL
EST. GFR  (AFRICAN AMERICAN): >60 ML/MIN/1.73 M^2
EST. GFR  (NON AFRICAN AMERICAN): 54.4 ML/MIN/1.73 M^2
GLUCOSE SERPL-MCNC: 182 MG/DL
HDLC SERPL-MCNC: 44 MG/DL
HDLC SERPL: 27.8 %
LDLC SERPL CALC-MCNC: 88.6 MG/DL
NONHDLC SERPL-MCNC: 114 MG/DL
POTASSIUM SERPL-SCNC: 4.7 MMOL/L
PROT SERPL-MCNC: 7.5 G/DL
SODIUM SERPL-SCNC: 140 MMOL/L
TRIGL SERPL-MCNC: 127 MG/DL

## 2017-10-31 PROCEDURE — 99999 PR PBB SHADOW E&M-EST. PATIENT-LVL IV: CPT | Mod: PBBFAC,GC,, | Performed by: PSYCHIATRY & NEUROLOGY

## 2017-10-31 PROCEDURE — 36415 COLL VENOUS BLD VENIPUNCTURE: CPT | Mod: PO

## 2017-10-31 PROCEDURE — 80053 COMPREHEN METABOLIC PANEL: CPT

## 2017-10-31 PROCEDURE — 99204 OFFICE O/P NEW MOD 45 MIN: CPT | Mod: GC,S$GLB,, | Performed by: PSYCHIATRY & NEUROLOGY

## 2017-10-31 PROCEDURE — 80061 LIPID PANEL: CPT

## 2017-10-31 RX ORDER — DULOXETIN HYDROCHLORIDE 60 MG/1
60 CAPSULE, DELAYED RELEASE ORAL DAILY
Qty: 90 CAPSULE | Refills: 0 | Status: SHIPPED | OUTPATIENT
Start: 2017-10-31 | End: 2019-04-29 | Stop reason: SDUPTHER

## 2017-10-31 RX ORDER — DULOXETIN HYDROCHLORIDE 60 MG/1
60 CAPSULE, DELAYED RELEASE ORAL DAILY
Qty: 30 CAPSULE | Refills: 0 | Status: SHIPPED | OUTPATIENT
Start: 2017-10-31 | End: 2017-10-31 | Stop reason: SDUPTHER
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
## 2017-11-27 RX ORDER — DULOXETIN HYDROCHLORIDE 60 MG/1
60 CAPSULE, DELAYED RELEASE ORAL DAILY
Qty: 30 CAPSULE | Refills: 0 | OUTPATIENT
Start: 2017-11-27

## 2017-11-28 ENCOUNTER — OFFICE VISIT (OUTPATIENT)
Dept: ENDOCRINOLOGY | Facility: CLINIC | Age: 82
End: 2017-11-28
Payer: MEDICARE

## 2017-11-28 VITALS
DIASTOLIC BLOOD PRESSURE: 70 MMHG | WEIGHT: 192.69 LBS | HEART RATE: 64 BPM | HEIGHT: 71 IN | BODY MASS INDEX: 26.98 KG/M2 | RESPIRATION RATE: 16 BRPM | SYSTOLIC BLOOD PRESSURE: 120 MMHG

## 2017-11-28 DIAGNOSIS — E78.2 MIXED HYPERLIPIDEMIA: ICD-10-CM

## 2017-11-28 DIAGNOSIS — Z79.4 TYPE 2 DIABETES MELLITUS WITH STAGE 3 CHRONIC KIDNEY DISEASE, WITH LONG-TERM CURRENT USE OF INSULIN: Primary | ICD-10-CM

## 2017-11-28 DIAGNOSIS — I10 HYPERTENSION, ESSENTIAL: ICD-10-CM

## 2017-11-28 DIAGNOSIS — G30.9 MIXED ALZHEIMER'S AND VASCULAR DEMENTIA: ICD-10-CM

## 2017-11-28 DIAGNOSIS — F01.50 MIXED ALZHEIMER'S AND VASCULAR DEMENTIA: ICD-10-CM

## 2017-11-28 DIAGNOSIS — F02.80 MIXED ALZHEIMER'S AND VASCULAR DEMENTIA: ICD-10-CM

## 2017-11-28 DIAGNOSIS — N18.30 TYPE 2 DIABETES MELLITUS WITH STAGE 3 CHRONIC KIDNEY DISEASE, WITH LONG-TERM CURRENT USE OF INSULIN: Primary | ICD-10-CM

## 2017-11-28 DIAGNOSIS — E11.22 TYPE 2 DIABETES MELLITUS WITH STAGE 3 CHRONIC KIDNEY DISEASE, WITH LONG-TERM CURRENT USE OF INSULIN: Primary | ICD-10-CM

## 2017-11-28 LAB
CREAT UR-MCNC: 139 MG/DL
MICROALBUMIN UR DL<=1MG/L-MCNC: 103 UG/ML
MICROALBUMIN/CREATININE RATIO: 74.1 UG/MG

## 2017-11-28 PROCEDURE — 82570 ASSAY OF URINE CREATININE: CPT

## 2017-11-28 PROCEDURE — 99214 OFFICE O/P EST MOD 30 MIN: CPT | Mod: S$GLB,,, | Performed by: INTERNAL MEDICINE

## 2017-11-28 PROCEDURE — 99999 PR PBB SHADOW E&M-EST. PATIENT-LVL III: CPT | Mod: PBBFAC,,, | Performed by: INTERNAL MEDICINE

## 2017-11-28 RX ORDER — METFORMIN HYDROCHLORIDE 500 MG/1
500 TABLET, EXTENDED RELEASE ORAL 2 TIMES DAILY WITH MEALS
Qty: 180 TABLET | Refills: 3 | Status: SHIPPED | OUTPATIENT
Start: 2017-11-28 | End: 2018-03-28

## 2017-11-28 RX ORDER — INSULIN GLARGINE 100 [IU]/ML
35 INJECTION, SOLUTION SUBCUTANEOUS DAILY
Qty: 10 ML | Refills: 3
Start: 2017-11-28 | End: 2018-03-28 | Stop reason: SDUPTHER

## 2017-11-28 NOTE — PROGRESS NOTES
CC: Nickolas Blake arrives today for T2DM follow up      HPI: Nickolas Blake was diagnosed with T2DM in .   No DM hospitalizations.     Known complications: mild neuropathy, prior history of retinopathy which has resolved     Was followed in [facility]. Began in chronic DM clinic 2016     CURRENT DIABETIC MEDS:   Lantus 21 units bid  Novolog 12  (started in 2017 by PCP )     The patient was previously on Metformin, Starlix and Glipizide at one time  He is unclear when the medications were discontinued     The patient's sitter (Keke) arrives today with Logs:   Fasting: 160, 197, 137, 90, 112, 93, 148, 118, 133, 126  Lunch: 121, 144, 129, 128, 118, 185, 87, 166  Evenin, 65, 42  Bedtime: 118, 84, 81, 109, 134    Hypoglycemia: see above  Symptoms include feeling jittery, blurred vision   Treats appropriately by drinking a small coke or orange juice     The patient sitter thinks the patient may be administering additional insulin during the day     Type of Glucose Meter: Accu-chek    Physical Activity: No formal exercise. Limited by spinal stenosis- physical therapy.     Dietary recall: eats 3 meals daily with last meal between 5-6 pm  He takes a sugar free snack with milk at 8pm     Last Eye Exam: 2017 , with Dr. Caputo   No evidence of retinopathy     Last Podiatry Exam: 2017 with Dr. Pearl Grant     Review of Systems   Constitutional: Positive for malaise/fatigue.   Eyes: Negative for blurred vision and double vision.   Respiratory: Negative for shortness of breath.    Cardiovascular: Negative for chest pain and palpitations.   Gastrointestinal: Negative for constipation and diarrhea.   Genitourinary: Positive for frequency. Negative for dysuria.   Musculoskeletal: Positive for back pain, joint pain and myalgias. Negative for falls.   Skin: Negative for rash.   Neurological: Negative for dizziness, tremors, seizures, weakness and headaches.   Endo/Heme/Allergies:  "Negative for polydipsia.   Psychiatric/Behavioral: Negative for depression. The patient is not nervous/anxious.      Vital Signs  /70 (BP Location: Left arm, Patient Position: Sitting, BP Method: Medium (Manual))   Pulse 64   Resp 16   Ht 5' 11" (1.803 m)   Wt 87.4 kg (192 lb 10.9 oz)   BMI 26.87 kg/m²     Hemoglobin A1C   Date Value Ref Range Status   08/01/2017 9.0 (H) 4.0 - 5.6 % Final     Comment:     According to ADA guidelines, hemoglobin A1c <7.0% represents  optimal control in non-pregnant diabetic patients. Different  metrics may apply to specific patient populations.   Standards of Medical Care in Diabetes-2016.  For the purpose of screening for the presence of diabetes:  <5.7%     Consistent with the absence of diabetes  5.7-6.4%  Consistent with increasing risk for diabetes   (prediabetes)  >or=6.5%  Consistent with diabetes  Currently, no consensus exists for use of hemoglobin A1c  for diagnosis of diabetes for children.  This Hemoglobin A1c assay has significant interference with fetal   hemoglobin   (HbF). The results are invalid for patients with abnormal amounts of   HbF,   including those with known Hereditary Persistence   of Fetal Hemoglobin. Heterozygous hemoglobin variants (HbAS, HbAC,   HbAD, HbAE, HbA2) do not significantly interfere with this assay;   however, presence of multiple variants in a sample may impact the %   interference.     04/26/2017 8.2 (H) 4.5 - 6.2 % Final     Comment:     According to ADA guidelines, hemoglobin A1C <7.0% represents  optimal control in non-pregnant diabetic patients.  Different  metrics may apply to specific populations.   Standards of Medical Care in Diabetes - 2016.  For the purpose of screening for the presence of diabetes:  <5.7%     Consistent with the absence of diabetes  5.7-6.4%  Consistent with increasing risk for diabetes   (prediabetes)  >or=6.5%  Consistent with diabetes  Currently no consensus exists for use of hemoglobin A1C  for " diagnosis of diabetes for children.     03/28/2017 7.9 (H) 4.5 - 6.2 % Final     Comment:     According to ADA guidelines, hemoglobin A1C <7.0% represents  optimal control in non-pregnant diabetic patients.  Different  metrics may apply to specific populations.   Standards of Medical Care in Diabetes - 2016.  For the purpose of screening for the presence of diabetes:  <5.7%     Consistent with the absence of diabetes  5.7-6.4%  Consistent with increasing risk for diabetes   (prediabetes)  >or=6.5%  Consistent with diabetes  Currently no consensus exists for use of hemoglobin A1C  for diagnosis of diabetes for children.         Chemistry        Component Value Date/Time     10/31/2017 0904    K 4.7 10/31/2017 0904     10/31/2017 0904    CO2 27 10/31/2017 0904    BUN 24 (H) 10/31/2017 0904    CREATININE 1.2 10/31/2017 0904     (H) 10/31/2017 0904        Component Value Date/Time    CALCIUM 9.1 10/31/2017 0904    ALKPHOS 92 10/31/2017 0904    AST 44 (H) 10/31/2017 0904    ALT 66 (H) 10/31/2017 0904    BILITOT 0.8 10/31/2017 0904          Physical Exam   Constitutional: He appears well-developed. No distress.   Neck: No thyromegaly present.   Cardiovascular: Normal rate and regular rhythm.    Pulses:       Dorsalis pedis pulses are 1+ on the right side, and 1+ on the left side.   Pulmonary/Chest: Effort normal.   Abdominal: Soft.   Musculoskeletal:        Right foot: There is no deformity.        Left foot: There is no deformity.   Feet:   Right Foot:   Protective Sensation: 10 sites tested. 8 sites sensed.   Skin Integrity: Positive for dry skin. Negative for ulcer, blister, skin breakdown, erythema, warmth or callus.   Left Foot:   Protective Sensation: 10 sites tested. 8 sites sensed.   Skin Integrity: Positive for dry skin. Negative for ulcer, blister, skin breakdown, erythema, warmth or callus.   Neurological: He is alert.   Feet    Shoes appropriate  Vibratory sensation intact bilaterally        Injection sites are ok. No lipo hypertropthy or atrophy     Skin: Skin is warm and dry.   Psychiatric: He has a normal mood and affect.   Nursing note and vitals reviewed.        Assessment/Plan:  1. Type 2 diabetes mellitus with stage 3 chronic kidney disease, with long-term current use of insulin  Hemoglobin A1c    Microalbumin/creatinine urine ratio    insulin glargine (LANTUS) 100 unit/mL injection    metFORMIN (GLUCOPHAGE-XR) 500 MG 24 hr tablet    SITagliptin (JANUVIA) 100 MG Tab    Comprehensive metabolic panel    Hemoglobin A1c   2. Hypertension, essential     3. Mixed hyperlipidemia     4. Mixed Alzheimer's and vascular dementia          1. Type 2 diabetes with stage 3 CKD with long term current use of insulin   -- case discussed in consultation with Dr. Diana   -- will simplify regimen at this time  -- recommend Lantus 35 units once daily   -- Metformin  mg - 1 tab bid   -- Januvia 100 mg qd   -- discussed A1c goals   -- check labs today   -- continue to [employer] 3-4 x daily and send log in 2-3 weeks for review and adjustments   -- reviewed signs and symptoms of hypoglycemia along with appropriate treatment protocol   -- continue dietary and lifestyle modifications   -- urine today     2. HTN   -- at goal with current regimen   -- continue medications as prescribed     3. HLD   - on statin per ADA recommendations   - encouraged to follow a low fat, low chol diet     5. Mixed Alzheimer's and vascular dementia   - stable with current regimen   - followed by Neurology     FOLLOW UP  Return in about 4 months (around 3/28/2018).     Orders Placed This Encounter   Procedures    Hemoglobin A1c     Standing Status:   Future     Standing Expiration Date:   1/27/2019    Microalbumin/creatinine urine ratio     Order Specific Question:   Specimen Source     Answer:   Urine    Comprehensive metabolic panel     Standing Status:   Future     Standing Expiration Date:   11/23/2018    Hemoglobin A1c      Standing Status:   Future     Number of Occurrences:   1     Standing Expiration Date:   1/27/2019

## 2017-12-07 ENCOUNTER — OFFICE VISIT (OUTPATIENT)
Dept: INTERNAL MEDICINE | Facility: CLINIC | Age: 82
End: 2017-12-07
Attending: FAMILY MEDICINE
Payer: MEDICARE

## 2017-12-07 VITALS
BODY MASS INDEX: 27.06 KG/M2 | DIASTOLIC BLOOD PRESSURE: 72 MMHG | HEIGHT: 71 IN | SYSTOLIC BLOOD PRESSURE: 126 MMHG | WEIGHT: 193.31 LBS

## 2017-12-07 DIAGNOSIS — I70.0 AORTIC ATHEROSCLEROSIS: ICD-10-CM

## 2017-12-07 DIAGNOSIS — E11.22 TYPE 2 DIABETES MELLITUS WITH STAGE 3 CHRONIC KIDNEY DISEASE, WITH LONG-TERM CURRENT USE OF INSULIN: Primary | ICD-10-CM

## 2017-12-07 DIAGNOSIS — M48.061 SPINAL STENOSIS OF LUMBAR REGION AT MULTIPLE LEVELS: ICD-10-CM

## 2017-12-07 DIAGNOSIS — N18.30 TYPE 2 DIABETES MELLITUS WITH STAGE 3 CHRONIC KIDNEY DISEASE, WITH LONG-TERM CURRENT USE OF INSULIN: Primary | ICD-10-CM

## 2017-12-07 DIAGNOSIS — Z79.4 TYPE 2 DIABETES MELLITUS WITH DIABETIC POLYNEUROPATHY, WITH LONG-TERM CURRENT USE OF INSULIN: ICD-10-CM

## 2017-12-07 DIAGNOSIS — F02.80 MIXED ALZHEIMER'S AND VASCULAR DEMENTIA: ICD-10-CM

## 2017-12-07 DIAGNOSIS — M53.3 SACROILIAC JOINT DYSFUNCTION OF RIGHT SIDE: ICD-10-CM

## 2017-12-07 DIAGNOSIS — E78.2 MIXED HYPERLIPIDEMIA: ICD-10-CM

## 2017-12-07 DIAGNOSIS — I10 HYPERTENSION, ESSENTIAL: ICD-10-CM

## 2017-12-07 DIAGNOSIS — F01.50 MIXED ALZHEIMER'S AND VASCULAR DEMENTIA: ICD-10-CM

## 2017-12-07 DIAGNOSIS — G30.9 MIXED ALZHEIMER'S AND VASCULAR DEMENTIA: ICD-10-CM

## 2017-12-07 DIAGNOSIS — F32.4 MAJOR DEPRESSIVE DISORDER WITH SINGLE EPISODE, IN PARTIAL REMISSION: ICD-10-CM

## 2017-12-07 DIAGNOSIS — I25.10 CORONARY ARTERY DISEASE INVOLVING NATIVE CORONARY ARTERY OF NATIVE HEART WITHOUT ANGINA PECTORIS: Chronic | ICD-10-CM

## 2017-12-07 DIAGNOSIS — E11.42 TYPE 2 DIABETES MELLITUS WITH DIABETIC POLYNEUROPATHY, WITH LONG-TERM CURRENT USE OF INSULIN: ICD-10-CM

## 2017-12-07 DIAGNOSIS — Z79.4 TYPE 2 DIABETES MELLITUS WITH STAGE 3 CHRONIC KIDNEY DISEASE, WITH LONG-TERM CURRENT USE OF INSULIN: Primary | ICD-10-CM

## 2017-12-07 PROBLEM — D64.9 ANEMIA: Status: RESOLVED | Noted: 2017-03-07 | Resolved: 2017-12-07

## 2017-12-07 PROCEDURE — 99499 UNLISTED E&M SERVICE: CPT | Mod: S$GLB,,, | Performed by: FAMILY MEDICINE

## 2017-12-07 PROCEDURE — 99999 PR PBB SHADOW E&M-EST. PATIENT-LVL III: CPT | Mod: PBBFAC,,, | Performed by: FAMILY MEDICINE

## 2017-12-07 PROCEDURE — 99214 OFFICE O/P EST MOD 30 MIN: CPT | Mod: S$GLB,,, | Performed by: FAMILY MEDICINE

## 2017-12-07 NOTE — PROGRESS NOTES
Subjective:       Patient ID: Nickolas Blake is a 86 y.o. male.    Chief Complaint: Follow-up    HPI  Review of Systems   Constitutional: Positive for fatigue. Negative for chills and fever.   HENT: Negative for congestion and trouble swallowing.    Eyes: Negative for redness.   Respiratory: Negative for cough, chest tightness and shortness of breath.    Cardiovascular: Negative for chest pain, palpitations and leg swelling.   Gastrointestinal: Negative for abdominal pain and blood in stool.   Genitourinary: Negative for hematuria.   Musculoskeletal: Positive for arthralgias, back pain and gait problem. Negative for joint swelling, myalgias and neck pain.   Skin: Negative for color change and rash.   Neurological: Negative for tremors, speech difficulty, weakness, numbness and headaches.   Hematological: Negative for adenopathy. Does not bruise/bleed easily.   Psychiatric/Behavioral: Positive for behavioral problems. Negative for confusion and sleep disturbance. The patient is not nervous/anxious.        Objective:      Physical Exam   Constitutional: He is oriented to person, place, and time. He appears well-developed and well-nourished. No distress.   Neck: Neck supple.   Pulmonary/Chest: Effort normal.   Musculoskeletal: He exhibits no edema.        Right lower leg: He exhibits no edema.        Left lower leg: He exhibits no edema.   Neurological: He is alert and oriented to person, place, and time.   Skin: Skin is warm and dry. No rash noted.   Psychiatric: He has a normal mood and affect. His behavior is normal. Judgment and thought content normal.   Nursing note and vitals reviewed.      Assessment:       1. Type 2 diabetes mellitus with stage 3 chronic kidney disease, with long-term current use of insulin    2. Type 2 diabetes mellitus with diabetic polyneuropathy, with long-term current use of insulin    3. Spinal stenosis of lumbar region at multiple levels    4. Sacroiliac joint dysfunction of right  side    5. Mixed Alzheimer's and vascular dementia    6. Hypertension, essential    7. Mixed hyperlipidemia    8. Coronary artery disease involving native coronary artery of native heart without angina pectoris    9. Aortic atherosclerosis    10. Major depressive disorder with single episode, in partial remission        Plan:   Nickolas was seen today for follow-up.    Diagnoses and all orders for this visit:    Type 2 diabetes mellitus with stage 3 chronic kidney disease, with long-term current use of insulin    Type 2 diabetes mellitus with diabetic polyneuropathy, with long-term current use of insulin  -     Ambulatory consult to Podiatry    Spinal stenosis of lumbar region at multiple levels  -     Ambulatory referral to Pain Clinic    Sacroiliac joint dysfunction of right side  -     Ambulatory referral to Pain Clinic    Mixed Alzheimer's and vascular dementia    Hypertension, essential    Mixed hyperlipidemia    Coronary artery disease involving native coronary artery of native heart without angina pectoris    Aortic atherosclerosis    Major depressive disorder with single episode, in partial remission      See meds, orders, follow up, routing and instructions sections of encounter.  The patient presents with a caretaker today.  She describes several complaints.    His back is still hurting.  He would like a referral back to our Pain   Management Service.    We spent the majority of the visit reviewing his laboratory, A1c was 7.3.  His   diabetic provider had changed his medications recently from Lantus insulin 42   units composite daily (21 x 2) to a decreased dose of 35 units every day.    Additionally, his NovoLog was stopped and they would like him to go back on his   Januvia and metformin.  Caretaker states the patient has not started his new   regimen yet; however, I did advise that I think that our diabetic provider's   advice is sound and would like him to go in that direction with a recheck in a   few  weeks.    He has no other acute findings at this time.  I did review several of his recent   consult notes and we will follow him up in approximately six months' time.      PHYLLIS/HN  dd: 12/07/2017 13:41:52 (CST)  td: 12/08/2017 06:19:13 (CST)  Doc ID   #[id number]  Job ID #[id number]    CC:

## 2017-12-14 RX ORDER — LISINOPRIL 10 MG/1
10 TABLET ORAL DAILY
Qty: 30 TABLET | Refills: 2 | Status: SHIPPED | OUTPATIENT
Start: 2017-12-14 | End: 2018-01-10 | Stop reason: SDUPTHER

## 2017-12-19 ENCOUNTER — TELEPHONE (OUTPATIENT)
Dept: ENDOCRINOLOGY | Facility: CLINIC | Age: 82
End: 2017-12-19

## 2017-12-19 NOTE — TELEPHONE ENCOUNTER
----- Message from Amy Sr sent at 12/19/2017  9:06 AM CST -----  Contact: Keke / Mary [phone]  Keke states the changes to the pts medication are not working. She can be reached at [phone].

## 2017-12-20 NOTE — TELEPHONE ENCOUNTER
"Spoke with Keke. She stated the patient's blood sugar has been elevated since making the changes as requested by the family to simplify his insulin regimen. She states blood sugars are over 250 mg/dL . She also states the patient is not following his diet and that he is eating "whatever he wants" when no one is with him. Advised to resume previous diabetes plan - Lantus 21 units bid and Novolog 12 units tid ac meals. He may discontinue Januvia and Metformin at this time. Advised to send logs in 2 weeks for review and adjustments. The sitter stated that she will inform the patient's daughter. She voiced understanding of the above information.   "

## 2017-12-28 DIAGNOSIS — Z79.4 TYPE 2 DIABETES MELLITUS WITH STAGE 3 CHRONIC KIDNEY DISEASE, WITH LONG-TERM CURRENT USE OF INSULIN: Primary | ICD-10-CM

## 2017-12-28 DIAGNOSIS — E11.22 TYPE 2 DIABETES MELLITUS WITH STAGE 3 CHRONIC KIDNEY DISEASE, WITH LONG-TERM CURRENT USE OF INSULIN: Primary | ICD-10-CM

## 2017-12-28 DIAGNOSIS — N18.30 TYPE 2 DIABETES MELLITUS WITH STAGE 3 CHRONIC KIDNEY DISEASE, WITH LONG-TERM CURRENT USE OF INSULIN: Primary | ICD-10-CM

## 2017-12-28 NOTE — TELEPHONE ENCOUNTER
----- Message from Amy Sr sent at 12/28/2017  8:53 AM CST -----  Contact: Chelsy / [phone]  .Refill requests   blood sugar diagnostic (ACCU-CHEK NEWTON PLUS TEST STRP) Strp & Lancets   ..    [id number] - [place], [place] - [address] [place] [id number]  [facility] [address]  Phone: [phone] Fax: [phone]

## 2018-01-02 DIAGNOSIS — E11.22 TYPE 2 DIABETES MELLITUS WITH STAGE 3 CHRONIC KIDNEY DISEASE, WITH LONG-TERM CURRENT USE OF INSULIN: ICD-10-CM

## 2018-01-02 DIAGNOSIS — Z79.4 TYPE 2 DIABETES MELLITUS WITH STAGE 3 CHRONIC KIDNEY DISEASE, WITH LONG-TERM CURRENT USE OF INSULIN: ICD-10-CM

## 2018-01-02 DIAGNOSIS — N18.30 TYPE 2 DIABETES MELLITUS WITH STAGE 3 CHRONIC KIDNEY DISEASE, WITH LONG-TERM CURRENT USE OF INSULIN: ICD-10-CM

## 2018-01-02 RX ORDER — LANCETS
1 EACH MISCELLANEOUS 2 TIMES DAILY
Qty: 200 EACH | Refills: 3 | Status: SHIPPED | OUTPATIENT
Start: 2018-01-02 | End: 2020-12-17 | Stop reason: SDUPTHER

## 2018-01-02 RX ORDER — METOPROLOL SUCCINATE 50 MG/1
TABLET, EXTENDED RELEASE ORAL
Qty: 90 TABLET | Refills: 3 | Status: ON HOLD | OUTPATIENT
Start: 2018-01-02 | End: 2019-11-12 | Stop reason: HOSPADM

## 2018-01-02 NOTE — TELEPHONE ENCOUNTER
----- Message from Zenaida Muniz sent at 1/2/2018  9:15 AM CST -----  Contact: Sitter: Gifty   tel:  [phone]   Pt.needs  Accuchek patria  lancets and test strips .   /   Requested this last week.   No response from your office yet.  Pls call :   [facility]:   [phone]  .   Out of this .

## 2018-01-04 RX ORDER — INSULIN ASPART 100 [IU]/ML
12 INJECTION, SOLUTION INTRAVENOUS; SUBCUTANEOUS
Qty: 1 BOX | Refills: 6 | Status: SHIPPED | OUTPATIENT
Start: 2018-01-04 | End: 2018-11-20

## 2018-01-04 NOTE — TELEPHONE ENCOUNTER
----- Message from Zenaida Muniz sent at 1/4/2018  8:35 AM CST -----  Contact: Keke                      tel [phone] / sitter  Sitter of pt. Says this is her 4th mesg.    Pls call [facility]  [phone] .  They keep telling her that they do not have the strips/ lancets/ and needs a new script for Novalog.     Pls call them today, to get this resolved.    In the meantime, pt. Is out of medication.   As per caller.

## 2018-01-04 NOTE — TELEPHONE ENCOUNTER
Spoke with pharmacy. Rx is ready and waiting to be picked up. Informed Keke that the Novolog script will be sent in today.

## 2018-01-09 ENCOUNTER — TELEPHONE (OUTPATIENT)
Dept: CARDIOLOGY | Facility: CLINIC | Age: 83
End: 2018-01-09

## 2018-01-09 NOTE — TELEPHONE ENCOUNTER
----- Message from Wendy Méndez MA sent at 1/9/2018  8:51 AM CST -----  Contact: Keke Murray please call Keke the patient caregiver she need to talk to the nurse about the patient blood pressure today it's  182/110. Last visit was on 8- Norma rosales. Please call [phone]  Thank you.

## 2018-01-09 NOTE — TELEPHONE ENCOUNTER
Spoke with ira , pt has been on Mucinex DM. /95 Norma Polanco notified. Pt ira instructed to stop Mucinex . Appointment scheduled for 1/10/18 at 1000 am. Ira verbalized knowledge.

## 2018-01-09 NOTE — PROGRESS NOTES
"Mr. Blake is a patient of Dr. Mendoza and was last seen in [facility] 8/23/2017.      Subjective:   Patient ID:  Nickolas Blake is a 86 y.o. male who presents for follow-up of Coronary Artery Disease (4 month f/u ) and Hypertension  .   HPI:    Mr. Blake is an 87yo male with a PMHx of CAD (s/p POBA in 1980), HTN, HLD, DM II, and CKD here with complaints of elevated blood pressures for 3 weeks. He reports that he has been taking mucinex D for a cold but has since stopped this medication about 2.5 weeks ago. Home log of BPs show morning pressures in the 170s-190s systolic with some high BPs (180s-190s) persisting into lunch. His BP is controlled in the 130s today. Today he is complaining of right sided hip pain but has no cardiac complaints. Mr. Blake denies chest pain with exertion or at rest, palpitations, SOB, LAUREN, dizziness, syncope, leg edema, claudication, PND, or orthopnea.    He is currently taking ASA 81mg and atorvastatin 40mg for CAD management. LDL is 88.6 on 10/31/2017. He is also taking metoprolol succinate 50mg daily and lisinopril 10mg daily. HA1C is 7.3 on insulin. Creatinine is 1.2. K is 4.7. Hepatic transaminases are within normal limits. HA1C is 7.3 on insulin.    Recent Cardiac Tests:    EKG (11/9/2016):  Normal sinus rhythm  Normal ECG  When compared with ECG of 25-MAY-2016 18:39,  No significant change was found    Current Outpatient Prescriptions   Medication Sig    acetaminophen (TYLENOL) 500 mg Cap Take 1 capsule by mouth every 6 to 8 hours as needed.     ascorbic acid, vitamin C, (VITAMIN C) 250 MG tablet Take 1 tablet (250 mg total) by mouth 2 (two) times daily.    aspirin (ECOTRIN) 81 MG EC tablet Take 81 mg by mouth once daily.    atorvastatin (LIPITOR) 40 MG tablet Take 1 tablet (40 mg total) by mouth once daily.    BD ALCOHOL SWABS PadM USE FOUR TIMES DAILY    BD INSULIN PEN NEEDLE UF SHORT 31 gauge x 5/16" Ndle Inject 1 application into the skin 5 (five) times daily. " "   BD INSULIN SYRINGE ULTRA-FINE 1/2 mL 31 gauge x 15/64" Syrg USE WITH INSULIN INJECTIONS TWICE DAILY    blood sugar diagnostic (ACCU-CHEK NEWTON PLUS TEST STRP) Strp TEST TWO TIMES DAILY WITH MEALS    donepezil (ARICEPT) 10 MG tablet Take 10 mg by mouth every evening.    DULoxetine (CYMBALTA) 60 MG capsule Take 1 capsule (60 mg total) by mouth once daily.    finasteride (PROSCAR) 5 mg tablet TAKE 1 TABLET EVERY DAY    fluticasone (FLONASE) 50 mcg/actuation nasal spray 2 sprays by Each Nare route daily as needed for Allergies.    gabapentin (NEURONTIN) 300 MG capsule Take 1 capsule (300 mg total) by mouth 3 (three) times daily.    insulin aspart (NOVOLOG) 100 unit/mL InPn pen Inject 12 Units into the skin 3 (three) times daily with meals.    insulin glargine (LANTUS) 100 unit/mL injection Inject 35 Units into the skin once daily.    lancets Misc 1 each by Misc.(Non-Drug; Combo Route) route 2 (two) times daily.    lisinopril 10 MG tablet TAKE 1 TABLET (10 MG TOTAL) BY MOUTH ONCE DAILY.    metFORMIN (GLUCOPHAGE-XR) 500 MG 24 hr tablet Take 1 tablet (500 mg total) by mouth 2 (two) times daily with meals.    metoprolol succinate (TOPROL-XL) 50 MG 24 hr tablet TAKE 1 TABLET DAILY. DOSE INCREASE, STOP 25 MG    nitroGLYCERIN (NITROSTAT) 0.4 MG SL tablet Place 1 tablet (0.4 mg total) under the tongue every 5 (five) minutes as needed for Chest pain.    oxycodone-acetaminophen (PERCOCET)  mg per tablet Take 1 tablet by mouth every 8 (eight) hours as needed for Pain. (Patient taking differently: Take 1 tablet by mouth nightly as needed for Pain. )    senna-docusate 8.6-50 mg (PERICOLACE) 8.6-50 mg per tablet Take 1 tablet by mouth 2 (two) times daily as needed for Constipation.    SITagliptin (JANUVIA) 100 MG Tab Take 1 tablet (100 mg total) by mouth once daily.    tamsulosin (FLOMAX) 0.4 mg Cp24 Take 2 capsules (0.8 mg total) by mouth once daily.     No current facility-administered medications for " "this visit.      Review of Systems   Constitution: Negative for malaise/fatigue.   Eyes: Negative for blurred vision.   Cardiovascular: Negative for chest pain, claudication, dyspnea on exertion, irregular heartbeat, leg swelling, orthopnea, palpitations, paroxysmal nocturnal dyspnea and syncope.   Respiratory: Negative for shortness of breath.    Hematologic/Lymphatic: Negative for bleeding problem.   Skin: Negative for rash.   Musculoskeletal: Positive for joint pain (right sided hip pain). Negative for myalgias.   Gastrointestinal: Negative for abdominal pain, constipation, diarrhea and nausea.   Genitourinary: Negative for hematuria.   Neurological: Negative for headaches, loss of balance and numbness.   Psychiatric/Behavioral: Negative for altered mental status.   Allergic/Immunologic: Negative for persistent infections.     Objective:     Right Arm BP - Sittin/67 (01/10/18 1025)  Left Arm BP - Sittin/70 (01/10/18 1025)    /70 (BP Location: Left arm, Patient Position: Sitting, BP Method: Large (Automatic))   Pulse 84   Ht 5' 11" (1.803 m)   Wt 87.5 kg (192 lb 14.4 oz)   BMI 26.90 kg/m²     Physical Exam   Constitutional: He is oriented to person, place, and time. He appears well-developed and well-nourished.   HENT:   Head: Normocephalic.   Nose: Nose normal.   Eyes: Pupils are equal, round, and reactive to light.   Neck: No JVD present. Carotid bruit is not present.   Cardiovascular: Normal rate, regular rhythm, S1 normal and S2 normal.  Exam reveals no gallop.    Murmur heard.   Harsh midsystolic murmur is present with a grade of 1/6  at the upper right sternal border radiating to the neck  Pulses:       Carotid pulses are 2+ on the right side, and 2+ on the left side.       Radial pulses are 2+ on the right side, and 2+ on the left side.        Dorsalis pedis pulses are 2+ on the right side, and 2+ on the left side.   Pulmonary/Chest: Breath sounds normal. No respiratory distress. "   Abdominal: Soft. Bowel sounds are normal. He exhibits no distension. There is no tenderness.   Musculoskeletal: Normal range of motion. He exhibits no edema.   Neurological: He is alert and oriented to person, place, and time.   Skin: Skin is warm and dry. No erythema.   Psychiatric: He has a normal mood and affect. His speech is normal and behavior is normal.   Nursing note and vitals reviewed.    Lab Results   Component Value Date     10/31/2017    K 4.7 10/31/2017    MG 1.9 11/28/2016     10/31/2017    CO2 27 10/31/2017    BUN 24 (H) 10/31/2017    CREATININE 1.2 10/31/2017     (H) 10/31/2017    HGBA1C 7.3 (H) 11/28/2017    AST 44 (H) 10/31/2017    ALT 66 (H) 10/31/2017    ALBUMIN 3.5 10/31/2017    PROT 7.5 10/31/2017    BILITOT 0.8 10/31/2017    WBC 5.47 03/07/2017    HGB 12.2 (L) 03/07/2017    HCT 37.5 (L) 03/07/2017    HCT 24 (L) 11/11/2016    MCV 82 03/07/2017     03/07/2017    TSH 0.541 02/15/2016    CHOL 158 10/31/2017    HDL 44 10/31/2017    LDLCALC 88.6 10/31/2017    TRIG 127 10/31/2017         Recent Labs  Lab 07/19/16  2048 11/09/16  1424   INR 0.9 0.9        Test(s) Reviewed  I have reviewed the following in detail:  [] Stress test   [] Angiography   [] Echocardiogram   [x] Labs   [] Other:         Assessment:         1. Hypertension, essential. BPs remain elevated several weeks since stopping decongestant. Will increase lisinopril to 20mg daily.      2. Coronary artery disease involving native coronary artery of native heart without angina pectoris. On ASA and statin. Patient has no angina, SOB or other signs of ischemia. No unstable arrhythmia. No symptoms of heart failure.     3. Mixed hyperlipidemia. LDL 88 on atorvastatin 40mg. Liver enzymes mildly elevated. Will not increase dose.     4. Bilateral carotid artery disease. On ASA and statin.     5. Type 2 diabetes mellitus with stage 3 chronic kidney disease, with long-term current use of insulin. Better controlled. HA1C  7.3.     6. CKD (chronic kidney disease), stage III. Creatinine 1.2.     Plan:     Nickolas was seen today for coronary artery disease and hypertension.    Diagnoses and all orders for this visit:    Hypertension, essential  -     lisinopril (PRINIVIL,ZESTRIL) 20 MG tablet; Take 1 tablet (20 mg total) by mouth once daily.  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 Continue other medications.  Blood test in 2 weeks.    Return in about 6 months (around 7/10/2018).    ------------------------------------------------------------------    YOLY Lawrence, NP-C  Consult Cardiology

## 2018-01-10 ENCOUNTER — OFFICE VISIT (OUTPATIENT)
Dept: CARDIOLOGY | Facility: CLINIC | Age: 83
End: 2018-01-10
Payer: MEDICARE

## 2018-01-10 VITALS
DIASTOLIC BLOOD PRESSURE: 70 MMHG | HEART RATE: 84 BPM | HEIGHT: 71 IN | WEIGHT: 192.88 LBS | SYSTOLIC BLOOD PRESSURE: 135 MMHG | BODY MASS INDEX: 27 KG/M2

## 2018-01-10 DIAGNOSIS — I77.9 BILATERAL CAROTID ARTERY DISEASE: ICD-10-CM

## 2018-01-10 DIAGNOSIS — I10 HYPERTENSION, ESSENTIAL: Primary | ICD-10-CM

## 2018-01-10 DIAGNOSIS — E78.2 MIXED HYPERLIPIDEMIA: ICD-10-CM

## 2018-01-10 DIAGNOSIS — N18.30 TYPE 2 DIABETES MELLITUS WITH STAGE 3 CHRONIC KIDNEY DISEASE, WITH LONG-TERM CURRENT USE OF INSULIN: ICD-10-CM

## 2018-01-10 DIAGNOSIS — I25.10 CORONARY ARTERY DISEASE INVOLVING NATIVE CORONARY ARTERY OF NATIVE HEART WITHOUT ANGINA PECTORIS: Chronic | ICD-10-CM

## 2018-01-10 DIAGNOSIS — E11.22 TYPE 2 DIABETES MELLITUS WITH STAGE 3 CHRONIC KIDNEY DISEASE, WITH LONG-TERM CURRENT USE OF INSULIN: ICD-10-CM

## 2018-01-10 DIAGNOSIS — Z79.4 TYPE 2 DIABETES MELLITUS WITH STAGE 3 CHRONIC KIDNEY DISEASE, WITH LONG-TERM CURRENT USE OF INSULIN: ICD-10-CM

## 2018-01-10 DIAGNOSIS — N18.30 CKD (CHRONIC KIDNEY DISEASE), STAGE III: ICD-10-CM

## 2018-01-10 PROCEDURE — 99214 OFFICE O/P EST MOD 30 MIN: CPT | Mod: S$GLB,,, | Performed by: NURSE PRACTITIONER

## 2018-01-10 PROCEDURE — 99999 PR PBB SHADOW E&M-EST. PATIENT-LVL III: CPT | Mod: PBBFAC,,, | Performed by: NURSE PRACTITIONER

## 2018-01-10 PROCEDURE — 99499 UNLISTED E&M SERVICE: CPT | Mod: S$GLB,,, | Performed by: NURSE PRACTITIONER

## 2018-01-10 RX ORDER — LISINOPRIL 20 MG/1
20 TABLET ORAL DAILY
Qty: 90 TABLET | Refills: 3 | Status: SHIPPED | OUTPATIENT
Start: 2018-01-10

## 2018-01-10 NOTE — PATIENT INSTRUCTIONS
Increase lisinopril to 20mg daily. Continue other medications.  Blood test in 2 weeks.  No additional cardiac testing is required prior to back injection. Stop aspirin 7 days prior to procedure. Should restart aspirin after procedure.

## 2018-01-18 RX ORDER — LISINOPRIL 2.5 MG/1
2.5 TABLET ORAL DAILY
Qty: 90 TABLET | Refills: 0 | OUTPATIENT
Start: 2018-01-18

## 2018-01-23 NOTE — PROGRESS NOTES
STAFF NOTE:  Patient's case was formally presented to me by Dr. Moreno and patient was seen by me in supervision on the same day.  I agree with his assessment and plan as specified to increase Cymbalta to 60 mg/day to hopefully help better with chronic pain and mood/irritability Sx management.

## 2018-01-25 ENCOUNTER — LAB VISIT (OUTPATIENT)
Dept: LAB | Facility: HOSPITAL | Age: 83
End: 2018-01-25
Attending: FAMILY MEDICINE
Payer: MEDICARE

## 2018-01-25 DIAGNOSIS — I10 HYPERTENSION, ESSENTIAL: ICD-10-CM

## 2018-01-25 LAB
ANION GAP SERPL CALC-SCNC: 7 MMOL/L
BUN SERPL-MCNC: 16 MG/DL
CALCIUM SERPL-MCNC: 9.2 MG/DL
CHLORIDE SERPL-SCNC: 103 MMOL/L
CO2 SERPL-SCNC: 29 MMOL/L
CREAT SERPL-MCNC: 1.1 MG/DL
EST. GFR  (AFRICAN AMERICAN): >60 ML/MIN/1.73 M^2
EST. GFR  (NON AFRICAN AMERICAN): >60 ML/MIN/1.73 M^2
GLUCOSE SERPL-MCNC: 186 MG/DL
POTASSIUM SERPL-SCNC: 4.3 MMOL/L
SODIUM SERPL-SCNC: 139 MMOL/L

## 2018-01-25 PROCEDURE — 80048 BASIC METABOLIC PNL TOTAL CA: CPT

## 2018-01-25 PROCEDURE — 36415 COLL VENOUS BLD VENIPUNCTURE: CPT | Mod: PO

## 2018-03-03 RX ORDER — DULOXETIN HYDROCHLORIDE 60 MG/1
CAPSULE, DELAYED RELEASE ORAL
Qty: 90 CAPSULE | Refills: 0 | OUTPATIENT
Start: 2018-03-03

## 2018-03-05 ENCOUNTER — TELEPHONE (OUTPATIENT)
Dept: INTERNAL MEDICINE | Facility: CLINIC | Age: 83
End: 2018-03-05

## 2018-03-05 DIAGNOSIS — E11.42 TYPE 2 DIABETES MELLITUS WITH DIABETIC POLYNEUROPATHY, WITH LONG-TERM CURRENT USE OF INSULIN: Primary | ICD-10-CM

## 2018-03-05 DIAGNOSIS — Z79.4 TYPE 2 DIABETES MELLITUS WITH DIABETIC POLYNEUROPATHY, WITH LONG-TERM CURRENT USE OF INSULIN: Primary | ICD-10-CM

## 2018-03-06 DIAGNOSIS — N18.30 TYPE 2 DIABETES MELLITUS WITH STAGE 3 CHRONIC KIDNEY DISEASE, WITH LONG-TERM CURRENT USE OF INSULIN: ICD-10-CM

## 2018-03-06 DIAGNOSIS — Z79.4 TYPE 2 DIABETES MELLITUS WITH STAGE 3 CHRONIC KIDNEY DISEASE, WITH LONG-TERM CURRENT USE OF INSULIN: ICD-10-CM

## 2018-03-06 DIAGNOSIS — E11.22 TYPE 2 DIABETES MELLITUS WITH STAGE 3 CHRONIC KIDNEY DISEASE, WITH LONG-TERM CURRENT USE OF INSULIN: ICD-10-CM

## 2018-03-06 RX ORDER — LANCETS
EACH MISCELLANEOUS
Qty: 300 EACH | Refills: 99 | Status: SHIPPED | OUTPATIENT
Start: 2018-03-06 | End: 2018-05-31

## 2018-03-06 RX ORDER — LANCETS
1 EACH MISCELLANEOUS 2 TIMES DAILY
Qty: 200 EACH | Refills: 3 | OUTPATIENT
Start: 2018-03-06

## 2018-03-06 NOTE — TELEPHONE ENCOUNTER
"----- Message from Vanesa Anguiano sent at 3/6/2018  8:58 AM CST -----  Contact: patient- [phone]  RX request - refill or new RX.  Is this a refill or new RX:    RX name and strength: blood sugar diagnostic (ACCU-CHEK NEWTON PLUS TEST STRP) Strp  Directions:   Is this a 30 day or 90 day RX:    Pharmacy name and phone # (DON'T enter "on file" or "in chart"): [facility] mail order [phone] (Phone)  [phone] (Fax)  Comments:      RX request - refill or new RX.  Is this a refill or new RX:  refill  RX name and strength: lancets [facility] [place]   Directions:   Is this a 30 day or 90 day RX:                   "

## 2018-03-09 ENCOUNTER — TELEPHONE (OUTPATIENT)
Dept: INTERNAL MEDICINE | Facility: CLINIC | Age: 83
End: 2018-03-09

## 2018-03-09 NOTE — TELEPHONE ENCOUNTER
----- Message from Nilsa Grimes sent at 3/7/2018  4:48 PM CST -----  Contact: Pt [phone]  Patient is returning a phone call.  Who left a message for the patient: Tono  Does patient know what this is regarding:  A message was left   Comments:

## 2018-03-20 ENCOUNTER — LAB VISIT (OUTPATIENT)
Dept: LAB | Facility: HOSPITAL | Age: 83
End: 2018-03-20
Attending: FAMILY MEDICINE
Payer: MEDICARE

## 2018-03-20 DIAGNOSIS — E11.22 TYPE 2 DIABETES MELLITUS WITH STAGE 3 CHRONIC KIDNEY DISEASE, WITH LONG-TERM CURRENT USE OF INSULIN: ICD-10-CM

## 2018-03-20 DIAGNOSIS — Z79.4 TYPE 2 DIABETES MELLITUS WITH STAGE 3 CHRONIC KIDNEY DISEASE, WITH LONG-TERM CURRENT USE OF INSULIN: ICD-10-CM

## 2018-03-20 DIAGNOSIS — N18.30 TYPE 2 DIABETES MELLITUS WITH STAGE 3 CHRONIC KIDNEY DISEASE, WITH LONG-TERM CURRENT USE OF INSULIN: ICD-10-CM

## 2018-03-20 LAB
ALBUMIN SERPL BCP-MCNC: 3.7 G/DL
ALP SERPL-CCNC: 85 U/L
ALT SERPL W/O P-5'-P-CCNC: 25 U/L
ANION GAP SERPL CALC-SCNC: 9 MMOL/L
AST SERPL-CCNC: 25 U/L
BILIRUB SERPL-MCNC: 1 MG/DL
BUN SERPL-MCNC: 24 MG/DL
CALCIUM SERPL-MCNC: 9.2 MG/DL
CHLORIDE SERPL-SCNC: 105 MMOL/L
CO2 SERPL-SCNC: 27 MMOL/L
CREAT SERPL-MCNC: 1.2 MG/DL
EST. GFR  (AFRICAN AMERICAN): >60 ML/MIN/1.73 M^2
EST. GFR  (NON AFRICAN AMERICAN): 54.4 ML/MIN/1.73 M^2
ESTIMATED AVG GLUCOSE: 169 MG/DL
GLUCOSE SERPL-MCNC: 203 MG/DL
HBA1C MFR BLD HPLC: 7.5 %
POTASSIUM SERPL-SCNC: 4.9 MMOL/L
PROT SERPL-MCNC: 7.3 G/DL
SODIUM SERPL-SCNC: 141 MMOL/L

## 2018-03-20 PROCEDURE — 80053 COMPREHEN METABOLIC PANEL: CPT

## 2018-03-20 PROCEDURE — 83036 HEMOGLOBIN GLYCOSYLATED A1C: CPT

## 2018-03-20 PROCEDURE — 36415 COLL VENOUS BLD VENIPUNCTURE: CPT | Mod: PO

## 2018-03-27 NOTE — PROGRESS NOTES
CC: Nickolas Blake arrives today for T2DM follow up      HPI: Nickolas Balke was diagnosed with T2DM in .   No DM hospitalizations.     Known complications: mild neuropathy, prior history of retinopathy which has resolved     Was followed in [facility]. Began in chronic DM clinic 2016     CURRENT DIABETIC MEDS:   Lantus 21 units bid  Novolog 12  (initiated in 2017 )     The patient was previously on Metformin, Starlix and Glipizide at one time  He is unclear when the medications were discontinued     Other medications tried: januvia  - discontinued due to elevated BG     Most recent A1c - 7.5 % from 3/2018     The patient's sitter (Ms. Perales) arrives today with Logs:   Fastin, 175, 221, 242, 261, 264  Lunch: 183, 226, 226, 243  Bedtime: 178, 124, 115, 249, 218    Hypoglycemia: denies recent episodes or symptoms   Symptoms include feeling jittery, blurred vision   Treats appropriately by drinking a small coke or orange juice     Type of Glucose Meter: Accu-chek    Physical Activity: No formal exercise. Limited by spinal stenosis- physical therapy.     Dietary recall:   Breakfast: 2 small waffles with a glass of milk   Lunch: ham sandwich, chips   Dinner: stuffed bell pepper, corn, red potatoes     Occasionally he takes a sugar free snack with milk at 8pm     Last Eye Exam: 2017 , with Dr. Caputo   No evidence of retinopathy   Follow up due in 2018     Last Podiatry Exam: 2017 with Dr. Pearl Grant     Review of Systems   Constitutional: Positive for malaise/fatigue.   Eyes: Negative for blurred vision and double vision.   Respiratory: Negative for shortness of breath.    Cardiovascular: Negative for chest pain and palpitations.   Gastrointestinal: Negative for constipation and diarrhea.   Genitourinary: Positive for frequency. Negative for dysuria.   Musculoskeletal: Positive for back pain, joint pain and myalgias. Negative for falls.   Skin: Negative for rash.  "  Neurological: Negative for dizziness, tremors, seizures, weakness and headaches.   Endo/Heme/Allergies: Negative for polydipsia.   Psychiatric/Behavioral: Negative for depression. The patient is not nervous/anxious.      Vital Signs  BP (!) 140/76 (BP Location: Left arm, Patient Position: Sitting, BP Method: Large (Manual))   Pulse 68   Resp 16   Ht 5' 11" (1.803 m)   Wt 88.3 kg (194 lb 10.7 oz)   BMI 27.15 kg/m²     Hemoglobin A1C   Date Value Ref Range Status   03/20/2018 7.5 (H) 4.0 - 5.6 % Final     Comment:     According to ADA guidelines, hemoglobin A1c <7.0% represents  optimal control in non-pregnant diabetic patients. Different  metrics may apply to specific patient populations.   Standards of Medical Care in Diabetes-2016.  For the purpose of screening for the presence of diabetes:  <5.7%     Consistent with the absence of diabetes  5.7-6.4%  Consistent with increasing risk for diabetes   (prediabetes)  >or=6.5%  Consistent with diabetes  Currently, no consensus exists for use of hemoglobin A1c  for diagnosis of diabetes for children.  This Hemoglobin A1c assay has significant interference with fetal   hemoglobin   (HbF). The results are invalid for patients with abnormal amounts of   HbF,   including those with known Hereditary Persistence   of Fetal Hemoglobin. Heterozygous hemoglobin variants (HbAS, HbAC,   HbAD, HbAE, HbA2) do not significantly interfere with this assay;   however, presence of multiple variants in a sample may impact the %   interference.     11/28/2017 7.3 (H) 4.0 - 5.6 % Final     Comment:     According to ADA guidelines, hemoglobin A1c <7.0% represents  optimal control in non-pregnant diabetic patients. Different  metrics may apply to specific patient populations.   Standards of Medical Care in Diabetes-2016.  For the purpose of screening for the presence of diabetes:  <5.7%     Consistent with the absence of diabetes  5.7-6.4%  Consistent with increasing risk for diabetes "   (prediabetes)  >or=6.5%  Consistent with diabetes  Currently, no consensus exists for use of hemoglobin A1c  for diagnosis of diabetes for children.  This Hemoglobin A1c assay has significant interference with fetal   hemoglobin   (HbF). The results are invalid for patients with abnormal amounts of   HbF,   including those with known Hereditary Persistence   of Fetal Hemoglobin. Heterozygous hemoglobin variants (HbAS, HbAC,   HbAD, HbAE, HbA2) do not significantly interfere with this assay;   however, presence of multiple variants in a sample may impact the %   interference.     08/01/2017 9.0 (H) 4.0 - 5.6 % Final     Comment:     According to ADA guidelines, hemoglobin A1c <7.0% represents  optimal control in non-pregnant diabetic patients. Different  metrics may apply to specific patient populations.   Standards of Medical Care in Diabetes-2016.  For the purpose of screening for the presence of diabetes:  <5.7%     Consistent with the absence of diabetes  5.7-6.4%  Consistent with increasing risk for diabetes   (prediabetes)  >or=6.5%  Consistent with diabetes  Currently, no consensus exists for use of hemoglobin A1c  for diagnosis of diabetes for children.  This Hemoglobin A1c assay has significant interference with fetal   hemoglobin   (HbF). The results are invalid for patients with abnormal amounts of   HbF,   including those with known Hereditary Persistence   of Fetal Hemoglobin. Heterozygous hemoglobin variants (HbAS, HbAC,   HbAD, HbAE, HbA2) do not significantly interfere with this assay;   however, presence of multiple variants in a sample may impact the %   interference.       Results for ÁNGEL AWAD (MRN [id number]) as of 3/27/2018 17:28   Ref. Range 3/20/2018 08:51   Sodium Latest Ref Range: 136 - 145 mmol/L 141   Potassium Latest Ref Range: 3.5 - 5.1 mmol/L 4.9   Chloride Latest Ref Range: 95 - 110 mmol/L 105   CO2 Latest Ref Range: 23 - 29 mmol/L 27   Anion Gap Latest Ref Range: 8 - 16 mmol/L  9   BUN, Bld Latest Ref Range: 8 - 23 mg/dL 24 (H)   Creatinine Latest Ref Range: 0.5 - 1.4 mg/dL 1.2   eGFR if non African American Latest Ref Range: >60 mL/min/1.73 m^2 54.4 (A)   eGFR if African American Latest Ref Range: >60 mL/min/1.73 m^2 >60.0   Glucose Latest Ref Range: 70 - 110 mg/dL 203 (H)   Calcium Latest Ref Range: 8.7 - 10.5 mg/dL 9.2   Alkaline Phosphatase Latest Ref Range: 55 - 135 U/L 85   Total Protein Latest Ref Range: 6.0 - 8.4 g/dL 7.3   Albumin Latest Ref Range: 3.5 - 5.2 g/dL 3.7   Total Bilirubin Latest Ref Range: 0.1 - 1.0 mg/dL 1.0   AST Latest Ref Range: 10 - 40 U/L 25   ALT Latest Ref Range: 10 - 44 U/L 25       Physical Exam   Constitutional: He appears well-developed. No distress.   Neck: No thyromegaly present.   Cardiovascular: Normal rate and regular rhythm.    Pulses:       Dorsalis pedis pulses are 1+ on the right side, and 1+ on the left side.   Pulmonary/Chest: Effort normal.   Abdominal: Soft.   Musculoskeletal:        Right foot: There is no deformity.        Left foot: There is no deformity.   Feet:   Right Foot:   Protective Sensation: 10 sites tested. 8 sites sensed.   Skin Integrity: Positive for dry skin. Negative for ulcer, blister, skin breakdown, erythema, warmth or callus.   Left Foot:   Protective Sensation: 10 sites tested. 8 sites sensed.   Skin Integrity: Positive for dry skin. Negative for ulcer, blister, skin breakdown, erythema, warmth or callus.   Neurological: He is alert.   Feet    Shoes appropriate  Vibratory sensation intact bilaterally       Injection sites are ok. No lipo hypertropthy or atrophy     Skin: Skin is warm and dry.   Psychiatric: He has a normal mood and affect.   Nursing note and vitals reviewed.        Assessment/Plan:  1. Type 2 diabetes mellitus with stage 3 chronic kidney disease, with long-term current use of insulin  Hemoglobin A1c    Comprehensive metabolic panel    insulin glargine (LANTUS U-100 INSULIN) 100 unit/mL injection     insulin syringe-needle U-100 1/2 mL 30 gauge x 5/16 Syrg   2. Hypertension, essential     3. Mixed hyperlipidemia     4. Mixed Alzheimer's and vascular dementia          1. Type 2 diabetes with stage 3 CKD with long term current use of insulin   -- A1c at goal considering age and co-morbidities   -- increase Lantus to 22 units bid,  continue Novolog as prescribed   -- continue to smbg 3-4 x daily and send log in 2-3 weeks for review and adjustments   -- reviewed signs and symptoms of hypoglycemia along with appropriate treatment protocol   -- continue dietary and lifestyle modifications   -- emphasized appropriate footwear and daily foot inspections   -- never walk barefoot   -- never put sharp objects to feet     2. HTN   -- not at goal   -- encouraged medication compliance     3. HLD   - on statin per ADA recommendations   - encouraged to follow a low fat, low chol diet     5. Mixed Alzheimer's and vascular dementia   - stable with current regimen   - followed by Neurology     FOLLOW UP  Follow-up in about 6 months (around 9/28/2018).     Orders Placed This Encounter   Procedures    Hemoglobin A1c     Standing Status:   Future     Standing Expiration Date:   5/27/2019    Comprehensive metabolic panel     Standing Status:   Future     Standing Expiration Date:   3/23/2019       The patient is instructed to notify the office with BG concerns or questions

## 2018-03-28 ENCOUNTER — OFFICE VISIT (OUTPATIENT)
Dept: ENDOCRINOLOGY | Facility: CLINIC | Age: 83
End: 2018-03-28
Payer: MEDICARE

## 2018-03-28 VITALS
RESPIRATION RATE: 16 BRPM | HEART RATE: 68 BPM | HEIGHT: 71 IN | WEIGHT: 194.69 LBS | BODY MASS INDEX: 27.26 KG/M2 | SYSTOLIC BLOOD PRESSURE: 140 MMHG | DIASTOLIC BLOOD PRESSURE: 76 MMHG

## 2018-03-28 DIAGNOSIS — F01.50 MIXED ALZHEIMER'S AND VASCULAR DEMENTIA: ICD-10-CM

## 2018-03-28 DIAGNOSIS — G30.9 MIXED ALZHEIMER'S AND VASCULAR DEMENTIA: ICD-10-CM

## 2018-03-28 DIAGNOSIS — F02.80 MIXED ALZHEIMER'S AND VASCULAR DEMENTIA: ICD-10-CM

## 2018-03-28 DIAGNOSIS — E78.2 MIXED HYPERLIPIDEMIA: ICD-10-CM

## 2018-03-28 DIAGNOSIS — E11.22 TYPE 2 DIABETES MELLITUS WITH STAGE 3 CHRONIC KIDNEY DISEASE, WITH LONG-TERM CURRENT USE OF INSULIN: Primary | ICD-10-CM

## 2018-03-28 DIAGNOSIS — N18.30 TYPE 2 DIABETES MELLITUS WITH STAGE 3 CHRONIC KIDNEY DISEASE, WITH LONG-TERM CURRENT USE OF INSULIN: Primary | ICD-10-CM

## 2018-03-28 DIAGNOSIS — I10 HYPERTENSION, ESSENTIAL: ICD-10-CM

## 2018-03-28 DIAGNOSIS — Z79.4 TYPE 2 DIABETES MELLITUS WITH STAGE 3 CHRONIC KIDNEY DISEASE, WITH LONG-TERM CURRENT USE OF INSULIN: Primary | ICD-10-CM

## 2018-03-28 PROCEDURE — 99999 PR PBB SHADOW E&M-EST. PATIENT-LVL III: CPT | Mod: PBBFAC,,, | Performed by: INTERNAL MEDICINE

## 2018-03-28 PROCEDURE — 99214 OFFICE O/P EST MOD 30 MIN: CPT | Mod: S$GLB,,, | Performed by: INTERNAL MEDICINE

## 2018-03-28 RX ORDER — SYRINGE,SAFETY WITH NEEDLE,1ML 25GX1"
SYRINGE (EA) MISCELLANEOUS
Qty: 200 EACH | Refills: 6 | Status: SHIPPED | OUTPATIENT
Start: 2018-03-28 | End: 2019-02-08 | Stop reason: SDUPTHER

## 2018-03-28 RX ORDER — INSULIN GLARGINE 100 [IU]/ML
22 INJECTION, SOLUTION SUBCUTANEOUS 2 TIMES DAILY
Qty: 2 VIAL | Refills: 6 | Status: SHIPPED | OUTPATIENT
Start: 2018-03-28 | End: 2018-11-20

## 2018-04-02 ENCOUNTER — PES CALL (OUTPATIENT)
Dept: ADMINISTRATIVE | Facility: CLINIC | Age: 83
End: 2018-04-02

## 2018-05-31 ENCOUNTER — HOSPITAL ENCOUNTER (OUTPATIENT)
Dept: RADIOLOGY | Facility: HOSPITAL | Age: 83
Discharge: HOME | End: 2018-05-31
Attending: FAMILY MEDICINE
Payer: MEDICARE

## 2018-05-31 ENCOUNTER — TELEPHONE (OUTPATIENT)
Dept: INTERNAL MEDICINE | Facility: CLINIC | Age: 83
End: 2018-05-31

## 2018-05-31 ENCOUNTER — OFFICE VISIT (OUTPATIENT)
Dept: INTERNAL MEDICINE | Facility: CLINIC | Age: 83
End: 2018-05-31
Attending: FAMILY MEDICINE
Payer: MEDICARE

## 2018-05-31 VITALS
WEIGHT: 197.63 LBS | BODY MASS INDEX: 27.67 KG/M2 | DIASTOLIC BLOOD PRESSURE: 60 MMHG | HEIGHT: 71 IN | HEART RATE: 86 BPM | SYSTOLIC BLOOD PRESSURE: 115 MMHG

## 2018-05-31 DIAGNOSIS — M48.061 SPINAL STENOSIS OF LUMBAR REGION AT MULTIPLE LEVELS: ICD-10-CM

## 2018-05-31 DIAGNOSIS — Z98.1 S/P LAMINECTOMY WITH SPINAL FUSION: ICD-10-CM

## 2018-05-31 DIAGNOSIS — G30.9 MIXED ALZHEIMER'S AND VASCULAR DEMENTIA: ICD-10-CM

## 2018-05-31 DIAGNOSIS — N18.30 TYPE 2 DIABETES MELLITUS WITH STAGE 3 CHRONIC KIDNEY DISEASE, WITH LONG-TERM CURRENT USE OF INSULIN: ICD-10-CM

## 2018-05-31 DIAGNOSIS — F32.4 MAJOR DEPRESSIVE DISORDER WITH SINGLE EPISODE, IN PARTIAL REMISSION: ICD-10-CM

## 2018-05-31 DIAGNOSIS — M53.3 SACROILIAC JOINT DYSFUNCTION OF RIGHT SIDE: ICD-10-CM

## 2018-05-31 DIAGNOSIS — E11.42 TYPE 2 DIABETES MELLITUS WITH DIABETIC POLYNEUROPATHY, WITH LONG-TERM CURRENT USE OF INSULIN: ICD-10-CM

## 2018-05-31 DIAGNOSIS — R91.1 PULMONARY NODULE: ICD-10-CM

## 2018-05-31 DIAGNOSIS — I10 HYPERTENSION, ESSENTIAL: Primary | ICD-10-CM

## 2018-05-31 DIAGNOSIS — M47.812 CERVICAL SPINE ARTHRITIS: ICD-10-CM

## 2018-05-31 DIAGNOSIS — E11.22 TYPE 2 DIABETES MELLITUS WITH STAGE 3 CHRONIC KIDNEY DISEASE, WITH LONG-TERM CURRENT USE OF INSULIN: ICD-10-CM

## 2018-05-31 DIAGNOSIS — F01.50 MIXED ALZHEIMER'S AND VASCULAR DEMENTIA: ICD-10-CM

## 2018-05-31 DIAGNOSIS — Z79.4 TYPE 2 DIABETES MELLITUS WITH DIABETIC POLYNEUROPATHY, WITH LONG-TERM CURRENT USE OF INSULIN: ICD-10-CM

## 2018-05-31 DIAGNOSIS — E78.2 MIXED HYPERLIPIDEMIA: ICD-10-CM

## 2018-05-31 DIAGNOSIS — F02.80 MIXED ALZHEIMER'S AND VASCULAR DEMENTIA: ICD-10-CM

## 2018-05-31 DIAGNOSIS — Z79.4 TYPE 2 DIABETES MELLITUS WITH STAGE 3 CHRONIC KIDNEY DISEASE, WITH LONG-TERM CURRENT USE OF INSULIN: ICD-10-CM

## 2018-05-31 PROCEDURE — 99999 PR PBB SHADOW E&M-EST. PATIENT-LVL IV: CPT | Mod: PBBFAC,,, | Performed by: FAMILY MEDICINE

## 2018-05-31 PROCEDURE — 99214 OFFICE O/P EST MOD 30 MIN: CPT | Mod: S$GLB,,, | Performed by: FAMILY MEDICINE

## 2018-05-31 PROCEDURE — 71046 X-RAY EXAM CHEST 2 VIEWS: CPT | Mod: 26,,, | Performed by: RADIOLOGY

## 2018-05-31 PROCEDURE — 99499 UNLISTED E&M SERVICE: CPT | Mod: S$GLB,,, | Performed by: FAMILY MEDICINE

## 2018-05-31 PROCEDURE — 71046 X-RAY EXAM CHEST 2 VIEWS: CPT | Mod: TC

## 2018-05-31 RX ORDER — GABAPENTIN 300 MG/1
300 CAPSULE ORAL 3 TIMES DAILY
Qty: 30 CAPSULE | Refills: 0 | Status: SHIPPED | OUTPATIENT
Start: 2018-05-31 | End: 2018-05-31 | Stop reason: SDUPTHER

## 2018-05-31 RX ORDER — GABAPENTIN 300 MG/1
300 CAPSULE ORAL 3 TIMES DAILY
Qty: 270 CAPSULE | Refills: 1 | Status: SHIPPED | OUTPATIENT
Start: 2018-05-31 | End: 2019-12-12

## 2018-05-31 NOTE — MEDICAL/APP STUDENT
Subjective:       Patient ID: Nickolas Blake is a 87 y.o. male.    Chief Complaint: Follow-up    HPI PT reports good appetite and sleep (8 hrs). He gets dizziness from sitting to standing. He has consistant burning sensation on 4th and 5th distal phalangies.    DM:   Deneis vision changes  Denies polydipsia/polyuria  Meds taken regularly - lantis x2 novolog x4 a day    Pt reports back pain is well tolerated with tylenol.    Denies ha/n/v/d/c, sob, chest pain, fever, chills    Review of Systems   Constitutional: Negative for activity change, appetite change, chills and fever.   HENT: Negative for ear pain.    Respiratory: Negative for chest tightness, shortness of breath and wheezing.    Cardiovascular: Negative for chest pain, palpitations and leg swelling.   Gastrointestinal: Negative for abdominal distention, abdominal pain, constipation and diarrhea.   Endocrine: Negative for polydipsia and polyuria.   Genitourinary: Negative for dysuria, frequency and hematuria.   Musculoskeletal: Positive for back pain and gait problem. Negative for arthralgias and joint swelling.   Neurological: Positive for dizziness.       Objective:      Physical Exam   Constitutional: He is oriented to person, place, and time. He appears well-developed and well-nourished.   HENT:   Head: Normocephalic and atraumatic.   Right Ear: External ear normal.   Left Ear: External ear normal.   Eyes: EOM are normal. Pupils are equal, round, and reactive to light.   Neck: Normal range of motion. Neck supple.   Cardiovascular: Normal rate, regular rhythm, normal heart sounds and intact distal pulses.  Exam reveals no friction rub.    No murmur heard.  Pulmonary/Chest: Effort normal and breath sounds normal. No respiratory distress. He has no wheezes.   Decreased breath sounds on L lung base   Abdominal: Soft. Bowel sounds are normal. He exhibits no distension. There is no tenderness. There is no guarding.   Musculoskeletal: He exhibits no edema or  tenderness.   Neurological: He is alert and oriented to person, place, and time.   Skin: Skin is warm and dry. Capillary refill takes less than 2 seconds.   Psychiatric: He has a normal mood and affect. His behavior is normal. Judgment and thought content normal.       Assessment:       Type 2 Diabetes mellitus  Pulmonary nodule  Spinal stenosis  HLD    Plan:       Type 2 Diabetes mellitus   HbA1c   Continue current meds   CBC   Consult Podiatry   Pt [facility]/u needed with documentation  Hyperlipidemia   Lipid panel  Pulmonary nodule   Chest X-ray  Spinal stenosis    Continue current meds

## 2018-05-31 NOTE — TELEPHONE ENCOUNTER
----- Message from Dalila Hurley sent at 5/31/2018  1:40 PM CDT -----  Contact: call daughter marissa   [phone]  or [phone]  Patient is returning a phone call.  Who left a message for the patient: Dr. Klein   Does patient know what this is regarding:  Test results   Comments:

## 2018-05-31 NOTE — PROGRESS NOTES
Subjective:       Patient ID: Nickolas Blake is a 87 y.o. male.    Chief Complaint: Follow-up    HPI  Review of Systems   Constitutional: Positive for fatigue. Negative for chills and fever.   HENT: Negative for congestion and trouble swallowing.    Eyes: Negative for redness.   Respiratory: Negative for cough, chest tightness and shortness of breath.    Cardiovascular: Negative for chest pain, palpitations and leg swelling.   Gastrointestinal: Negative for abdominal pain and blood in stool.   Genitourinary: Negative for hematuria.   Musculoskeletal: Positive for back pain and gait problem. Negative for arthralgias, joint swelling, myalgias and neck pain.   Skin: Negative for color change and rash.   Neurological: Negative for tremors, speech difficulty, weakness, numbness and headaches.   Hematological: Negative for adenopathy. Does not bruise/bleed easily.   Psychiatric/Behavioral: Negative for behavioral problems, confusion and sleep disturbance. The patient is not nervous/anxious.        Objective:      Physical Exam   Constitutional: He is oriented to person, place, and time. He appears well-developed and well-nourished.   Eyes: No scleral icterus.   Neck: Normal range of motion. Neck supple. Carotid bruit is not present.   Cardiovascular: Normal rate, regular rhythm and intact distal pulses.  Exam reveals distant heart sounds. Exam reveals no gallop and no friction rub.    No murmur heard.  Pulmonary/Chest: Effort normal. No respiratory distress. He has decreased breath sounds. He has no wheezes. He has no rales.   Abdominal: Soft. Bowel sounds are normal.   Musculoskeletal: He exhibits no edema.   Neurological: He is alert and oriented to person, place, and time. He displays no tremor. No cranial nerve deficit. Coordination and gait normal.   Skin: Skin is warm and dry. No rash noted. He is not diaphoretic. No erythema.   Psychiatric: He has a normal mood and affect. His behavior is normal. Judgment and  thought content normal.   Nursing note and vitals reviewed.      Assessment:       1. Hypertension, essential    2. Mixed hyperlipidemia    3. Spinal stenosis of lumbar region at multiple levels    4. Sacroiliac joint dysfunction of right side    5. S/P laminectomy with spinal fusion    6. Cervical spine arthritis    7. Type 2 diabetes mellitus with diabetic polyneuropathy, with long-term current use of insulin    8. Type 2 diabetes mellitus with stage 3 chronic kidney disease, with long-term current use of insulin    9. Major depressive disorder with single episode, in partial remission    10. Mixed Alzheimer's and vascular dementia    11. Pulmonary nodule        Plan:   Nickolas was seen today for follow-up.    Diagnoses and all orders for this visit:    Hypertension, essential    Mixed hyperlipidemia    Spinal stenosis of lumbar region at multiple levels    Sacroiliac joint dysfunction of right side  Comments:  recent injection Dr. Wu - no records    S/P laminectomy with spinal fusion    Cervical spine arthritis  Comments:  states doing well now    Type 2 diabetes mellitus with diabetic polyneuropathy, with long-term current use of insulin    Type 2 diabetes mellitus with stage 3 chronic kidney disease, with long-term current use of insulin  -     Ambulatory referral to Optometry  -     Ambulatory consult to Podiatry    Major depressive disorder with single episode, in partial remission    Mixed Alzheimer's and vascular dementia    Pulmonary nodule  -     X-Ray Chest PA And Lateral; Future    Other orders  -     Discontinue: gabapentin (NEURONTIN) 300 MG capsule; Take 1 capsule (300 mg total) by mouth 3 (three) times daily.  -     gabapentin (NEURONTIN) 300 MG capsule; Take 1 capsule (300 mg total) by mouth 3 (three) times daily.      See meds, orders, follow up, routing and instructions sections of encounter.  An 87-year-old established male patient.  He is in for a followup.  His   cardiologist had  increased his lisinopril in January.  I also reviewed his   Endocrinology notes in March.  His last A1c in March was 7.5 and his LDL in   October was 88.  Psychiatrist increased his Cymbalta in October 2017, which   seems to be doing a great job controlling his anger and is helping with his pain   symptoms.  He has discontinued oxycodone and he is currently continuing to take   gabapentin 300 mg t.i.d.  He apparently had a right SI injection in March 2018   by Dr. Wu, outside.    He is currently taking Lantus 22 units twice daily and NovoLog 12 units three   times daily.    RECOMMENDATIONS:  He may have decreased breath sounds on the left side.  This is   an old finding.  He had an abnormal chest x-ray in 2016, repeat chest x-ray   today.  Follow up with me in four to six months.  Keep appointments with   Endocrinology and Cardiology.      GIULIANO  dd: 05/31/2018 10:54:56 (CDT)  td: 06/01/2018 02:32:09 (CDT)  Doc ID   #[id number]  Job ID #[id number]    CC:

## 2018-06-01 ENCOUNTER — PATIENT MESSAGE (OUTPATIENT)
Dept: INTERNAL MEDICINE | Facility: CLINIC | Age: 83
End: 2018-06-01

## 2018-06-01 ENCOUNTER — TELEPHONE (OUTPATIENT)
Dept: INTERNAL MEDICINE | Facility: CLINIC | Age: 83
End: 2018-06-01

## 2018-06-01 DIAGNOSIS — J18.9 PLEURAL EFFUSION ASSOCIATED WITH PULMONARY INFECTION: Primary | ICD-10-CM

## 2018-06-01 DIAGNOSIS — J91.8 PLEURAL EFFUSION ASSOCIATED WITH PULMONARY INFECTION: Primary | ICD-10-CM

## 2018-06-01 NOTE — TELEPHONE ENCOUNTER
Called patient daughter and discussed labs and or test results. Patient expressed understanding and had the opportunity to ask questions. Any questions were answered. See meds, orders, follow up and instructions sections of encounter.    Specific issues include:  ?? pulm effusion - pulm consult

## 2018-06-04 ENCOUNTER — TELEPHONE (OUTPATIENT)
Dept: INTERNAL MEDICINE | Facility: CLINIC | Age: 83
End: 2018-06-04

## 2018-06-04 ENCOUNTER — PATIENT MESSAGE (OUTPATIENT)
Dept: INTERNAL MEDICINE | Facility: CLINIC | Age: 83
End: 2018-06-04

## 2018-06-04 NOTE — TELEPHONE ENCOUNTER
----- Message from Nesha Esparza sent at 6/4/2018  4:11 PM CDT -----  Contact: Trish/daughter/ [phone]  Daughter has some questions and concern about her father and the appointment that is to be scheduled with pulmonary.

## 2018-07-12 ENCOUNTER — TELEPHONE (OUTPATIENT)
Dept: PULMONOLOGY | Facility: CLINIC | Age: 83
End: 2018-07-12

## 2018-07-12 ENCOUNTER — OFFICE VISIT (OUTPATIENT)
Dept: OPTOMETRY | Facility: CLINIC | Age: 83
End: 2018-07-12
Attending: FAMILY MEDICINE
Payer: COMMERCIAL

## 2018-07-12 DIAGNOSIS — I10 HYPERTENSION, ESSENTIAL: Primary | ICD-10-CM

## 2018-07-12 DIAGNOSIS — E11.9 TYPE 2 DIABETES MELLITUS WITHOUT OPHTHALMIC MANIFESTATIONS: ICD-10-CM

## 2018-07-12 DIAGNOSIS — H02.834 DERMATOCHALASIS OF BOTH UPPER EYELIDS: ICD-10-CM

## 2018-07-12 DIAGNOSIS — J90 PLEURAL EFFUSION: Primary | ICD-10-CM

## 2018-07-12 DIAGNOSIS — E11.42 TYPE 2 DIABETES MELLITUS WITH DIABETIC POLYNEUROPATHY, WITH LONG-TERM CURRENT USE OF INSULIN: ICD-10-CM

## 2018-07-12 DIAGNOSIS — Z79.4 TYPE 2 DIABETES MELLITUS WITH DIABETIC POLYNEUROPATHY, WITH LONG-TERM CURRENT USE OF INSULIN: ICD-10-CM

## 2018-07-12 DIAGNOSIS — H02.831 DERMATOCHALASIS OF BOTH UPPER EYELIDS: ICD-10-CM

## 2018-07-12 DIAGNOSIS — Z96.1 PSEUDOPHAKIA OF BOTH EYES: ICD-10-CM

## 2018-07-12 DIAGNOSIS — N18.30 CKD (CHRONIC KIDNEY DISEASE), STAGE III: ICD-10-CM

## 2018-07-12 DIAGNOSIS — H43.813 PVD (POSTERIOR VITREOUS DETACHMENT), BILATERAL: ICD-10-CM

## 2018-07-12 PROCEDURE — 99999 PR PBB SHADOW E&M-EST. PATIENT-LVL III: CPT | Mod: PBBFAC,,, | Performed by: OPTOMETRIST

## 2018-07-12 PROCEDURE — 92014 COMPRE OPH EXAM EST PT 1/>: CPT | Mod: S$GLB,,, | Performed by: OPTOMETRIST

## 2018-07-12 NOTE — PROGRESS NOTES
HPI     87yr old male present for IDDM Annual DFE. Pt last  this AM   fasting. Hemoglobin A1C       Date                     Value               Ref Range             Status                03/20/2018               7.5 (H)             4.0 - 5.6 %           Final            Pt states he has no trouble with vision OU. Pt seeing floaters sometimes   when he looks into the sunlight, with no flashes of light. Pt denies   headaches.     Last edited by Carlos Wilcox on 7/12/2018  9:55 AM. (History)            Assessment /Plan     For exam results, see Encounter Report.    Hypertension, essential  CKD (chronic kidney disease), stage III  Type 2 diabetes mellitus with diabetic polyneuropathy, with long-term current use of insulin  Type 2 diabetes mellitus without ophthalmic manifestations  No retinopathy, monitor yearly      PVD (posterior vitreous detachment), bilateral             Stable, monitor     Astigmatism, bilateral  Pseudophakia of both eyes  Rx specs, Good BCVA with specs     Dermatochalasia, unspecified laterality  Monitor         RTC 1 year, sooner PRN

## 2018-07-12 NOTE — TELEPHONE ENCOUNTER
----- Message from Sunshine Marroquin LPN sent at 7/12/2018  9:57 AM CDT -----  Coming in for effusion, lung infection. Last CXR 5/18. Repeat?

## 2018-07-12 NOTE — LETTER
July 12, 2018      Joe Bunn MD  [address]  [facility] [address]           [facility]  [address]  [facility] [address]  Phone: [phone]  Fax: [phone]          Patient: Nickolas Blake   MR Number: [id number]   YOB: 1931   Date of Visit: 7/12/2018       Dear Dr. Joe Bunn:    Thank you for referring Nickolas Blake to me for evaluation. Attached you will find relevant portions of my assessment and plan of care.    If you have questions, please do not hesitate to call me. I look forward to following Nickolas Blake along with you.    Sincerely,    Barbra Caputo, OD    Enclosure  CC:  No Recipients    If you would like to receive this communication electronically, please contact [email or web address] or [phone] to request more information on [employer] Link access.    For providers and/or their staff who would like to refer a patient to [facility], please contact us through our one-stop-shop provider referral line, [facility], at [phone].    If you feel you have received this communication in error or would no longer like to receive these types of communications, please e-mail [email or web address]

## 2018-07-18 ENCOUNTER — OFFICE VISIT (OUTPATIENT)
Dept: PULMONOLOGY | Facility: CLINIC | Age: 83
End: 2018-07-18
Attending: FAMILY MEDICINE
Payer: MEDICARE

## 2018-07-18 VITALS
SYSTOLIC BLOOD PRESSURE: 134 MMHG | BODY MASS INDEX: 27.84 KG/M2 | HEIGHT: 71 IN | OXYGEN SATURATION: 96 % | HEART RATE: 70 BPM | WEIGHT: 198.88 LBS | DIASTOLIC BLOOD PRESSURE: 66 MMHG

## 2018-07-18 DIAGNOSIS — R93.89 ABNORMAL CHEST X-RAY: ICD-10-CM

## 2018-07-18 PROCEDURE — 99204 OFFICE O/P NEW MOD 45 MIN: CPT | Mod: S$GLB,,, | Performed by: INTERNAL MEDICINE

## 2018-07-18 PROCEDURE — 99999 PR PBB SHADOW E&M-EST. PATIENT-LVL III: CPT | Mod: PBBFAC,,, | Performed by: INTERNAL MEDICINE

## 2018-07-18 NOTE — LETTER
July 18, 2018      Joe Bunn MD  [address]  [place] [address]           [facility] - Pulmonary Services  [address]  [facility] [address]  Phone: [phone]          Patient: Nickolas Blake   MR Number: [id number]   YOB: 1931   Date of Visit: 7/18/2018       Dear Dr. Joe Bunn:    Thank you for referring Nickolas Blake to me for evaluation. Attached you will find relevant portions of my assessment and plan of care.    If you have questions, please do not hesitate to call me. I look forward to following Nickolas Blake along with you.    Sincerely,    Chelsea Crespo MD    Enclosure  CC:  No Recipients    If you would like to receive this communication electronically, please contact [email or web address] or [phone] to request more information on [employer] Link access.    For providers and/or their staff who would like to refer a patient to [facility], please contact us through our one-stop-shop provider referral line, [facility] [place], at [phone].    If you feel you have received this communication in error or would no longer like to receive these types of communications, please e-mail [email or web address]

## 2018-07-18 NOTE — PROGRESS NOTES
"Subjective:       Patient ID: Nickolas Blake is a 87 y.o. male.    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  Had asbestos covers when worked and slept at [employer].  Denies any shortness of breath or cough.  Only complaint is left hip pain.  No chest pain.  No leg swelling.        Pleural Effusion   Pertinent negatives include no arthralgias, chest pain, coughing, headaches or rash.     Review of Systems   Constitutional: Negative for weight loss and weight gain.   HENT: Negative for trouble swallowing.    Eyes: Negative for itching.   Respiratory: Negative for cough.    Cardiovascular: Negative for chest pain and leg swelling.   Genitourinary: Negative for difficulty urinating.   Endocrine: Negative for cold intolerance and heat intolerance.    Musculoskeletal: Negative for arthralgias.   Skin: Negative for rash.   Gastrointestinal: Negative for acid reflux.   Neurological: Negative for headaches.   Hematological: Negative for adenopathy.   Psychiatric/Behavioral: Negative for confusion.       Past medical and surgical history reviewed.  Social and family history reviewed.  Allergies and medications reviewed.  Objective:       Vitals:    07/18/18 0940   BP: 134/66   Pulse: 70   SpO2: 96%   Weight: 90.2 kg (198 lb 13.7 oz)   Height: 5' 11" (1.803 m)     Physical Exam   Constitutional: He is oriented to person, place, and time. He appears well-developed and well-nourished. No distress.   HENT:   Head: Normocephalic.   Right Ear: External ear normal.   Left Ear: External ear normal.   Nose: Nose normal.   Mouth/Throat: Oropharynx is clear and moist.   Neck: Normal range of motion. Neck supple. No tracheal deviation present. No thyromegaly present.   Cardiovascular: Normal rate, regular rhythm, normal heart sounds and intact distal pulses.    Pulmonary/Chest: Normal expansion and symmetric chest wall expansion. He has no wheezes. He has no " "rales. He exhibits no tenderness.   Abdominal: Soft. Bowel sounds are normal. He exhibits no distension and no mass. There is no hepatosplenomegaly. There is no tenderness.   Musculoskeletal: Normal range of motion.   Lymphadenopathy: No supraclavicular adenopathy is present.     He has no cervical adenopathy.   Neurological: He is alert and oriented to person, place, and time. No cranial nerve deficit.   Psychiatric: He has a normal mood and affect.   Vitals reviewed.    Personal Diagnostic Review  Chest x-ray: 5/31/2018 compared to 2016 blunting of costophrenic angle possibly on the left seen on lateral view.  Minor change from previous    Bedside ultrasound performed  No flowsheet data found.      Assessment:       1. Abnormal chest x-ray        Outpatient Encounter Prescriptions as of 7/18/2018   Medication Sig Dispense Refill    acetaminophen (TYLENOL) 500 mg Cap Take 1 capsule by mouth every 6 to 8 hours as needed.       ascorbic acid, vitamin C, (VITAMIN C) 250 MG tablet Take 1 tablet (250 mg total) by mouth 2 (two) times daily.      aspirin (ECOTRIN) 81 MG EC tablet Take 81 mg by mouth once daily.      atorvastatin (LIPITOR) 40 MG tablet Take 1 tablet (40 mg total) by mouth once daily. 90 tablet 3    BD ALCOHOL SWABS PadM USE FOUR TIMES DAILY 200 each 11    BD INSULIN PEN NEEDLE UF SHORT 31 gauge x 5/16" Ndle Inject 1 application into the skin 5 (five) times daily. 450 each 11    blood sugar diagnostic (ACCU-CHEK NEWTON PLUS TEST STRP) Strp TEST TWO TIMES DAILY WITH MEALS 200 strip 3    blood sugar diagnostic Strp To check BG 3 times daily, to use with insurance preferred meter 300 strip 99    donepezil (ARICEPT) 10 MG tablet Take 10 mg by mouth every evening.      DULoxetine (CYMBALTA) 60 MG capsule Take 1 capsule (60 mg total) by mouth once daily. 90 capsule 0    finasteride (PROSCAR) 5 mg tablet TAKE 1 TABLET EVERY DAY 90 tablet 3    fluticasone (FLONASE) 50 mcg/actuation nasal spray 2 sprays " by Each Nare route daily as needed for Allergies. 1 Bottle 5    gabapentin (NEURONTIN) 300 MG capsule Take 1 capsule (300 mg total) by mouth 3 (three) times daily. 270 capsule 1    insulin aspart (NOVOLOG) 100 unit/mL InPn pen Inject 12 Units into the skin 3 (three) times daily with meals. 1 Box 6    insulin glargine (LANTUS U-100 INSULIN) 100 unit/mL injection Inject 22 Units into the skin 2 (two) times daily. 2 vial 6    insulin syringe-needle U-100 1/2 mL 30 gauge x 5/16 Syrg To use with Lantus vials - 2 times daily 200 each 6    lancets Misc 1 each by Misc.(Non-Drug; Combo Route) route 2 (two) times daily. 200 each 3    lisinopril (PRINIVIL,ZESTRIL) 20 MG tablet Take 1 tablet (20 mg total) by mouth once daily. 90 tablet 3    metoprolol succinate (TOPROL-XL) 50 MG 24 hr tablet TAKE 1 TABLET DAILY. DOSE INCREASE, STOP 25 MG 90 tablet 3    nitroGLYCERIN (NITROSTAT) 0.4 MG SL tablet Place 1 tablet (0.4 mg total) under the tongue every 5 (five) minutes as needed for Chest pain. 25 tablet 11    senna-docusate 8.6-50 mg (PERICOLACE) 8.6-50 mg per tablet Take 1 tablet by mouth 2 (two) times daily as needed for Constipation.      tamsulosin (FLOMAX) 0.4 mg Cp24 Take 2 capsules (0.8 mg total) by mouth once daily. 60 capsule 0     No facility-administered encounter medications on file as of 7/18/2018.      No orders of the defined types were placed in this encounter.    Plan:     Problem List Items Addressed This Visit     Abnormal chest x-ray    Overview     Primary may follow up with repeat CXR in 3-6 months.  Bedside ultrasound personally performed without significant effusion.  If abnormality is worse, please contact me after CT of chest is performed.

## 2018-09-24 ENCOUNTER — TELEPHONE (OUTPATIENT)
Dept: INTERNAL MEDICINE | Facility: CLINIC | Age: 83
End: 2018-09-24

## 2018-09-24 DIAGNOSIS — N18.30 CKD (CHRONIC KIDNEY DISEASE), STAGE III: ICD-10-CM

## 2018-09-24 DIAGNOSIS — I10 HYPERTENSION, ESSENTIAL: ICD-10-CM

## 2018-09-24 DIAGNOSIS — E78.5 HYPERLIPIDEMIA, UNSPECIFIED HYPERLIPIDEMIA TYPE: ICD-10-CM

## 2018-09-24 DIAGNOSIS — Z00.00 ANNUAL PHYSICAL EXAM: Primary | ICD-10-CM

## 2018-09-24 NOTE — TELEPHONE ENCOUNTER
----- Message from Yasmine Ward sent at 9/24/2018 11:58 AM CDT -----  Contact: [phone]  Patient is requesting lab orders for six month follow up [facility] 11-27-18.  Patient would like have it done a week before the visit.    Please call when labs are in computer for patient to schedule.    Thank you

## 2018-09-25 NOTE — TELEPHONE ENCOUNTER
Patient has an existing appointment - See A1C and CMP lab orders (Dr. Read) and please please call patient to schedule labs before appointment. Add on my lipid panel. Thank you

## 2018-10-10 ENCOUNTER — NURSE TRIAGE (OUTPATIENT)
Dept: ADMINISTRATIVE | Facility: CLINIC | Age: 83
End: 2018-10-10

## 2018-10-10 ENCOUNTER — TELEPHONE (OUTPATIENT)
Dept: ENDOCRINOLOGY | Facility: CLINIC | Age: 83
End: 2018-10-10

## 2018-10-10 NOTE — TELEPHONE ENCOUNTER
----- Message from Patricia Renae sent at 10/10/2018  3:35 PM CDT -----  Contact: pt                 (SFT)  Pt would like to be called back regarding blood sugar level low  Pt can be reached at [phone]

## 2018-10-10 NOTE — TELEPHONE ENCOUNTER
Blood glucose has been extremely low over the last 2 days, in the 70's in the am, 49 at lunch, he ate lunch and candy was given, which brought the glucose up to 78.   Patient reports blurry vision.  Caller wants patient seen tomorrow.    Reason for Disposition   Patient wants to be seen    Protocols used:  DIABETES - LOW BLOOD SUGAR-A-OH

## 2018-10-11 ENCOUNTER — OFFICE VISIT (OUTPATIENT)
Dept: INTERNAL MEDICINE | Facility: CLINIC | Age: 83
End: 2018-10-11
Payer: MEDICARE

## 2018-10-11 VITALS
OXYGEN SATURATION: 96 % | TEMPERATURE: 98 F | HEIGHT: 71 IN | SYSTOLIC BLOOD PRESSURE: 128 MMHG | WEIGHT: 198 LBS | HEART RATE: 62 BPM | BODY MASS INDEX: 27.72 KG/M2 | DIASTOLIC BLOOD PRESSURE: 74 MMHG

## 2018-10-11 DIAGNOSIS — E11.22 TYPE 2 DIABETES MELLITUS WITH STAGE 3 CHRONIC KIDNEY DISEASE, WITH LONG-TERM CURRENT USE OF INSULIN: Primary | ICD-10-CM

## 2018-10-11 DIAGNOSIS — N18.30 TYPE 2 DIABETES MELLITUS WITH STAGE 3 CHRONIC KIDNEY DISEASE, WITH LONG-TERM CURRENT USE OF INSULIN: Primary | ICD-10-CM

## 2018-10-11 DIAGNOSIS — Z79.4 TYPE 2 DIABETES MELLITUS WITH STAGE 3 CHRONIC KIDNEY DISEASE, WITH LONG-TERM CURRENT USE OF INSULIN: Primary | ICD-10-CM

## 2018-10-11 PROCEDURE — 99213 OFFICE O/P EST LOW 20 MIN: CPT | Mod: S$PBB,,, | Performed by: INTERNAL MEDICINE

## 2018-10-11 PROCEDURE — 99213 OFFICE O/P EST LOW 20 MIN: CPT | Mod: PBBFAC | Performed by: INTERNAL MEDICINE

## 2018-10-11 PROCEDURE — 99999 PR PBB SHADOW E&M-EST. PATIENT-LVL III: CPT | Mod: PBBFAC,,, | Performed by: INTERNAL MEDICINE

## 2018-10-11 PROCEDURE — 1101F PT FALLS ASSESS-DOCD LE1/YR: CPT | Mod: CPTII,,, | Performed by: INTERNAL MEDICINE

## 2018-10-11 NOTE — TELEPHONE ENCOUNTER
Please contact patient and see if we can set up an appointment with available provider today. Also advise to hold insulin for now.    Thank you.

## 2018-10-11 NOTE — PROGRESS NOTES
Subjective:       Patient ID: Nickolas Blake is a 87 y.o. male.    Chief Complaint: Blood Sugar Problem (running low)    Patient presents with caretaker because he occasionally has very low BS's. Lowest I saw on his log was in the 40's  He takes 22 units of lantus at bedtime and about 12 u novolog with meals. Novolog dose may vary from 10 u to 15 u depending on BS at the meal time and what he plans to eat.  Caretaker says sometimes he just doesn't eat enough or sometimes eat at all and BS goes low.  When this happens she gives him a snack and limits next dose of insulin.  Son got worried and made them come in.  BS this am fasting was 110.  Patient is awake and alert.  Caretaker has good control of what she can control.  I admonished the patient to eat something about every 4 hours.  He says he eats when he is hungry and drinks when he is thirsty.  He will follow up with PCP and ENDO      Review of Systems   Constitutional: Negative for activity change, appetite change and fever.   HENT: Negative for congestion, postnasal drip and sore throat.    Respiratory: Negative for cough, shortness of breath and wheezing.    Cardiovascular: Negative for chest pain and palpitations.   Gastrointestinal: Negative for abdominal pain, blood in stool, constipation, diarrhea, nausea and vomiting.   Genitourinary: Negative for decreased urine volume, difficulty urinating, flank pain and frequency.   Musculoskeletal: Negative for arthralgias.   Neurological: Negative for dizziness, weakness and headaches.       Objective:      Physical Exam   Constitutional: He appears well-developed and well-nourished.   Cardiovascular: Regular rhythm.   Pulmonary/Chest: Effort normal and breath sounds normal.       Assessment:       No diagnosis found.    Plan:   There are no diagnoses linked to this encounter.

## 2018-11-20 ENCOUNTER — TELEPHONE (OUTPATIENT)
Dept: ENDOCRINOLOGY | Facility: CLINIC | Age: 83
End: 2018-11-20

## 2018-11-20 ENCOUNTER — LAB VISIT (OUTPATIENT)
Dept: LAB | Facility: HOSPITAL | Age: 83
End: 2018-11-20
Payer: MEDICARE

## 2018-11-20 DIAGNOSIS — Z79.4 TYPE 2 DIABETES MELLITUS WITH STAGE 3 CHRONIC KIDNEY DISEASE, WITH LONG-TERM CURRENT USE OF INSULIN: ICD-10-CM

## 2018-11-20 DIAGNOSIS — N18.30 TYPE 2 DIABETES MELLITUS WITH STAGE 3 CHRONIC KIDNEY DISEASE, WITH LONG-TERM CURRENT USE OF INSULIN: ICD-10-CM

## 2018-11-20 DIAGNOSIS — E78.5 HYPERLIPIDEMIA, UNSPECIFIED HYPERLIPIDEMIA TYPE: ICD-10-CM

## 2018-11-20 DIAGNOSIS — E11.22 TYPE 2 DIABETES MELLITUS WITH STAGE 3 CHRONIC KIDNEY DISEASE, WITH LONG-TERM CURRENT USE OF INSULIN: ICD-10-CM

## 2018-11-20 LAB
ALBUMIN SERPL BCP-MCNC: 3.6 G/DL
ALP SERPL-CCNC: 97 U/L
ALT SERPL W/O P-5'-P-CCNC: 32 U/L
ANION GAP SERPL CALC-SCNC: 8 MMOL/L
AST SERPL-CCNC: 30 U/L
BILIRUB SERPL-MCNC: 0.9 MG/DL
BUN SERPL-MCNC: 21 MG/DL
CALCIUM SERPL-MCNC: 8.9 MG/DL
CHLORIDE SERPL-SCNC: 107 MMOL/L
CHOLEST SERPL-MCNC: 143 MG/DL
CHOLEST/HDLC SERPL: 3.8 {RATIO}
CO2 SERPL-SCNC: 27 MMOL/L
CREAT SERPL-MCNC: 1.2 MG/DL
EST. GFR  (AFRICAN AMERICAN): >60 ML/MIN/1.73 M^2
EST. GFR  (NON AFRICAN AMERICAN): 54 ML/MIN/1.73 M^2
ESTIMATED AVG GLUCOSE: 146 MG/DL
GLUCOSE SERPL-MCNC: 143 MG/DL
HBA1C MFR BLD HPLC: 6.7 %
HDLC SERPL-MCNC: 38 MG/DL
HDLC SERPL: 26.6 %
LDLC SERPL CALC-MCNC: 81.8 MG/DL
NONHDLC SERPL-MCNC: 105 MG/DL
POTASSIUM SERPL-SCNC: 4 MMOL/L
PROT SERPL-MCNC: 7.4 G/DL
SODIUM SERPL-SCNC: 142 MMOL/L
TRIGL SERPL-MCNC: 116 MG/DL

## 2018-11-20 PROCEDURE — 80061 LIPID PANEL: CPT | Mod: HCNC

## 2018-11-20 PROCEDURE — 83036 HEMOGLOBIN GLYCOSYLATED A1C: CPT | Mod: HCNC

## 2018-11-20 PROCEDURE — 36415 COLL VENOUS BLD VENIPUNCTURE: CPT | Mod: HCNC,PO

## 2018-11-20 PROCEDURE — 80053 COMPREHEN METABOLIC PANEL: CPT | Mod: HCNC

## 2018-11-20 RX ORDER — INSULIN GLARGINE 100 [IU]/ML
20 INJECTION, SOLUTION SUBCUTANEOUS DAILY
Qty: 2 VIAL | Refills: 6 | Status: SHIPPED | OUTPATIENT
Start: 2018-11-20 | End: 2020-12-17 | Stop reason: SDUPTHER

## 2018-11-20 RX ORDER — INSULIN ASPART 100 [IU]/ML
8 INJECTION, SOLUTION INTRAVENOUS; SUBCUTANEOUS
Qty: 1 BOX | Refills: 6
Start: 2018-11-20 | End: 2020-12-17

## 2018-11-27 ENCOUNTER — OFFICE VISIT (OUTPATIENT)
Dept: INTERNAL MEDICINE | Facility: CLINIC | Age: 83
End: 2018-11-27
Attending: FAMILY MEDICINE
Payer: MEDICARE

## 2018-11-27 ENCOUNTER — IMMUNIZATION (OUTPATIENT)
Dept: INTERNAL MEDICINE | Facility: CLINIC | Age: 83
End: 2018-11-27
Payer: MEDICARE

## 2018-11-27 ENCOUNTER — HOSPITAL ENCOUNTER (OUTPATIENT)
Dept: RADIOLOGY | Facility: HOSPITAL | Age: 83
Discharge: HOME | End: 2018-11-27
Attending: FAMILY MEDICINE
Payer: MEDICARE

## 2018-11-27 VITALS
OXYGEN SATURATION: 96 % | BODY MASS INDEX: 29.59 KG/M2 | DIASTOLIC BLOOD PRESSURE: 56 MMHG | SYSTOLIC BLOOD PRESSURE: 122 MMHG | HEIGHT: 69 IN | WEIGHT: 199.75 LBS | TEMPERATURE: 97 F | HEART RATE: 77 BPM

## 2018-11-27 DIAGNOSIS — R93.89 ABNORMAL CHEST X-RAY: ICD-10-CM

## 2018-11-27 DIAGNOSIS — I25.10 CORONARY ARTERY DISEASE INVOLVING NATIVE CORONARY ARTERY OF NATIVE HEART WITHOUT ANGINA PECTORIS: Chronic | ICD-10-CM

## 2018-11-27 DIAGNOSIS — E11.22 TYPE 2 DIABETES MELLITUS WITH STAGE 3 CHRONIC KIDNEY DISEASE, WITH LONG-TERM CURRENT USE OF INSULIN: ICD-10-CM

## 2018-11-27 DIAGNOSIS — N18.30 TYPE 2 DIABETES MELLITUS WITH STAGE 3 CHRONIC KIDNEY DISEASE, WITH LONG-TERM CURRENT USE OF INSULIN: ICD-10-CM

## 2018-11-27 DIAGNOSIS — Z98.1 S/P LAMINECTOMY WITH SPINAL FUSION: ICD-10-CM

## 2018-11-27 DIAGNOSIS — F01.50 MIXED ALZHEIMER'S AND VASCULAR DEMENTIA: ICD-10-CM

## 2018-11-27 DIAGNOSIS — M48.061 SPINAL STENOSIS OF LUMBAR REGION AT MULTIPLE LEVELS: ICD-10-CM

## 2018-11-27 DIAGNOSIS — F02.80 MIXED ALZHEIMER'S AND VASCULAR DEMENTIA: ICD-10-CM

## 2018-11-27 DIAGNOSIS — E11.42 TYPE 2 DIABETES MELLITUS WITH DIABETIC POLYNEUROPATHY, WITH LONG-TERM CURRENT USE OF INSULIN: ICD-10-CM

## 2018-11-27 DIAGNOSIS — I10 HYPERTENSION, ESSENTIAL: ICD-10-CM

## 2018-11-27 DIAGNOSIS — Z00.00 ANNUAL PHYSICAL EXAM: Primary | ICD-10-CM

## 2018-11-27 DIAGNOSIS — Z79.4 TYPE 2 DIABETES MELLITUS WITH DIABETIC POLYNEUROPATHY, WITH LONG-TERM CURRENT USE OF INSULIN: ICD-10-CM

## 2018-11-27 DIAGNOSIS — F32.4 MAJOR DEPRESSIVE DISORDER WITH SINGLE EPISODE, IN PARTIAL REMISSION: ICD-10-CM

## 2018-11-27 DIAGNOSIS — Z79.4 TYPE 2 DIABETES MELLITUS WITH STAGE 3 CHRONIC KIDNEY DISEASE, WITH LONG-TERM CURRENT USE OF INSULIN: ICD-10-CM

## 2018-11-27 DIAGNOSIS — G30.9 MIXED ALZHEIMER'S AND VASCULAR DEMENTIA: ICD-10-CM

## 2018-11-27 DIAGNOSIS — E78.2 MIXED HYPERLIPIDEMIA: ICD-10-CM

## 2018-11-27 PROCEDURE — 71046 X-RAY EXAM CHEST 2 VIEWS: CPT | Mod: TC,HCNC

## 2018-11-27 PROCEDURE — 71046 X-RAY EXAM CHEST 2 VIEWS: CPT | Mod: 26,HCNC,, | Performed by: RADIOLOGY

## 2018-11-27 PROCEDURE — G0008 ADMIN INFLUENZA VIRUS VAC: HCPCS | Mod: HCNC,S$GLB,, | Performed by: FAMILY MEDICINE

## 2018-11-27 PROCEDURE — 99397 PER PM REEVAL EST PAT 65+ YR: CPT | Mod: 25,HCNC,S$GLB, | Performed by: FAMILY MEDICINE

## 2018-11-27 PROCEDURE — 90662 IIV NO PRSV INCREASED AG IM: CPT | Mod: HCNC,S$GLB,, | Performed by: FAMILY MEDICINE

## 2018-11-27 PROCEDURE — 99999 PR PBB SHADOW E&M-EST. PATIENT-LVL V: CPT | Mod: PBBFAC,HCNC,, | Performed by: FAMILY MEDICINE

## 2018-11-27 NOTE — PROGRESS NOTES
Subjective:       Patient ID: Nickolas Blake is a 87 y.o. male.    Chief Complaint: Follow-up    Established patient for an annual wellness check/physical exam and also chronic disease management. Specific complaints - see dictation and please see ROS.  P, S, Fm, Soc Hx's; Meds, allergies reviewed and reconciled.  Health maintenance file reviewed and addressed items due.        Review of Systems   Constitutional: Negative for chills, fatigue, fever and unexpected weight change.   HENT: Negative for congestion and trouble swallowing.    Eyes: Negative for redness and visual disturbance.   Respiratory: Negative for cough, chest tightness and shortness of breath.    Cardiovascular: Negative for chest pain, palpitations and leg swelling.   Gastrointestinal: Negative for abdominal pain and blood in stool.   Genitourinary: Negative for difficulty urinating and hematuria.   Musculoskeletal: Positive for back pain. Negative for arthralgias, gait problem, joint swelling, myalgias and neck pain.   Skin: Negative for color change and rash.   Neurological: Negative for tremors, speech difficulty, weakness, numbness and headaches.   Hematological: Negative for adenopathy. Does not bruise/bleed easily.   Psychiatric/Behavioral: Positive for behavioral problems, hallucinations and sleep disturbance. Negative for confusion. The patient is not nervous/anxious.        Objective:      Physical Exam   Constitutional: He is oriented to person, place, and time. He appears well-developed and well-nourished. No distress.   Eyes: No scleral icterus.   Neck: Normal range of motion. Neck supple. Carotid bruit is not present.   Cardiovascular: Normal rate, regular rhythm and intact distal pulses. Exam reveals distant heart sounds. Exam reveals no gallop and no friction rub.   Murmur heard.  Pulmonary/Chest: Effort normal. No respiratory distress. He has decreased breath sounds. He has no wheezes. He has no rales.   Abdominal: Soft. He  exhibits no distension and no mass. There is no tenderness. There is no rebound and no guarding.   Musculoskeletal: He exhibits no edema.        Right lower leg: He exhibits no edema.        Left lower leg: He exhibits no edema.   Neurological: He is alert and oriented to person, place, and time. He displays no tremor. No cranial nerve deficit. Coordination and gait normal.   Skin: Skin is warm and dry. No rash noted. He is not diaphoretic. No erythema.   Psychiatric: He has a normal mood and affect. His behavior is normal. Judgment and thought content normal.   Nursing note and vitals reviewed.      Assessment:       1. Annual physical exam    2. Coronary artery disease involving native coronary artery of native heart without angina pectoris    3. Hypertension, essential    4. Mixed hyperlipidemia    5. Major depressive disorder with single episode, in partial remission    6. Mixed Alzheimer's and vascular dementia    7. Spinal stenosis of lumbar region at multiple levels    8. S/P laminectomy with spinal fusion    9. Type 2 diabetes mellitus with diabetic polyneuropathy, with long-term current use of insulin    10. Type 2 diabetes mellitus with stage 3 chronic kidney disease, with long-term current use of insulin    11. Abnormal chest x-ray        Plan:   Nickolas was seen today for follow-up.    Diagnoses and all orders for this visit:    Annual physical exam    Coronary artery disease involving native coronary artery of native heart without angina pectoris  -     Ambulatory referral to Cardiology    Hypertension, essential    Mixed hyperlipidemia    Major depressive disorder with single episode, in partial remission  -     Ambulatory referral to Psychiatry    Mixed Alzheimer's and vascular dementia  -     Ambulatory referral to Neurology    Spinal stenosis of lumbar region at multiple levels    S/P laminectomy with spinal fusion    Type 2 diabetes mellitus with diabetic polyneuropathy, with long-term current use  of insulin    Type 2 diabetes mellitus with stage 3 chronic kidney disease, with long-term current use of insulin    Abnormal chest x-ray  -     X-Ray Chest PA And Lateral; Future      See meds, orders, follow up, routing and instructions sections of encounter.  This is an established male patient.  He is in essentially for his annual   physical examination.  He presents with a caretaker.  He states he feels fine.    Caretaker states, otherwise.  She describes some recent agitation and failure to   eat dinner.  The patient states that it is due to not feeling hungry.  He   denies any dysphagia, early satiety, abdominal pain, significant reflux, chest   pain, dyspnea, etc.  His blood sugars have been running a little low.  They did   communicate to Dr. Read who discontinued his evening Lantus, but continued   the daytime only.  The patient is taking NovoLog 12 units t.i.d. as well.  His   A1c was 6.7, which is the best it has been in a long, long time.  He has a   followup scheduled.    The patient was seeing Dr. Wu at [facility] for back pain, has not   seen in a year.  Back pain is improved.  No current complaints.    The patient was having some agitation, hallucinations.  I did review the chart.    He had seen Psychiatry and Neurology in the past and is overdue to follow up   with both.  Apparently, he has some personal resistance to seeing the   psychiatrist; however, I explained to his caretaker that for type of complaint,   I would recommend Psychiatric and Neurology followups.  I provided them with the   Psychiatry number to call and schedule an appointment and we will place a   referral for his Neurology followup.    He is also overdue for followup on his coronary artery disease, though he has no   symptoms at this time.    I recommend a discontinuation of the evening dose of NovoLog should he not eat   the evening meal.  Explained the importance of matching NovoLog with meal.  At   this time, I  do not feel he needs a gastro workup as he is not complaining of   any ominous symptoms.  We will watch his weight and the above items.  I   suggested a six-month followup, referrals were placed.  Continue current   medications otherwise.  I did review his A1c, CMP and recent lipid panel.  He   also has a history of minimal carotid disease, had a scan a couple of years ago   and today, he appears to be in no acute distress with minimal back symptoms.      PHYLLIS/HN  dd: 11/27/2018 12:09:56 (CST)  td: 11/28/2018 06:47:48 (CST)  Doc ID   #[id number]  Job ID #[id number]    CC:

## 2018-11-28 ENCOUNTER — TELEPHONE (OUTPATIENT)
Dept: INTERNAL MEDICINE | Facility: CLINIC | Age: 83
End: 2018-11-28

## 2018-11-28 DIAGNOSIS — R91.1 PULMONARY NODULE: Primary | ICD-10-CM

## 2018-11-28 DIAGNOSIS — J18.9 PNEUMONIA DUE TO INFECTIOUS ORGANISM, UNSPECIFIED LATERALITY, UNSPECIFIED PART OF LUNG: ICD-10-CM

## 2018-11-29 NOTE — TELEPHONE ENCOUNTER
Called patient daughter and discussed labs and or test results. Patient expressed understanding and had the opportunity to ask questions. Any questions were answered. See meds, orders, follow up and instructions sections of encounter.    Specific issues include:  CXR noted - no change - discussed w/ Dr. Sariah owens w/ CT due to small effusion on the L

## 2018-11-29 NOTE — TELEPHONE ENCOUNTER
----- Message from Chelsea Crespo MD sent at 11/28/2018  5:49 PM CST -----  Let's repeat a CT. Do you need me to see me?  Chelsea    ----- Message -----  From: Joe Bunn MD  Sent: 11/28/2018   5:38 PM  To: Chelsea Crespo MD    No new symptoms, but I reviewed your note and was unclear whether you wanted to repeat the CXR or get a CT. Let me know what you think. Thank you. Jeremy Bunn        ----- Message -----  From: Chelsea Crespo MD  Sent: 11/28/2018   2:32 PM  To: Joe Bunn MD    I feel that it looks unchanged from previous.  Any symptoms? You could always order a CT to see if it is real.  Chelsea    ----- Message -----  From: Joe Bunn MD  Sent: 11/28/2018   1:15 PM  To: MD Chelsea Fink - this is a patient you saw in the summer - please see the repeat xray and let me know if you need to follow him up. Thank you. Jeremy Bunn

## 2018-12-04 ENCOUNTER — OFFICE VISIT (OUTPATIENT)
Dept: ENDOCRINOLOGY | Facility: CLINIC | Age: 83
End: 2018-12-04
Payer: MEDICARE

## 2018-12-04 VITALS
HEIGHT: 69 IN | RESPIRATION RATE: 16 BRPM | BODY MASS INDEX: 29.19 KG/M2 | WEIGHT: 197.06 LBS | DIASTOLIC BLOOD PRESSURE: 74 MMHG | SYSTOLIC BLOOD PRESSURE: 122 MMHG

## 2018-12-04 DIAGNOSIS — Z79.4 TYPE 2 DIABETES MELLITUS WITH STAGE 3 CHRONIC KIDNEY DISEASE, WITH LONG-TERM CURRENT USE OF INSULIN: Primary | ICD-10-CM

## 2018-12-04 DIAGNOSIS — F01.50 MIXED ALZHEIMER'S AND VASCULAR DEMENTIA: ICD-10-CM

## 2018-12-04 DIAGNOSIS — N18.30 TYPE 2 DIABETES MELLITUS WITH STAGE 3 CHRONIC KIDNEY DISEASE, WITH LONG-TERM CURRENT USE OF INSULIN: Primary | ICD-10-CM

## 2018-12-04 DIAGNOSIS — E78.2 MIXED HYPERLIPIDEMIA: ICD-10-CM

## 2018-12-04 DIAGNOSIS — I10 HYPERTENSION, ESSENTIAL: ICD-10-CM

## 2018-12-04 DIAGNOSIS — G30.9 MIXED ALZHEIMER'S AND VASCULAR DEMENTIA: ICD-10-CM

## 2018-12-04 DIAGNOSIS — F02.80 MIXED ALZHEIMER'S AND VASCULAR DEMENTIA: ICD-10-CM

## 2018-12-04 DIAGNOSIS — E11.22 TYPE 2 DIABETES MELLITUS WITH STAGE 3 CHRONIC KIDNEY DISEASE, WITH LONG-TERM CURRENT USE OF INSULIN: Primary | ICD-10-CM

## 2018-12-04 LAB
ALBUMIN/CREAT UR: 44.9 UG/MG
CREAT UR-MCNC: 138 MG/DL
MICROALBUMIN UR DL<=1MG/L-MCNC: 62 UG/ML

## 2018-12-04 PROCEDURE — 99214 OFFICE O/P EST MOD 30 MIN: CPT | Mod: HCNC,S$GLB,, | Performed by: INTERNAL MEDICINE

## 2018-12-04 PROCEDURE — 99999 PR PBB SHADOW E&M-EST. PATIENT-LVL III: CPT | Mod: PBBFAC,HCNC,, | Performed by: INTERNAL MEDICINE

## 2018-12-04 PROCEDURE — 82043 UR ALBUMIN QUANTITATIVE: CPT | Mod: HCNC

## 2018-12-04 PROCEDURE — 1101F PT FALLS ASSESS-DOCD LE1/YR: CPT | Mod: CPTII,HCNC,S$GLB, | Performed by: INTERNAL MEDICINE

## 2018-12-04 NOTE — PROGRESS NOTES
CC: Nickolas Blake arrives today for T2DM follow up   Accompanied by sitter      HPI: Nickolas Blake was diagnosed with T2DM in .   No DM hospitalizations.     Known complications: mild neuropathy, prior history of retinopathy which has resolved     Was followed in [facility]. Began in chronic DM clinic 2016     CURRENT DIABETIC MEDS:   Lantus 22 units daily   Novolog 12 units with meals - pt prescribed 8 units but sitter self adjusts insulin dosage based on BG readings.     The patient was previously on Metformin, Starlix and Glipizide at one time  He is unclear when the medications were discontinued     Other medications tried: januvia  - discontinued due to elevated BG     Most recent A1c -  6.7 % from 2018     The patient's sitter (Ms. Begum)  arrives today with Logs:   Fastin, 162, 213, 181, 203, 209  Lunch: 164, 232, 244, 212  Bedtime: 208, 172, 165, 198    Hypoglycemia: denies recent episodes since dosage adjustments   Symptoms include feeling jittery, blurred vision   Treats appropriately by drinking a small coke or orange juice     Type of Glucose Meter: Accu-chek    Physical Activity: No formal exercise. Limited by spinal stenosis- physical therapy.     Dietary recall:   Breakfast: oatmeal with milk, 1/2 banana  Lunch: cannot recall   Dinner: pot roast, mac-n-cheese, corn     Occasionally he takes a sugar free snack with milk at 8pm     Diabetes Management Status  Statin: Taking   ACE/ARB: Taking  Screening or Prevention Patient's value Goal Complete/Controlled?   HgA1C Testing and Control   Lab Results   Component Value Date    HGBA1C 6.7 (H) 2018      Annually/Less than 8% Yes   Lipid profile : 2018 Annually Yes   LDL control Lab Results   Component Value Date    LDLCALC 81.8 2018    Annually/Less than 100 mg/dl  Yes   Nephropathy screening Lab Results   Component Value Date    LABMICR 103.0 2017     Lab Results   Component Value Date    PROTEINUA  "Negative 11/14/2016    Annually No   Blood pressure BP Readings from Last 1 Encounters:   12/04/18 122/74    Less than 140/90 Yes   Dilated retinal exam : 07/12/2018 Annually Yes   Foot exam   : 12/04/2018 Annually Yes       Review of Systems   Constitutional: Positive for malaise/fatigue.   Eyes: Negative for blurred vision and double vision.   Respiratory: Negative for shortness of breath.    Cardiovascular: Negative for chest pain and palpitations.   Gastrointestinal: Negative for constipation and diarrhea.   Genitourinary: Positive for frequency. Negative for dysuria.   Musculoskeletal: Positive for back pain, joint pain and myalgias. Negative for falls.   Skin: Negative for rash.   Neurological: Negative for dizziness, tremors, weakness and headaches.   Endo/Heme/Allergies: Negative for polydipsia.   Psychiatric/Behavioral: Positive for memory loss. Negative for depression. The patient is not nervous/anxious.      Vital Signs  /74 (BP Location: Right arm, Patient Position: Sitting, BP Method: Medium (Manual))   Resp 16   Ht 5' 8.5" (1.74 m)   Wt 89.4 kg (197 lb 1.5 oz)   BMI 29.53 kg/m²     Hemoglobin A1C   Date Value Ref Range Status   11/20/2018 6.7 (H) 4.0 - 5.6 % Final     Comment:     ADA Screening Guidelines:  5.7-6.4%  Consistent with prediabetes  >or=6.5%  Consistent with diabetes  High levels of fetal hemoglobin interfere with the HbA1C  assay. Heterozygous hemoglobin variants (HbS, HgC, etc)do  not significantly interfere with this assay.   However, presence of multiple variants may affect accuracy.     03/20/2018 7.5 (H) 4.0 - 5.6 % Final     Comment:     According to ADA guidelines, hemoglobin A1c <7.0% represents  optimal control in non-pregnant diabetic patients. Different  metrics may apply to specific patient populations.   Standards of Medical Care in Diabetes-2016.  For the purpose of screening for the presence of diabetes:  <5.7%     Consistent with the absence of diabetes  5.7-6.4% "  Consistent with increasing risk for diabetes   (prediabetes)  >or=6.5%  Consistent with diabetes  Currently, no consensus exists for use of hemoglobin A1c  for diagnosis of diabetes for children.  This Hemoglobin A1c assay has significant interference with fetal   hemoglobin   (HbF). The results are invalid for patients with abnormal amounts of   HbF,   including those with known Hereditary Persistence   of Fetal Hemoglobin. Heterozygous hemoglobin variants (HbAS, HbAC,   HbAD, HbAE, HbA2) do not significantly interfere with this assay;   however, presence of multiple variants in a sample may impact the %   interference.     11/28/2017 7.3 (H) 4.0 - 5.6 % Final     Comment:     According to ADA guidelines, hemoglobin A1c <7.0% represents  optimal control in non-pregnant diabetic patients. Different  metrics may apply to specific patient populations.   Standards of Medical Care in Diabetes-2016.  For the purpose of screening for the presence of diabetes:  <5.7%     Consistent with the absence of diabetes  5.7-6.4%  Consistent with increasing risk for diabetes   (prediabetes)  >or=6.5%  Consistent with diabetes  Currently, no consensus exists for use of hemoglobin A1c  for diagnosis of diabetes for children.  This Hemoglobin A1c assay has significant interference with fetal   hemoglobin   (HbF). The results are invalid for patients with abnormal amounts of   HbF,   including those with known Hereditary Persistence   of Fetal Hemoglobin. Heterozygous hemoglobin variants (HbAS, HbAC,   HbAD, HbAE, HbA2) do not significantly interfere with this assay;   however, presence of multiple variants in a sample may impact the %   interference.       Results for ÁNGEL AWAD (MRN [id number]) as of 12/3/2018 22:41   Ref. Range 11/20/2018 08:27   Sodium Latest Ref Range: 136 - 145 mmol/L 142   Potassium Latest Ref Range: 3.5 - 5.1 mmol/L 4.0   Chloride Latest Ref Range: 95 - 110 mmol/L 107   CO2 Latest Ref Range: 23 - 29 mmol/L  27   Anion Gap Latest Ref Range: 8 - 16 mmol/L 8   BUN, Bld Latest Ref Range: 8 - 23 mg/dL 21   Creatinine Latest Ref Range: 0.5 - 1.4 mg/dL 1.2   eGFR if non African American Latest Ref Range: >60 mL/min/1.73 m^2 54.0 (A)   eGFR if African American Latest Ref Range: >60 mL/min/1.73 m^2 >60.0   Glucose Latest Ref Range: 70 - 110 mg/dL 143 (H)   Calcium Latest Ref Range: 8.7 - 10.5 mg/dL 8.9   Alkaline Phosphatase Latest Ref Range: 55 - 135 U/L 97   Total Protein Latest Ref Range: 6.0 - 8.4 g/dL 7.4   Albumin Latest Ref Range: 3.5 - 5.2 g/dL 3.6   Total Bilirubin Latest Ref Range: 0.1 - 1.0 mg/dL 0.9   AST Latest Ref Range: 10 - 40 U/L 30   ALT Latest Ref Range: 10 - 44 U/L 32     Results for ÁNGEL AWAD (MRN [id number]) as of 12/3/2018 22:41   Ref. Range 11/20/2018 08:27   Cholesterol Latest Ref Range: 120 - 199 mg/dL 143   HDL Latest Ref Range: 40 - 75 mg/dL 38 (L)   HDL/Chol Ratio Latest Ref Range: 20.0 - 50.0 % 26.6   LDL Cholesterol Latest Ref Range: 63.0 - 159.0 mg/dL 81.8   Non-HDL Cholesterol Latest Units: mg/dL 105   Total Cholesterol/HDL Ratio Latest Ref Range: 2.0 - 5.0  3.8   Triglycerides Latest Ref Range: 30 - 150 mg/dL 116     Physical Exam   Constitutional: He appears well-developed and well-nourished. No distress.   Cardiovascular: Normal rate and regular rhythm.   Pulses:       Dorsalis pedis pulses are 1+ on the right side, and 1+ on the left side.   Pulmonary/Chest: Effort normal.   Musculoskeletal: He exhibits no edema.        Right foot: There is no deformity.        Left foot: There is no deformity.   Feet:   Right Foot:   Protective Sensation: 10 sites tested. 8 sites sensed.   Skin Integrity: Negative for ulcer, blister, skin breakdown, erythema, warmth, callus or dry skin.   Left Foot:   Protective Sensation: 10 sites tested. 8 sites sensed.   Skin Integrity: Negative for ulcer, blister, skin breakdown, erythema, warmth, callus or dry skin.   Neurological: He is alert.   Feet    Shoes  appropriate  Vibratory sensation intact bilaterally      Skin: Skin is warm and dry.   Psychiatric: He has a normal mood and affect.   Nursing note and vitals reviewed.        Assessment/Plan:  1. Type 2 diabetes mellitus with stage 3 chronic kidney disease, with long-term current use of insulin  Microalbumin/creatinine urine ratio   2. Hypertension, essential     3. Mixed hyperlipidemia     4. Mixed Alzheimer's and vascular dementia          1. Type 2 diabetes with stage 3 CKD with long term current use of insulin   -- discussed the goal of therapy is to obtain the best control we can without hypoglycemia   -- recommend A1c goal <8.0% due to age and co-morbidities   -- recommend MDI closer to weight based goal: Lantus 22 units daily, Novolog 8 units tid ac meals + low dose correction starting at 180 mg/dL   -- discussed discontinuing Novolog and adding orals but sitter states that insulin works better   -- continue to smbg 3-4 x daily and send log in 2-3 weeks for review and adjustments   -- reviewed signs and symptoms of hypoglycemia along with appropriate treatment protocol   -- continue dietary and lifestyle modifications   -- emphasized appropriate footwear and daily foot inspections   -- advised to never walk barefoot   -- advised to never put sharp objects to feet   -- recommend periodic follow ups for routine eye, foot and dental care     2. HTN   -- BP at goal   -- followed and managed by PCP     3. HLD   - on statin per ADA recommendations   - encouraged to follow a low fat, low chol diet     5. Mixed Alzheimer's and vascular dementia   - stable with current regimen   - followed by Neurology     FOLLOW UP  Follow-up in about 4 months (around 4/4/2019).     Orders Placed This Encounter   Procedures    Microalbumin/creatinine urine ratio     Order Specific Question:   Specimen Source     Answer:   Urine       The patient is instructed to notify the office with BG concerns or questions

## 2018-12-11 ENCOUNTER — HOSPITAL ENCOUNTER (OUTPATIENT)
Dept: RADIOLOGY | Facility: HOSPITAL | Age: 83
Discharge: HOME | End: 2018-12-11
Attending: FAMILY MEDICINE
Payer: MEDICARE

## 2018-12-11 DIAGNOSIS — J18.9 PNEUMONIA DUE TO INFECTIOUS ORGANISM, UNSPECIFIED LATERALITY, UNSPECIFIED PART OF LUNG: ICD-10-CM

## 2018-12-11 DIAGNOSIS — R91.1 PULMONARY NODULE: ICD-10-CM

## 2018-12-11 PROCEDURE — 71250 CT THORAX DX C-: CPT | Mod: TC,HCNC

## 2018-12-11 PROCEDURE — 71250 CT THORAX DX C-: CPT | Mod: 26,HCNC,, | Performed by: RADIOLOGY

## 2018-12-12 ENCOUNTER — TELEPHONE (OUTPATIENT)
Dept: INTERNAL MEDICINE | Facility: CLINIC | Age: 83
End: 2018-12-12

## 2018-12-12 DIAGNOSIS — R91.1 PULMONARY NODULE: Primary | ICD-10-CM

## 2018-12-12 NOTE — TELEPHONE ENCOUNTER
No appointments available.  Message sent to Dr. Crespo staff to call and schedule an appointment for patient.

## 2018-12-12 NOTE — TELEPHONE ENCOUNTER
Called patient daughter and discussed labs and or test results. Patient expressed understanding and had the opportunity to ask questions. Any questions were answered. See meds, orders, follow up and instructions sections of encounter.    Specific issues include:  Small pl eff on L - ? Stable - will sched FU w/ pulm -

## 2019-01-24 RX ORDER — TAMSULOSIN HYDROCHLORIDE 0.4 MG/1
CAPSULE ORAL
Qty: 180 CAPSULE | Refills: 3 | Status: SHIPPED | OUTPATIENT
Start: 2019-01-24 | End: 2019-11-07

## 2019-01-28 RX ORDER — PEN NEEDLE, DIABETIC 31 GX5/16"
1 NEEDLE, DISPOSABLE MISCELLANEOUS
Qty: 450 EACH | Refills: 11 | Status: SHIPPED | OUTPATIENT
Start: 2019-01-28 | End: 2020-12-17 | Stop reason: SDUPTHER

## 2019-01-28 NOTE — TELEPHONE ENCOUNTER
----- Message from Luh Daigle sent at 1/28/2019  8:52 AM CST -----  Contact: [facility] [phone]  Prescription Request:     Name of medication: BD UF INS Syr 31g 0.5 ML 15/64in (8MM) Joe    Reason for request: Refill    Pharmacy: [facility] - [facility] [address]    Please advise.    Thank You

## 2019-02-08 DIAGNOSIS — N18.30 TYPE 2 DIABETES MELLITUS WITH STAGE 3 CHRONIC KIDNEY DISEASE, WITH LONG-TERM CURRENT USE OF INSULIN: ICD-10-CM

## 2019-02-08 DIAGNOSIS — E11.22 TYPE 2 DIABETES MELLITUS WITH STAGE 3 CHRONIC KIDNEY DISEASE, WITH LONG-TERM CURRENT USE OF INSULIN: ICD-10-CM

## 2019-02-08 DIAGNOSIS — Z79.4 TYPE 2 DIABETES MELLITUS WITH STAGE 3 CHRONIC KIDNEY DISEASE, WITH LONG-TERM CURRENT USE OF INSULIN: ICD-10-CM

## 2019-02-08 RX ORDER — SYRINGE,SAFETY WITH NEEDLE,1ML 25GX1"
SYRINGE (EA) MISCELLANEOUS
Qty: 200 EACH | Refills: 6 | Status: SHIPPED | OUTPATIENT
Start: 2019-02-08 | End: 2019-02-13 | Stop reason: SDUPTHER

## 2019-02-08 NOTE — TELEPHONE ENCOUNTER
----- Message from Mary Gallo sent at 2/8/2019 10:21 AM CST -----  Contact: [facility] - [phone]  RX request - refill or new RX.  Is this a refill or new RX:    RX name and strength: insulin syringe-needle U-100 1/2 mL 30 gauge x 5/16 Syrg    Local pharmacy or mail order pharmacy:  [facility] - [address]   [phone] (Phone)  [phone] (Fax)        Comments:  Please advise, thanks

## 2019-02-13 DIAGNOSIS — E11.22 TYPE 2 DIABETES MELLITUS WITH STAGE 3 CHRONIC KIDNEY DISEASE, WITH LONG-TERM CURRENT USE OF INSULIN: ICD-10-CM

## 2019-02-13 DIAGNOSIS — Z79.4 TYPE 2 DIABETES MELLITUS WITH STAGE 3 CHRONIC KIDNEY DISEASE, WITH LONG-TERM CURRENT USE OF INSULIN: ICD-10-CM

## 2019-02-13 DIAGNOSIS — N18.30 TYPE 2 DIABETES MELLITUS WITH STAGE 3 CHRONIC KIDNEY DISEASE, WITH LONG-TERM CURRENT USE OF INSULIN: ICD-10-CM

## 2019-02-13 RX ORDER — SYRINGE,SAFETY WITH NEEDLE,1ML 25GX1"
SYRINGE (EA) MISCELLANEOUS
Qty: 200 EACH | Refills: 6 | Status: SHIPPED | OUTPATIENT
Start: 2019-02-13

## 2019-02-20 ENCOUNTER — PES CALL (OUTPATIENT)
Dept: ADMINISTRATIVE | Facility: CLINIC | Age: 84
End: 2019-02-20

## 2019-04-02 ENCOUNTER — TELEPHONE (OUTPATIENT)
Dept: ENDOCRINOLOGY | Facility: CLINIC | Age: 84
End: 2019-04-02

## 2019-04-02 NOTE — TELEPHONE ENCOUNTER
----- Message from Fallon Madera sent at 4/2/2019 11:04 AM CDT -----  Contact:    Angella   [phone]  Needs Advice   /  Patient  Daughter     Reason for call:     Pt  Needs another appt schedule Tuesday or Thursday in the Am between hours of 10 and 11  ..  Call back to schedule         Communication Preference:  Phone     Additional Information:

## 2019-04-02 NOTE — TELEPHONE ENCOUNTER
Spoke with pt daughter and offer Janet August appointment but pt daughter wants early than August. Pt was upset.

## 2019-04-29 ENCOUNTER — TELEPHONE (OUTPATIENT)
Dept: INTERNAL MEDICINE | Facility: CLINIC | Age: 84
End: 2019-04-29

## 2019-04-29 RX ORDER — DULOXETIN HYDROCHLORIDE 60 MG/1
60 CAPSULE, DELAYED RELEASE ORAL DAILY
Qty: 90 CAPSULE | Refills: 0 | Status: SHIPPED | OUTPATIENT
Start: 2019-04-29 | End: 2019-05-28 | Stop reason: SDUPTHER

## 2019-04-29 NOTE — TELEPHONE ENCOUNTER
LVM to pt informing that requested medication refilled and sent by electronic prescription to the pharmacy. Advised pt to call pharmacy prior .

## 2019-04-29 NOTE — TELEPHONE ENCOUNTER
----- Message from Cecilia Nicholas sent at 4/29/2019  8:02 AM CDT -----  Contact: care taker Ms PAYTON [phone]  Type: Rx    Name of medication(s):  DULoxetine (CYMBALTA) 60 MG capsule    Is this a refill? New rx? refill    Who prescribed medication?Dr Amaya    Pharmacy Name, Phone, & Location: [facility]/pharmacy #[id number] -    Comments: Patient is out of above medication and needs it today, please call Ms Payton back    Thank you

## 2019-05-28 ENCOUNTER — LAB VISIT (OUTPATIENT)
Dept: LAB | Facility: HOSPITAL | Age: 84
End: 2019-05-28
Attending: FAMILY MEDICINE
Payer: MEDICARE

## 2019-05-28 ENCOUNTER — OFFICE VISIT (OUTPATIENT)
Dept: INTERNAL MEDICINE | Facility: CLINIC | Age: 84
End: 2019-05-28
Attending: FAMILY MEDICINE
Payer: MEDICARE

## 2019-05-28 VITALS
HEIGHT: 71 IN | SYSTOLIC BLOOD PRESSURE: 116 MMHG | WEIGHT: 188.81 LBS | HEART RATE: 74 BPM | OXYGEN SATURATION: 96 % | DIASTOLIC BLOOD PRESSURE: 60 MMHG | BODY MASS INDEX: 26.43 KG/M2 | TEMPERATURE: 97 F

## 2019-05-28 DIAGNOSIS — I10 HYPERTENSION, ESSENTIAL: ICD-10-CM

## 2019-05-28 DIAGNOSIS — Z79.4 TYPE 2 DIABETES MELLITUS WITH DIABETIC POLYNEUROPATHY, WITH LONG-TERM CURRENT USE OF INSULIN: Primary | ICD-10-CM

## 2019-05-28 DIAGNOSIS — G30.9 MIXED ALZHEIMER'S AND VASCULAR DEMENTIA: ICD-10-CM

## 2019-05-28 DIAGNOSIS — E78.2 MIXED HYPERLIPIDEMIA: ICD-10-CM

## 2019-05-28 DIAGNOSIS — N18.30 ANEMIA IN STAGE 3 CHRONIC KIDNEY DISEASE: ICD-10-CM

## 2019-05-28 DIAGNOSIS — D63.1 ANEMIA IN STAGE 3 CHRONIC KIDNEY DISEASE: ICD-10-CM

## 2019-05-28 DIAGNOSIS — F02.80 MIXED ALZHEIMER'S AND VASCULAR DEMENTIA: ICD-10-CM

## 2019-05-28 DIAGNOSIS — E11.42 TYPE 2 DIABETES MELLITUS WITH DIABETIC POLYNEUROPATHY, WITH LONG-TERM CURRENT USE OF INSULIN: Primary | ICD-10-CM

## 2019-05-28 DIAGNOSIS — F01.50 MIXED ALZHEIMER'S AND VASCULAR DEMENTIA: ICD-10-CM

## 2019-05-28 DIAGNOSIS — E11.42 TYPE 2 DIABETES MELLITUS WITH DIABETIC POLYNEUROPATHY, WITH LONG-TERM CURRENT USE OF INSULIN: ICD-10-CM

## 2019-05-28 DIAGNOSIS — Z79.4 TYPE 2 DIABETES MELLITUS WITH STAGE 3 CHRONIC KIDNEY DISEASE, WITH LONG-TERM CURRENT USE OF INSULIN: ICD-10-CM

## 2019-05-28 DIAGNOSIS — M47.26 OSTEOARTHRITIS OF SPINE WITH RADICULOPATHY, LUMBAR REGION: ICD-10-CM

## 2019-05-28 DIAGNOSIS — N18.30 CKD (CHRONIC KIDNEY DISEASE), STAGE III: ICD-10-CM

## 2019-05-28 DIAGNOSIS — R93.89 ABNORMAL CHEST X-RAY: ICD-10-CM

## 2019-05-28 DIAGNOSIS — I25.10 CORONARY ARTERY DISEASE INVOLVING NATIVE CORONARY ARTERY OF NATIVE HEART WITHOUT ANGINA PECTORIS: Chronic | ICD-10-CM

## 2019-05-28 DIAGNOSIS — Z79.4 TYPE 2 DIABETES MELLITUS WITH DIABETIC POLYNEUROPATHY, WITH LONG-TERM CURRENT USE OF INSULIN: ICD-10-CM

## 2019-05-28 DIAGNOSIS — E11.22 TYPE 2 DIABETES MELLITUS WITH STAGE 3 CHRONIC KIDNEY DISEASE, WITH LONG-TERM CURRENT USE OF INSULIN: ICD-10-CM

## 2019-05-28 DIAGNOSIS — N18.30 TYPE 2 DIABETES MELLITUS WITH STAGE 3 CHRONIC KIDNEY DISEASE, WITH LONG-TERM CURRENT USE OF INSULIN: ICD-10-CM

## 2019-05-28 LAB
ALBUMIN SERPL BCP-MCNC: 3.6 G/DL (ref 3.5–5.2)
ALP SERPL-CCNC: 90 U/L (ref 55–135)
ALT SERPL W/O P-5'-P-CCNC: 30 U/L (ref 10–44)
ANION GAP SERPL CALC-SCNC: 10 MMOL/L (ref 8–16)
AST SERPL-CCNC: 33 U/L (ref 10–40)
BILIRUB SERPL-MCNC: 1.1 MG/DL (ref 0.1–1)
BUN SERPL-MCNC: 22 MG/DL (ref 8–23)
CALCIUM SERPL-MCNC: 9.3 MG/DL (ref 8.7–10.5)
CHLORIDE SERPL-SCNC: 103 MMOL/L (ref 95–110)
CHOLEST SERPL-MCNC: 145 MG/DL (ref 120–199)
CHOLEST/HDLC SERPL: 3.9 {RATIO} (ref 2–5)
CO2 SERPL-SCNC: 23 MMOL/L (ref 23–29)
CREAT SERPL-MCNC: 1.2 MG/DL (ref 0.5–1.4)
ERYTHROCYTE [DISTWIDTH] IN BLOOD BY AUTOMATED COUNT: 13.9 % (ref 11.5–14.5)
EST. GFR  (AFRICAN AMERICAN): >60 ML/MIN/1.73 M^2
EST. GFR  (NON AFRICAN AMERICAN): 54 ML/MIN/1.73 M^2
ESTIMATED AVG GLUCOSE: 174 MG/DL (ref 68–131)
GLUCOSE SERPL-MCNC: 231 MG/DL (ref 70–110)
HBA1C MFR BLD HPLC: 7.7 % (ref 4–5.6)
HCT VFR BLD AUTO: 42.3 % (ref 40–54)
HDLC SERPL-MCNC: 37 MG/DL (ref 40–75)
HDLC SERPL: 25.5 % (ref 20–50)
HGB BLD-MCNC: 13.8 G/DL (ref 14–18)
LDLC SERPL CALC-MCNC: 74.6 MG/DL (ref 63–159)
MCH RBC QN AUTO: 28.8 PG (ref 27–31)
MCHC RBC AUTO-ENTMCNC: 32.6 G/DL (ref 32–36)
MCV RBC AUTO: 88 FL (ref 82–98)
NONHDLC SERPL-MCNC: 108 MG/DL
PLATELET # BLD AUTO: 261 K/UL (ref 150–350)
PMV BLD AUTO: 10.9 FL (ref 9.2–12.9)
POTASSIUM SERPL-SCNC: 4.4 MMOL/L (ref 3.5–5.1)
PROT SERPL-MCNC: 7.2 G/DL (ref 6–8.4)
RBC # BLD AUTO: 4.79 M/UL (ref 4.6–6.2)
SODIUM SERPL-SCNC: 136 MMOL/L (ref 136–145)
TRIGL SERPL-MCNC: 167 MG/DL (ref 30–150)
WBC # BLD AUTO: 6.47 K/UL (ref 3.9–12.7)

## 2019-05-28 PROCEDURE — 1101F PT FALLS ASSESS-DOCD LE1/YR: CPT | Mod: HCNC,CPTII,S$GLB, | Performed by: FAMILY MEDICINE

## 2019-05-28 PROCEDURE — 99999 PR PBB SHADOW E&M-EST. PATIENT-LVL V: ICD-10-PCS | Mod: PBBFAC,HCNC,, | Performed by: FAMILY MEDICINE

## 2019-05-28 PROCEDURE — 99999 PR PBB SHADOW E&M-EST. PATIENT-LVL V: CPT | Mod: PBBFAC,HCNC,, | Performed by: FAMILY MEDICINE

## 2019-05-28 PROCEDURE — 80061 LIPID PANEL: CPT | Mod: HCNC

## 2019-05-28 PROCEDURE — 1101F PR PT FALLS ASSESS DOC 0-1 FALLS W/OUT INJ PAST YR: ICD-10-PCS | Mod: HCNC,CPTII,S$GLB, | Performed by: FAMILY MEDICINE

## 2019-05-28 PROCEDURE — 85027 COMPLETE CBC AUTOMATED: CPT | Mod: HCNC

## 2019-05-28 PROCEDURE — 36415 COLL VENOUS BLD VENIPUNCTURE: CPT | Mod: HCNC

## 2019-05-28 PROCEDURE — 99214 OFFICE O/P EST MOD 30 MIN: CPT | Mod: HCNC,S$GLB,, | Performed by: FAMILY MEDICINE

## 2019-05-28 PROCEDURE — 80053 COMPREHEN METABOLIC PANEL: CPT | Mod: HCNC

## 2019-05-28 PROCEDURE — 83036 HEMOGLOBIN GLYCOSYLATED A1C: CPT | Mod: HCNC

## 2019-05-28 PROCEDURE — 99214 PR OFFICE/OUTPT VISIT, EST, LEVL IV, 30-39 MIN: ICD-10-PCS | Mod: HCNC,S$GLB,, | Performed by: FAMILY MEDICINE

## 2019-05-28 RX ORDER — DULOXETIN HYDROCHLORIDE 60 MG/1
60 CAPSULE, DELAYED RELEASE ORAL DAILY
Qty: 90 CAPSULE | Refills: 0 | Status: SHIPPED | OUTPATIENT
Start: 2019-05-28 | End: 2019-12-30

## 2019-05-28 NOTE — PROGRESS NOTES
Subjective:       Patient ID: Nickolas Blake is a 88 y.o. male.    Chief Complaint: Follow-up    Established patient follows up for management of chronic medical illnesses with complaints today. Please see dictation and ROS for interval problems, specific complaints and disease management discussion.    P, S, Fm, Soc Hx's; Meds, allergies reviewed and reconciled.  Health maintenance file reviewed and addressed items due.    Review of Systems   Constitutional: Negative for chills, fatigue, fever and unexpected weight change.   HENT: Negative for congestion and trouble swallowing.    Eyes: Negative for redness and visual disturbance.   Respiratory: Negative for cough, chest tightness and shortness of breath.    Cardiovascular: Negative for chest pain, palpitations and leg swelling.   Gastrointestinal: Negative for abdominal pain and blood in stool.   Genitourinary: Negative for difficulty urinating and hematuria.   Musculoskeletal: Positive for arthralgias, back pain and gait problem. Negative for joint swelling, myalgias and neck pain.   Skin: Negative for color change and rash.   Neurological: Positive for weakness. Negative for tremors, speech difficulty, numbness and headaches.   Hematological: Negative for adenopathy. Does not bruise/bleed easily.   Psychiatric/Behavioral: Negative for behavioral problems, confusion and sleep disturbance. The patient is not nervous/anxious.        Objective:      Physical Exam   Constitutional: He is oriented to person, place, and time. He appears well-developed and well-nourished. No distress.   umkempt   HENT:   Head: Normocephalic.   Right Ear: Tympanic membrane and external ear normal. A foreign body is present.   Left Ear: Tympanic membrane and external ear normal. A foreign body is present.   Nose: Nose normal.   Mouth/Throat: Uvula is midline, oropharynx is clear and moist and mucous membranes are normal. No oropharyngeal exudate.   Eyes: Conjunctivae are normal. Right  eye exhibits no discharge. Left eye exhibits no discharge. No scleral icterus.   Neck: Normal range of motion. Neck supple. No thyromegaly present.   Cardiovascular: Normal rate, regular rhythm, normal heart sounds and intact distal pulses. Exam reveals no gallop and no friction rub.   No murmur heard.  Pulmonary/Chest: Effort normal. No respiratory distress. He has decreased breath sounds. He has no wheezes. He has no rales. He exhibits no tenderness.   Abdominal: Soft. There is no tenderness.   Musculoskeletal: He exhibits no edema.        Lumbar back: He exhibits decreased range of motion.   Lymphadenopathy:     He has no cervical adenopathy.   Neurological: He is alert and oriented to person, place, and time. No cranial nerve deficit. Gait abnormal. GCS eye subscore is 4. GCS verbal subscore is 5. GCS motor subscore is 6.   Sl shuffling   Skin: Skin is warm and dry. No rash noted. He is not diaphoretic.   Psychiatric: His speech is normal and behavior is normal.   Nursing note and vitals reviewed.      Assessment:       1. Type 2 diabetes mellitus with diabetic polyneuropathy, with long-term current use of insulin    2. Type 2 diabetes mellitus with stage 3 chronic kidney disease, with long-term current use of insulin    3. Hypertension, essential    4. Mixed hyperlipidemia    5. Coronary artery disease involving native coronary artery of native heart without angina pectoris    6. CKD (chronic kidney disease), stage III    7. Abnormal chest x-ray    8. Osteoarthritis of spine with radiculopathy, lumbar region    9. Anemia in stage 3 chronic kidney disease    10. Mixed Alzheimer's and vascular dementia        Plan:     Medication List with Changes/Refills   Current Medications    ACETAMINOPHEN (TYLENOL) 500 MG CAP    Take 1 capsule by mouth every 6 to 8 hours as needed.     ASCORBIC ACID, VITAMIN C, (VITAMIN C) 250 MG TABLET    Take 1 tablet (250 mg total) by mouth 2 (two) times daily.    ASPIRIN (ECOTRIN) 81 MG  "EC TABLET    Take 81 mg by mouth once daily.    ATORVASTATIN (LIPITOR) 40 MG TABLET    Take 1 tablet (40 mg total) by mouth once daily.    BD ALCOHOL SWABS PADM    USE FOUR TIMES DAILY    BD ULTRA-FINE SHORT PEN NEEDLE 31 GAUGE X 5/16" NDLE    Inject 1 application into the skin 5 (five) times daily.    BLOOD SUGAR DIAGNOSTIC (ACCU-CHEK [place] PLUS TEST STRP) STRP    TEST TWO TIMES DAILY WITH MEALS    BLOOD SUGAR DIAGNOSTIC STRP    To check BG 3 times daily, to use with insurance preferred meter    DONEPEZIL (ARICEPT) 10 MG TABLET    Take 10 mg by mouth every evening.    DULOXETINE (CYMBALTA) 60 MG CAPSULE    Take 1 capsule (60 mg total) by mouth once daily.    FINASTERIDE (PROSCAR) 5 MG TABLET    TAKE 1 TABLET EVERY DAY    FLUTICASONE (FLONASE) 50 MCG/ACTUATION NASAL SPRAY    2 sprays by Each Nare route daily as needed for Allergies.    GABAPENTIN (NEURONTIN) 300 MG CAPSULE    Take 1 capsule (300 mg total) by mouth 3 (three) times daily.    INSULIN ASPART U-100 (NOVOLOG) 100 UNIT/ML INPN PEN    Inject 8 Units into the skin 3 (three) times daily with meals.    INSULIN GLARGINE (LANTUS U-100 INSULIN) 100 UNIT/ML INJECTION    Inject 20 Units into the skin once daily.    INSULIN SYRINGE-NEEDLE U-100 0.5 ML 30 GAUGE X 5/16" SYRG    To use with Lantus vials - 2 times daily    LANCETS MISC    1 each by Misc.(Non-Drug; Combo Route) route 2 (two) times daily.    LISINOPRIL (PRINIVIL,ZESTRIL) 20 MG TABLET    Take 1 tablet (20 mg total) by mouth once daily.    METOPROLOL SUCCINATE (TOPROL-XL) 50 MG 24 HR TABLET    TAKE 1 TABLET DAILY. DOSE INCREASE, STOP 25 MG    NITROGLYCERIN (NITROSTAT) 0.4 MG SL TABLET    Place 1 tablet (0.4 mg total) under the tongue every 5 (five) minutes as needed for Chest pain.    SENNA-DOCUSATE 8.6-50 MG (PERICOLACE) 8.6-50 MG PER TABLET    Take 1 tablet by mouth 2 (two) times daily as needed for Constipation.    TAMSULOSIN (FLOMAX) 0.4 MG CAP    TAKE 2 CAPSULES ONE TIME DAILY     Nickolas was seen " today for follow-up.    Diagnoses and all orders for this visit:    Type 2 diabetes mellitus with diabetic polyneuropathy, with long-term current use of insulin  -     Hemoglobin A1c; Standing  -     Comprehensive metabolic panel; Standing    Type 2 diabetes mellitus with stage 3 chronic kidney disease, with long-term current use of insulin  -     Microalbumin/creatinine urine ratio; Standing  -     Ambulatory referral to Cardiology  -     Ambulatory referral to Optometry    Hypertension, essential    Mixed hyperlipidemia  -     Lipid panel; Standing    Coronary artery disease involving native coronary artery of native heart without angina pectoris    CKD (chronic kidney disease), stage III    Abnormal chest x-ray    Osteoarthritis of spine with radiculopathy, lumbar region    Anemia in stage 3 chronic kidney disease  -     CBC Without Differential; Standing    Mixed Alzheimer's and vascular dementia  -     Ambulatory referral to Neurology      See meds, orders, follow up, routing and instructions sections of encounter.  An 88-year-old established male patient.  He is here to see me for followup.  He   is brought in by a caretaker.  They are asking for a male caretaker help at   home since he is refusing to take baths and their caretaker staff is only   female.  I suggested looking for a male attendant, but I do not think this would   be an insurance issue.  Currently, the patient's son visits on Sundays, full   day and on Tuesday and Thursday evenings, otherwise he has sitters.  Apparently,   there is some sort of falling out amongst the other family members at this time   and that is his support network today.    The patient has not followed up with any of his consults including a Pain   Management, Cardiology and Neurology, etc.  He was seeing Dr. Colon at [facility] for his back, but again no recent followups and we never did receive   notes from them.    RECOMMENDATIONS:  Update consults.  Update labs.   The patient is currently   taking Tylenol for pain management.  His assumed blood sugars are similar to   previous.  He has not followed up with endocrinologist any time recently as well   and his caretaker states [facility].    I suggested a referral to the complex case management should patient continue to   have home deterioration in terms of soliciting sitter support, etc.    Additionally, resources may be available through the [employer].    Followup presumably is in six months.        PHYLLIS/HN  dd: 05/28/2019 12:02:12 (CDT)  td: 05/28/2019 23:49:25 (CDT)  Doc ID   #[id number]  Job ID #[id number]    CC:

## 2019-06-03 ENCOUNTER — TELEPHONE (OUTPATIENT)
Dept: INTERNAL MEDICINE | Facility: CLINIC | Age: 84
End: 2019-06-03

## 2019-06-03 NOTE — TELEPHONE ENCOUNTER
Spoke to care taker/mahesh/[phone] and advised to take pt to ER now  Mahesh said will have to try to convince pt to go to ER and also get his son to come and take him to ER since pt does not want ambulance.   Mahesh will keep us updated.

## 2019-06-03 NOTE — TELEPHONE ENCOUNTER
----- Message from Luh Daigle sent at 6/3/2019  1:14 PM CDT -----  Contact: Francisco/Angella [phone]  Please call to advise what to do.    Please call and advise.    Thank You

## 2019-06-03 NOTE — TELEPHONE ENCOUNTER
----- Message from Martha Jauregui sent at 6/3/2019 10:03 AM CDT -----  Contact: care taker/mahesh/[phone]  Pt care taker called in regards to the pt having a very bad fall on yesterday. He is awake off and on/he is not hungry and they wanted to know what should they do. Pt has no wounds.      Please advise

## 2019-06-03 NOTE — TELEPHONE ENCOUNTER
----- Message from Mary Gallo sent at 6/3/2019 11:37 AM CDT -----  Contact: [phone]  Patient is returning a phone call.  Who left a message for the patient: Maria Elena  Does patient know what this is regarding:    Comments: please advise, thanks

## 2019-06-04 ENCOUNTER — HOSPITAL ENCOUNTER (EMERGENCY)
Facility: HOSPITAL | Age: 84
Discharge: HOME | End: 2019-06-04
Attending: EMERGENCY MEDICINE
Payer: MEDICARE

## 2019-06-04 ENCOUNTER — TELEPHONE (OUTPATIENT)
Dept: NEUROLOGY | Facility: CLINIC | Age: 84
End: 2019-06-04

## 2019-06-04 VITALS
WEIGHT: 180 LBS | TEMPERATURE: 98 F | BODY MASS INDEX: 25.2 KG/M2 | DIASTOLIC BLOOD PRESSURE: 72 MMHG | HEIGHT: 71 IN | HEART RATE: 70 BPM | SYSTOLIC BLOOD PRESSURE: 136 MMHG | OXYGEN SATURATION: 94 % | RESPIRATION RATE: 20 BRPM

## 2019-06-04 DIAGNOSIS — M25.559 HIP PAIN: ICD-10-CM

## 2019-06-04 DIAGNOSIS — R53.1 WEAKNESS: ICD-10-CM

## 2019-06-04 LAB
ALBUMIN SERPL BCP-MCNC: 3.4 G/DL (ref 3.5–5.2)
ALP SERPL-CCNC: 94 U/L (ref 55–135)
ALT SERPL W/O P-5'-P-CCNC: 23 U/L (ref 10–44)
ANION GAP SERPL CALC-SCNC: 11 MMOL/L (ref 8–16)
AST SERPL-CCNC: 28 U/L (ref 10–40)
B-OH-BUTYR BLD STRIP-SCNC: 0.2 MMOL/L (ref 0–0.5)
BACTERIA #/AREA URNS AUTO: ABNORMAL /HPF
BASOPHILS # BLD AUTO: 0.08 K/UL (ref 0–0.2)
BASOPHILS NFR BLD: 1.1 % (ref 0–1.9)
BILIRUB SERPL-MCNC: 1.3 MG/DL (ref 0.1–1)
BILIRUB UR QL STRIP: NEGATIVE
BNP SERPL-MCNC: 40 PG/ML (ref 0–99)
BUN SERPL-MCNC: 25 MG/DL (ref 8–23)
CALCIUM SERPL-MCNC: 9.6 MG/DL (ref 8.7–10.5)
CHLORIDE SERPL-SCNC: 104 MMOL/L (ref 95–110)
CLARITY UR REFRACT.AUTO: ABNORMAL
CO2 SERPL-SCNC: 21 MMOL/L (ref 23–29)
COLOR UR AUTO: YELLOW
CREAT SERPL-MCNC: 1.4 MG/DL (ref 0.5–1.4)
DIFFERENTIAL METHOD: ABNORMAL
EOSINOPHIL # BLD AUTO: 0 K/UL (ref 0–0.5)
EOSINOPHIL NFR BLD: 0.1 % (ref 0–8)
ERYTHROCYTE [DISTWIDTH] IN BLOOD BY AUTOMATED COUNT: 13.6 % (ref 11.5–14.5)
EST. GFR  (AFRICAN AMERICAN): 51.5 ML/MIN/1.73 M^2
EST. GFR  (NON AFRICAN AMERICAN): 44.5 ML/MIN/1.73 M^2
GLUCOSE SERPL-MCNC: 306 MG/DL (ref 70–110)
GLUCOSE UR QL STRIP: ABNORMAL
HCT VFR BLD AUTO: 43.7 % (ref 40–54)
HGB BLD-MCNC: 14 G/DL (ref 14–18)
HGB UR QL STRIP: NEGATIVE
HYALINE CASTS UR QL AUTO: 21 /LPF
IMM GRANULOCYTES # BLD AUTO: 0.02 K/UL (ref 0–0.04)
IMM GRANULOCYTES NFR BLD AUTO: 0.3 % (ref 0–0.5)
INR PPP: 1 (ref 0.8–1.2)
KETONES UR QL STRIP: NEGATIVE
LEUKOCYTE ESTERASE UR QL STRIP: NEGATIVE
LYMPHOCYTES # BLD AUTO: 2 K/UL (ref 1–4.8)
LYMPHOCYTES NFR BLD: 26.5 % (ref 18–48)
MCH RBC QN AUTO: 29 PG (ref 27–31)
MCHC RBC AUTO-ENTMCNC: 32 G/DL (ref 32–36)
MCV RBC AUTO: 91 FL (ref 82–98)
MICROSCOPIC COMMENT: ABNORMAL
MONOCYTES # BLD AUTO: 1.1 K/UL (ref 0.3–1)
MONOCYTES NFR BLD: 14.2 % (ref 4–15)
NEUTROPHILS # BLD AUTO: 4.3 K/UL (ref 1.8–7.7)
NEUTROPHILS NFR BLD: 57.8 % (ref 38–73)
NITRITE UR QL STRIP: NEGATIVE
NRBC BLD-RTO: 0 /100 WBC
PH UR STRIP: 5 [PH] (ref 5–8)
PLATELET # BLD AUTO: 232 K/UL (ref 150–350)
PMV BLD AUTO: 10.8 FL (ref 9.2–12.9)
POCT GLUCOSE: 282 MG/DL (ref 70–110)
POCT GLUCOSE: 308 MG/DL (ref 70–110)
POTASSIUM SERPL-SCNC: 4.5 MMOL/L (ref 3.5–5.1)
PROT SERPL-MCNC: 7.4 G/DL (ref 6–8.4)
PROT UR QL STRIP: ABNORMAL
PROTHROMBIN TIME: 10 SEC (ref 9–12.5)
RBC # BLD AUTO: 4.83 M/UL (ref 4.6–6.2)
RBC #/AREA URNS AUTO: 4 /HPF (ref 0–4)
SODIUM SERPL-SCNC: 136 MMOL/L (ref 136–145)
SP GR UR STRIP: 1.02 (ref 1–1.03)
SQUAMOUS #/AREA URNS AUTO: 2 /HPF
TROPONIN I SERPL DL<=0.01 NG/ML-MCNC: 0.01 NG/ML (ref 0–0.03)
URN SPEC COLLECT METH UR: ABNORMAL
WBC # BLD AUTO: 7.48 K/UL (ref 3.9–12.7)
WBC #/AREA URNS AUTO: 2 /HPF (ref 0–5)

## 2019-06-04 PROCEDURE — 80053 COMPREHEN METABOLIC PANEL: CPT | Mod: HCNC

## 2019-06-04 PROCEDURE — 81001 URINALYSIS AUTO W/SCOPE: CPT | Mod: HCNC

## 2019-06-04 PROCEDURE — 83880 ASSAY OF NATRIURETIC PEPTIDE: CPT | Mod: HCNC

## 2019-06-04 PROCEDURE — 36000 PLACE NEEDLE IN VEIN: CPT | Mod: HCNC

## 2019-06-04 PROCEDURE — 93010 EKG 12-LEAD: ICD-10-PCS | Mod: HCNC,,, | Performed by: INTERNAL MEDICINE

## 2019-06-04 PROCEDURE — 82962 GLUCOSE BLOOD TEST: CPT | Mod: HCNC,91

## 2019-06-04 PROCEDURE — 85025 COMPLETE CBC W/AUTO DIFF WBC: CPT | Mod: HCNC

## 2019-06-04 PROCEDURE — 99285 EMERGENCY DEPT VISIT HI MDM: CPT | Mod: 25,HCNC

## 2019-06-04 PROCEDURE — 84484 ASSAY OF TROPONIN QUANT: CPT | Mod: HCNC

## 2019-06-04 PROCEDURE — 85610 PROTHROMBIN TIME: CPT | Mod: HCNC

## 2019-06-04 PROCEDURE — 99285 PR EMERGENCY DEPT VISIT,LEVEL V: ICD-10-PCS | Mod: HCNC,,, | Performed by: EMERGENCY MEDICINE

## 2019-06-04 PROCEDURE — 99285 EMERGENCY DEPT VISIT HI MDM: CPT | Mod: HCNC,,, | Performed by: EMERGENCY MEDICINE

## 2019-06-04 PROCEDURE — 93010 ELECTROCARDIOGRAM REPORT: CPT | Mod: HCNC,,, | Performed by: INTERNAL MEDICINE

## 2019-06-04 PROCEDURE — 93005 ELECTROCARDIOGRAM TRACING: CPT | Mod: HCNC

## 2019-06-04 PROCEDURE — 82010 KETONE BODYS QUAN: CPT | Mod: HCNC

## 2019-06-04 RX ORDER — AZITHROMYCIN 250 MG/1
250 TABLET, FILM COATED ORAL DAILY
Qty: 6 TABLET | Refills: 0 | Status: SHIPPED | OUTPATIENT
Start: 2019-06-04 | End: 2019-06-20

## 2019-06-04 NOTE — TELEPHONE ENCOUNTER
----- Message from Pamela Jones sent at 6/4/2019  7:50 AM CDT -----  Contact: pt's nurse  Name of Who is Calling: Keke      What is the request in detail: requesting to get the pt in today, pt fell on Srturday and hit his head, pt can barely walk, knees are weak, disoriented, dizziness. Pt had 2 falls on Saturday. Pt's sugar has been really high, has a really bad headache. Please call and advise      Can the clinic reply by [facility]: no      What Number to Call Back if not in [facility]: [phone]

## 2019-06-04 NOTE — ED PROVIDER NOTES
"Encounter Date: 6/4/2019    SCRIBE #1 NOTE: I, Rubi Washingtont, am scribing for, and in the presence of,  Dr. Day. I have scribed the entire note.       History     Chief Complaint   Patient presents with    Fall     Pt states he fell on Saturday twice, hitting head both times.  Denies LOC.  Family states pt has been very dizzy and "not acting right".       Time patient was seen by the provider: 10:11 AM      The patient is a 88 y.o. male with co-morbidities including: osteoarthritis and hypertension who presents to the ED with a complaint of hip, neck, and back pain status post falling twice. The patient's brother states that the pt has fallen twice in the past three days. Both times the pt fell on his back and hit his head. In the first fall, he hit his head on a doorframe, and in the second fall, he fell back and hit his head on the coffee table. The patient's brother states that the pt is complaining of headaches and sleeping a lot, but is eating normally. He states that the pt has seemed uncomfortable and was not feeling well enough to go out like he usually does on 06/02/19. The pt does not usually need assistance when ambulating but has barely been able to ambulate with assistance and a cane, and needed help to get out of his chair. The patient's brother states that the pt was   was ambulating better on Sunday, but that the pt says that "his knees feel like they're going to buckle."    The history is provided by the patient, medical records and a relative.     Review of patient's allergies indicates:  No Known Allergies  Past Medical History:   Diagnosis Date    Anemia in stage 3 chronic kidney disease 11/10/2016    BPH (benign prostatic hyperplasia) 10/26/2012    Cataract     Coronary artery disease involving native coronary artery of native heart without angina pectoris     Degeneration of lumbar or lumbosacral intervertebral disc 2/4/2014    Essential hypertension 9/30/2015    GERD " (gastroesophageal reflux disease)     Hyperlipidemia     Lumbar radiculopathy, right 12/9/2013    Major depressive disorder with single episode, 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    Mild cognitive impairment with memory loss 7/18/2016    Osteoarthritis of right hip 8/16/2016    Osteoarthritis of spine with radiculopathy, lumbar region     S/P percutaneous transluminal coronary angioplasty     Spinal stenosis of lumbar region at multiple levels 2/4/2014    Thoracic or lumbosacral neuritis or radiculitis, unspecified 4/21/2015    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 edema, with long-term current use of insulin 11/19/2015    Type 2 diabetes mellitus with diabetic polyneuropathy, with long-term current use of insulin 11/19/2015    Type 2 diabetes mellitus with stage 3 chronic kidney disease, with long-term current use of insulin 11/19/2015    Type 2 diabetes with peripheral circulatory disorder, controlled      Past Surgical History:   Procedure Laterality Date    ADENOIDECTOMY      CARDIAC CATHETERIZATION  1980    CATARACT EXTRACTION      CYST REMOVAL      rectum    SCOTT-TRANSFORAMINAL Bilateral 6/12/2014    [facility]    SCOTT-TRANSFORAMINAL Bilateral 12/9/2013    [facility]    EYE SURGERY      INJECTION MAJOR JOINT Right 1/25/2017    [facility]    INJECTION-JOINT Right 10/5/2016    [facility]    INJECTION-JOINT Right 8/24/2016    [facility]    INJECTION-STEROID-EPIDURAL Right 8/24/2016    [facility] at [facility]    INJECTION-STEROID-EPIDURAL-LUMBAR N/A 6/28/2016    [facility] at [facility]    INJECTION-STEROID-EPIDURAL-LUMBAR N/A 6/14/2016    [facility]  [facility]    INJECTION-STEROID-EPIDURAL-LUMBAR N/A 2015    [facility]    INJECTION-STEROID-EPIDURAL-TRANSFORAMINAL Bilateral 2/10/2014    [facility]    INJECTION/SI joint Right 2017    [facility]    MIS L3-4 LAMINECTOMY, fecetectomy, foraminotomy-LUMBAR; Right 2016    [facility]    TONSILLECTOMY       Family History   Problem Relation Age of Onset    Breast cancer Mother     Cancer Brother     Heart attack Brother     No Known Problems Father     No Known Problems Sister     No Known Problems Maternal Aunt     No Known Problems Maternal Uncle     No Known Problems Paternal Aunt     No Known Problems Paternal Uncle     No Known Problems Maternal Grandmother     No Known Problems Maternal Grandfather     No Known Problems Paternal Grandmother     No Known Problems Paternal Grandfather     Colon cancer Neg Hx     Colon polyps Neg Hx     Amblyopia Neg Hx     Blindness Neg Hx     Cataracts Neg Hx     Diabetes Neg Hx     Glaucoma Neg Hx     Hypertension Neg Hx     Macular degeneration Neg Hx     Retinal detachment Neg Hx     Strabismus Neg Hx     Stroke Neg Hx     Thyroid disease Neg Hx      Social History     Tobacco Use    Smoking status: Former Smoker     Packs/day: 0.25     Years: 50.00     Pack years: 12.50     Types: Cigarettes     Last attempt to quit: 1980     Years since quittin.2    Smokeless tobacco: Never Used   Substance Use Topics    Alcohol use: No    Drug use: No     Review of Systems   Constitutional: Negative for fever.   HENT: Negative for sore throat.    Respiratory: Negative for shortness of breath.    Cardiovascular: Negative for chest pain.   Gastrointestinal: Negative for nausea.   Genitourinary: Negative for dysuria.   Musculoskeletal: Positive for arthralgias (knee and hip pain), back pain and  neck pain.   Skin: Negative for rash.   Neurological: Positive for headaches.   Hematological: Does not bruise/bleed easily.       Physical Exam     Initial Vitals [06/04/19 0949]   BP Pulse Resp Temp SpO2   130/69 89 16 98 °F (36.7 °C) 95 %      MAP       --         Physical Exam    Nursing note and vitals reviewed.        ED Course   Procedures  Labs Reviewed   CBC W/ AUTO DIFFERENTIAL - Abnormal; Notable for the following components:       Result Value    Mono # 1.1 (*)     All other components within normal limits    Narrative:     shared lav   COMPREHENSIVE METABOLIC PANEL - Abnormal; Notable for the following components:    CO2 21 (*)     Glucose 306 (*)     BUN, Bld 25 (*)     Albumin 3.4 (*)     Total Bilirubin 1.3 (*)     eGFR if  51.5 (*)     eGFR if non  44.5 (*)     All other components within normal limits    Narrative:     shared lav   URINALYSIS, REFLEX TO URINE CULTURE - Abnormal; Notable for the following components:    Appearance, UA Hazy (*)     Protein, UA 1+ (*)     Glucose, UA 2+ (*)     All other components within normal limits    Narrative:     Preferred Collection Type->Urine, Clean Catch   URINALYSIS MICROSCOPIC - Abnormal; Notable for the following components:    Hyaline Casts, UA 21 (*)     All other components within normal limits    Narrative:     Preferred Collection Type->Urine, Clean Catch   POCT GLUCOSE - Abnormal; Notable for the following components:    POCT Glucose 308 (*)     All other components within normal limits   POCT GLUCOSE - Abnormal; Notable for the following components:    POCT Glucose 282 (*)     All other components within normal limits   PROTIME-INR    Narrative:     shared lav   TROPONIN I    Narrative:     shared lav   B-TYPE NATRIURETIC PEPTIDE    Narrative:     shared lav   BETA - HYDROXYBUTYRATE, SERUM     EKG Readings: (Independently Interpreted)   NSR 88, no ischemic changes.     ECG Results          EKG 12-lead (In process)   Result time 06/04/19 10:25:03    In process by Interface, Lab In [facility] (06/04/19 10:25:03)                 Narrative:    Test Reason : R53.1,    Vent. Rate : 088 BPM     Atrial Rate : 088 BPM     P-R Int : 172 ms          QRS Dur : 088 ms      QT Int : 368 ms       P-R-T Axes : 029 -09 044 degrees     QTc Int : 445 ms    Normal sinus rhythm  Normal ECG  When compared with ECG of 09-NOV-2016 15:21,  No significant change was found    Referred By: [email or web address]   SELF           Confirmed By:                             Imaging Results          X-Ray Chest AP Portable (Final result)  Result time 06/04/19 11:29:31    Final result by King Mccullough III, MD (06/04/19 11:29:31)                 Impression:      Left lower lobe pneumonia pleural fluid.      Electronically signed by: King Mccullough MD  Date:    06/04/2019  Time:    11:29             Narrative:    EXAMINATION:  XR CHEST AP PORTABLE    CLINICAL HISTORY:  Chest Pain;    FINDINGS:  Heart size is upper normal.  There is left lower lobe atelectasis versus infiltrate.  Right lung is clear.  The bones bowel gas are noncontributory.                               X-Ray Pelvis Routine AP (Final result)  Result time 06/04/19 11:30:08    Final result by King Mccullough III, MD (06/04/19 11:30:08)                 Narrative:    EXAMINATION:  XR PELVIS ROUTINE AP    CLINICAL HISTORY:  hip pain;    FINDINGS:  Both hips demonstrate mild DJD and impingement change.  No fracture dislocation bone destruction seen.      Electronically signed by: King Mccullough MD  Date:    06/04/2019  Time:    11:30                             X-Ray Femur Ap/Lat Bilateral (Final result)  Result time 06/04/19 11:30:57    Final result by King Mccullough III, MD (06/04/19 11:30:57)                 Narrative:    EXAMINATION:  XR FEMUR 2 VIEW BILATERAL    CLINICAL HISTORY:  Pain in unspecified hip    FINDINGS:  Two views bilateral:    Right: No fracture dislocation bone destruction seen.  There is  mild DJD.    Left: No fracture dislocation bone destruction seen.  There is mild DJD.      Electronically signed by: King Mccullough MD  Date:    06/04/2019  Time:    11:30                             CT Lumbar Spine Without Contrast (Final result)  Result time 06/04/19 11:58:04    Final result by Ricardo Casas MD (06/04/19 11:58:04)                 Impression:      No acute fracture or listhesis.    Stable moderate lumbar spondylosis.    Stable left renal cyst.    Moderate calcific atherosclerosis of the abdominal aorta.  Stable calcified intimal flap possibly representing an ulcerated plaque or localized dissection.    Electronically signed by resident: Erickson Pardo  Date:    06/04/2019  Time:    11:13    Electronically signed by: Ricardo Casas MD  Date:    06/04/2019  Time:    11:58             Narrative:    EXAMINATION:  CT LUMBAR SPINE WITHOUT CONTRAST    CLINICAL HISTORY:  Low back pain, risk factors (osteoporosis or chronic steroid use or elderly);    TECHNIQUE:  Low-dose axial, sagittal and coronal reformations are obtained through the lumbar spine.  Contrast was not administered.    COMPARISON:  MRI lumbar spine 07/20/2016, CT chest abdomen pelvis 07/08/2008    FINDINGS:  Lumbar spine alignment within normal limits.  Redemonstration of mild height loss of the T11 and L1 vertebral bodies, similar to prior exam.  Remaining vertebrae demonstrate preserved body height without evidence of acute fracture or aggressive osseous lesion.  Stable moderate degenerative change with areas of significant intervertebral disc height loss and adjacent endplate changes; findings most prominent at L1-L2 and L3-L4.  Regional soft tissue structures include a left renal cyst as well as moderate calcific atherosclerosis of the abdominal aorta with extension into the proximal iliac vasculature bilaterally.  Of note, there is calcification of an intimal flap possibly representing an ulcerated plaque or localized dissection, stable  dating back to July 2008 CT.                               CT Cervical Spine Without Contrast (Final result)  Result time 06/04/19 11:19:30    Final result by Ricardo Casas MD (06/04/19 11:19:30)                 Impression:      No acute fracture or dislocation.    Moderate cervical spondylosis.    Electronically signed by resident: Erickson Pardo  Date:    06/04/2019  Time:    11:02    Electronically signed by: Ricardo Casas MD  Date:    06/04/2019  Time:    11:19             Narrative:    EXAMINATION:  CT CERVICAL SPINE WITHOUT CONTRAST    CLINICAL HISTORY:  C-spine trauma, NEXUS/CCR positive, +risk factor(s);    TECHNIQUE:  Low dose axial images, sagittal and coronal reformations were performed though the cervical spine.  Contrast was not administered.    COMPARISON:  None    FINDINGS:  Mild dextroscoliosis of the cervical spine.  Grade 1 anterolisthesis of C4 on C5.  Vertebral body heights are relatively well maintained and are without evidence for acute fracture or dislocation.  Moderate cervical spondylosis with multilevel disc space narrowing and uncovertebral spurring.  Findings most significant within the lower cervical spine where there are areas of moderate neural foraminal narrowing.  The soft tissue structures visualized in the neck are unremarkable.  The airway is patent and the lung apices are unremarkable.  The visualized portions of the brain demonstrate no significant abnormality.                               CT Head Without Contrast (Final result)  Result time 06/04/19 11:04:40    Final result by Neil Ridley MD (06/04/19 11:04:40)                 Impression:      No acute intracranial abnormality.      Electronically signed by: Neil Ridley MD  Date:    06/04/2019  Time:    11:04             Narrative:    EXAMINATION:  CT HEAD WITHOUT CONTRAST    CLINICAL HISTORY:  Confusion/delirium, altered LOC, unexplained;    TECHNIQUE:  Low dose axial images were obtained through the head.  Coronal and  sagittal reformations were also performed. Contrast was not administered.    COMPARISON:  None.    FINDINGS:  Exam is slightly motion limited.  There is no midline shift, hydrocephalus or mass effect.  No evidence of acute intracranial hemorrhage or acute major vascular territory infarct.  No abnormal extra-axial fluid collections.  No evidence of a space-occupying mass.  There is carotid and vertebral atherosclerosis.  Calvarium is intact without evidence of fracture.  Visualized paranasal sinuses and mastoid air cells are essentially clear with only trace left mastoid fluid..                                 Medical Decision Making:   History:   Old Medical Records: I decided to obtain old medical records.  Clinical Tests:   Lab Tests: Ordered and Reviewed  Radiological Study: Ordered and Reviewed  Medical Tests: Ordered and Reviewed  ED Management:  2:07 PM  Question of pneumonia on the chest X-ray, apparently this is a chronic fining for him. I had recommended doing a cat scan of his head but the family and pt are requesting discharge home. Will prescribe Zithromax as an antibiotic and ask that they return to the ED if he is doing worse. They are comfortable with taking care of him at home, will discharge.            Scribe Attestation:   Scribe #1: I performed the above scribed service and the documentation accurately describes the services I performed. I attest to the accuracy of the note.               Clinical Impression:       ICD-10-CM ICD-9-CM   1. Weakness R53.1 780.79   2. Hip pain M25.559 719.45         Disposition:   Disposition: Discharged  Condition: Stable

## 2019-06-04 NOTE — ED PROVIDER NOTES
"Encounter Date: 6/4/2019       History     Chief Complaint   Patient presents with    Fall     Pt states he fell on Saturday twice, hitting head both times.  Denies LOC.  Family states pt has been very dizzy and "not acting right".       88-year-old male presents with weakness and falling.  He fell twice Saturday.  He hit his head.  He seems to have a headache and is not as interactive.  He has discomfort to his neck and back.  There is no cough fever.  He does have generalized weakness.        Review of patient's allergies indicates:  No Known Allergies  Past Medical History:   Diagnosis Date    Anemia in stage 3 chronic kidney disease 11/10/2016    BPH (benign prostatic hyperplasia) 10/26/2012    Cataract     Coronary artery disease involving native coronary artery of native heart without angina pectoris     Degeneration of lumbar or lumbosacral intervertebral disc 2/4/2014    Essential hypertension 9/30/2015    GERD (gastroesophageal reflux disease)     Hyperlipidemia     Lumbar radiculopathy, right 12/9/2013    Major depressive disorder with single episode, 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    Mild cognitive impairment with memory loss 7/18/2016    Osteoarthritis of right hip 8/16/2016    Osteoarthritis of spine with radiculopathy, lumbar region     S/P percutaneous transluminal coronary angioplasty     Spinal stenosis of lumbar region at multiple levels 2/4/2014    Thoracic or lumbosacral neuritis or radiculitis, unspecified 4/21/2015    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 edema, with long-term current use of insulin 11/19/2015    Type 2 diabetes mellitus with diabetic polyneuropathy, with long-term current use of insulin 11/19/2015    Type 2 diabetes mellitus with stage 3 chronic kidney disease, with long-term current use of insulin 11/19/2015    Type 2 diabetes with peripheral circulatory disorder, controlled      Past " Surgical History:   Procedure Laterality Date    ADENOIDECTOMY      CARDIAC CATHETERIZATION  1980    CATARACT EXTRACTION      CYST REMOVAL      rectum    SCOTT-TRANSFORAMINAL Bilateral 6/12/2014    [facility]    SCOTT-TRANSFORAMINAL Bilateral 12/9/2013    [facility]    EYE SURGERY      INJECTION MAJOR JOINT Right 1/25/2017    [facility] at [facility]    INJECTION-JOINT Right 10/5/2016    [facility]    INJECTION-JOINT Right 8/24/2016    [facility]    INJECTION-STEROID-EPIDURAL Right 8/24/2016    [facility]    INJECTION-STEROID-EPIDURAL-LUMBAR N/A 6/28/2016    [facility]    INJECTION-STEROID-EPIDURAL-LUMBAR N/A 6/14/2016    [facility] at [facility]    INJECTION-STEROID-EPIDURAL-LUMBAR N/A 5/18/2015    [facility]    INJECTION-STEROID-EPIDURAL-TRANSFORAMINAL Bilateral 2/10/2014    [facility]    INJECTION/SI joint Right 1/25/2017    [facility]    MIS L3-4 LAMINECTOMY, fecetectomy, foraminotomy-LUMBAR; Right 11/11/2016    [facility]    TONSILLECTOMY       Family History   Problem Relation Age of Onset    Breast cancer Mother     Cancer Brother     Heart attack Brother     No Known Problems Father     No Known Problems Sister     No Known Problems Maternal Aunt     No Known Problems Maternal Uncle     No Known Problems Paternal Aunt     No Known Problems Paternal Uncle     No Known Problems Maternal Grandmother     No Known Problems Maternal Grandfather     No Known Problems Paternal Grandmother     No Known Problems Paternal Grandfather     Colon cancer Neg Hx      Colon polyps Neg Hx     Amblyopia Neg Hx     Blindness Neg Hx     Cataracts Neg Hx     Diabetes Neg Hx     Glaucoma Neg Hx     Hypertension Neg Hx     Macular degeneration Neg Hx     Retinal detachment Neg Hx     Strabismus Neg Hx     Stroke Neg Hx     Thyroid disease Neg Hx      Social History     Tobacco Use    Smoking status: Former Smoker     Packs/day: 0.25     Years: 50.00     Pack years: 12.50     Types: Cigarettes     Last attempt to quit: 1980     Years since quittin.3    Smokeless tobacco: Never Used   Substance Use Topics    Alcohol use: No    Drug use: No     Review of Systems   Constitutional: Negative for chills and fever.   HENT: Negative for congestion.    Eyes: Negative for photophobia.   Respiratory: Negative for cough and shortness of breath.    Cardiovascular: Negative for chest pain.   Gastrointestinal: Negative for abdominal pain.   Endocrine: Negative for polydipsia and polyuria.   Genitourinary: Negative for difficulty urinating.   Musculoskeletal: Positive for back pain. Negative for myalgias.   Skin: Negative for rash.   Neurological: Positive for headaches. Negative for dizziness.   Hematological: Negative for adenopathy.   Psychiatric/Behavioral: Negative for agitation.       Physical Exam     Initial Vitals [19 0949]   BP Pulse Resp Temp SpO2   130/69 89 16 98 °F (36.7 °C) 95 %      MAP       --         Physical Exam    Vitals reviewed.  Constitutional: He appears well-developed and well-nourished.   HENT:   Head: Normocephalic and atraumatic.   Eyes: EOM are normal. Pupils are equal, round, and reactive to light.   Neck: Normal range of motion. Neck supple.   Mild tenderness   Cardiovascular: Normal rate, regular rhythm, normal heart sounds and intact distal pulses.   Pulmonary/Chest: Breath sounds normal. No respiratory distress. He has no wheezes. He has no rhonchi. He has no rales. He exhibits no tenderness.   Abdominal: Soft. Bowel sounds are  normal. He exhibits no distension. There is no tenderness. There is no rebound.   Musculoskeletal: He exhibits no edema.   Tenderness to cerv/lumb spine.    Full ROM of hips/lower ext/upper ext without pain   Neurological: He is alert. He has normal strength. No cranial nerve deficit or sensory deficit. GCS score is 15. GCS eye subscore is 4. GCS verbal subscore is 5. GCS motor subscore is 6.   Skin: Skin is warm. Capillary refill takes less than 2 seconds. No rash noted.   Psychiatric: He has a normal mood and affect.         ED Course   Procedures  Labs Reviewed   CBC W/ AUTO DIFFERENTIAL - Abnormal; Notable for the following components:       Result Value    Mono # 1.1 (*)     All other components within normal limits    Narrative:     shared lav   COMPREHENSIVE METABOLIC PANEL - Abnormal; Notable for the following components:    CO2 21 (*)     Glucose 306 (*)     BUN, Bld 25 (*)     Albumin 3.4 (*)     Total Bilirubin 1.3 (*)     eGFR if  51.5 (*)     eGFR if non  44.5 (*)     All other components within normal limits    Narrative:     shared lav   URINALYSIS, REFLEX TO URINE CULTURE - Abnormal; Notable for the following components:    Appearance, UA Hazy (*)     Protein, UA 1+ (*)     Glucose, UA 2+ (*)     All other components within normal limits    Narrative:     Preferred Collection Type->Urine, Clean Catch   URINALYSIS MICROSCOPIC - Abnormal; Notable for the following components:    Hyaline Casts, UA 21 (*)     All other components within normal limits    Narrative:     Preferred Collection Type->Urine, Clean Catch   POCT GLUCOSE - Abnormal; Notable for the following components:    POCT Glucose 308 (*)     All other components within normal limits   POCT GLUCOSE - Abnormal; Notable for the following components:    POCT Glucose 282 (*)     All other components within normal limits   PROTIME-INR    Narrative:     shared [facility]   TROPONIN I    Narrative:     shared lav   B-TYPE  NATRIURETIC PEPTIDE    Narrative:     shared lav   BETA - HYDROXYBUTYRATE, SERUM     EKG Readings: (Independently Interpreted)   Normal sinus rhythm without ischemic changes     ECG Results          EKG 12-lead (Final result)  Result time 06/05/19 00:40:05    Final result by Marah, [facility] (06/05/19 00:40:05)                 Narrative:    Test Reason : R53.1,    Vent. Rate : 088 BPM     Atrial Rate : 088 BPM     P-R Int : 172 ms          QRS Dur : 088 ms      QT Int : 368 ms       P-R-T Axes : 029 -09 044 degrees     QTc Int : 445 ms    Normal sinus rhythm  Normal ECG  When compared with ECG of 09-NOV-2016 15:21,  No significant change was found  Confirmed by Ender Mendoza MD ([id number]) on 6/5/2019 12:39:55 AM    Referred By: MELINA   SELF           Confirmed By:Ender Mendoza MD                            Imaging Results          X-Ray Chest AP Portable (Final result)  Result time 06/04/19 11:29:31    Final result by King Mccullough III, MD (06/04/19 11:29:31)                 Impression:      Left lower lobe pneumonia pleural fluid.      Electronically signed by: King Mccullough MD  Date:    06/04/2019  Time:    11:29             Narrative:    EXAMINATION:  XR CHEST AP PORTABLE    CLINICAL HISTORY:  Chest Pain;    FINDINGS:  Heart size is upper normal.  There is left lower lobe atelectasis versus infiltrate.  Right lung is clear.  The bones bowel gas are noncontributory.                               X-Ray Pelvis Routine AP (Final result)  Result time 06/04/19 11:30:08    Final result by King Mccullough III, MD (06/04/19 11:30:08)                 Narrative:    EXAMINATION:  XR PELVIS ROUTINE AP    CLINICAL HISTORY:  hip pain;    FINDINGS:  Both hips demonstrate mild DJD and impingement change.  No fracture dislocation bone destruction seen.      Electronically signed by: King Mccullough MD  Date:    06/04/2019  Time:    11:30                             X-Ray Femur Ap/Lat Bilateral (Final result)  Result time  06/04/19 11:30:57    Final result by King Mccullough III, MD (06/04/19 11:30:57)                 Narrative:    EXAMINATION:  XR FEMUR 2 VIEW BILATERAL    CLINICAL HISTORY:  Pain in unspecified hip    FINDINGS:  Two views bilateral:    Right: No fracture dislocation bone destruction seen.  There is mild DJD.    Left: No fracture dislocation bone destruction seen.  There is mild DJD.      Electronically signed by: King Mccullough MD  Date:    06/04/2019  Time:    11:30                             CT Lumbar Spine Without Contrast (Final result)  Result time 06/04/19 11:58:04    Final result by Ricardo Casas MD (06/04/19 11:58:04)                 Impression:      No acute fracture or listhesis.    Stable moderate lumbar spondylosis.    Stable left renal cyst.    Moderate calcific atherosclerosis of the abdominal aorta.  Stable calcified intimal flap possibly representing an ulcerated plaque or localized dissection.    Electronically signed by resident: Erickson Pardo  Date:    06/04/2019  Time:    11:13    Electronically signed by: Riacrdo Casas MD  Date:    06/04/2019  Time:    11:58             Narrative:    EXAMINATION:  CT LUMBAR SPINE WITHOUT CONTRAST    CLINICAL HISTORY:  Low back pain, risk factors (osteoporosis or chronic steroid use or elderly);    TECHNIQUE:  Low-dose axial, sagittal and coronal reformations are obtained through the lumbar spine.  Contrast was not administered.    COMPARISON:  MRI lumbar spine 07/20/2016, CT chest abdomen pelvis 07/08/2008    FINDINGS:  Lumbar spine alignment within normal limits.  Redemonstration of mild height loss of the T11 and L1 vertebral bodies, similar to prior exam.  Remaining vertebrae demonstrate preserved body height without evidence of acute fracture or aggressive osseous lesion.  Stable moderate degenerative change with areas of significant intervertebral disc height loss and adjacent endplate changes; findings most prominent at L1-L2 and L3-L4.  Regional soft tissue  structures include a left renal cyst as well as moderate calcific atherosclerosis of the abdominal aorta with extension into the proximal iliac vasculature bilaterally.  Of note, there is calcification of an intimal flap possibly representing an ulcerated plaque or localized dissection, stable dating back to July 2008 CT.                               CT Cervical Spine Without Contrast (Final result)  Result time 06/04/19 11:19:30    Final result by Ricardo Casas MD (06/04/19 11:19:30)                 Impression:      No acute fracture or dislocation.    Moderate cervical spondylosis.    Electronically signed by resident: Erickson Pardo  Date:    06/04/2019  Time:    11:02    Electronically signed by: Ricardo Casas MD  Date:    06/04/2019  Time:    11:19             Narrative:    EXAMINATION:  CT CERVICAL SPINE WITHOUT CONTRAST    CLINICAL HISTORY:  C-spine trauma, NEXUS/CCR positive, +risk factor(s);    TECHNIQUE:  Low dose axial images, sagittal and coronal reformations were performed though the cervical spine.  Contrast was not administered.    COMPARISON:  None    FINDINGS:  Mild dextroscoliosis of the cervical spine.  Grade 1 anterolisthesis of C4 on C5.  Vertebral body heights are relatively well maintained and are without evidence for acute fracture or dislocation.  Moderate cervical spondylosis with multilevel disc space narrowing and uncovertebral spurring.  Findings most significant within the lower cervical spine where there are areas of moderate neural foraminal narrowing.  The soft tissue structures visualized in the neck are unremarkable.  The airway is patent and the lung apices are unremarkable.  The visualized portions of the brain demonstrate no significant abnormality.                               CT Head Without Contrast (Final result)  Result time 06/04/19 11:04:40    Final result by Neil Ridley MD (06/04/19 11:04:40)                 Impression:      No acute intracranial  abnormality.      Electronically signed by: Neil Ridley MD  Date:    06/04/2019  Time:    11:04             Narrative:    EXAMINATION:  CT HEAD WITHOUT CONTRAST    CLINICAL HISTORY:  Confusion/delirium, altered LOC, unexplained;    TECHNIQUE:  Low dose axial images were obtained through the head.  Coronal and sagittal reformations were also performed. Contrast was not administered.    COMPARISON:  None.    FINDINGS:  Exam is slightly motion limited.  There is no midline shift, hydrocephalus or mass effect.  No evidence of acute intracranial hemorrhage or acute major vascular territory infarct.  No abnormal extra-axial fluid collections.  No evidence of a space-occupying mass.  There is carotid and vertebral atherosclerosis.  Calvarium is intact without evidence of fracture.  Visualized paranasal sinuses and mastoid air cells are essentially clear with only trace left mastoid fluid..                                 Medical Decision Making:   History:   Old Medical Records: I decided to obtain old medical records.  Initial Assessment:   Patient with 2 falls.  It sounds like he is on the edge of been ambulatory for some time.  He now has generalized weakness. Will do a head CT with headache and head trauma. He also has cervical spine tenderness and lumbar spine tenderness will CT as well. Will x-ray his hips even though clinically he does not have a fracture.  Will also do a medical workup for his weakness.  His mental status seems to be clear and at baseline and he has no focality to his neurologic exam.  Clinical Tests:   Lab Tests: Ordered and Reviewed  Radiological Study: Ordered and Reviewed  Medical Tests: Ordered and Reviewed  ED Management:  I reviewed studies.  Blood tests and urine tests were reassuring.  CT of the head shows no acute findings.  CT cervical spine and lumbar spine show no acute findings.  Chest x-ray read was consistent with pneumonia.  My interpretation of the chest x-ray showed no obvious  infiltrate but there is a subtle area in the left lower lobe.    I discussed the findings with the patient and family.  Recommended doing a CT scan of the chest to clarify.  Unfortunately, at this time we had many stroke patient is transferred in requiring emergent head CTs.  I discussed with the CT techs to arrange for CT being done upstairs.  Patient and family did not want to wait for these results.  They relate to me that this is a chronic finding and they are often presented with the possibility ammonia.  However, he states that he is not having shortness of breath cough fever.  They state the did a do not want to wait for continued evaluation.  I asked if they were comfortable with him being at home with his frequent falls.  They state they would like to continue to manage him at home.  I offered observation which they declined.  Will discharge home.  Will do a course of Zithromax in case there is pneumonia.  They understand they can return of doing worse.                      Clinical Impression:       ICD-10-CM ICD-9-CM   1. Weakness R53.1 780.79   2. Hip pain M25.559 719.45   3. Head injury      Disposition:   Disposition: Discharged  Condition: Stable                        Lam Day MD  06/05/19 0610

## 2019-06-04 NOTE — TELEPHONE ENCOUNTER
Spoke to Keke who was advised to have the patient seen in the ER. She is in agreement with this, and will keep scheduled appointment with  on 6-.

## 2019-06-04 NOTE — ED NOTES
"Pt reports a fall, unwitnessed.  Reported mechanical.  "I tripped on my pants."  States he hit his head, no other blood thinners than aspirin 81mg.  No numbness, or hemiparesis noted.  -facial droop/slurred speech.    "

## 2019-06-05 ENCOUNTER — TELEPHONE (OUTPATIENT)
Dept: INTERNAL MEDICINE | Facility: CLINIC | Age: 84
End: 2019-06-05

## 2019-06-05 NOTE — TELEPHONE ENCOUNTER
----- Message from Martha Jauregui sent at 6/5/2019 10:28 AM CDT -----  Contact: care giver/amari/[phone]  Pt care giver called in regards to letting the dr know that the pt went to the er on yesterday and had to falls and may have pneumonia. He receive a Rx for a z-pack and would like the dr to look at the information from the er visit. They wanted to know what's the next step.      Please advise

## 2019-06-05 NOTE — TELEPHONE ENCOUNTER
Please advise caretaker for patient to continue antibiotics, watch for any mental status changes, fever or apparent shortness of breath.  schedule a follow-up appointment in a few days to see me.  Go to the emergency room for severe symptoms such as mental status change, etc.  Thank you

## 2019-06-11 ENCOUNTER — HOSPITAL ENCOUNTER (OUTPATIENT)
Dept: RADIOLOGY | Facility: HOSPITAL | Age: 84
Discharge: HOME | End: 2019-06-11
Attending: FAMILY MEDICINE
Payer: MEDICARE

## 2019-06-11 ENCOUNTER — TELEPHONE (OUTPATIENT)
Dept: INTERNAL MEDICINE | Facility: CLINIC | Age: 84
End: 2019-06-11

## 2019-06-11 ENCOUNTER — OFFICE VISIT (OUTPATIENT)
Dept: INTERNAL MEDICINE | Facility: CLINIC | Age: 84
End: 2019-06-11
Attending: FAMILY MEDICINE
Payer: MEDICARE

## 2019-06-11 VITALS
HEIGHT: 71 IN | TEMPERATURE: 98 F | SYSTOLIC BLOOD PRESSURE: 116 MMHG | OXYGEN SATURATION: 95 % | DIASTOLIC BLOOD PRESSURE: 61 MMHG | HEART RATE: 81 BPM | BODY MASS INDEX: 26.33 KG/M2 | WEIGHT: 188.06 LBS

## 2019-06-11 DIAGNOSIS — F01.50 MIXED ALZHEIMER'S AND VASCULAR DEMENTIA: ICD-10-CM

## 2019-06-11 DIAGNOSIS — F02.80 MIXED ALZHEIMER'S AND VASCULAR DEMENTIA: ICD-10-CM

## 2019-06-11 DIAGNOSIS — R93.89 ABNORMAL CHEST X-RAY: ICD-10-CM
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
## 2019-06-11 DIAGNOSIS — Z98.1 S/P LAMINECTOMY WITH SPINAL FUSION: ICD-10-CM

## 2019-06-11 DIAGNOSIS — G30.9 MIXED ALZHEIMER'S AND VASCULAR DEMENTIA: ICD-10-CM

## 2019-06-11 DIAGNOSIS — M17.0 OSTEOARTHRITIS OF BOTH KNEES, UNSPECIFIED OSTEOARTHRITIS TYPE: ICD-10-CM
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
## 2019-06-11 DIAGNOSIS — M47.26 OSTEOARTHRITIS OF SPINE WITH RADICULOPATHY, LUMBAR REGION: ICD-10-CM

## 2019-06-11 PROCEDURE — 99999 PR PBB SHADOW E&M-EST. PATIENT-LVL III: CPT | Mod: PBBFAC,HCNC,, | Performed by: FAMILY MEDICINE

## 2019-06-11 PROCEDURE — 1101F PT FALLS ASSESS-DOCD LE1/YR: CPT | Mod: HCNC,CPTII,S$GLB, | Performed by: FAMILY MEDICINE

## 2019-06-11 PROCEDURE — 73565 XR KNEE AP STANDING BILATERAL: ICD-10-PCS | Mod: 26,HCNC,, | Performed by: RADIOLOGY

## 2019-06-11 PROCEDURE — 73565 X-RAY EXAM OF KNEES: CPT | Mod: TC,HCNC

## 2019-06-11 PROCEDURE — 99999 PR PBB SHADOW E&M-EST. PATIENT-LVL III: ICD-10-PCS | Mod: PBBFAC,HCNC,, | Performed by: FAMILY MEDICINE

## 2019-06-11 PROCEDURE — 99214 PR OFFICE/OUTPT VISIT, EST, LEVL IV, 30-39 MIN: ICD-10-PCS | Mod: HCNC,S$GLB,, | Performed by: FAMILY MEDICINE

## 2019-06-11 PROCEDURE — 99499 UNLISTED E&M SERVICE: CPT | Mod: S$GLB,,, | Performed by: FAMILY MEDICINE

## 2019-06-11 PROCEDURE — 73565 X-RAY EXAM OF KNEES: CPT | Mod: 26,HCNC,, | Performed by: RADIOLOGY

## 2019-06-11 PROCEDURE — 99499 RISK ADDL DX/OHS AUDIT: ICD-10-PCS | Mod: S$GLB,,, | Performed by: FAMILY MEDICINE

## 2019-06-11 PROCEDURE — 1101F PR PT FALLS ASSESS DOC 0-1 FALLS W/OUT INJ PAST YR: ICD-10-PCS | Mod: HCNC,CPTII,S$GLB, | Performed by: FAMILY MEDICINE

## 2019-06-11 PROCEDURE — 99214 OFFICE O/P EST MOD 30 MIN: CPT | Mod: HCNC,S$GLB,, | Performed by: FAMILY MEDICINE

## 2019-06-11 NOTE — PROGRESS NOTES
Subjective:       Patient ID: Nickolas Blake is a 88 y.o. male.    Chief Complaint: Follow-up    HPI  Review of Systems   Constitutional: Negative for chills, fatigue, fever and unexpected weight change.   HENT: Negative for congestion and trouble swallowing.    Eyes: Negative for redness and visual disturbance.   Respiratory: Negative for cough, chest tightness and shortness of breath.    Cardiovascular: Negative for chest pain, palpitations and leg swelling.   Gastrointestinal: Negative for abdominal pain and blood in stool.   Genitourinary: Negative for difficulty urinating and hematuria.   Musculoskeletal: Positive for back pain and gait problem. Negative for arthralgias, joint swelling, myalgias and neck pain.   Skin: Negative for color change and rash.   Neurological: Positive for weakness. Negative for tremors, speech difficulty, numbness and headaches.   Hematological: Negative for adenopathy. Does not bruise/bleed easily.   Psychiatric/Behavioral: Positive for confusion and decreased concentration. Negative for behavioral problems and sleep disturbance. The patient is not nervous/anxious.        Objective:      Physical Exam   Constitutional: He is oriented to person, place, and time. He appears well-developed and well-nourished.   HENT:   Head: Normocephalic and atraumatic.   Eyes: Conjunctivae are normal. No scleral icterus.   Neck: Normal range of motion. Neck supple. Carotid bruit is not present.   Cardiovascular: Normal rate, regular rhythm and intact distal pulses. Exam reveals distant heart sounds. Exam reveals no gallop and no friction rub.   No murmur heard.  Pulmonary/Chest: Effort normal. No respiratory distress. He has decreased breath sounds. He has no wheezes. He has no rales.   Abdominal: He exhibits no distension. There is no tenderness.   Musculoskeletal: He exhibits no edema or deformity.   Neurological: He is alert and oriented to person, place, and time. He displays no tremor. No  "cranial nerve deficit. Coordination abnormal. Gait normal. GCS eye subscore is 4. GCS verbal subscore is 5. GCS motor subscore is 6.   Age appropriate gait decline   Skin: Skin is warm and dry. No rash noted. He is not diaphoretic. No erythema.   Psychiatric: He has a normal mood and affect. His behavior is normal. Judgment and thought content normal.   Nursing note and vitals reviewed.      Assessment:       1. Fall, subsequent encounter    2. S/P laminectomy with spinal fusion    3. Osteoarthritis of spine with radiculopathy, lumbar region    4. Mixed Alzheimer's and vascular dementia    5. Osteoarthritis of both knees, unspecified osteoarthritis type    6. Abnormal chest x-ray        Plan:     Medication List with Changes/Refills   Current Medications    ACETAMINOPHEN (TYLENOL) 500 MG CAP    Take 1 capsule by mouth every 6 to 8 hours as needed.     ASCORBIC ACID, VITAMIN C, (VITAMIN C) 250 MG TABLET    Take 1 tablet (250 mg total) by mouth 2 (two) times daily.    ASPIRIN (ECOTRIN) 81 MG EC TABLET    Take 81 mg by mouth once daily.    ATORVASTATIN (LIPITOR) 40 MG TABLET    Take 1 tablet (40 mg total) by mouth once daily.    AZITHROMYCIN (ZITHROMAX Z-ODALIS) 250 MG TABLET    Take 1 tablet (250 mg total) by mouth once daily. Take as zpak    BD ALCOHOL SWABS PADM    USE FOUR TIMES DAILY    BD ULTRA-FINE SHORT PEN NEEDLE 31 GAUGE X 5/16" NDLE    Inject 1 application into the skin 5 (five) times daily.    BLOOD SUGAR DIAGNOSTIC (ACCU-CHEK [place] PLUS TEST STRP) STRP    TEST TWO TIMES DAILY WITH MEALS    BLOOD SUGAR DIAGNOSTIC STRP    To check BG 3 times daily, to use with insurance preferred meter    DONEPEZIL (ARICEPT) 10 MG TABLET    Take 10 mg by mouth every evening.    DULOXETINE (CYMBALTA) 60 MG CAPSULE    Take 1 capsule (60 mg total) by mouth once daily.    FINASTERIDE (PROSCAR) 5 MG TABLET    TAKE 1 TABLET EVERY DAY    FLUTICASONE (FLONASE) 50 MCG/ACTUATION NASAL SPRAY    2 sprays by Each Nare route daily as needed " "for Allergies.    GABAPENTIN (NEURONTIN) 300 MG CAPSULE    Take 1 capsule (300 mg total) by mouth 3 (three) times daily.    INSULIN ASPART U-100 (NOVOLOG) 100 UNIT/ML INPN PEN    Inject 8 Units into the skin 3 (three) times daily with meals.    INSULIN GLARGINE (LANTUS U-100 INSULIN) 100 UNIT/ML INJECTION    Inject 20 Units into the skin once daily.    INSULIN SYRINGE-NEEDLE U-100 0.5 ML 30 GAUGE X 5/16" SYRG    To use with Lantus vials - 2 times daily    LANCETS MISC    1 each by Misc.(Non-Drug; Combo Route) route 2 (two) times daily.    LISINOPRIL (PRINIVIL,ZESTRIL) 20 MG TABLET    Take 1 tablet (20 mg total) by mouth once daily.    METOPROLOL SUCCINATE (TOPROL-XL) 50 MG 24 HR TABLET    TAKE 1 TABLET DAILY. DOSE INCREASE, STOP 25 MG    NITROGLYCERIN (NITROSTAT) 0.4 MG SL TABLET    Place 1 tablet (0.4 mg total) under the tongue every 5 (five) minutes as needed for Chest pain.    SENNA-DOCUSATE 8.6-50 MG (PERICOLACE) 8.6-50 MG PER TABLET    Take 1 tablet by mouth 2 (two) times daily as needed for Constipation.    TAMSULOSIN (FLOMAX) 0.4 MG CAP    TAKE 2 CAPSULES ONE TIME DAILY     Nickolas was seen today for follow-up.    Diagnoses and all orders for this visit:    Fall, subsequent encounter  -     X-Ray Knee AP Standing Bilateral; Future  -     WHEELCHAIR FOR HOME USE    S/P laminectomy with spinal fusion  -     WHEELCHAIR FOR HOME USE    Osteoarthritis of spine with radiculopathy, lumbar region  -     WHEELCHAIR FOR HOME USE    Mixed Alzheimer's and vascular dementia  -     WHEELCHAIR FOR HOME USE    Osteoarthritis of both knees, unspecified osteoarthritis type  -     X-Ray Knee AP Standing Bilateral; Future  -     WHEELCHAIR FOR HOME USE    Abnormal chest x-ray      See meds, orders, follow up, routing and instructions sections of encounter.  An 88-year-old patient accompanied by son and caretaker today complaining of   falls x3, had an abrasion to the arm.  He went to the Emergency Room.  He had CT   imaging of " the head and neck and back with no acute findings.  There were   apparently three separate falls.  These occurred at home.  He also had a chest   x-ray, which showed infiltrate, which may be chronic.  He was placed on a Z-ODALIS.    He was reporting no dyspnea or fever.  He is reporting no chest pain at this   time.  He has pain in the lower lumbar area, right greater than left.    There was a complaint of a wound to the left arm from one ____.  There was no   loss of consciousness and nothing to suggest syncope or near syncope.    His caretakers come from 8:00 to 3:00 daily and again 5:00 to 7:00.  His son   sees them on weekend, Sunday, and the patient is alone from 7:00 p.m. to the   a.m.    PHYSICAL EXAMINATION:  He has an abrasion to the left arm.  It is not infected.    It is covered with ointment.  He has no actual spinous process percussion   tenderness today.  He was able to get up from the wheelchair and get on the exam   table without much difficulty.  There were no focal neurologic deficits.  No   footdrop noted.    The patient has an appointment to follow up with the ____ just soon.  He will   see me in one week.  If his back is  at that point, consider bone   scan to assess for occult fracture.  Follow up with me in the fall as well and   they ask that I communicate with son.      GIULIANO  dd: 06/11/2019 12:14:50 (CDT)  td: 06/12/2019 00:40:24 (CDT)  Doc ID   #[id number]  Job ID #[id number]    CC:

## 2019-06-19 ENCOUNTER — PATIENT MESSAGE (OUTPATIENT)
Dept: NEUROLOGY | Facility: CLINIC | Age: 84
End: 2019-06-19

## 2019-06-20 ENCOUNTER — OFFICE VISIT (OUTPATIENT)
Dept: NEUROLOGY | Facility: CLINIC | Age: 84
End: 2019-06-20
Payer: MEDICARE

## 2019-06-20 VITALS
BODY MASS INDEX: 26.33 KG/M2 | HEIGHT: 71 IN | HEART RATE: 86 BPM | SYSTOLIC BLOOD PRESSURE: 105 MMHG | WEIGHT: 188.06 LBS | DIASTOLIC BLOOD PRESSURE: 64 MMHG

## 2019-06-20 DIAGNOSIS — F02.80 MIXED ALZHEIMER'S AND VASCULAR DEMENTIA: Primary | ICD-10-CM

## 2019-06-20 DIAGNOSIS — I70.0 AORTIC ATHEROSCLEROSIS: ICD-10-CM

## 2019-06-20 DIAGNOSIS — Z79.4 TYPE 2 DIABETES MELLITUS WITH DIABETIC POLYNEUROPATHY, WITH LONG-TERM CURRENT USE OF INSULIN: ICD-10-CM

## 2019-06-20 DIAGNOSIS — F01.50 MIXED ALZHEIMER'S AND VASCULAR DEMENTIA: Primary | ICD-10-CM

## 2019-06-20 DIAGNOSIS — E11.42 TYPE 2 DIABETES MELLITUS WITH DIABETIC POLYNEUROPATHY, WITH LONG-TERM CURRENT USE OF INSULIN: ICD-10-CM

## 2019-06-20 DIAGNOSIS — G30.9 MIXED ALZHEIMER'S AND VASCULAR DEMENTIA: Primary | ICD-10-CM

## 2019-06-20 DIAGNOSIS — Z79.4 TYPE 2 DIABETES MELLITUS WITH STAGE 3 CHRONIC KIDNEY DISEASE, WITH LONG-TERM CURRENT USE OF INSULIN: ICD-10-CM

## 2019-06-20 DIAGNOSIS — E11.22 TYPE 2 DIABETES MELLITUS WITH STAGE 3 CHRONIC KIDNEY DISEASE, WITH LONG-TERM CURRENT USE OF INSULIN: ICD-10-CM

## 2019-06-20 DIAGNOSIS — N18.30 TYPE 2 DIABETES MELLITUS WITH STAGE 3 CHRONIC KIDNEY DISEASE, WITH LONG-TERM CURRENT USE OF INSULIN: ICD-10-CM

## 2019-06-20 DIAGNOSIS — I25.10 CORONARY ARTERY DISEASE INVOLVING NATIVE CORONARY ARTERY OF NATIVE HEART WITHOUT ANGINA PECTORIS: Chronic | ICD-10-CM

## 2019-06-20 DIAGNOSIS — I10 HYPERTENSION, ESSENTIAL: ICD-10-CM

## 2019-06-20 DIAGNOSIS — M47.26 OSTEOARTHRITIS OF SPINE WITH RADICULOPATHY, LUMBAR REGION: ICD-10-CM

## 2019-06-20 DIAGNOSIS — N18.30 CKD (CHRONIC KIDNEY DISEASE), STAGE III: ICD-10-CM

## 2019-06-20 DIAGNOSIS — F32.4 MAJOR DEPRESSIVE DISORDER WITH SINGLE EPISODE, IN PARTIAL REMISSION: ICD-10-CM

## 2019-06-20 PROCEDURE — 99999 PR PBB SHADOW E&M-EST. PATIENT-LVL III: ICD-10-PCS | Mod: PBBFAC,HCNC,, | Performed by: PSYCHIATRY & NEUROLOGY

## 2019-06-20 PROCEDURE — 99999 PR PBB SHADOW E&M-EST. PATIENT-LVL III: CPT | Mod: PBBFAC,HCNC,, | Performed by: PSYCHIATRY & NEUROLOGY

## 2019-06-20 PROCEDURE — 99499 RISK ADDL DX/OHS AUDIT: ICD-10-PCS | Mod: S$GLB,,, | Performed by: PSYCHIATRY & NEUROLOGY

## 2019-06-20 PROCEDURE — 99215 PR OFFICE/OUTPT VISIT, EST, LEVL V, 40-54 MIN: ICD-10-PCS | Mod: HCNC,S$GLB,, | Performed by: PSYCHIATRY & NEUROLOGY

## 2019-06-20 PROCEDURE — 1101F PR PT FALLS ASSESS DOC 0-1 FALLS W/OUT INJ PAST YR: ICD-10-PCS | Mod: HCNC,CPTII,S$GLB, | Performed by: PSYCHIATRY & NEUROLOGY

## 2019-06-20 PROCEDURE — 99499 UNLISTED E&M SERVICE: CPT | Mod: S$GLB,,, | Performed by: PSYCHIATRY & NEUROLOGY

## 2019-06-20 PROCEDURE — 99215 OFFICE O/P EST HI 40 MIN: CPT | Mod: HCNC,S$GLB,, | Performed by: PSYCHIATRY & NEUROLOGY

## 2019-06-20 PROCEDURE — 1101F PT FALLS ASSESS-DOCD LE1/YR: CPT | Mod: HCNC,CPTII,S$GLB, | Performed by: PSYCHIATRY & NEUROLOGY

## 2019-06-20 NOTE — PATIENT INSTRUCTIONS
Discussed with patient and caregiver.  It is advised that he reduce the Aricept to just 5 mg daily in the morning as the potential for cardiac bradyarrhythmia, lightheadedness and increased risk of falls, specially in this age group.  Fall precautions discussed with caregiver.  Continue present medical care and continue follow-up with his primary care physician to monitor his vascular risk factors specially diabetes and hypertension.  He has gait instability is multifactorial including that related to chronic lumbar spine disease and a diabetic peripheral neuropathy.

## 2019-06-20 NOTE — PROGRESS NOTES
Subjective:       Patient ID: Nickolas Blake is a 88 y.o. male.    Chief Complaint:  Memory Loss      History of Present Illness  HPI  The patient was referred for neurological evaluation following a fall and minor head trauma without loss of consciousness though had been complaining of mild headache.  She was accompanied by his caregiver who did report that his headache has significantly improved.  He was at the emergency room on 06/04/2019 following the fall, at which time he reported that felt generally weak and fell and did hit his head.  His caregiver did report that he has had about 3 falls but without loss of consciousness.  He does report occasional lightheadedness.  He has very limited his ability to ambulate because of chronic lumbar spine disease and is a stents limited to a wheelchair.  A CT scan of the head without contrast showed no acute intracranial abnormality.  In addition, he had a CT scan of cervical spine that showed no acute fracture or dislocation.  There was moderate cervical spondylosis.  A CT scan of the lumbar spine showed no acute fracture or listhesis.  Stable moderate lumbar spondylosis.  Moderate calcific arteriosclerosis of the abdominal aorta.  There has been no significant progression in his cognitive difficulties though he has significant difficulty given adequate recent history and has difficulty retaining recent information.    The patient has had a history of gait instability related to chronic lumbar spine disease with status post laminectomy, type 2 diabetes with diabetic polyneuropathy with long-term use of insulin.  The patient has been seen by me in the past for significant memory difficulties consistent with a mixed Alzheimer's/vascular dementia.  He was last seen by me in August 2017 and canceled subsequent appointments.  He was on Aricept 5 mg daily however this was increased to 10 mg daily by [facility] who has now been following him for his multiple problems  despite the fact that he has a primary care physician at [facility].  He now has a  as he has significant difficulty with ambulating and requires help with ADLs.       Review of Systems  Review of Systems   Constitutional: Negative.  Negative for activity change and appetite change.   HENT: Positive for hearing loss.    Eyes: Negative.  Negative for visual disturbance.   Respiratory: Negative.  Negative for cough and shortness of breath.    Cardiovascular: Negative.  Negative for chest pain and palpitations.   Gastrointestinal: Negative for diarrhea.   Genitourinary: Negative for urgency.   Musculoskeletal: Positive for back pain and gait problem.        Right hip pain   Skin: Negative.    Neurological:        Memory loss   Hematological: Negative.    Psychiatric/Behavioral: Positive for confusion, decreased concentration and sleep disturbance.       Objective:      Neurologic Exam      Physical Exam   Constitutional: He appears well-developed and well-nourished.   HENT:   Head: Normocephalic and atraumatic.   Right Ear: Hearing normal.   Left Ear: Hearing normal.   Eyes:   Fundus examination shows sharp disc margins.  Pupils are equal and reactive with EOM being full without nystagmus   Neck: Normal range of motion. Neck supple. Carotid bruit is not present.   Neurological: He is alert. He has normal reflexes. He displays no atrophy. No cranial nerve deficit (VF grossly intact.  No facial asymmetry noted with facial movements and sensory exam being normal/symmetrical.  Corneals/gag reflexes normal.  Tongue & palate movements normal.  Hearing unimpaired.  Exam was limited because of patient's dementia) or sensory deficit (Primary sensations symmetrical though diminished distally). He exhibits normal muscle tone. He displays a negative Romberg sign. Gait (Gait is impaired because of chronic lumbar spine disease and he is limited to a wheelchair or walker at home.) abnormal. Coordination (Mild clumsiness  of fine motor movements bilaterally) normal. He displays no Babinski's sign on the right side. He displays no Babinski's sign on the left side.   Mental status examination:  Patient is oriented to place and person but not to time.  He has some difficulty recalling recent information and could not give an adequate detailed recent history.  Immediate recall is 0/3 after 2 minutes.  Judgment and insight is fair.  Language functions are intact with no evidence of aphasia or dysarthria.  Comprehension is unimpaired.  He is able to follow one or two-step commands.  Processing of information was a little slow.  Affect is appropriate, mood was even.  No thought disorder is noted.   Vitals reviewed.        Assessment:        1. Mixed Alzheimer's and vascular dementia    2. Type 2 diabetes mellitus with diabetic polyneuropathy, with long-term current use of insulin    3. Major depressive disorder with single episode, in partial remission    4. Coronary artery disease involving native coronary artery of native heart without angina pectoris    5. Type 2 diabetes mellitus with stage 3 chronic kidney disease, with long-term current use of insulin    6. Osteoarthritis of spine with radiculopathy, lumbar region    7. Hypertension, essential    8. Aortic atherosclerosis    9. CKD (chronic kidney disease), stage III            Plan:         Discussed with patient and caregiver.  It is advised that he reduce the Aricept to just 5 mg daily in the morning as the potential for cardiac bradyarrhythmia, lightheadedness and increased risk of falls, specially in this age group.  Fall precautions discussed with caregiver.  Continue present medical care and continue follow-up with his primary care physician to monitor his vascular risk factors specially diabetes and hypertension.  He has gait instability is multifactorial including that related to chronic lumbar spine disease and a diabetic peripheral neuropathy.  He will follow up in 1 year.

## 2019-07-09 ENCOUNTER — INITIAL CONSULT (OUTPATIENT)
Dept: OPTOMETRY | Facility: CLINIC | Age: 84
End: 2019-07-09
Attending: FAMILY MEDICINE
Payer: COMMERCIAL

## 2019-07-09 DIAGNOSIS — H43.813 PVD (POSTERIOR VITREOUS DETACHMENT), BILATERAL: ICD-10-CM

## 2019-07-09 DIAGNOSIS — I10 HYPERTENSION, ESSENTIAL: ICD-10-CM

## 2019-07-09 DIAGNOSIS — E11.22 TYPE 2 DIABETES MELLITUS WITH STAGE 3 CHRONIC KIDNEY DISEASE, WITH LONG-TERM CURRENT USE OF INSULIN: ICD-10-CM

## 2019-07-09 DIAGNOSIS — H52.4 PRESBYOPIA: Primary | ICD-10-CM

## 2019-07-09 DIAGNOSIS — E11.9 TYPE 2 DIABETES MELLITUS WITHOUT OPHTHALMIC MANIFESTATIONS: ICD-10-CM

## 2019-07-09 DIAGNOSIS — H02.831 DERMATOCHALASIS OF BOTH UPPER EYELIDS: ICD-10-CM

## 2019-07-09 DIAGNOSIS — N18.30 TYPE 2 DIABETES MELLITUS WITH STAGE 3 CHRONIC KIDNEY DISEASE, WITH LONG-TERM CURRENT USE OF INSULIN: ICD-10-CM

## 2019-07-09 DIAGNOSIS — Z79.4 TYPE 2 DIABETES MELLITUS WITH STAGE 3 CHRONIC KIDNEY DISEASE, WITH LONG-TERM CURRENT USE OF INSULIN: ICD-10-CM

## 2019-07-09 DIAGNOSIS — Z96.1 PSEUDOPHAKIA OF BOTH EYES: ICD-10-CM

## 2019-07-09 DIAGNOSIS — H02.834 DERMATOCHALASIS OF BOTH UPPER EYELIDS: ICD-10-CM

## 2019-07-09 PROCEDURE — 99999 PR PBB SHADOW E&M-EST. PATIENT-LVL III: CPT | Mod: PBBFAC,,, | Performed by: OPTOMETRIST

## 2019-07-09 PROCEDURE — 92015 PR REFRACTION: ICD-10-PCS | Mod: S$GLB,,, | Performed by: OPTOMETRIST

## 2019-07-09 PROCEDURE — 92015 DETERMINE REFRACTIVE STATE: CPT | Mod: S$GLB,,, | Performed by: OPTOMETRIST

## 2019-07-09 PROCEDURE — 99999 PR PBB SHADOW E&M-EST. PATIENT-LVL III: ICD-10-PCS | Mod: PBBFAC,,, | Performed by: OPTOMETRIST

## 2019-07-09 PROCEDURE — 92014 COMPRE OPH EXAM EST PT 1/>: CPT | Mod: S$GLB,,, | Performed by: OPTOMETRIST

## 2019-07-09 PROCEDURE — 92014 PR EYE EXAM, EST PATIENT,COMPREHESV: ICD-10-PCS | Mod: S$GLB,,, | Performed by: OPTOMETRIST

## 2019-07-09 NOTE — LETTER
July 12, 2019      Joe Bunn MD  [address]  [place] [address]           [facility]  [address]  [place] [address]  Phone: [phone]  Fax: [phone]          Patient: Nickolas Blake   MR Number: [id number]   YOB: 1931   Date of Visit: 7/9/2019       Dear Dr. Joe Bunn:    Thank you for referring Nickolas Blake to me for evaluation. Attached you will find relevant portions of my assessment and plan of care.    If you have questions, please do not hesitate to call me. I look forward to following Nickolas Blake along with you.    Sincerely,    Barbra Caputo, OD    Enclosure  CC:  No Recipients    If you would like to receive this communication electronically, please contact [email or web address] or [phone] to request more information on [employer] access.    For providers and/or their staff who would like to refer a patient to [facility], please contact us through our one-stop-shop provider referral line, [facility], at [phone].    If you feel you have received this communication in error or would no longer like to receive these types of communications, please e-mail [email or web address]

## 2019-07-12 NOTE — PROGRESS NOTES
HPI     Last eye exam was 7/12/18 with Dr Caputo.    Here for annual eye exam  No changes in vision  Has some itching and redness OU sometimes has to put drops in.  Patient denies diplopia, headaches, flashes/floaters, pain, and   burning/tearing.          Last edited by Angelina Marin on 7/9/2019 10:41 AM. (History)            Assessment /Plan     For exam results, see Encounter Report.    Type 2 diabetes mellitus with stage 3 chronic kidney disease, with long-term current use of insulin    Type 2 diabetes mellitus without ophthalmic manifestations    Hypertension, essential    Dermatochalasis of both upper eyelids    Pseudophakia of both eyes    PVD (posterior vitreous detachment), bilateral      Hypertension, essential  CKD (chronic kidney disease), stage III  Type 2 diabetes mellitus with diabetic polyneuropathy, with long-term current use of insulin  Type 2 diabetes mellitus without ophthalmic manifestations  No retinopathy, monitor yearly        PVD (posterior vitreous detachment), bilateral             Stable, monitor     Astigmatism, bilateral  Pseudophakia of both eyes  Rx specs, Good BCVA with specs     Dermatochalasia, unspecified laterality  Monitor         RTC 1 year, sooner PRN

## 2019-07-20 RX ORDER — DULOXETIN HYDROCHLORIDE 60 MG/1
CAPSULE, DELAYED RELEASE ORAL
Qty: 90 CAPSULE | Refills: 0 | Status: SHIPPED | OUTPATIENT
Start: 2019-07-20 | End: 2019-11-07 | Stop reason: SDUPTHER

## 2019-08-13 ENCOUNTER — PES CALL (OUTPATIENT)
Dept: ADMINISTRATIVE | Facility: CLINIC | Age: 84
End: 2019-08-13

## 2019-11-05 ENCOUNTER — TELEPHONE (OUTPATIENT)
Dept: INTERNAL MEDICINE | Facility: CLINIC | Age: 84
End: 2019-11-05

## 2019-11-05 NOTE — TELEPHONE ENCOUNTER
----- Message from Nesha Esparza sent at 11/5/2019  2:52 PM CST -----  Contact: Keke/ ira/[phone]  Keke would like the patient to be seen sooner than 11/14 due to the symptoms the patient is having. Please call Keke so she can discuss this with the doctor.

## 2019-11-07 ENCOUNTER — TELEPHONE (OUTPATIENT)
Dept: INTERNAL MEDICINE | Facility: CLINIC | Age: 84
End: 2019-11-07

## 2019-11-07 ENCOUNTER — OFFICE VISIT (OUTPATIENT)
Dept: INTERNAL MEDICINE | Facility: CLINIC | Age: 84
End: 2019-11-07
Attending: FAMILY MEDICINE
Payer: MEDICARE

## 2019-11-07 ENCOUNTER — LAB VISIT (OUTPATIENT)
Dept: LAB | Facility: HOSPITAL | Age: 84
End: 2019-11-07
Attending: FAMILY MEDICINE
Payer: MEDICARE

## 2019-11-07 VITALS
WEIGHT: 183 LBS | DIASTOLIC BLOOD PRESSURE: 60 MMHG | BODY MASS INDEX: 29.41 KG/M2 | SYSTOLIC BLOOD PRESSURE: 120 MMHG | HEIGHT: 66 IN

## 2019-11-07 DIAGNOSIS — I25.10 CORONARY ARTERY DISEASE INVOLVING NATIVE CORONARY ARTERY OF NATIVE HEART WITHOUT ANGINA PECTORIS: Chronic | ICD-10-CM

## 2019-11-07 DIAGNOSIS — N18.30 TYPE 2 DIABETES MELLITUS WITH STAGE 3 CHRONIC KIDNEY DISEASE, WITH LONG-TERM CURRENT USE OF INSULIN: ICD-10-CM

## 2019-11-07 DIAGNOSIS — R55 NEAR SYNCOPE: Primary | ICD-10-CM

## 2019-11-07 DIAGNOSIS — E11.42 TYPE 2 DIABETES MELLITUS WITH DIABETIC POLYNEUROPATHY, WITH LONG-TERM CURRENT USE OF INSULIN: ICD-10-CM

## 2019-11-07 DIAGNOSIS — E78.5 HYPERLIPIDEMIA, UNSPECIFIED HYPERLIPIDEMIA TYPE: ICD-10-CM

## 2019-11-07 DIAGNOSIS — R56.9 SEIZURE: ICD-10-CM

## 2019-11-07 DIAGNOSIS — Z79.4 TYPE 2 DIABETES MELLITUS WITH STAGE 3 CHRONIC KIDNEY DISEASE, WITH LONG-TERM CURRENT USE OF INSULIN: ICD-10-CM

## 2019-11-07 DIAGNOSIS — I10 HYPERTENSION, ESSENTIAL: ICD-10-CM
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
## 2019-11-07 DIAGNOSIS — Z79.4 TYPE 2 DIABETES MELLITUS WITH DIABETIC POLYNEUROPATHY, WITH LONG-TERM CURRENT USE OF INSULIN: ICD-10-CM

## 2019-11-07 DIAGNOSIS — N39.0 URINARY TRACT INFECTION WITHOUT HEMATURIA, SITE UNSPECIFIED: Primary | ICD-10-CM

## 2019-11-07 DIAGNOSIS — E11.22 TYPE 2 DIABETES MELLITUS WITH STAGE 3 CHRONIC KIDNEY DISEASE, WITH LONG-TERM CURRENT USE OF INSULIN: ICD-10-CM

## 2019-11-07 DIAGNOSIS — N18.30 CKD (CHRONIC KIDNEY DISEASE), STAGE III: ICD-10-CM

## 2019-11-07 LAB
ALBUMIN SERPL BCP-MCNC: 3.5 G/DL (ref 3.5–5.2)
ALP SERPL-CCNC: 108 U/L (ref 55–135)
ALT SERPL W/O P-5'-P-CCNC: 23 U/L (ref 10–44)
ANION GAP SERPL CALC-SCNC: 9 MMOL/L (ref 8–16)
AST SERPL-CCNC: 23 U/L (ref 10–40)
BILIRUB SERPL-MCNC: 0.8 MG/DL (ref 0.1–1)
BUN SERPL-MCNC: 25 MG/DL (ref 8–23)
CALCIUM SERPL-MCNC: 9.2 MG/DL (ref 8.7–10.5)
CHLORIDE SERPL-SCNC: 106 MMOL/L (ref 95–110)
CHOLEST SERPL-MCNC: 145 MG/DL (ref 120–199)
CHOLEST/HDLC SERPL: 3.9 {RATIO} (ref 2–5)
CO2 SERPL-SCNC: 24 MMOL/L (ref 23–29)
CREAT SERPL-MCNC: 1.3 MG/DL (ref 0.5–1.4)
EST. GFR  (AFRICAN AMERICAN): 56 ML/MIN/1.73 M^2
EST. GFR  (NON AFRICAN AMERICAN): 49 ML/MIN/1.73 M^2
GLUCOSE SERPL-MCNC: 171 MG/DL (ref 70–110)
HDLC SERPL-MCNC: 37 MG/DL (ref 40–75)
HDLC SERPL: 25.5 % (ref 20–50)
LDLC SERPL CALC-MCNC: 75.2 MG/DL (ref 63–159)
NONHDLC SERPL-MCNC: 108 MG/DL
POTASSIUM SERPL-SCNC: 4.3 MMOL/L (ref 3.5–5.1)
PROT SERPL-MCNC: 7.3 G/DL (ref 6–8.4)
SODIUM SERPL-SCNC: 139 MMOL/L (ref 136–145)
TRIGL SERPL-MCNC: 164 MG/DL (ref 30–150)

## 2019-11-07 PROCEDURE — 99999 PR PBB SHADOW E&M-EST. PATIENT-LVL IV: ICD-10-PCS | Mod: PBBFAC,HCNC,, | Performed by: FAMILY MEDICINE

## 2019-11-07 PROCEDURE — 80061 LIPID PANEL: CPT | Mod: HCNC

## 2019-11-07 PROCEDURE — 99499 RISK ADDL DX/OHS AUDIT: ICD-10-PCS | Mod: HCNC,S$GLB,, | Performed by: FAMILY MEDICINE

## 2019-11-07 PROCEDURE — 36415 COLL VENOUS BLD VENIPUNCTURE: CPT | Mod: HCNC

## 2019-11-07 PROCEDURE — 99499 UNLISTED E&M SERVICE: CPT | Mod: HCNC,S$GLB,, | Performed by: FAMILY MEDICINE

## 2019-11-07 PROCEDURE — 80053 COMPREHEN METABOLIC PANEL: CPT | Mod: HCNC

## 2019-11-07 PROCEDURE — 1101F PT FALLS ASSESS-DOCD LE1/YR: CPT | Mod: HCNC,CPTII,S$GLB, | Performed by: FAMILY MEDICINE

## 2019-11-07 PROCEDURE — 99214 PR OFFICE/OUTPT VISIT, EST, LEVL IV, 30-39 MIN: ICD-10-PCS | Mod: HCNC,S$GLB,, | Performed by: FAMILY MEDICINE

## 2019-11-07 PROCEDURE — 1101F PR PT FALLS ASSESS DOC 0-1 FALLS W/OUT INJ PAST YR: ICD-10-PCS | Mod: HCNC,CPTII,S$GLB, | Performed by: FAMILY MEDICINE

## 2019-11-07 PROCEDURE — 99214 OFFICE O/P EST MOD 30 MIN: CPT | Mod: HCNC,S$GLB,, | Performed by: FAMILY MEDICINE

## 2019-11-07 PROCEDURE — 83036 HEMOGLOBIN GLYCOSYLATED A1C: CPT | Mod: HCNC

## 2019-11-07 PROCEDURE — 99999 PR PBB SHADOW E&M-EST. PATIENT-LVL IV: CPT | Mod: PBBFAC,HCNC,, | Performed by: FAMILY MEDICINE

## 2019-11-07 RX ORDER — TAMSULOSIN HYDROCHLORIDE 0.4 MG/1
0.4 CAPSULE ORAL DAILY
Qty: 90 CAPSULE | Refills: 3 | Status: ON HOLD
Start: 2019-11-07 | End: 2019-11-12 | Stop reason: HOSPADM

## 2019-11-07 NOTE — TELEPHONE ENCOUNTER
Dr Bunn,   I wanted to inform you that the patient's urine overlapped into specimen bag and was not in specimen cup. I was asking if you would want me to call patient to see if he can possibly do a home urine collect and bring over to Lab for processing.    Please advise.

## 2019-11-07 NOTE — PROGRESS NOTES
Subjective:       Patient ID: Nickolas Blake is a 88 y.o. male.    Chief Complaint: Otalgia (started on Sat., reports of DENIS (mainy the L than the R) ear pains. pain was pretty bad on yesterday.)   Patient presents with son and his caretaker who report that the patient had a near syncopal episode on Tuesday and 1 last week.  The 1 this week was described as patient went down after feeling lightheaded and experienced shaking in the right arm.  He did not appear to fully lose consciousness.  There was no chest pain or heart palpitations reported.  Patient has baseline dementia and is unable to provide a good history.  Patient states he is feeling well at this time.  Caretaker reports steady decline.  Patient has sitters most of the day and is alone at night.  He is overdue for several follow-up visits with his specialty providers including Endocrinology, Neurology, Cardiology.    HPI  Review of Systems   Constitutional: Positive for fatigue. Negative for chills, fever and unexpected weight change.   HENT: Negative for congestion and trouble swallowing.    Eyes: Negative for redness and visual disturbance.   Respiratory: Negative for cough, chest tightness and shortness of breath.    Cardiovascular: Negative for chest pain, palpitations and leg swelling.   Gastrointestinal: Negative for abdominal pain and blood in stool.   Genitourinary: Negative for difficulty urinating and hematuria.   Musculoskeletal: Positive for gait problem. Negative for arthralgias, back pain, joint swelling, myalgias and neck pain.   Skin: Negative for color change and rash.   Neurological: Positive for tremors, seizures, syncope, weakness and light-headedness. Negative for speech difficulty, numbness and headaches.   Hematological: Negative for adenopathy. Does not bruise/bleed easily.   Psychiatric/Behavioral: Positive for behavioral problems, confusion, decreased concentration and sleep disturbance. The patient is not nervous/anxious.         Objective:      Physical Exam   Constitutional: He is oriented to person, place, and time. He appears well-developed and well-nourished.   HENT:   Head: Normocephalic and atraumatic.   Eyes: Conjunctivae are normal. No scleral icterus.   Neck: Normal range of motion. Neck supple. Carotid bruit is not present.   Cardiovascular: Normal rate, regular rhythm and intact distal pulses. Exam reveals distant heart sounds. Exam reveals no gallop and no friction rub.   No murmur heard.  Pulmonary/Chest: Effort normal. No respiratory distress. He has decreased breath sounds. He has no wheezes. He has no rales.   Abdominal: He exhibits no distension. There is no tenderness.   Musculoskeletal: He exhibits no edema or deformity.   Neurological: He is alert and oriented to person, place, and time. He displays no tremor. No cranial nerve deficit. Coordination abnormal. Gait normal. GCS eye subscore is 4. GCS verbal subscore is 5. GCS motor subscore is 6.   We did not stand him today   Skin: Skin is warm and dry. No rash noted. He is not diaphoretic. No erythema.   Psychiatric: He has a normal mood and affect. His behavior is normal. Judgment and thought content normal.   Nursing note and vitals reviewed.      Assessment:       1. Near syncope    2. Seizure    3. Type 2 diabetes mellitus with stage 3 chronic kidney disease, with long-term current use of insulin    4. Type 2 diabetes mellitus with diabetic polyneuropathy, with long-term current use of insulin    5. Hypertension, essential    6. CKD (chronic kidney disease), stage III    7. Coronary artery disease involving native coronary artery of native heart without angina pectoris    8. Hyperlipidemia, unspecified hyperlipidemia type    9. Fall, subsequent encounter        Plan:     Medication List with Changes/Refills   Current Medications    ACETAMINOPHEN (TYLENOL) 500 MG CAP    Take 1 capsule by mouth every 6 to 8 hours as needed.     ASCORBIC ACID, VITAMIN C, (VITAMIN C)  "250 MG TABLET    Take 1 tablet (250 mg total) by mouth 2 (two) times daily.    ASPIRIN (ECOTRIN) 81 MG EC TABLET    Take 81 mg by mouth once daily.    ATORVASTATIN (LIPITOR) 40 MG TABLET    Take 1 tablet (40 mg total) by mouth once daily.    BD ALCOHOL SWABS PADM    USE FOUR TIMES DAILY    BD ULTRA-FINE SHORT PEN NEEDLE 31 GAUGE X 5/16" NDLE    Inject 1 application into the skin 5 (five) times daily.    BLOOD SUGAR DIAGNOSTIC (ACCU-CHEK NEWTON PLUS TEST STRP) STRP    TEST TWO TIMES DAILY WITH MEALS    BLOOD SUGAR DIAGNOSTIC STRP    To check BG 3 times daily, to use with insurance preferred meter    DULOXETINE (CYMBALTA) 60 MG CAPSULE    Take 1 capsule (60 mg total) by mouth once daily.    FINASTERIDE (PROSCAR) 5 MG TABLET    TAKE 1 TABLET EVERY DAY    FLUTICASONE (FLONASE) 50 MCG/ACTUATION NASAL SPRAY    2 sprays by Each Nare route daily as needed for Allergies.    GABAPENTIN (NEURONTIN) 300 MG CAPSULE    Take 1 capsule (300 mg total) by mouth 3 (three) times daily.    INSULIN ASPART U-100 (NOVOLOG) 100 UNIT/ML INPN PEN    Inject 8 Units into the skin 3 (three) times daily with meals.    INSULIN GLARGINE (LANTUS U-100 INSULIN) 100 UNIT/ML INJECTION    Inject 20 Units into the skin once daily.    INSULIN SYRINGE-NEEDLE U-100 0.5 ML 30 GAUGE X 5/16" SYRG    To use with Lantus vials - 2 times daily    LANCETS MISC    1 each by Misc.(Non-Drug; Combo Route) route 2 (two) times daily.    LISINOPRIL (PRINIVIL,ZESTRIL) 20 MG TABLET    Take 1 tablet (20 mg total) by mouth once daily.    METOPROLOL SUCCINATE (TOPROL-XL) 50 MG 24 HR TABLET    TAKE 1 TABLET DAILY. DOSE INCREASE, STOP 25 MG    NITROGLYCERIN (NITROSTAT) 0.4 MG SL TABLET    Place 1 tablet (0.4 mg total) under the tongue every 5 (five) minutes as needed for Chest pain.   Changed and/or Refilled Medications    Modified Medication Previous Medication    TAMSULOSIN (FLOMAX) 0.4 MG CAP tamsulosin (FLOMAX) 0.4 mg Cap       Take 1 capsule (0.4 mg total) by mouth once " daily.    TAKE 2 CAPSULES ONE TIME DAILY   Discontinued Medications    DULOXETINE (CYMBALTA) 60 MG CAPSULE    TAKE 1 CAPSULE BY MOUTH EVERY DAY    SENNA-DOCUSATE 8.6-50 MG (PERICOLACE) 8.6-50 MG PER TABLET    Take 1 tablet by mouth 2 (two) times daily as needed for Constipation.     Nickolas was seen today for otalgia.    Diagnoses and all orders for this visit:    Near syncope  -     Ambulatory referral to Neurology  -     Holter monitor - 48 hour; Future  -     EKG 12-lead; Future  -     Ambulatory referral to Cardiology  -     CV Ultrasound Bilateral Doppler Carotid; Future    Seizure  -     Ambulatory referral to Neurology  -     CV Ultrasound Bilateral Doppler Carotid; Future    Type 2 diabetes mellitus with stage 3 chronic kidney disease, with long-term current use of insulin  -     Hemoglobin A1c; Standing  -     Comprehensive metabolic panel; Standing  -     Microalbumin/creatinine urine ratio; Standing  -     Ambulatory referral/consult to Endocrinology; Future    Type 2 diabetes mellitus with diabetic polyneuropathy, with long-term current use of insulin    Hypertension, essential    CKD (chronic kidney disease), stage III    Coronary artery disease involving native coronary artery of native heart without angina pectoris  -     Ambulatory referral to Cardiology    Hyperlipidemia, unspecified hyperlipidemia type  -     Lipid panel; Standing    Fall, subsequent encounter    Other orders  -     tamsulosin (FLOMAX) 0.4 mg Cap; Take 1 capsule (0.4 mg total) by mouth once daily.      See meds, orders, follow up, routing and instructions sections of encounter and AVS. Discussed with patient and provided on AVS.    Patient appears his normal self today.  He generally does not answer complicated questions and does not recall the events that we discussed.  His caretaker states he has had at least 4 falls recently over the past few months with the 2 episodes over the past week.  I advised caretaker and son should  this occur again to take the patient immediately to the emergency room or call 911.  We will initiate the workup shown above and attempt to schedule an urgent Neurology visit.  He had a fall and CT scan of the head a few months ago will not repeat that at this time.  Needs follow-ups with Endocrinology and Cardiology as well and addition see Cardiology  opinion concerning his recent events.  Reduce Flomax to 1 tablet a day.

## 2019-11-08 ENCOUNTER — HOSPITAL ENCOUNTER (OUTPATIENT)
Dept: CARDIOLOGY | Facility: CLINIC | Age: 84
Discharge: HOME | End: 2019-11-08
Attending: FAMILY MEDICINE
Payer: MEDICARE

## 2019-11-08 ENCOUNTER — CLINICAL SUPPORT (OUTPATIENT)
Dept: CARDIOLOGY | Facility: HOSPITAL | Age: 84
End: 2019-11-08
Attending: FAMILY MEDICINE
Payer: MEDICARE

## 2019-11-08 ENCOUNTER — CLINICAL SUPPORT (OUTPATIENT)
Dept: CARDIOLOGY | Facility: CLINIC | Age: 84
End: 2019-11-08
Attending: FAMILY MEDICINE
Payer: MEDICARE

## 2019-11-08 DIAGNOSIS — R55 NEAR SYNCOPE: ICD-10-CM

## 2019-11-08 DIAGNOSIS — R56.9 SEIZURE: ICD-10-CM

## 2019-11-08 LAB
ESTIMATED AVG GLUCOSE: 203 MG/DL (ref 68–131)
HBA1C MFR BLD HPLC: 8.7 % (ref 4–5.6)
LEFT ARM DIASTOLIC BLOOD PRESSURE: 61 MMHG
LEFT ARM SYSTOLIC BLOOD PRESSURE: 116 MMHG
LEFT CBA DIAS: 9 CM/S
LEFT CBA SYS: 46 CM/S
LEFT CCA DIST DIAS: 10 CM/S
LEFT CCA DIST SYS: 39 CM/S
LEFT CCA MID DIAS: 8 CM/S
LEFT CCA MID SYS: 51 CM/S
LEFT CCA PROX DIAS: 8 CM/S
LEFT CCA PROX SYS: 42 CM/S
LEFT ECA DIAS: 9 CM/S
LEFT ECA SYS: 98 CM/S
LEFT ICA DIST DIAS: 23 CM/S
LEFT ICA DIST SYS: 77 CM/S
LEFT ICA MID DIAS: 13 CM/S
LEFT ICA MID SYS: 52 CM/S
LEFT ICA PROX DIAS: 9 CM/S
LEFT ICA PROX SYS: 40 CM/S
LEFT VERTEBRAL DIAS: 9 CM/S
LEFT VERTEBRAL SYS: 26 CM/S
OHS CV CAROTID RIGHT ICA EDV HIGHEST: 27
OHS CV CAROTID ULTRASOUND LEFT ICA/CCA RATIO: 1.51
OHS CV CAROTID ULTRASOUND RIGHT ICA/CCA RATIO: 1.39
OHS CV PV CAROTID LEFT HIGHEST CCA: 51
OHS CV PV CAROTID LEFT HIGHEST ICA: 77
OHS CV PV CAROTID RIGHT HIGHEST CCA: 59
OHS CV PV CAROTID RIGHT HIGHEST ICA: 82
OHS CV US CAROTID LEFT HIGHEST EDV: 23
RIGHT ARM DIASTOLIC BLOOD PRESSURE: 60 MMHG
RIGHT ARM SYSTOLIC BLOOD PRESSURE: 120 MMHG
RIGHT CBA DIAS: 8 CM/S
RIGHT CBA SYS: 55 CM/S
RIGHT CCA DIST DIAS: 11 CM/S
RIGHT CCA DIST SYS: 50 CM/S
RIGHT CCA MID DIAS: 11 CM/S
RIGHT CCA MID SYS: 57 CM/S
RIGHT CCA PROX DIAS: 10 CM/S
RIGHT CCA PROX SYS: 59 CM/S
RIGHT ECA DIAS: 7 CM/S
RIGHT ECA SYS: 99 CM/S
RIGHT ICA DIST DIAS: 27 CM/S
RIGHT ICA DIST SYS: 82 CM/S
RIGHT ICA MID DIAS: 16 CM/S
RIGHT ICA MID SYS: 69 CM/S
RIGHT ICA PROX DIAS: 9 CM/S
RIGHT ICA PROX SYS: 45 CM/S
RIGHT VERTEBRAL DIAS: 7 CM/S
RIGHT VERTEBRAL SYS: 28 CM/S

## 2019-11-08 PROCEDURE — 93227 XTRNL ECG REC<48 HR R&I: CPT | Mod: HCNC,,, | Performed by: INTERNAL MEDICINE

## 2019-11-08 PROCEDURE — 93880 EXTRACRANIAL BILAT STUDY: CPT | Mod: HCNC,S$GLB,, | Performed by: INTERNAL MEDICINE

## 2019-11-08 PROCEDURE — 93227 HOLTER MONITOR - 48 HOUR (CUPID ONLY): ICD-10-PCS | Mod: HCNC,,, | Performed by: INTERNAL MEDICINE

## 2019-11-08 PROCEDURE — 93005 EKG 12-LEAD: ICD-10-PCS | Mod: HCNC,S$GLB,, | Performed by: FAMILY MEDICINE

## 2019-11-08 PROCEDURE — 93010 ELECTROCARDIOGRAM REPORT: CPT | Mod: HCNC,S$GLB,, | Performed by: INTERNAL MEDICINE

## 2019-11-08 PROCEDURE — 93880 CV US DOPPLER CAROTID (CUPID ONLY): ICD-10-PCS | Mod: HCNC,S$GLB,, | Performed by: INTERNAL MEDICINE

## 2019-11-08 PROCEDURE — 93005 ELECTROCARDIOGRAM TRACING: CPT | Mod: HCNC,S$GLB,, | Performed by: FAMILY MEDICINE

## 2019-11-08 PROCEDURE — 93226 XTRNL ECG REC<48 HR SCAN A/R: CPT | Mod: HCNC

## 2019-11-08 PROCEDURE — 93010 EKG 12-LEAD: ICD-10-PCS | Mod: HCNC,S$GLB,, | Performed by: INTERNAL MEDICINE

## 2019-11-08 NOTE — TELEPHONE ENCOUNTER
See lab orders and please call patient to schedule ordered lab(s) as a home collect, future. Thank you.

## 2019-11-10 ENCOUNTER — HOSPITAL ENCOUNTER (OUTPATIENT)
Facility: HOSPITAL | Age: 84
Discharge: HOME | End: 2019-11-12
Attending: EMERGENCY MEDICINE | Admitting: HOSPITALIST
Payer: MEDICARE

## 2019-11-10 DIAGNOSIS — R55 SYNCOPE: ICD-10-CM

## 2019-11-10 DIAGNOSIS — R07.9 CHEST PAIN: ICD-10-CM
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
## 2019-11-10 DIAGNOSIS — J90 RECURRENT LEFT PLEURAL EFFUSION: ICD-10-CM

## 2019-11-10 DIAGNOSIS — R55 SYNCOPE, UNSPECIFIED SYNCOPE TYPE: Primary | ICD-10-CM

## 2019-11-10 PROBLEM — E83.51 HYPOCALCEMIA: Status: ACTIVE | Noted: 2019-11-10

## 2019-11-10 LAB
ALBUMIN SERPL BCP-MCNC: 2.4 G/DL (ref 3.5–5.2)
ALP SERPL-CCNC: 78 U/L (ref 55–135)
ALT SERPL W/O P-5'-P-CCNC: 18 U/L (ref 10–44)
ANION GAP SERPL CALC-SCNC: 4 MMOL/L (ref 8–16)
AST SERPL-CCNC: 18 U/L (ref 10–40)
BASOPHILS # BLD AUTO: 0.08 K/UL (ref 0–0.2)
BASOPHILS NFR BLD: 1.2 % (ref 0–1.9)
BILIRUB SERPL-MCNC: 0.6 MG/DL (ref 0.1–1)
BNP SERPL-MCNC: 46 PG/ML (ref 0–99)
BUN SERPL-MCNC: 28 MG/DL (ref 8–23)
CALCIUM SERPL-MCNC: 6.4 MG/DL (ref 8.7–10.5)
CHLORIDE SERPL-SCNC: 117 MMOL/L (ref 95–110)
CO2 SERPL-SCNC: 20 MMOL/L (ref 23–29)
CREAT SERPL-MCNC: 1 MG/DL (ref 0.5–1.4)
DIFFERENTIAL METHOD: ABNORMAL
EOSINOPHIL # BLD AUTO: 0 K/UL (ref 0–0.5)
EOSINOPHIL NFR BLD: 0.3 % (ref 0–8)
ERYTHROCYTE [DISTWIDTH] IN BLOOD BY AUTOMATED COUNT: 13.9 % (ref 11.5–14.5)
EST. GFR  (AFRICAN AMERICAN): >60 ML/MIN/1.73 M^2
EST. GFR  (NON AFRICAN AMERICAN): >60 ML/MIN/1.73 M^2
GLUCOSE SERPL-MCNC: 172 MG/DL (ref 70–110)
HCT VFR BLD AUTO: 44.7 % (ref 40–54)
HGB BLD-MCNC: 14.3 G/DL (ref 14–18)
IMM GRANULOCYTES # BLD AUTO: 0.03 K/UL (ref 0–0.04)
IMM GRANULOCYTES NFR BLD AUTO: 0.4 % (ref 0–0.5)
INR PPP: 1 (ref 0.8–1.2)
LYMPHOCYTES # BLD AUTO: 1.2 K/UL (ref 1–4.8)
LYMPHOCYTES NFR BLD: 17.5 % (ref 18–48)
MAGNESIUM SERPL-MCNC: 1.5 MG/DL (ref 1.6–2.6)
MCH RBC QN AUTO: 28.5 PG (ref 27–31)
MCHC RBC AUTO-ENTMCNC: 32 G/DL (ref 32–36)
MCV RBC AUTO: 89 FL (ref 82–98)
MONOCYTES # BLD AUTO: 0.8 K/UL (ref 0.3–1)
MONOCYTES NFR BLD: 12 % (ref 4–15)
NEUTROPHILS # BLD AUTO: 4.7 K/UL (ref 1.8–7.7)
NEUTROPHILS NFR BLD: 68.6 % (ref 38–73)
NRBC BLD-RTO: 0 /100 WBC
PHOSPHATE SERPL-MCNC: 2.7 MG/DL (ref 2.7–4.5)
PLATELET # BLD AUTO: 271 K/UL (ref 150–350)
PMV BLD AUTO: 11 FL (ref 9.2–12.9)
POCT GLUCOSE: 250 MG/DL (ref 70–110)
POCT GLUCOSE: 268 MG/DL (ref 70–110)
POTASSIUM SERPL-SCNC: 3.4 MMOL/L (ref 3.5–5.1)
PROT SERPL-MCNC: 5 G/DL (ref 6–8.4)
PROTHROMBIN TIME: 10.6 SEC (ref 9–12.5)
PTH-INTACT SERPL-MCNC: 91 PG/ML (ref 9–77)
RBC # BLD AUTO: 5.01 M/UL (ref 4.6–6.2)
SODIUM SERPL-SCNC: 141 MMOL/L (ref 136–145)
T4 FREE SERPL-MCNC: 0.9 NG/DL (ref 0.71–1.51)
TROPONIN I SERPL DL<=0.01 NG/ML-MCNC: <0.006 NG/ML (ref 0–0.03)
TROPONIN I SERPL DL<=0.01 NG/ML-MCNC: <0.006 NG/ML (ref 0–0.03)
TSH SERPL DL<=0.005 MIU/L-ACNC: 0.27 UIU/ML (ref 0.4–4)
WBC # BLD AUTO: 6.91 K/UL (ref 3.9–12.7)

## 2019-11-10 PROCEDURE — 84439 ASSAY OF FREE THYROXINE: CPT | Mod: HCNC

## 2019-11-10 PROCEDURE — 93010 ELECTROCARDIOGRAM REPORT: CPT | Mod: HCNC,,, | Performed by: INTERNAL MEDICINE

## 2019-11-10 PROCEDURE — 82533 TOTAL CORTISOL: CPT | Mod: HCNC

## 2019-11-10 PROCEDURE — 93005 ELECTROCARDIOGRAM TRACING: CPT | Mod: HCNC

## 2019-11-10 PROCEDURE — G0378 HOSPITAL OBSERVATION PER HR: HCPCS | Mod: HCNC

## 2019-11-10 PROCEDURE — 99223 1ST HOSP IP/OBS HIGH 75: CPT | Mod: AI,HCNC,GC, | Performed by: HOSPITALIST

## 2019-11-10 PROCEDURE — 84484 ASSAY OF TROPONIN QUANT: CPT | Mod: HCNC

## 2019-11-10 PROCEDURE — 99223 PR INITIAL HOSPITAL CARE,LEVL III: ICD-10-PCS | Mod: AI,HCNC,GC, | Performed by: HOSPITALIST

## 2019-11-10 PROCEDURE — 63600175 PHARM REV CODE 636 W HCPCS: Mod: HCNC | Performed by: EMERGENCY MEDICINE

## 2019-11-10 PROCEDURE — 83970 ASSAY OF PARATHORMONE: CPT | Mod: HCNC

## 2019-11-10 PROCEDURE — 83735 ASSAY OF MAGNESIUM: CPT | Mod: HCNC

## 2019-11-10 PROCEDURE — 25000003 PHARM REV CODE 250: Mod: HCNC | Performed by: STUDENT IN AN ORGANIZED HEALTH CARE EDUCATION/TRAINING PROGRAM

## 2019-11-10 PROCEDURE — 80053 COMPREHEN METABOLIC PANEL: CPT | Mod: HCNC

## 2019-11-10 PROCEDURE — 63600175 PHARM REV CODE 636 W HCPCS: Mod: HCNC | Performed by: STUDENT IN AN ORGANIZED HEALTH CARE EDUCATION/TRAINING PROGRAM

## 2019-11-10 PROCEDURE — 99285 EMERGENCY DEPT VISIT HI MDM: CPT | Mod: HCNC,,, | Performed by: EMERGENCY MEDICINE

## 2019-11-10 PROCEDURE — 11000001 HC ACUTE MED/SURG PRIVATE ROOM: Mod: HCNC

## 2019-11-10 PROCEDURE — 85610 PROTHROMBIN TIME: CPT | Mod: HCNC

## 2019-11-10 PROCEDURE — 99285 EMERGENCY DEPT VISIT HI MDM: CPT | Mod: 25,HCNC

## 2019-11-10 PROCEDURE — 83880 ASSAY OF NATRIURETIC PEPTIDE: CPT | Mod: HCNC

## 2019-11-10 PROCEDURE — 36415 COLL VENOUS BLD VENIPUNCTURE: CPT | Mod: HCNC

## 2019-11-10 PROCEDURE — 84100 ASSAY OF PHOSPHORUS: CPT | Mod: HCNC

## 2019-11-10 PROCEDURE — 94761 N-INVAS EAR/PLS OXIMETRY MLT: CPT | Mod: HCNC

## 2019-11-10 PROCEDURE — 96361 HYDRATE IV INFUSION ADD-ON: CPT | Mod: HCNC

## 2019-11-10 PROCEDURE — 99285 PR EMERGENCY DEPT VISIT,LEVEL V: ICD-10-PCS | Mod: HCNC,,, | Performed by: EMERGENCY MEDICINE

## 2019-11-10 PROCEDURE — 85025 COMPLETE CBC W/AUTO DIFF WBC: CPT | Mod: HCNC

## 2019-11-10 PROCEDURE — 84443 ASSAY THYROID STIM HORMONE: CPT | Mod: HCNC

## 2019-11-10 PROCEDURE — 93010 EKG 12-LEAD: ICD-10-PCS | Mod: HCNC,,, | Performed by: INTERNAL MEDICINE

## 2019-11-10 RX ORDER — FLUTICASONE PROPIONATE 50 MCG
2 SPRAY, SUSPENSION (ML) NASAL DAILY PRN
Status: DISCONTINUED | OUTPATIENT
Start: 2019-11-10 | End: 2019-11-12 | Stop reason: HOSPADM

## 2019-11-10 RX ORDER — SODIUM CHLORIDE 0.9 % (FLUSH) 0.9 %
10 SYRINGE (ML) INJECTION
Status: DISCONTINUED | OUTPATIENT
Start: 2019-11-10 | End: 2019-11-12 | Stop reason: HOSPADM

## 2019-11-10 RX ORDER — SODIUM CHLORIDE, SODIUM LACTATE, POTASSIUM CHLORIDE, CALCIUM CHLORIDE 600; 310; 30; 20 MG/100ML; MG/100ML; MG/100ML; MG/100ML
INJECTION, SOLUTION INTRAVENOUS CONTINUOUS
Status: ACTIVE | OUTPATIENT
Start: 2019-11-10 | End: 2019-11-10

## 2019-11-10 RX ORDER — INSULIN ASPART 100 [IU]/ML
5 INJECTION, SOLUTION INTRAVENOUS; SUBCUTANEOUS
Status: DISCONTINUED | OUTPATIENT
Start: 2019-11-10 | End: 2019-11-12 | Stop reason: HOSPADM

## 2019-11-10 RX ORDER — IBUPROFEN 200 MG
16 TABLET ORAL
Status: DISCONTINUED | OUTPATIENT
Start: 2019-11-10 | End: 2019-11-12 | Stop reason: HOSPADM

## 2019-11-10 RX ORDER — INSULIN ASPART 100 [IU]/ML
0-5 INJECTION, SOLUTION INTRAVENOUS; SUBCUTANEOUS
Status: DISCONTINUED | OUTPATIENT
Start: 2019-11-10 | End: 2019-11-12 | Stop reason: HOSPADM

## 2019-11-10 RX ORDER — POTASSIUM CHLORIDE 20 MEQ/1
40 TABLET, EXTENDED RELEASE ORAL ONCE
Status: COMPLETED | OUTPATIENT
Start: 2019-11-10 | End: 2019-11-10

## 2019-11-10 RX ORDER — GABAPENTIN 300 MG/1
300 CAPSULE ORAL 3 TIMES DAILY
Status: DISCONTINUED | OUTPATIENT
Start: 2019-11-11 | End: 2019-11-12 | Stop reason: HOSPADM

## 2019-11-10 RX ORDER — GABAPENTIN 300 MG/1
300 CAPSULE ORAL 3 TIMES DAILY
Status: DISCONTINUED | OUTPATIENT
Start: 2019-11-10 | End: 2019-11-10

## 2019-11-10 RX ORDER — FINASTERIDE 5 MG/1
5 TABLET, FILM COATED ORAL DAILY
Status: DISCONTINUED | OUTPATIENT
Start: 2019-11-11 | End: 2019-11-12 | Stop reason: HOSPADM

## 2019-11-10 RX ORDER — DULOXETIN HYDROCHLORIDE 60 MG/1
60 CAPSULE, DELAYED RELEASE ORAL DAILY
Status: DISCONTINUED | OUTPATIENT
Start: 2019-11-11 | End: 2019-11-12 | Stop reason: HOSPADM

## 2019-11-10 RX ORDER — ATORVASTATIN CALCIUM 20 MG/1
40 TABLET, FILM COATED ORAL DAILY
Status: DISCONTINUED | OUTPATIENT
Start: 2019-11-10 | End: 2019-11-12 | Stop reason: HOSPADM

## 2019-11-10 RX ORDER — ENOXAPARIN SODIUM 100 MG/ML
40 INJECTION SUBCUTANEOUS EVERY 24 HOURS
Status: DISCONTINUED | OUTPATIENT
Start: 2019-11-10 | End: 2019-11-10

## 2019-11-10 RX ORDER — ASPIRIN 325 MG
325 TABLET ORAL
Status: DISCONTINUED | OUTPATIENT
Start: 2019-11-10 | End: 2019-11-10

## 2019-11-10 RX ORDER — DULOXETIN HYDROCHLORIDE 60 MG/1
60 CAPSULE, DELAYED RELEASE ORAL DAILY
Status: DISCONTINUED | OUTPATIENT
Start: 2019-11-10 | End: 2019-11-10

## 2019-11-10 RX ORDER — GLUCAGON 1 MG
1 KIT INJECTION
Status: DISCONTINUED | OUTPATIENT
Start: 2019-11-10 | End: 2019-11-12 | Stop reason: HOSPADM

## 2019-11-10 RX ORDER — IBUPROFEN 200 MG
24 TABLET ORAL
Status: DISCONTINUED | OUTPATIENT
Start: 2019-11-10 | End: 2019-11-12 | Stop reason: HOSPADM

## 2019-11-10 RX ORDER — ENOXAPARIN SODIUM 100 MG/ML
40 INJECTION SUBCUTANEOUS EVERY 24 HOURS
Status: DISCONTINUED | OUTPATIENT
Start: 2019-11-10 | End: 2019-11-12 | Stop reason: HOSPADM

## 2019-11-10 RX ADMIN — CALCIUM GLUCONATE 1000 MG: 94 INJECTION, SOLUTION INTRAVENOUS at 04:11

## 2019-11-10 RX ADMIN — POTASSIUM CHLORIDE 40 MEQ: 1500 TABLET, EXTENDED RELEASE ORAL at 04:11

## 2019-11-10 RX ADMIN — SODIUM CHLORIDE, SODIUM LACTATE, POTASSIUM CHLORIDE, AND CALCIUM CHLORIDE 500 ML: .6; .31; .03; .02 INJECTION, SOLUTION INTRAVENOUS at 12:11

## 2019-11-10 RX ADMIN — ATORVASTATIN CALCIUM 40 MG: 20 TABLET, FILM COATED ORAL at 03:11

## 2019-11-10 RX ADMIN — ENOXAPARIN SODIUM 40 MG: 100 INJECTION SUBCUTANEOUS at 10:11

## 2019-11-10 RX ADMIN — INSULIN ASPART 5 UNITS: 100 INJECTION, SOLUTION INTRAVENOUS; SUBCUTANEOUS at 05:11

## 2019-11-10 RX ADMIN — INSULIN ASPART 1 UNITS: 100 INJECTION, SOLUTION INTRAVENOUS; SUBCUTANEOUS at 10:11

## 2019-11-10 RX ADMIN — ENOXAPARIN SODIUM 40 MG: 100 INJECTION SUBCUTANEOUS at 04:11

## 2019-11-10 RX ADMIN — SODIUM CHLORIDE, SODIUM LACTATE, POTASSIUM CHLORIDE, AND CALCIUM CHLORIDE: .6; .31; .03; .02 INJECTION, SOLUTION INTRAVENOUS at 03:11

## 2019-11-10 NOTE — ASSESSMENT & PLAN NOTE
Initial CMP showed corrected calcium 7.7  Given 1g Ca gluconate iv  Initial low calcium could just be lab error  Will f/up next cmp  - PTH

## 2019-11-10 NOTE — HOSPITAL COURSE
Admitted to [facility] for evaluation of syncopal episodes. Bedside echo showed 50% aortic stenosis and positive orthostatic vitals. Cardiology consulted- TTE shows no abnormality. Orthostatic hypotension is present which is the prime suspect for the syncopal events. Stopped flomax and anti-htn but resumed lisinopril today after BP improved to 140s. Cortisol low normal. Holter monitor interrogated and was unremarkable. Will discharge today, 11/12, and hold flomax and beta-blocker.

## 2019-11-10 NOTE — ASSESSMENT & PLAN NOTE
88M with pmhx for dementia and OA, CAD, uncontrolled DM2 with neuropathy and recently added medication flomax for BPH now presents with recurrent syncope since last month associated with shaking of one arm in one episode but neurologically intact and no palpitations and no signs of active infection.     Symptoms could likely be from dehydration in setting of dementia and OA limiting mobility vs arrhythmia vs aortic stenosis per cardiology note +systolic murmur) vs orthostatic hypotension in setting of new medication and DM with neuropathy and hypovolemia from dehydration vs vasovagal shock with pallor pre-event or during the event but no rational stressor. Tn wnl and EKG was NSR and unremarkable. BNP 46, no leucocytosis or anemia    - Orthostatic vitals  - Cardiology consult - appreciate recs  - IVF (became hypotensive in the ER)   - Holter interrogation  - TTE  - TSH  - UA  - Daily CBC, CMP, Mg, Phos  - Hold Anti-Htn meds  - continue aspirin  - EEG (though very less likely seizures but because he had one episode of jerking movements)

## 2019-11-10 NOTE — H&P
[facility] Hospital Medicine  History & Physical    Patient Name: Nickolas Blake  MRN: [id number]  Admission Date: 11/10/2019  Attending Physician: Jayson Gloria, *   Primary Care Provider: Joe Klein MD    [facility] Medicine Team: [facility] 4 Sherif Acevedo MD     Patient information was obtained from patient, spouse/SO, relative(s), past medical records and ER records.     Subjective:     Principal Problem:Syncope    Chief Complaint:   Chief Complaint   Patient presents with    Near Syncope     Took insulin, then went eat breakfast, reports near syncope when he went to leave.         HPI: 88M with known hx of Uncontrolled IDDM2 with neuropathy, HTN, HLD, MI s/p angioplasty 1994, Mild cognitive impairment, BPH, OA of right hip and lumbar spinal stenosis with radiculopathy is brought to the ER after a witnessed event of syncope. Patient got up after a meal with family at an outside restaurant, was walking back to the car when his gait became unsteady and staggering, he started noticing light headedness and finally visual black out before passing out. Son, walking next to him, held him and avoided the fall but they report patient was right behind the car when this happened and still was mentioning he could not see the car, body was entirely flaccid and patient was totally pale when he passed out and was knocked out for less than a minute before regaining concsiousness followed by a less than 5 min duration of confusion after which he finally regained awareness of what had happened. Complaints of orthostatic light headedness.  Denies any pre-syncopal event, seizure/shaking/spasm of any extremity or the body, tongue bite or urine or bowel incontinence during event, prolonged confusion after the event.     Similar such 2 events have happened in the last month where patient had a arm shaking-spasm and a fall but never reported to the ER. Patient lives alone but has  sitters with him 24/7. Baseline functional status- fairly dependent on sitters for his ADLs. Limited mobility due to OA right knee pain which limits family's assessment of dizziness, sob and gait abnormality. Finally patient saw his PCP Dr Klein on 11/7 and was put on a holter for 48 hours.    Denies palpitations, chest pain, recent diarrhea/melena/brbpr, runny nose, chills, fever, cough, SOB, abd pain, headache, hx of seizures, sick contacts, weight loss, urinary or bowel changes, dysuria/hematuria, blood loss, confusion, back pain, slurred speech, facial droop or weakness/numbness of extremities, fatigue, leg swelling, change in appetite, back pain.    No alcohol drinking habit - no recent intake  Smoking for 40 years (chain smoker at one point per daughter at bedside) but quit since 1986  Recent change in medications: Flomax was started on 11/7 by Dr Klein for BPH.    Work-up in ER  Normal Hgb and wbc  CXR no consolidation or infiltrates but mild left sided effusion with basal atelectasis  BNP 46, Tn not elevated, EKG NSR  CMP showed mild hypokalemia and also hypocalcemia (corrected 7.7)  Normal kidney and liver functions        Past Medical History:   Diagnosis Date    Anemia in stage 3 chronic kidney disease 11/10/2016    BPH (benign prostatic hyperplasia) 10/26/2012    Cataract     Coronary artery disease involving native coronary artery of native heart without angina pectoris     Degeneration of lumbar or lumbosacral intervertebral disc 2/4/2014    Essential hypertension 9/30/2015    GERD (gastroesophageal reflux disease)     Hyperlipidemia     Lumbar radiculopathy, right 12/9/2013    Major depressive disorder with single episode, 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    Mild cognitive impairment with memory loss 7/18/2016    Osteoarthritis of right hip 8/16/2016    Osteoarthritis of spine with radiculopathy, lumbar region     S/P percutaneous transluminal coronary angioplasty     Spinal  "stenosis of lumbar region at multiple levels 2/4/2014    Thoracic or lumbosacral neuritis or radiculitis, unspecified 4/21/2015    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 edema, with long-term current use of insulin 11/19/2015    Type 2 diabetes mellitus with diabetic polyneuropathy, with long-term current use of insulin 11/19/2015    Type 2 diabetes mellitus with stage 3 chronic kidney disease, with long-term current use of insulin 11/19/2015    Type 2 diabetes with peripheral circulatory disorder, controlled        Past Surgical History:   Procedure Laterality Date    ADENOIDECTOMY      CARDIAC CATHETERIZATION  1980    CATARACT EXTRACTION      CYST REMOVAL      rectum    EYE SURGERY      TONSILLECTOMY         Review of patient's allergies indicates:  No Known Allergies    No current facility-administered medications on file prior to encounter.      Current Outpatient Medications on File Prior to Encounter   Medication Sig    acetaminophen (TYLENOL) 500 mg Cap Take 1 capsule by mouth every 6 to 8 hours as needed.     ascorbic acid, vitamin C, (VITAMIN C) 250 MG tablet Take 1 tablet (250 mg total) by mouth 2 (two) times daily.    aspirin (ECOTRIN) 81 MG EC tablet Take 81 mg by mouth once daily.    atorvastatin (LIPITOR) 40 MG tablet Take 1 tablet (40 mg total) by mouth once daily.    BD ALCOHOL SWABS PadM USE FOUR TIMES DAILY    BD ULTRA-FINE SHORT PEN NEEDLE 31 gauge x 5/16" Ndle Inject 1 application into the skin 5 (five) times daily.    blood sugar diagnostic (ACCU-CHEK NEWTON PLUS TEST STRP) Strp TEST TWO TIMES DAILY WITH MEALS    blood sugar diagnostic Strp To check BG 3 times daily, to use with insurance preferred meter    DULoxetine (CYMBALTA) 60 MG capsule Take 1 capsule (60 mg total) by mouth once daily.    finasteride (PROSCAR) 5 mg tablet TAKE 1 TABLET EVERY DAY    fluticasone (FLONASE) 50 mcg/actuation nasal spray 2 sprays " "by Each Nare route daily as needed for Allergies.    gabapentin (NEURONTIN) 300 MG capsule Take 1 capsule (300 mg total) by mouth 3 (three) times daily.    insulin aspart U-100 (NOVOLOG) 100 unit/mL InPn pen Inject 8 Units into the skin 3 (three) times daily with meals.    insulin glargine (LANTUS U-100 INSULIN) 100 unit/mL injection Inject 20 Units into the skin once daily.    insulin syringe-needle U-100 0.5 mL 30 gauge x 5/16" Syrg To use with Lantus vials - 2 times daily    lancets Misc 1 each by Misc.(Non-Drug; Combo Route) route 2 (two) times daily.    lisinopril (PRINIVIL,ZESTRIL) 20 MG tablet Take 1 tablet (20 mg total) by mouth once daily.    metoprolol succinate (TOPROL-XL) 50 MG 24 hr tablet TAKE 1 TABLET DAILY. DOSE INCREASE, STOP 25 MG    nitroGLYCERIN (NITROSTAT) 0.4 MG SL tablet Place 1 tablet (0.4 mg total) under the tongue every 5 (five) minutes as needed for Chest pain.    tamsulosin (FLOMAX) 0.4 mg Cap Take 1 capsule (0.4 mg total) by mouth once daily.     Family History     Problem Relation (Age of Onset)    Breast cancer Mother    Cancer Brother    Heart attack Brother    No Known Problems Father, Sister, Maternal Aunt, Maternal Uncle, Paternal Aunt, Paternal Uncle, Maternal Grandmother, Maternal Grandfather, Paternal Grandmother, Paternal Grandfather        Tobacco Use    Smoking status: Former Smoker     Packs/day: 0.25     Years: 50.00     Pack years: 12.50     Types: Cigarettes     Last attempt to quit: 1980     Years since quittin.7    Smokeless tobacco: Never Used   Substance and Sexual Activity    Alcohol use: No    Drug use: No    Sexual activity: Not Currently     Review of Systems   Constitutional: Negative for activity change, appetite change, chills, diaphoresis, fatigue, fever and unexpected weight change.   HENT: Negative for rhinorrhea, sore throat and trouble swallowing.    Eyes: Positive for visual disturbance. Negative for photophobia and pain. "   Respiratory: Negative for cough, chest tightness and shortness of breath.    Cardiovascular: Negative for chest pain, palpitations and leg swelling.   Gastrointestinal: Negative for abdominal distention, abdominal pain, blood in stool, constipation, diarrhea, nausea and vomiting.   Endocrine: Negative for cold intolerance, heat intolerance and polyuria.   Genitourinary: Negative for difficulty urinating, dysuria, flank pain, frequency and hematuria.   Musculoskeletal: Positive for arthralgias. Negative for back pain, gait problem, myalgias, neck pain and neck stiffness.   Skin: Positive for pallor (during the syncopal episode). Negative for rash.   Neurological: Positive for syncope and light-headedness. Negative for tremors, seizures, facial asymmetry, speech difficulty, weakness, numbness and headaches.   Psychiatric/Behavioral: Negative for confusion and sleep disturbance.     Objective:     Vital Signs (Most Recent):  Temp: 97.9 °F (36.6 °C) (11/10/19 1528)  Pulse: 62 (11/10/19 1528)  Resp: 20 (11/10/19 1528)  BP: (!) 162/73 (11/10/19 1528)  SpO2: 98 % (11/10/19 1528) Vital Signs (24h Range):  Temp:  [97.9 °F (36.6 °C)-98 °F (36.7 °C)] 97.9 °F (36.6 °C)  Pulse:  [62-80] 62  Resp:  [16-20] 20  SpO2:  [98 %] 98 %  BP: ()/(57-73) 162/73     Weight: 86.5 kg (190 lb 11.2 oz)  Body mass index is 25.86 kg/m².    Physical Exam   Constitutional: He is oriented to person, place, and time. He appears well-developed and well-nourished. No distress.   HENT:   Head: Normocephalic and atraumatic.   Mouth/Throat: Oropharynx is clear and moist. No oropharyngeal exudate.   Eyes: Pupils are equal, round, and reactive to light. Conjunctivae and EOM are normal. No scleral icterus.   Neck: Normal range of motion. Neck supple. No thyromegaly present.   Cardiovascular: Normal rate, regular rhythm, normal heart sounds and intact distal pulses.   Pulmonary/Chest: Effort normal and breath sounds normal. No respiratory distress. He  has no wheezes. He has no rales.   Abdominal: Soft. Bowel sounds are normal. He exhibits no distension. There is no tenderness.   Musculoskeletal: Normal range of motion. He exhibits no edema or tenderness.   Neurological: He is alert and oriented to person, place, and time.   Skin: Skin is dry. Capillary refill takes 2 to 3 seconds. He is not diaphoretic.         CRANIAL NERVES     CN III, IV, VI   Pupils are equal, round, and reactive to light.  Extraocular motions are normal.   CN III: no CN III palsy  CN VI: no CN VI palsy  Nystagmus: none   Diplopia: none    CN V   Facial sensation intact.     CN VII   Facial expression full, symmetric.     CN VIII   CN VIII normal.     CN XII   Tongue deviation: none       Significant Labs:   Recent Lab Results     None          Significant Imaging: I have reviewed all pertinent imaging results/findings within the past 24 hours.    Assessment/Plan:     * Syncope  88M with pmhx for dementia and OA, CAD, uncontrolled DM2 with neuropathy and recently added medication flomax for BPH now presents with recurrent syncope since last month associated with shaking of one arm in one episode but neurologically intact and no palpitations and no signs of active infection.     Symptoms could likely be from dehydration in setting of dementia and OA limiting mobility vs arrhythmia vs aortic stenosis per cardiology note +systolic murmur) vs orthostatic hypotension in setting of new medication and DM with neuropathy and hypovolemia from dehydration vs vasovagal shock with pallor pre-event or during the event but no rational stressor. Tn wnl and EKG was NSR and unremarkable. BNP 46, no leucocytosis or anemia    - Orthostatic vitals  - Cardiology consult - appreciate recs  - IVF (became hypotensive in the ER)   - Holter interrogation  - TTE  - TSH  - UA  - Daily CBC, CMP, Mg, Phos  - Hold Anti-Htn meds  - continue aspirin  - EEG (though very less likely seizures but because he had one episode of  jerking movements)          Hypertension, essential  Takes lisinopril and metoprolol at home  Holding off anti-htn in the setting of recent syncopal episodes and hypotension in the ER        Type 2 diabetes mellitus with diabetic polyneuropathy, with long-term current use of insulin  Insulin dependent  C/b peripheral neuropathy/polyneuropathy - could be a factor in syncope from autonomic neuropathy leading to orthostatic symptoms as patient described  Will start 16U basal and 5U prandial and add LDSSI\  (Home dose 20 basal and 8 prandial)  Last   HbA1C on 11/7 was 8.4  Resume home gabapentin and cymbalta for neuropathic pain      Coronary artery disease involving native coronary artery of native heart without angina pectoris  MI s/p angioplasty 1994  No symptoms of angina or diaphoresis or atypical chest pain  Continue aspirin  Not on any other AC      Benign prostatic hyperplasia without lower urinary tract symptoms  Continue Finasteride but hold flomax      Hypocalcemia  Initial CMP showed corrected calcium 7.7  Given 1g Ca gluconate iv  Initial low calcium could just be lab error  Will f/up next cmp  - PTH      Hyperlipidemia  Continue atorvastatin      VTE Risk Mitigation (From admission, onward)         Ordered     enoxaparin injection 40 mg  Daily      11/10/19 1454     IP VTE HIGH RISK PATIENT  Once      11/10/19 1454                   Sherif Acevedo MD  Department of Hospital Medicine   [facility]

## 2019-11-10 NOTE — ASSESSMENT & PLAN NOTE
Insulin dependent  C/b peripheral neuropathy/polyneuropathy - could be a factor in syncope from autonomic neuropathy leading to orthostatic symptoms as patient described  Will start 16U basal and 5U prandial and add LDSSI\  (Home dose 20 basal and 8 prandial)  Last   HbA1C on 11/7 was 8.4  Resume home gabapentin and cymbalta for neuropathic pain

## 2019-11-10 NOTE — SUBJECTIVE & OBJECTIVE
Past Medical History:   Diagnosis Date    Anemia in stage 3 chronic kidney disease 11/10/2016    BPH (benign prostatic hyperplasia) 10/26/2012    Cataract     Coronary artery disease involving native coronary artery of native heart without angina pectoris     Degeneration of lumbar or lumbosacral intervertebral disc 2/4/2014    Essential hypertension 9/30/2015    GERD (gastroesophageal reflux disease)     Hyperlipidemia     Lumbar radiculopathy, right 12/9/2013    Major depressive disorder with single episode, 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    Mild cognitive impairment with memory loss 7/18/2016    Osteoarthritis of right hip 8/16/2016    Osteoarthritis of spine with radiculopathy, lumbar region     S/P percutaneous transluminal coronary angioplasty     Spinal stenosis of lumbar region at multiple levels 2/4/2014    Thoracic or lumbosacral neuritis or radiculitis, unspecified 4/21/2015    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 edema, with long-term current use of insulin 11/19/2015    Type 2 diabetes mellitus with diabetic polyneuropathy, with long-term current use of insulin 11/19/2015    Type 2 diabetes mellitus with stage 3 chronic kidney disease, with long-term current use of insulin 11/19/2015    Type 2 diabetes with peripheral circulatory disorder, controlled        Past Surgical History:   Procedure Laterality Date    ADENOIDECTOMY      CARDIAC CATHETERIZATION  1980    CATARACT EXTRACTION      CYST REMOVAL      rectum    EYE SURGERY      TONSILLECTOMY         Review of patient's allergies indicates:  No Known Allergies    Current Facility-Administered Medications   Medication    lactated ringers bolus 500 mL     Current Outpatient Medications   Medication Sig    acetaminophen (TYLENOL) 500 mg Cap Take 1 capsule by mouth every 6 to 8 hours as needed.     ascorbic acid, vitamin C, (VITAMIN C) 250 MG tablet Take 1 tablet  "(250 mg total) by mouth 2 (two) times daily.    aspirin (ECOTRIN) 81 MG EC tablet Take 81 mg by mouth once daily.    atorvastatin (LIPITOR) 40 MG tablet Take 1 tablet (40 mg total) by mouth once daily.    BD ALCOHOL SWABS PadM USE FOUR TIMES DAILY    BD ULTRA-FINE SHORT PEN NEEDLE 31 gauge x 5/16" Ndle Inject 1 application into the skin 5 (five) times daily.    blood sugar diagnostic (ACCU-CHEK NEWTON PLUS TEST STRP) Strp TEST TWO TIMES DAILY WITH MEALS    blood sugar diagnostic Strp To check BG 3 times daily, to use with insurance preferred meter    DULoxetine (CYMBALTA) 60 MG capsule Take 1 capsule (60 mg total) by mouth once daily.    finasteride (PROSCAR) 5 mg tablet TAKE 1 TABLET EVERY DAY    fluticasone (FLONASE) 50 mcg/actuation nasal spray 2 sprays by Each Nare route daily as needed for Allergies.    gabapentin (NEURONTIN) 300 MG capsule Take 1 capsule (300 mg total) by mouth 3 (three) times daily.    insulin aspart U-100 (NOVOLOG) 100 unit/mL InPn pen Inject 8 Units into the skin 3 (three) times daily with meals.    insulin glargine (LANTUS U-100 INSULIN) 100 unit/mL injection Inject 20 Units into the skin once daily.    insulin syringe-needle U-100 0.5 mL 30 gauge x 5/16" Syrg To use with Lantus vials - 2 times daily    lancets Misc 1 each by Misc.(Non-Drug; Combo Route) route 2 (two) times daily.    lisinopril (PRINIVIL,ZESTRIL) 20 MG tablet Take 1 tablet (20 mg total) by mouth once daily.    metoprolol succinate (TOPROL-XL) 50 MG 24 hr tablet TAKE 1 TABLET DAILY. DOSE INCREASE, STOP 25 MG    nitroGLYCERIN (NITROSTAT) 0.4 MG SL tablet Place 1 tablet (0.4 mg total) under the tongue every 5 (five) minutes as needed for Chest pain.    tamsulosin (FLOMAX) 0.4 mg Cap Take 1 capsule (0.4 mg total) by mouth once daily.     Family History     Problem Relation (Age of Onset)    Breast cancer Mother    Cancer Brother    Heart attack Brother    No Known Problems Father, Sister, Maternal Aunt, " Maternal Uncle, Paternal Aunt, Paternal Uncle, Maternal Grandmother, Maternal Grandfather, Paternal Grandmother, Paternal Grandfather        Tobacco Use    Smoking status: Former Smoker     Packs/day: 0.25     Years: 50.00     Pack years: 12.50     Types: Cigarettes     Last attempt to quit: 1980     Years since quittin.7    Smokeless tobacco: Never Used   Substance and Sexual Activity    Alcohol use: No    Drug use: No    Sexual activity: Not Currently     Review of Systems   Constitutional: Negative for fatigue and fever.   Respiratory: Negative for cough, chest tightness, shortness of breath and wheezing.    Cardiovascular: Negative for chest pain, palpitations and leg swelling.   Gastrointestinal: Negative for abdominal pain and diarrhea.   Genitourinary: Negative for dysuria.     Objective:     Vital Signs (Most Recent):  Temp: 98 °F (36.7 °C) (11/10/19 0935)  Pulse: 80 (11/10/19 1221)  Resp: 16 (11/10/19 09)  BP: (!) 96/57 (11/10/19 1221)  SpO2: 98 % (11/10/19 0935) Vital Signs (24h Range):  Temp:  [98 °F (36.7 °C)] 98 °F (36.7 °C)  Pulse:  [69-80] 80  Resp:  [16] 16  SpO2:  [98 %] 98 %  BP: ()/(57-71) 96/57     Patient Vitals for the past 72 hrs (Last 3 readings):   Weight   11/10/19 0935 81.6 kg (180 lb)     Body mass index is 29.05 kg/m².    No intake or output data in the 24 hours ending 11/10/19 1419    Physical Exam   Constitutional: He is oriented to person, place, and time. He appears well-developed and well-nourished.   HENT:   Head: Normocephalic and atraumatic.   Eyes: Pupils are equal, round, and reactive to light. EOM are normal.   Neck: Normal range of motion. No JVD present.   Cardiovascular: Normal rate and regular rhythm.   Murmur heard.  Systolic ejection murmur   Pulmonary/Chest: Effort normal. He has no decreased breath sounds. He has no rales.   Abdominal: Soft. There is no tenderness.   Neurological: He is alert and oriented to person, place, and time.   Skin: Skin  is warm and dry. Capillary refill takes less than 2 seconds.       Significant Labs:  CBC:  No results for input(s): WBC, RBC, HGB, HCT, PLT, MCV, MCH, MCHC in the last 168 hours.  BNP:  No results for input(s): BNP in the last 168 hours.    Invalid input(s): BNPTRIAGELBLO  CMP:  Recent Labs   Lab 11/07/19  1240   *   CALCIUM 9.2   ALBUMIN 3.5   PROT 7.3      K 4.3   CO2 24      BUN 25*   CREATININE 1.3   ALKPHOS 108   ALT 23   AST 23   BILITOT 0.8      Coagulation:   No results for input(s): PT, INR, APTT in the last 168 hours.  LDH:  No results for input(s): LDH in the last 72 hours.  Microbiology:  Microbiology Results (last 7 days)     ** No results found for the last 168 hours. **          I have reviewed all pertinent labs within the past 24 hours.    Diagnostic Results:  I have reviewed all pertinent imaging results/findings within the past 24 hours.

## 2019-11-10 NOTE — ASSESSMENT & PLAN NOTE
MI s/p angioplasty 1994  No symptoms of angina or diaphoresis or atypical chest pain  Continue aspirin  Not on any other AC

## 2019-11-10 NOTE — ASSESSMENT & PLAN NOTE
- Patient seen and examined  - Polypharmacy and orthostatic hypotension in an 88 year old with syncope likely its etiology  - Recommend holding BP meds and give IV Fluids  - Recommend careful with gabapentin, cymbalta etc  - EP is working on getting interrogation of 48 hr Holter which will help us elucidate or r/o arrhythmic etiology.  - Recommend TTE  - Admit to [facility] and J

## 2019-11-10 NOTE — HPI
89 yo M  PMH of HTN, HLD, DM, MI s/p angioplasty 1994, demnetia, BPH, polyneuropathy DM, presenting wiyj an episode of syncope. He finished eating at a resturant got up and started walking towards family car when he became weaker, started staggering in his steps and complained of vision loss or blurred vision he then slowly slumped into his sons arms and passed out for a few seconds at exactly 8:52 AM. He has had 2 prior episodes of this in the last 2 weeks with similar presentation save a right arm shake a week ago not present this time. He has been wearing a 48 hr Holter monitor since Friday. He denies chest pain, palpitations, seizures etc. Of note he is on metoprolol, lisinopril, gabapentin, cymbalta and recently started on flomax.  In ER bedisde TTE with normal EF 50% scelerosed AV, no effusion, markedly orthostatic,  laying, 94 Standing, HR 70 -> 80.

## 2019-11-10 NOTE — ED NOTES
LOC: The patient is awake, alert and aware of environment with an appropriate affect  APPEARANCE: Patient resting comfortably and in no acute distress, patient is clean and well groomed, patient's clothing is properly fastened.  SKIN: The skin is warm and dry, color consistent with ethnicity, patient has normal skin turgor and moist mucus membranes, skin intact, no breakdown or bruising noted.  MUSCULOSKELETAL: Patient moving all extremities spontaneously, no obvious swelling or deformities noted.  RESPIRATORY: Airway is open and patent, respirations are spontaneous, patient has a normal effort and rate, no accessory muscle use noted  ABDOMEN: Soft and non tender to palpation, no distention noted    Patient brought in ems, patients family states they were walking out of a restaurant and patient yelled his vision was getting blurry and he didn't feel good, patients family states he completely blacked out and his son in law caught patient before hitting ground, patient is currently wearing a halter monitor due to the same thing happening several times in the last couple of weeks, no acute distress noted, will continue to monitor

## 2019-11-10 NOTE — CONSULTS
[facility]  Cardiology  Consult Note    Patient Name: Nickolas Blake  MRN: [id number]  Admission Date: 11/10/2019  Hospital Length of Stay: 0 days  Attending Physician: Nadeem Wyman MD  Primary Care Provider: Joe Klein MD   Principal Problem:<principal problem not specified>    Inpatient consult to Cardiology  Consult performed by: Sara Muñiz MD  Consult ordered by: Nadeem Wyman MD        Subjective:     History of Present Illness:  87 yo M  PMH of HTN, HLD, DM, MI s/p angioplasty 1994, demnetia, BPH, polyneuropathy DM, presenting wiyj an episode of syncope. He finished eating at a resturant got up and started walking towards family car when he became weaker, started staggering in his steps and complained of vision loss or blurred vision he then slowly slumped into his sons arms and passed out for a few seconds at exactly 8:52 AM. He has had 2 prior episodes of this in the last 2 weeks with similar presentation save a right arm shake a week ago not present this time. He has been wearing a 48 hr Holter monitor since Friday. He denies chest pain, palpitations, seizures etc. Of note he is on metoprolol, lisinopril, gabapentin, cymbalta and recently started on flomax.  In ER bedisde TTE with normal EF 50% scelerosed AV, no effusion, markedly orthostatic,  laying, 94 Standing, HR 70 -> 80.      Past Medical History:   Diagnosis Date    Anemia in stage 3 chronic kidney disease 11/10/2016    BPH (benign prostatic hyperplasia) 10/26/2012    Cataract     Coronary artery disease involving native coronary artery of native heart without angina pectoris     Degeneration of lumbar or lumbosacral intervertebral disc 2/4/2014    Essential hypertension 9/30/2015    GERD (gastroesophageal reflux disease)     Hyperlipidemia     Lumbar radiculopathy, right 12/9/2013    Major depressive disorder with single episode, 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    Mild cognitive  "impairment with memory loss 7/18/2016    Osteoarthritis of right hip 8/16/2016    Osteoarthritis of spine with radiculopathy, lumbar region     S/P percutaneous transluminal coronary angioplasty     Spinal stenosis of lumbar region at multiple levels 2/4/2014    Thoracic or lumbosacral neuritis or radiculitis, unspecified 4/21/2015    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 edema, with long-term current use of insulin 11/19/2015    Type 2 diabetes mellitus with diabetic polyneuropathy, with long-term current use of insulin 11/19/2015    Type 2 diabetes mellitus with stage 3 chronic kidney disease, with long-term current use of insulin 11/19/2015    Type 2 diabetes with peripheral circulatory disorder, controlled        Past Surgical History:   Procedure Laterality Date    ADENOIDECTOMY      CARDIAC CATHETERIZATION  1980    CATARACT EXTRACTION      CYST REMOVAL      rectum    EYE SURGERY      TONSILLECTOMY         Review of patient's allergies indicates:  No Known Allergies    Current Facility-Administered Medications   Medication    lactated ringers bolus 500 mL     Current Outpatient Medications   Medication Sig    acetaminophen (TYLENOL) 500 mg Cap Take 1 capsule by mouth every 6 to 8 hours as needed.     ascorbic acid, vitamin C, (VITAMIN C) 250 MG tablet Take 1 tablet (250 mg total) by mouth 2 (two) times daily.    aspirin (ECOTRIN) 81 MG EC tablet Take 81 mg by mouth once daily.    atorvastatin (LIPITOR) 40 MG tablet Take 1 tablet (40 mg total) by mouth once daily.    BD ALCOHOL SWABS PadM USE FOUR TIMES DAILY    BD ULTRA-FINE SHORT PEN NEEDLE 31 gauge x 5/16" Ndle Inject 1 application into the skin 5 (five) times daily.    blood sugar diagnostic (ACCU-CHEK NEWTON PLUS TEST STRP) Strp TEST TWO TIMES DAILY WITH MEALS    blood sugar diagnostic Strp To check BG 3 times daily, to use with insurance preferred meter    DULoxetine " "(CYMBALTA) 60 MG capsule Take 1 capsule (60 mg total) by mouth once daily.    finasteride (PROSCAR) 5 mg tablet TAKE 1 TABLET EVERY DAY    fluticasone (FLONASE) 50 mcg/actuation nasal spray 2 sprays by Each Nare route daily as needed for Allergies.    gabapentin (NEURONTIN) 300 MG capsule Take 1 capsule (300 mg total) by mouth 3 (three) times daily.    insulin aspart U-100 (NOVOLOG) 100 unit/mL InPn pen Inject 8 Units into the skin 3 (three) times daily with meals.    insulin glargine (LANTUS U-100 INSULIN) 100 unit/mL injection Inject 20 Units into the skin once daily.    insulin syringe-needle U-100 0.5 mL 30 gauge x 5/16" Syrg To use with Lantus vials - 2 times daily    lancets Misc 1 each by Misc.(Non-Drug; Combo Route) route 2 (two) times daily.    lisinopril (PRINIVIL,ZESTRIL) 20 MG tablet Take 1 tablet (20 mg total) by mouth once daily.    metoprolol succinate (TOPROL-XL) 50 MG 24 hr tablet TAKE 1 TABLET DAILY. DOSE INCREASE, STOP 25 MG    nitroGLYCERIN (NITROSTAT) 0.4 MG SL tablet Place 1 tablet (0.4 mg total) under the tongue every 5 (five) minutes as needed for Chest pain.    tamsulosin (FLOMAX) 0.4 mg Cap Take 1 capsule (0.4 mg total) by mouth once daily.     Family History     Problem Relation (Age of Onset)    Breast cancer Mother    Cancer Brother    Heart attack Brother    No Known Problems Father, Sister, Maternal Aunt, Maternal Uncle, Paternal Aunt, Paternal Uncle, Maternal Grandmother, Maternal Grandfather, Paternal Grandmother, Paternal Grandfather        Tobacco Use    Smoking status: Former Smoker     Packs/day: 0.25     Years: 50.00     Pack years: 12.50     Types: Cigarettes     Last attempt to quit: 1980     Years since quittin.7    Smokeless tobacco: Never Used   Substance and Sexual Activity    Alcohol use: No    Drug use: No    Sexual activity: Not Currently     Review of Systems   Constitutional: Negative for fatigue and fever.   Respiratory: Negative for cough, " chest tightness, shortness of breath and wheezing.    Cardiovascular: Negative for chest pain, palpitations and leg swelling.   Gastrointestinal: Negative for abdominal pain and diarrhea.   Genitourinary: Negative for dysuria.     Objective:     Vital Signs (Most Recent):  Temp: 98 °F (36.7 °C) (11/10/19 0935)  Pulse: 80 (11/10/19 1221)  Resp: 16 (11/10/19 0935)  BP: (!) 96/57 (11/10/19 1221)  SpO2: 98 % (11/10/19 0935) Vital Signs (24h Range):  Temp:  [98 °F (36.7 °C)] 98 °F (36.7 °C)  Pulse:  [69-80] 80  Resp:  [16] 16  SpO2:  [98 %] 98 %  BP: ()/(57-71) 96/57     Patient Vitals for the past 72 hrs (Last 3 readings):   Weight   11/10/19 0935 81.6 kg (180 lb)     Body mass index is 29.05 kg/m².    No intake or output data in the 24 hours ending 11/10/19 1419    Physical Exam   Constitutional: He is oriented to person, place, and time. He appears well-developed and well-nourished.   HENT:   Head: Normocephalic and atraumatic.   Eyes: Pupils are equal, round, and reactive to light. EOM are normal.   Neck: Normal range of motion. No JVD present.   Cardiovascular: Normal rate and regular rhythm.   Murmur heard.  Systolic ejection murmur   Pulmonary/Chest: Effort normal. He has no decreased breath sounds. He has no rales.   Abdominal: Soft. There is no tenderness.   Neurological: He is alert and oriented to person, place, and time.   Skin: Skin is warm and dry. Capillary refill takes less than 2 seconds.       Significant Labs:  CBC:  No results for input(s): WBC, RBC, HGB, HCT, PLT, MCV, MCH, MCHC in the last 168 hours.  BNP:  No results for input(s): BNP in the last 168 hours.    Invalid input(s): BNPTRIAGELBLO  CMP:  Recent Labs   Lab 11/07/19  1240   *   CALCIUM 9.2   ALBUMIN 3.5   PROT 7.3      K 4.3   CO2 24      BUN 25*   CREATININE 1.3   ALKPHOS 108   ALT 23   AST 23   BILITOT 0.8      Coagulation:   No results for input(s): PT, INR, APTT in the last 168 hours.  LDH:  No results for  input(s): LDH in the last 72 hours.  Microbiology:  Microbiology Results (last 7 days)     ** No results found for the last 168 hours. **          I have reviewed all pertinent labs within the past 24 hours.    Diagnostic Results:  I have reviewed all pertinent imaging results/findings within the past 24 hours.    Assessment/Plan:     Syncope  - Patient seen and examined  - Polypharmacy and orthostatic hypotension in an 88 year old with syncope likely its etiology  - Recommend holding BP meds and give IV Fluids  - Recommend careful with gabapentin, cymbalta etc  - EP is working on getting interrogation of 48 hr Holter which will help us elucidate or r/o arrhythmic etiology.  - Recommend TTE  - Admit to [facility] and [facility]  - NPO AT MIDNIGHT          Thank you for your consult. I will follow-up with patient. Please contact us if you have any additional questions.    Sara Muñiz MD  Cardiology  [facility]

## 2019-11-10 NOTE — HPI
88M with known hx of Uncontrolled IDDM2 with neuropathy, HTN, HLD, MI s/p angioplasty 1994, Mild cognitive impairment, BPH, OA of right hip and lumbar spinal stenosis with radiculopathy is brought to the ER after a witnessed event of syncope. Patient got up after a meal with family at an outside restaurant, was walking back to the car when his gait became unsteady and staggering, he started noticing light headedness and finally visual black out before passing out. Son, walking next to him, held him and avoided the fall but they report patient was right behind the car when this happened and still was mentioning he could not see the car, body was entirely flaccid and patient was totally pale when he passed out and was knocked out for less than a minute before regaining concsiousness followed by a less than 5 min duration of confusion after which he finally regained awareness of what had happened. Complaints of orthostatic light headedness.  Denies any pre-syncopal event, seizure/shaking/spasm of any extremity or the body, tongue bite or urine or bowel incontinence during event, prolonged confusion after the event.     Similar such 2 events have happened in the last month where patient had a arm shaking-spasm and a fall but never reported to the ER. Patient lives alone but has sitters with him 24/7. Baseline functional status- fairly dependent on sitters for his ADLs. Limited mobility due to OA right knee pain which limits family's assessment of dizziness, sob and gait abnormality. Finally patient saw his PCP Dr Klein on 11/7 and was put on a holter for 48 hours.    Denies palpitations, chest pain, recent diarrhea/melena/brbpr, runny nose, chills, fever, cough, SOB, abd pain, headache, hx of seizures, sick contacts, weight loss, urinary or bowel changes, dysuria/hematuria, blood loss, confusion, back pain, slurred speech, facial droop or weakness/numbness of extremities, fatigue, leg swelling, change in appetite,  back pain.    No alcohol drinking habit - no recent intake  Smoking for 40 years (chain smoker at one point per daughter at bedside) but quit since 1986  Recent change in medications: Flomax was started on 11/7 by Dr Klein for BPH.    Work-up in ER  Normal Hgb and wbc  CXR no consolidation or infiltrates but mild left sided effusion with basal atelectasis  BNP 46, Tn not elevated, EKG NSR  CMP showed mild hypokalemia and also hypocalcemia (corrected 7.7)  Normal kidney and liver functions

## 2019-11-10 NOTE — SUBJECTIVE & OBJECTIVE
Past Medical History:   Diagnosis Date    Anemia in stage 3 chronic kidney disease 11/10/2016    BPH (benign prostatic hyperplasia) 10/26/2012    Cataract     Coronary artery disease involving native coronary artery of native heart without angina pectoris     Degeneration of lumbar or lumbosacral intervertebral disc 2/4/2014    Essential hypertension 9/30/2015    GERD (gastroesophageal reflux disease)     Hyperlipidemia     Lumbar radiculopathy, right 12/9/2013    Major depressive disorder with single episode, 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    Mild cognitive impairment with memory loss 7/18/2016    Osteoarthritis of right hip 8/16/2016    Osteoarthritis of spine with radiculopathy, lumbar region     S/P percutaneous transluminal coronary angioplasty     Spinal stenosis of lumbar region at multiple levels 2/4/2014    Thoracic or lumbosacral neuritis or radiculitis, unspecified 4/21/2015    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 edema, with long-term current use of insulin 11/19/2015    Type 2 diabetes mellitus with diabetic polyneuropathy, with long-term current use of insulin 11/19/2015    Type 2 diabetes mellitus with stage 3 chronic kidney disease, with long-term current use of insulin 11/19/2015    Type 2 diabetes with peripheral circulatory disorder, controlled        Past Surgical History:   Procedure Laterality Date    ADENOIDECTOMY      CARDIAC CATHETERIZATION  1980    CATARACT EXTRACTION      CYST REMOVAL      rectum    EYE SURGERY      TONSILLECTOMY         Review of patient's allergies indicates:  No Known Allergies    No current facility-administered medications on file prior to encounter.      Current Outpatient Medications on File Prior to Encounter   Medication Sig    acetaminophen (TYLENOL) 500 mg Cap Take 1 capsule by mouth every 6 to 8 hours as needed.     ascorbic acid, vitamin C, (VITAMIN C) 250 MG tablet  "Take 1 tablet (250 mg total) by mouth 2 (two) times daily.    aspirin (ECOTRIN) 81 MG EC tablet Take 81 mg by mouth once daily.    atorvastatin (LIPITOR) 40 MG tablet Take 1 tablet (40 mg total) by mouth once daily.    BD ALCOHOL SWABS PadM USE FOUR TIMES DAILY    BD ULTRA-FINE SHORT PEN NEEDLE 31 gauge x 5/16" Ndle Inject 1 application into the skin 5 (five) times daily.    blood sugar diagnostic (ACCU-CHEK [place] PLUS TEST STRP) Strp TEST TWO TIMES DAILY WITH MEALS    blood sugar diagnostic Strp To check BG 3 times daily, to use with insurance preferred meter    DULoxetine (CYMBALTA) 60 MG capsule Take 1 capsule (60 mg total) by mouth once daily.    finasteride (PROSCAR) 5 mg tablet TAKE 1 TABLET EVERY DAY    fluticasone (FLONASE) 50 mcg/actuation nasal spray 2 sprays by Each Nare route daily as needed for Allergies.    gabapentin (NEURONTIN) 300 MG capsule Take 1 capsule (300 mg total) by mouth 3 (three) times daily.    insulin aspart U-100 (NOVOLOG) 100 unit/mL InPn pen Inject 8 Units into the skin 3 (three) times daily with meals.    insulin glargine (LANTUS U-100 INSULIN) 100 unit/mL injection Inject 20 Units into the skin once daily.    insulin syringe-needle U-100 0.5 mL 30 gauge x 5/16" Syrg To use with Lantus vials - 2 times daily    lancets Misc 1 each by Misc.(Non-Drug; Combo Route) route 2 (two) times daily.    lisinopril (PRINIVIL,ZESTRIL) 20 MG tablet Take 1 tablet (20 mg total) by mouth once daily.    metoprolol succinate (TOPROL-XL) 50 MG 24 hr tablet TAKE 1 TABLET DAILY. DOSE INCREASE, STOP 25 MG    nitroGLYCERIN (NITROSTAT) 0.4 MG SL tablet Place 1 tablet (0.4 mg total) under the tongue every 5 (five) minutes as needed for Chest pain.    tamsulosin (FLOMAX) 0.4 mg Cap Take 1 capsule (0.4 mg total) by mouth once daily.     Family History     Problem Relation (Age of Onset)    Breast cancer Mother    Cancer Brother    Heart attack Brother    No Known Problems Father, Sister, " Maternal Aunt, Maternal Uncle, Paternal Aunt, Paternal Uncle, Maternal Grandmother, Maternal Grandfather, Paternal Grandmother, Paternal Grandfather        Tobacco Use    Smoking status: Former Smoker     Packs/day: 0.25     Years: 50.00     Pack years: 12.50     Types: Cigarettes     Last attempt to quit: 1980     Years since quittin.7    Smokeless tobacco: Never Used   Substance and Sexual Activity    Alcohol use: No    Drug use: No    Sexual activity: Not Currently     Review of Systems   Constitutional: Negative for activity change, appetite change, chills, diaphoresis, fatigue, fever and unexpected weight change.   HENT: Negative for rhinorrhea, sore throat and trouble swallowing.    Eyes: Positive for visual disturbance. Negative for photophobia and pain.   Respiratory: Negative for cough, chest tightness and shortness of breath.    Cardiovascular: Negative for chest pain, palpitations and leg swelling.   Gastrointestinal: Negative for abdominal distention, abdominal pain, blood in stool, constipation, diarrhea, nausea and vomiting.   Endocrine: Negative for cold intolerance, heat intolerance and polyuria.   Genitourinary: Negative for difficulty urinating, dysuria, flank pain, frequency and hematuria.   Musculoskeletal: Positive for arthralgias. Negative for back pain, gait problem, myalgias, neck pain and neck stiffness.   Skin: Positive for pallor (during the syncopal episode). Negative for rash.   Neurological: Positive for syncope and light-headedness. Negative for tremors, seizures, facial asymmetry, speech difficulty, weakness, numbness and headaches.   Psychiatric/Behavioral: Negative for confusion and sleep disturbance.     Objective:     Vital Signs (Most Recent):  Temp: 97.9 °F (36.6 °C) (11/10/19 1528)  Pulse: 62 (11/10/19 1528)  Resp: 20 (11/10/19 152)  BP: (!) 162/73 (11/10/19 1528)  SpO2: 98 % (11/10/19 152) Vital Signs (24h Range):  Temp:  [97.9 °F (36.6 °C)-98 °F (36.7 °C)]  97.9 °F (36.6 °C)  Pulse:  [62-80] 62  Resp:  [16-20] 20  SpO2:  [98 %] 98 %  BP: ()/(57-73) 162/73     Weight: 86.5 kg (190 lb 11.2 oz)  Body mass index is 25.86 kg/m².    Physical Exam   Constitutional: He is oriented to person, place, and time. He appears well-developed and well-nourished. No distress.   HENT:   Head: Normocephalic and atraumatic.   Mouth/Throat: Oropharynx is clear and moist. No oropharyngeal exudate.   Eyes: Pupils are equal, round, and reactive to light. Conjunctivae and EOM are normal. No scleral icterus.   Neck: Normal range of motion. Neck supple. No thyromegaly present.   Cardiovascular: Normal rate, regular rhythm, normal heart sounds and intact distal pulses.   Pulmonary/Chest: Effort normal and breath sounds normal. No respiratory distress. He has no wheezes. He has no rales.   Abdominal: Soft. Bowel sounds are normal. He exhibits no distension. There is no tenderness.   Musculoskeletal: Normal range of motion. He exhibits no edema or tenderness.   Neurological: He is alert and oriented to person, place, and time.   Skin: Skin is dry. Capillary refill takes 2 to 3 seconds. He is not diaphoretic.         CRANIAL NERVES     CN III, IV, VI   Pupils are equal, round, and reactive to light.  Extraocular motions are normal.   CN III: no CN III palsy  CN VI: no CN VI palsy  Nystagmus: none   Diplopia: none    CN V   Facial sensation intact.     CN VII   Facial expression full, symmetric.     CN VIII   CN VIII normal.     CN XII   Tongue deviation: none       Significant Labs:   Recent Lab Results     None          Significant Imaging: I have reviewed all pertinent imaging results/findings within the past 24 hours.

## 2019-11-11 LAB
ALBUMIN SERPL BCP-MCNC: 2.9 G/DL (ref 3.5–5.2)
ALP SERPL-CCNC: 127 U/L (ref 55–135)
ALT SERPL W/O P-5'-P-CCNC: 22 U/L (ref 10–44)
ANION GAP SERPL CALC-SCNC: 8 MMOL/L (ref 8–16)
AST SERPL-CCNC: 22 U/L (ref 10–40)
AV INDEX (PROSTH): 0.79
AV MEAN GRADIENT: 4 MMHG
AV PEAK GRADIENT: 6 MMHG
AV VALVE AREA: 2.99 CM2
AV VELOCITY RATIO: 0.7
BASOPHILS # BLD AUTO: 0.06 K/UL (ref 0–0.2)
BASOPHILS NFR BLD: 1.1 % (ref 0–1.9)
BILIRUB SERPL-MCNC: 0.5 MG/DL (ref 0.1–1)
BILIRUB UR QL STRIP: NEGATIVE
BSA FOR ECHO PROCEDURE: 2.09 M2
BUN SERPL-MCNC: 28 MG/DL (ref 8–23)
CALCIUM SERPL-MCNC: 8.5 MG/DL (ref 8.7–10.5)
CHLORIDE SERPL-SCNC: 108 MMOL/L (ref 95–110)
CLARITY UR REFRACT.AUTO: CLEAR
CO2 SERPL-SCNC: 24 MMOL/L (ref 23–29)
COLOR UR AUTO: YELLOW
CORTIS SERPL-MCNC: 2.9 UG/DL (ref 4.3–22.4)
CORTIS SERPL-MCNC: 8.1 UG/DL
CORTIS SERPL-MCNC: 8.3 UG/DL
CORTIS SERPL-MCNC: 8.4 UG/DL
CREAT SERPL-MCNC: 1.3 MG/DL (ref 0.5–1.4)
CV ECHO LV RWT: 0.49 CM
DIFFERENTIAL METHOD: ABNORMAL
DOP CALC AO PEAK VEL: 1.27 M/S
DOP CALC AO VTI: 28.54 CM
DOP CALC LVOT AREA: 3.8 CM2
DOP CALC LVOT DIAMETER: 2.2 CM
DOP CALC LVOT PEAK VEL: 0.89 M/S
DOP CALC LVOT STROKE VOLUME: 85.45 CM3
DOP CALCLVOT PEAK VEL VTI: 22.49 CM
E WAVE DECELERATION TIME: 345.54 MSEC
E/A RATIO: 0.65
E/E' RATIO: 9.83 M/S
ECHO LV POSTERIOR WALL: 1 CM (ref 0.6–1.1)
EOSINOPHIL # BLD AUTO: 0 K/UL (ref 0–0.5)
EOSINOPHIL NFR BLD: 0 % (ref 0–8)
ERYTHROCYTE [DISTWIDTH] IN BLOOD BY AUTOMATED COUNT: 14.1 % (ref 11.5–14.5)
EST. GFR  (AFRICAN AMERICAN): 56.3 ML/MIN/1.73 M^2
EST. GFR  (NON AFRICAN AMERICAN): 48.7 ML/MIN/1.73 M^2
FRACTIONAL SHORTENING: 35 % (ref 28–44)
GLUCOSE SERPL-MCNC: 206 MG/DL (ref 70–110)
GLUCOSE UR QL STRIP: ABNORMAL
HCT VFR BLD AUTO: 36.8 % (ref 40–54)
HGB BLD-MCNC: 11.8 G/DL (ref 14–18)
HGB UR QL STRIP: NEGATIVE
HYALINE CASTS UR QL AUTO: 3 /LPF
IMM GRANULOCYTES # BLD AUTO: 0.02 K/UL (ref 0–0.04)
IMM GRANULOCYTES NFR BLD AUTO: 0.4 % (ref 0–0.5)
INTERVENTRICULAR SEPTUM: 1.2 CM (ref 0.6–1.1)
KETONES UR QL STRIP: NEGATIVE
LA MAJOR: 5.21 CM
LA WIDTH: 3.53 CM
LEFT ATRIUM SIZE: 3.66 CM
LEFT INTERNAL DIMENSION IN SYSTOLE: 2.65 CM (ref 2.1–4)
LEFT VENTRICLE DIASTOLIC VOLUME INDEX: 27.87 ML/M2
LEFT VENTRICLE DIASTOLIC VOLUME: 57.98 ML
LEFT VENTRICLE MASS INDEX: 73 G/M2
LEFT VENTRICLE SYSTOLIC VOLUME INDEX: 12.4 ML/M2
LEFT VENTRICLE SYSTOLIC VOLUME: 25.78 ML
LEFT VENTRICULAR INTERNAL DIMENSION IN DIASTOLE: 4.1 CM (ref 3.5–6)
LEFT VENTRICULAR MASS: 151.3 G
LEUKOCYTE ESTERASE UR QL STRIP: NEGATIVE
LV LATERAL E/E' RATIO: 9.83 M/S
LV SEPTAL E/E' RATIO: 9.83 M/S
LYMPHOCYTES # BLD AUTO: 1.2 K/UL (ref 1–4.8)
LYMPHOCYTES NFR BLD: 21.1 % (ref 18–48)
MAGNESIUM SERPL-MCNC: 1.8 MG/DL (ref 1.6–2.6)
MCH RBC QN AUTO: 28.6 PG (ref 27–31)
MCHC RBC AUTO-ENTMCNC: 32.1 G/DL (ref 32–36)
MCV RBC AUTO: 89 FL (ref 82–98)
MICROSCOPIC COMMENT: ABNORMAL
MONOCYTES # BLD AUTO: 1 K/UL (ref 0.3–1)
MONOCYTES NFR BLD: 16.9 % (ref 4–15)
MV PEAK A VEL: 0.91 M/S
MV PEAK E VEL: 0.59 M/S
NEUTROPHILS # BLD AUTO: 3.5 K/UL (ref 1.8–7.7)
NEUTROPHILS NFR BLD: 60.5 % (ref 38–73)
NITRITE UR QL STRIP: NEGATIVE
NRBC BLD-RTO: 0 /100 WBC
OHS CV EVENT MONITOR DAY: 0
OHS CV HOLTER LENGTH DECIMAL HOURS: 48
OHS CV HOLTER LENGTH HOURS: 48
OHS CV HOLTER LENGTH MINUTES: 0
PH UR STRIP: 5 [PH] (ref 5–8)
PHOSPHATE SERPL-MCNC: 2.6 MG/DL (ref 2.7–4.5)
PLATELET # BLD AUTO: 223 K/UL (ref 150–350)
PMV BLD AUTO: 11.7 FL (ref 9.2–12.9)
POCT GLUCOSE: 139 MG/DL (ref 70–110)
POCT GLUCOSE: 168 MG/DL (ref 70–110)
POCT GLUCOSE: 183 MG/DL (ref 70–110)
POCT GLUCOSE: 215 MG/DL (ref 70–110)
POTASSIUM SERPL-SCNC: 4.2 MMOL/L (ref 3.5–5.1)
PROT SERPL-MCNC: 6.1 G/DL (ref 6–8.4)
PROT UR QL STRIP: NEGATIVE
RA MAJOR: 4.59 CM
RA PRESSURE: 3 MMHG
RA WIDTH: 2.83 CM
RBC # BLD AUTO: 4.13 M/UL (ref 4.6–6.2)
RBC #/AREA URNS AUTO: 3 /HPF (ref 0–4)
RV TISSUE DOPPLER FREE WALL SYSTOLIC VELOCITY 1 (APICAL 4 CHAMBER VIEW): 15 CM/S
SINUS: 3.39 CM
SODIUM SERPL-SCNC: 140 MMOL/L (ref 136–145)
SP GR UR STRIP: 1.02 (ref 1–1.03)
SQUAMOUS #/AREA URNS AUTO: 1 /HPF
STJ: 2.8 CM
T3FREE SERPL-MCNC: 1.6 PG/ML (ref 2.3–4.2)
TDI LATERAL: 0.06 M/S
TDI SEPTAL: 0.06 M/S
TDI: 0.06 M/S
TRICUSPID ANNULAR PLANE SYSTOLIC EXCURSION: 2.41 CM
URN SPEC COLLECT METH UR: ABNORMAL
WBC # BLD AUTO: 5.69 K/UL (ref 3.9–12.7)
WBC #/AREA URNS AUTO: 0 /HPF (ref 0–5)

## 2019-11-11 PROCEDURE — 97165 OT EVAL LOW COMPLEX 30 MIN: CPT | Mod: HCNC

## 2019-11-11 PROCEDURE — 99215 OFFICE O/P EST HI 40 MIN: CPT | Mod: HCNC,,, | Performed by: INTERNAL MEDICINE

## 2019-11-11 PROCEDURE — 97162 PT EVAL MOD COMPLEX 30 MIN: CPT | Mod: HCNC

## 2019-11-11 PROCEDURE — 81001 URINALYSIS AUTO W/SCOPE: CPT | Mod: HCNC

## 2019-11-11 PROCEDURE — 63600175 PHARM REV CODE 636 W HCPCS: Mod: HCNC | Performed by: STUDENT IN AN ORGANIZED HEALTH CARE EDUCATION/TRAINING PROGRAM

## 2019-11-11 PROCEDURE — 80053 COMPREHEN METABOLIC PANEL: CPT | Mod: HCNC

## 2019-11-11 PROCEDURE — 84100 ASSAY OF PHOSPHORUS: CPT | Mod: HCNC

## 2019-11-11 PROCEDURE — 96372 THER/PROPH/DIAG INJ SC/IM: CPT

## 2019-11-11 PROCEDURE — 84481 FREE ASSAY (FT-3): CPT | Mod: HCNC

## 2019-11-11 PROCEDURE — 99226 PR SUBSEQUENT OBSERVATION CARE,LEVEL III: ICD-10-PCS | Mod: HCNC,GC,, | Performed by: HOSPITALIST

## 2019-11-11 PROCEDURE — C9399 UNCLASSIFIED DRUGS OR BIOLOG: HCPCS | Mod: HCNC | Performed by: STUDENT IN AN ORGANIZED HEALTH CARE EDUCATION/TRAINING PROGRAM

## 2019-11-11 PROCEDURE — G0378 HOSPITAL OBSERVATION PER HR: HCPCS | Mod: HCNC

## 2019-11-11 PROCEDURE — 82024 ASSAY OF ACTH: CPT | Mod: HCNC

## 2019-11-11 PROCEDURE — 95819 EEG AWAKE AND ASLEEP: CPT | Mod: HCNC

## 2019-11-11 PROCEDURE — 99215 PR OFFICE/OUTPT VISIT, EST, LEVL V, 40-54 MIN: ICD-10-PCS | Mod: HCNC,,, | Performed by: INTERNAL MEDICINE

## 2019-11-11 PROCEDURE — 82533 TOTAL CORTISOL: CPT | Mod: HCNC

## 2019-11-11 PROCEDURE — 94761 N-INVAS EAR/PLS OXIMETRY MLT: CPT | Mod: HCNC

## 2019-11-11 PROCEDURE — 25000003 PHARM REV CODE 250: Mod: HCNC | Performed by: STUDENT IN AN ORGANIZED HEALTH CARE EDUCATION/TRAINING PROGRAM

## 2019-11-11 PROCEDURE — 97535 SELF CARE MNGMENT TRAINING: CPT | Mod: HCNC,59

## 2019-11-11 PROCEDURE — 99226 PR SUBSEQUENT OBSERVATION CARE,LEVEL III: CPT | Mod: HCNC,GC,, | Performed by: HOSPITALIST

## 2019-11-11 PROCEDURE — 97530 THERAPEUTIC ACTIVITIES: CPT | Mod: HCNC

## 2019-11-11 PROCEDURE — 83735 ASSAY OF MAGNESIUM: CPT | Mod: HCNC

## 2019-11-11 PROCEDURE — 36415 COLL VENOUS BLD VENIPUNCTURE: CPT | Mod: HCNC

## 2019-11-11 PROCEDURE — 85025 COMPLETE CBC W/AUTO DIFF WBC: CPT | Mod: HCNC

## 2019-11-11 RX ORDER — ACETAMINOPHEN 500 MG
1 TABLET ORAL 2 TIMES DAILY
COMMUNITY
End: 2021-10-01

## 2019-11-11 RX ORDER — LISINOPRIL 20 MG/1
20 TABLET ORAL DAILY
Status: DISCONTINUED | OUTPATIENT
Start: 2019-11-11 | End: 2019-11-12 | Stop reason: HOSPADM

## 2019-11-11 RX ORDER — COSYNTROPIN 0.25 MG/ML
0.25 INJECTION, POWDER, FOR SOLUTION INTRAMUSCULAR; INTRAVENOUS ONCE
Status: DISCONTINUED | OUTPATIENT
Start: 2019-11-11 | End: 2019-11-11

## 2019-11-11 RX ADMIN — ATORVASTATIN CALCIUM 40 MG: 20 TABLET, FILM COATED ORAL at 08:11

## 2019-11-11 RX ADMIN — INSULIN DETEMIR 16 UNITS: 100 INJECTION, SOLUTION SUBCUTANEOUS at 08:11

## 2019-11-11 RX ADMIN — LISINOPRIL 20 MG: 20 TABLET ORAL at 03:11

## 2019-11-11 RX ADMIN — INSULIN ASPART 5 UNITS: 100 INJECTION, SOLUTION INTRAVENOUS; SUBCUTANEOUS at 05:11

## 2019-11-11 RX ADMIN — ENOXAPARIN SODIUM 40 MG: 100 INJECTION SUBCUTANEOUS at 05:11

## 2019-11-11 RX ADMIN — INSULIN ASPART 5 UNITS: 100 INJECTION, SOLUTION INTRAVENOUS; SUBCUTANEOUS at 08:11

## 2019-11-11 RX ADMIN — GABAPENTIN 300 MG: 300 CAPSULE ORAL at 08:11

## 2019-11-11 RX ADMIN — GABAPENTIN 300 MG: 300 CAPSULE ORAL at 09:11

## 2019-11-11 RX ADMIN — GABAPENTIN 300 MG: 300 CAPSULE ORAL at 03:11

## 2019-11-11 RX ADMIN — FINASTERIDE 5 MG: 5 TABLET, FILM COATED ORAL at 08:11

## 2019-11-11 RX ADMIN — DULOXETINE HYDROCHLORIDE 60 MG: 60 CAPSULE, DELAYED RELEASE ORAL at 08:11

## 2019-11-11 NOTE — PHARMACY MED REC
"Admission Medication Reconciliation - Pharmacy Consult Note    The home medication history was taken by Mary Anne Snyder.  Based on information gathered and subsequent review by the clinical pharmacist, the items below may need attention.    You may go to "Admission" then "Reconcile Home Medications" tabs to review and/or act upon these items.        No issues noted with the medication reconciliation.        Potential issues to be addressed PRIOR TO DISCHARGE  o Recommend to discontinue metoprolol and tamsulosin at discharge as these are likely contributing to orthostatic hypotension    Please address this information as you see fit.  Feel free to contact us if you have any questions or require assistance.    Schuyler Vides, PharmD  [phone]                .    .          "

## 2019-11-11 NOTE — PLAN OF CARE
Pt aaox4. V/s stable. Sitter at bedside. Discharg potentially tomorrow morning pending holter monitor results. Family aware. Will continue to monitor and interventions as appropriate.

## 2019-11-11 NOTE — PLAN OF CARE
CM met with pt and family at the bedside to discuss discharge planning assessment. Pt currently has HH needs, CM will discuss with pt HH and see if this is a service the pt wants at discharge. PCP and Pharmacy verified.        11/11/19 3349   Discharge Assessment   Assessment Type Discharge Planning Assessment   Confirmed/corrected address and phone number on facesheet? Yes   Assessment information obtained from? Patient   Expected Length of Stay (days) 2   Communicated expected length of stay with patient/caregiver yes   Prior to hospitilization cognitive status: Alert/Oriented   Prior to hospitalization functional status: Assistive Equipment   Current cognitive status: Alert/Oriented   Current Functional Status: Assistive Equipment   Lives With alone   Able to Return to Prior Arrangements yes   Is patient able to care for self after discharge? Unable to determine at this time (comments)   Who are your caregiver(s) and their phone number(s)? Nickolas Blake - [facility]- [phone]   Patient's perception of discharge disposition home health   Readmission Within the Last 30 Days no previous admission in last 30 days   Patient currently being followed by outpatient case management? No   Patient currently receives any other outside agency services? No   Equipment Currently Used at Home wheelchair;walker, rolling;shower chair   Do you have any problems affording any of your prescribed medications? No   Is the patient taking medications as prescribed? yes   Does the patient have transportation home? Yes   Transportation Anticipated family or friend will provide   Does the patient receive services at the [facility]? No   Discharge Plan A Home Health;Home   Discharge Plan B Home with family;Home Health   DME Needed Upon Discharge  none   Patient/Family in Agreement with Plan yes

## 2019-11-11 NOTE — SUBJECTIVE & OBJECTIVE
Interval History: NAEON  Orthostatic vitals- dropped 30mm Hg while standing from supine position  Low normal am cortisol levels  Low TSH but normal free T4  BP improved to 140s/60s      Review of Systems   Constitutional: Negative for activity change, appetite change, chills, diaphoresis, fatigue, fever and unexpected weight change.   HENT: Negative for rhinorrhea, sore throat and trouble swallowing.    Eyes: Positive for visual disturbance. Negative for photophobia and pain.   Respiratory: Negative for cough, chest tightness and shortness of breath.    Cardiovascular: Negative for chest pain, palpitations and leg swelling.   Gastrointestinal: Negative for abdominal distention, abdominal pain, blood in stool, constipation, diarrhea, nausea and vomiting.   Endocrine: Negative for cold intolerance, heat intolerance and polyuria.   Genitourinary: Negative for difficulty urinating, dysuria, flank pain, frequency and hematuria.   Musculoskeletal: Positive for arthralgias. Negative for back pain, gait problem, myalgias, neck pain and neck stiffness.   Skin: Positive for pallor (during the syncopal episode). Negative for rash.   Neurological: Positive for syncope and light-headedness. Negative for tremors, seizures, facial asymmetry, speech difficulty, weakness, numbness and headaches.   Psychiatric/Behavioral: Negative for confusion and sleep disturbance.     Objective:     Vital Signs (Most Recent):  Temp: 98 °F (36.7 °C) (11/11/19 0827)  Pulse: 61 (11/11/19 0827)  Resp: 16 (11/11/19 0827)  BP: (!) 148/67 (11/11/19 0827)  SpO2: 98 % (11/11/19 0827) Vital Signs (24h Range):  Temp:  [97.6 °F (36.4 °C)-98.1 °F (36.7 °C)] 98 °F (36.7 °C)  Pulse:  [61-95] 61  Resp:  [16-20] 16  SpO2:  [95 %-98 %] 98 %  BP: ()/(56-73) 148/67     Weight: 86.5 kg (190 lb 11.2 oz)  Body mass index is 26.11 kg/m².    Intake/Output Summary (Last 24 hours) at 11/11/2019 1034  Last data filed at 11/11/2019 0200  Gross per 24 hour   Intake 740  ml   Output 900 ml   Net -160 ml      Physical Exam   Constitutional: He is oriented to person, place, and time. He appears well-developed and well-nourished. No distress.   HENT:   Head: Normocephalic and atraumatic.   Mouth/Throat: Oropharynx is clear and moist. No oropharyngeal exudate.   Eyes: Pupils are equal, round, and reactive to light. Conjunctivae and EOM are normal. No scleral icterus.   Neck: Normal range of motion. Neck supple. No thyromegaly present.   Cardiovascular: Normal rate, regular rhythm, normal heart sounds and intact distal pulses.   Pulmonary/Chest: Effort normal and breath sounds normal. No respiratory distress. He has no wheezes. He has no rales.   Abdominal: Soft. Bowel sounds are normal. He exhibits no distension. There is no tenderness.   Musculoskeletal: Normal range of motion. He exhibits no edema or tenderness.   Neurological: He is alert and oriented to person, place, and time.   Skin: Skin is dry. Capillary refill takes 2 to 3 seconds. He is not diaphoretic.       Significant Labs: All pertinent labs within the past 24 hours have been reviewed.    Significant Imaging: I have reviewed all pertinent imaging results/findings within the past 24 hours.

## 2019-11-11 NOTE — PLAN OF CARE
Problem: Physical Therapy Goal  Goal: Physical Therapy Goal  Description  Goals to be met by: 19     Patient will increase functional independence with mobility by performin. Sit to stand transfer with Modified [place]  2. Gait  x 300 feet with Supervision using no AD.   3.  Standing Lower extremity exercise program x15 reps per handout, with supervision to improve dynamic standing balance.     Outcome: Ongoing, Progressing     PT eval completed.  PT and family/caregiver ed  on safety awareness, use of gait belt.  D/C rec:  HHPT and 24 hr (A) from caregiver ( states can provide)  DME:  Gait belt

## 2019-11-11 NOTE — NURSING
Pt is AAOx4 and ambulates with assistance. Pt turns independently. Urinal at bedside. Pt family states that he has a power of  and DNR form at home that they need to turn in. Family states pt Power of  is his son that is going out of town on Tuesday. No complaints of pain.

## 2019-11-11 NOTE — ASSESSMENT & PLAN NOTE
Takes lisinopril and metoprolol at home  Held off anti-htn initially in the setting of recent syncopal episodes and hypotension in the ER  Reintroduced Lisinopril once orthostatic vitals were stable after fluid resuscitation and holding off flomax

## 2019-11-11 NOTE — ASSESSMENT & PLAN NOTE
88M with pmhx for dementia and OA, CAD, uncontrolled DM2 with neuropathy and recently added medication flomax for BPH now presents with recurrent syncope since last month associated with shaking of one arm in one episode but neurologically intact and no palpitations and no signs of active infection.     Symptoms could likely be from dehydration in setting of dementia and OA limiting mobility vs arrhythmia vs aortic stenosis per cardiology note +systolic murmur) vs orthostatic hypotension in setting of new medication and DM with neuropathy and hypovolemia from dehydration vs vasovagal shock with pallor pre-event or during the event but no rational stressor. Tn wnl and EKG was NSR and unremarkable. BNP 46, no leucocytosis or anemia  BP improved to 140s/50s-60s    - Orthostatic vitals positive for orthostatic hypotension - patient dropped SBP by 30mmhg which could likely from BP meds, flomax, DM  - Cardiology consult - TTE unremarkable, awaiting holter interrogation  - IVF as needed (became hypotensive in the ER)   - Holter interrogation  - TTE- no abnormality on TTE  - TSH is low, PTH is high, low normal am cortisol  - UA unremarkable  - Daily CBC, CMP, Mg, Phos  - Resuming anti-Htn meds gradually - start with lisinopril per cardiology recs (Reintroduced Lisinopril once orthostatic vitals were stable after fluid resuscitation and holding off flomax)  - continue aspirin  - Pending EEG read (though very less likely seizures but because he had one episode of jerking movements)  - Will dc after holter interrogation is completed if no rhythm disordres seen

## 2019-11-11 NOTE — PLAN OF CARE
Problem: Occupational Therapy Goal  Goal: Occupational Therapy Goal  Description  Goals to be met by: 11/18/19    Patient will increase functional independence with ADLs by performing:    UE Dressing with Stand-by Assistance.  LE Dressing with Minimal Assistance.  Grooming while standing at sink with Stand-by Assistance.  Toileting from toilet with Stand-by Assistance for hygiene and clothing management.   Supine to sit with Supervision.  Toilet transfer to toilet with Stand-by Assistance.     Outcome: Ongoing, Progressing   Evaluation completed. Initiate POC.   Daly Becker OT  11/11/2019

## 2019-11-11 NOTE — ASSESSMENT & PLAN NOTE
Initial CMP showed corrected calcium 7.7  Given 1g Ca gluconate iv  Initial low calcium could just be lab error  Will f/up next cmp  - PTH elevated  - hypocalcemia has resolved

## 2019-11-11 NOTE — TELEPHONE ENCOUNTER
Spoke with pt son and informed. Pt is currently admitted to the hospital due to another episode. Advised pt son to collect the urine when possible while pt is in hospital

## 2019-11-11 NOTE — PT/OT/SLP EVAL
Occupational Therapy   Evaluation    Name: Nickolas Blake  MRN: [id number]  Admitting Diagnosis:  Syncope      Recommendations:     Discharge Recommendations: home health OT  Discharge Equipment Recommendations:  (Gait belt)  Barriers to discharge:  None    Assessment:     Nickolas Blake is a 88 y.o. male with a medical diagnosis of Syncope.  He presents alert and willing to participate in therapy sesion. Upon arrival, pt found supine with caregiver at bedside. Pt alert and oriented x4 however demo's poor overall safety awareness requiring cues for improved safety and RW management. Performance deficits affecting function: weakness, impaired endurance, impaired self care skills, impaired functional mobilty, impaired balance, impaired cognition, decreased safety awareness. He would benefit from HHOT following d/c to continue to progress towards goals and improve quality of life.     Rehab Prognosis: Good; patient would benefit from acute skilled OT services to address these deficits and reach maximum level of function.       Plan:     Patient to be seen 3 x/week to address the above listed problems via self-care/home management, therapeutic activities, therapeutic exercises, neuromuscular re-education  · Plan of Care Expires: 12/10/19  · Plan of Care Reviewed with: patient, caregiver, daughter    Subjective     Chief Complaint: No complaints  Patient/Family Comments/goals: Return home    Occupational Profile:  Living Environment: Pt lives alone however caregivers present to assist at needed; 1Sh with no MEMO; bathroom contains tub-shower with shower chair   Previous level of function: PTA, pt ambulating occasionally with straight cane and requiring assistance with set-up and initiation of tasks and requiring assistance with bathing  Roles and Routines: Home/community dweller, drives  Equipment Used at Home:  wheelchair, walker, rolling, shower chair, hospital bed  Assistance upon Discharge: Pt will have assistance  from caregiver    Pain/Comfort:  · Pain Rating 1: 0/10  · Pain Rating Post-Intervention 1: 0/10    Patients cultural, spiritual, Quaker conflicts given the current situation: no    Objective:     Communicated with: RN prior to session.  Patient found supine with peripheral IV, telemetry upon OT entry to room.    General Precautions: Standard, fall, NPO   Orthopedic Precautions:N/A   Braces: N/A     Occupational Performance:    Bed Mobility:    · Patient completed Rolling/Turning to Left with  stand by assistance  · Patient completed Supine to Sit with stand by assistance  · Patient completed Sit to Supine with stand by assistance    Functional Mobility/Transfers:  · Patient completed Sit <> Stand Transfer with contact guard assistance  with  rolling walker   · Functional Mobility: Pt completed functional mobility from EOB <> bathroom and mobility in hallway ( house hold distance) requiring CGA and RW for balance and safety. He tolerated well with no LOB or SOB. Pt required verbal cues for RW and direction.     Activities of Daily Living:  · Grooming: contact guard assistance to wash hands while standing at sink  · Upper Body Dressing: minimum assistance to bell gown like jacket while seated EOB  · Toileting: contact guard assistance to complete toileting at toilet while standing    Cognitive/Visual Perceptual:  Cognitive/Psychosocial Skills:     -       Oriented to: Person, Place, Time and Situation   -       Follows Commands/attention:Follows one-step commands; difficult with multi-step commands  -       Communication: clear/fluent  -       Safety awareness/insight to disability: intact   -       Mood/Affect/Coping skills/emotional control: Appropriate to situation  Visual/Perceptual:      -Intact     Physical Exam:  Postural examination/scapula alignment:    -       Rounded shoulders  Skin integrity: Visible skin intact  Edema:  None noted  Dominant hand:    -       RUE  Upper Extremity Range of Motion:     -        Right Upper Extremity: WFL  -       Left Upper Extremity: WFL  Upper Extremity Strength:    -       Right Upper Extremity: WFL 4+/5  -       Left Upper Extremity: WFL  4+/5   Strength:    -       Right Upper Extremity: WFL  -       Left Upper Extremity: WFL    AMPAC 6 Click ADL:  AMPAC Total Score: 17    Treatment & Education:  -Pt edu on OT role/POC  -Importance of OOB activity with staff assistance ( UIC during each meal)  -Safety during functional t/f and mobility ( RW management)  -White board updated  - Multiple self care tasks completed--as noted above  - All questions/concerns answered within OT scope of practice  Education:    Patient left supine with all lines intact, call button in reach and RN notified    GOALS:   Multidisciplinary Problems     Occupational Therapy Goals        Problem: Occupational Therapy Goal    Goal Priority Disciplines Outcome Interventions   Occupational Therapy Goal     OT, PT/OT Ongoing, Progressing    Description:  Goals to be met by: 11/18/19    Patient will increase functional independence with ADLs by performing:    UE Dressing with Stand-by Assistance.  LE Dressing with Minimal Assistance.  Grooming while standing at sink with Stand-by Assistance.  Toileting from toilet with Stand-by Assistance for hygiene and clothing management.   Supine to sit with Supervision.  Toilet transfer to toilet with Stand-by Assistance.                      History:     Past Medical History:   Diagnosis Date    Anemia in stage 3 chronic kidney disease 11/10/2016    BPH (benign prostatic hyperplasia) 10/26/2012    Cataract     Coronary artery disease involving native coronary artery of native heart without angina pectoris     Degeneration of lumbar or lumbosacral intervertebral disc 2/4/2014    Essential hypertension 9/30/2015    GERD (gastroesophageal reflux disease)     Hyperlipidemia     Lumbar radiculopathy, right 12/9/2013    Major depressive disorder with single episode,  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    Mild cognitive impairment with memory loss 7/18/2016    Osteoarthritis of right hip 8/16/2016    Osteoarthritis of spine with radiculopathy, lumbar region     S/P percutaneous transluminal coronary angioplasty     Spinal stenosis of lumbar region at multiple levels 2/4/2014    Thoracic or lumbosacral neuritis or radiculitis, unspecified 4/21/2015    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 edema, with long-term current use of insulin 11/19/2015    Type 2 diabetes mellitus with diabetic polyneuropathy, with long-term current use of insulin 11/19/2015    Type 2 diabetes mellitus with stage 3 chronic kidney disease, with long-term current use of insulin 11/19/2015    Type 2 diabetes with peripheral circulatory disorder, controlled        Past Surgical History:   Procedure Laterality Date    ADENOIDECTOMY      CARDIAC CATHETERIZATION  1980    CATARACT EXTRACTION      CYST REMOVAL      rectum    EYE SURGERY      TONSILLECTOMY         Time Tracking:     OT Date of Treatment: 11/11/19  OT Start Time: 0929  OT Stop Time: 1000  OT Total Time (min): 31 min    Billable Minutes:Evaluation 20  Self Care/Home Management 11    Daly Becker, OT  11/11/2019

## 2019-11-11 NOTE — PROGRESS NOTES
Brief Cardiology Note:    Patient seen and assessed on rounds. Agree with findings of initial consult note, suspect orthostatic hypotension in setting of polypharmacy +/- hypovolemia and advanced age. Tele unremarkable. TTE with structurally normal heart.     - Holter pending, still awaiting results  - Agree with cessation of tamsulosin  - Would recheck orthostatics off of tamsulosin and s/p IV rehydration  - Hypertensive today, will likely need anti-htn at least partially restarted. Would recommend restarting lisinopril. Okay to continue holding metoprolol.   - Cardiology will sign off pending Holter findings    Santy Crowe MD  Cardiology Fellow PGY-6  Pager: [phone]

## 2019-11-11 NOTE — PROGRESS NOTES
[facility] Hospital Medicine  Progress Note    Patient Name: Nickolas Blake  MRN: [id number]  Patient Class: OP- Observation   Admission Date: 11/10/2019  Length of Stay: 1 days  Attending Physician: Jayson Gloria, *  Primary Care Provider: Joe Klein MD    [facility] Medicine Team: Networked reference to record PCT  Sherif Acevedo MD    Subjective:     Principal Problem:Syncope        HPI:  88M with known hx of Uncontrolled IDDM2 with neuropathy, HTN, HLD, MI s/p angioplasty 1994, Mild cognitive impairment, BPH, OA of right hip and lumbar spinal stenosis with radiculopathy is brought to the ER after a witnessed event of syncope. Patient got up after a meal with family at an outside restaurant, was walking back to the car when his gait became unsteady and staggering, he started noticing light headedness and finally visual black out before passing out. Son, walking next to him, held him and avoided the fall but they report patient was right behind the car when this happened and still was mentioning he could not see the car, body was entirely flaccid and patient was totally pale when he passed out and was knocked out for less than a minute before regaining concsiousness followed by a less than 5 min duration of confusion after which he finally regained awareness of what had happened. Complaints of orthostatic light headedness.  Denies any pre-syncopal event, seizure/shaking/spasm of any extremity or the body, tongue bite or urine or bowel incontinence during event, prolonged confusion after the event.     Similar such 2 events have happened in the last month where patient had a arm shaking-spasm and a fall but never reported to the ER. Patient lives alone but has sitters with him 24/7. Baseline functional status- fairly dependent on sitters for his ADLs. Limited mobility due to OA right knee pain which limits family's assessment of dizziness, sob and gait abnormality. Finally  patient saw his PCP Dr Klein on 11/7 and was put on a holter for 48 hours.    Denies palpitations, chest pain, recent diarrhea/melena/brbpr, runny nose, chills, fever, cough, SOB, abd pain, headache, hx of seizures, sick contacts, weight loss, urinary or bowel changes, dysuria/hematuria, blood loss, confusion, back pain, slurred speech, facial droop or weakness/numbness of extremities, fatigue, leg swelling, change in appetite, back pain.    No alcohol drinking habit - no recent intake  Smoking for 40 years (chain smoker at one point per daughter at bedside) but quit since 1986  Recent change in medications: Flomax was started on 11/7 by Dr Klein for BPH.    Work-up in ER  Normal Hgb and wbc  CXR no consolidation or infiltrates but mild left sided effusion with basal atelectasis  BNP 46, Tn not elevated, EKG NSR  CMP showed mild hypokalemia and also hypocalcemia (corrected 7.7)  Normal kidney and liver functions        Overview/Hospital Course:  Admitted to [facility] for evaluation of syncopal episodes. Bedside echo showed 50% aortic stenosis and positive orthostatic vitals. Cardiology consulted- TTE shows no abnormality. Orthostatic hypotension is present which is the prime suspect for the syncopal events. Stopped flomax and anti-htn but resumed lisinopril today after BP improved to 140s. Cortisol low normal, awaiting holter interrogation before safely discharging patient home.    Interval History: NAEON  Orthostatic vitals- dropped 30mm Hg while standing from supine position  Low normal am cortisol levels  Low TSH but normal free T4  BP improved to 140s/60s      Review of Systems   Constitutional: Negative for activity change, appetite change, chills, diaphoresis, fatigue, fever and unexpected weight change.   HENT: Negative for rhinorrhea, sore throat and trouble swallowing.    Eyes: Positive for visual disturbance. Negative for photophobia and pain.   Respiratory: Negative for cough, chest tightness and  shortness of breath.    Cardiovascular: Negative for chest pain, palpitations and leg swelling.   Gastrointestinal: Negative for abdominal distention, abdominal pain, blood in stool, constipation, diarrhea, nausea and vomiting.   Endocrine: Negative for cold intolerance, heat intolerance and polyuria.   Genitourinary: Negative for difficulty urinating, dysuria, flank pain, frequency and hematuria.   Musculoskeletal: Positive for arthralgias. Negative for back pain, gait problem, myalgias, neck pain and neck stiffness.   Skin: Positive for pallor (during the syncopal episode). Negative for rash.   Neurological: Positive for syncope and light-headedness. Negative for tremors, seizures, facial asymmetry, speech difficulty, weakness, numbness and headaches.   Psychiatric/Behavioral: Negative for confusion and sleep disturbance.     Objective:     Vital Signs (Most Recent):  Temp: 98 °F (36.7 °C) (11/11/19 0827)  Pulse: 61 (11/11/19 0827)  Resp: 16 (11/11/19 0827)  BP: (!) 148/67 (11/11/19 0827)  SpO2: 98 % (11/11/19 0827) Vital Signs (24h Range):  Temp:  [97.6 °F (36.4 °C)-98.1 °F (36.7 °C)] 98 °F (36.7 °C)  Pulse:  [61-95] 61  Resp:  [16-20] 16  SpO2:  [95 %-98 %] 98 %  BP: ()/(56-73) 148/67     Weight: 86.5 kg (190 lb 11.2 oz)  Body mass index is 26.11 kg/m².    Intake/Output Summary (Last 24 hours) at 11/11/2019 1034  Last data filed at 11/11/2019 0200  Gross per 24 hour   Intake 740 ml   Output 900 ml   Net -160 ml      Physical Exam   Constitutional: He is oriented to person, place, and time. He appears well-developed and well-nourished. No distress.   HENT:   Head: Normocephalic and atraumatic.   Mouth/Throat: Oropharynx is clear and moist. No oropharyngeal exudate.   Eyes: Pupils are equal, round, and reactive to light. Conjunctivae and EOM are normal. No scleral icterus.   Neck: Normal range of motion. Neck supple. No thyromegaly present.   Cardiovascular: Normal rate, regular rhythm, normal heart sounds  and intact distal pulses.   Pulmonary/Chest: Effort normal and breath sounds normal. No respiratory distress. He has no wheezes. He has no rales.   Abdominal: Soft. Bowel sounds are normal. He exhibits no distension. There is no tenderness.   Musculoskeletal: Normal range of motion. He exhibits no edema or tenderness.   Neurological: He is alert and oriented to person, place, and time.   Skin: Skin is dry. Capillary refill takes 2 to 3 seconds. He is not diaphoretic.       Significant Labs: All pertinent labs within the past 24 hours have been reviewed.    Significant Imaging: I have reviewed all pertinent imaging results/findings within the past 24 hours.      Assessment/Plan:      * Syncope  88M with pmhx for dementia and OA, CAD, uncontrolled DM2 with neuropathy and recently added medication flomax for BPH now presents with recurrent syncope since last month associated with shaking of one arm in one episode but neurologically intact and no palpitations and no signs of active infection.     Symptoms could likely be from dehydration in setting of dementia and OA limiting mobility vs arrhythmia vs aortic stenosis per cardiology note +systolic murmur) vs orthostatic hypotension in setting of new medication and DM with neuropathy and hypovolemia from dehydration vs vasovagal shock with pallor pre-event or during the event but no rational stressor. Tn wnl and EKG was NSR and unremarkable. BNP 46, no leucocytosis or anemia  BP improved to 140s/50s-60s    - Orthostatic vitals positive for orthostatic hypotension - patient dropped SBP by 30mmhg which could likely from BP meds, flomax, DM  - Cardiology consult - TTE unremarkable, awaiting holter interrogation  - IVF as needed (became hypotensive in the ER)   - Holter interrogation  - TTE- no abnormality on TTE  - TSH is low, PTH is high, low normal am cortisol  - UA unremarkable  - Daily CBC, CMP, Mg, Phos  - Resuming anti-Htn meds gradually - start with lisinopril per  cardiology recs (Reintroduced Lisinopril once orthostatic vitals were stable after fluid resuscitation and holding off flomax)  - continue aspirin  - Pending EEG read (though very less likely seizures but because he had one episode of jerking movements)  - Will dc after holter interrogation is completed if no rhythm disordres seen          Hypertension, essential  Takes lisinopril and metoprolol at home  Held off anti-htn initially in the setting of recent syncopal episodes and hypotension in the ER  Reintroduced Lisinopril once orthostatic vitals were stable after fluid resuscitation and holding off flomax      Type 2 diabetes mellitus with diabetic polyneuropathy, with long-term current use of insulin  Insulin dependent  C/b peripheral neuropathy/polyneuropathy - could be a factor in syncope from autonomic neuropathy leading to orthostatic symptoms as patient described  Will start 16U basal and 5U prandial and add LDSSI\  (Home dose 20 basal and 8 prandial)  Last   HbA1C on 11/7 was 8.4  Resume home gabapentin and cymbalta for neuropathic pain      Coronary artery disease involving native coronary artery of native heart without angina pectoris  MI s/p angioplasty 1994  No symptoms of angina or diaphoresis or atypical chest pain  Continue aspirin  Not on any other AC      Benign prostatic hyperplasia without lower urinary tract symptoms  Continue Finasteride but hold flomax      Hypocalcemia  Initial CMP showed corrected calcium 7.7  Given 1g Ca gluconate iv  Initial low calcium could just be lab error  Will f/up next cmp  - PTH elevated  - hypocalcemia has resolved      Hyperlipidemia  Continue atorvastatin        VTE Risk Mitigation (From admission, onward)         Ordered     enoxaparin injection 40 mg  Daily      11/10/19 2006     IP VTE HIGH RISK PATIENT  Once      11/10/19 2030                      Sherif Acevedo MD  Department of Hospital Medicine   [facility]

## 2019-11-11 NOTE — PT/OT/SLP EVAL
"Physical Therapy Evaluation    Patient Name:  Nickolas Blake   MRN:  [id number]    Recommendations:     Discharge Recommendations:  home health PT   Discharge Equipment Recommendations: other (see comments)(gait belt)   Barriers to discharge: None    Assessment:     Nickolas Blake is a 88 y.o. male admitted with a medical diagnosis of Syncope.  He presents with the following impairments/functional limitations:  gait instability, impaired balance, impaired cognition, decreased safety awareness Pt is a high fall risk due to decreased dynamic standing balance and syncopal episodes.  Pt will have 24 hr (A) at home from hired caregivers and good family support.  He will benefit from HHPT to maximize his functional (I) with gait and safety.    Rehab Prognosis: Good; patient would benefit from acute skilled PT services to address these deficits and reach maximum level of function.    Recent Surgery: * No surgery found *      Plan:     During this hospitalization, patient to be seen 2 x/week to address the identified rehab impairments via gait training, therapeutic activities, therapeutic exercises and progress toward the following goals:    · Plan of Care Expires:  12/11/19    Subjective     Chief Complaint: " I have no pain"  Patient/Family Comments/goals: to go home  Pain/Comfort:  · Pain Rating 1: 0/10    Patients cultural, spiritual, Druze conflicts given the current situation: no    Living Environment:  Pt lives alone in 1 level home with no steps, he had hired caregivers during the day but now will have them stay 24 hrs /day.   Prior to admission, patients level of function was amb with no AD, used w/c for casino outings.  Pt says he was (I) with ADL's, caregiver said they assist him into the shower due to fall risk, but he washes himself.   Equipment used at home: wheelchair(for community level outing ( casino)).  DME owned (not currently used): rolling walker, single point cane, shower chair, wheelchair and " adjustable bed..  Upon discharge, patient will have assistance from hired caregivers, 24 hrs /day. .    Objective:     Communicated with Emily alexis,  prior to session.  Patient found supine with peripheral IV  upon PT entry to room.  One of pt's hired caregivers present in the room and his dtr, Jennifer,  arrived at the end of the session.     General Precautions: Standard, fall, NPO(NPO due to test later today)   Orthopedic Precautions:N/A   Braces: N/A     Exams:  · Cognitive Exam:  Patient is oriented to Person, Place, Time, Situation and followed mutli step commnads, but required repeated cues and some visual cues.  · Fine Motor Coordination:    · -       Intact  Left hand, diadochokinesis skill , Right hand, diadochokinesis skill , RLE heel shin and LLE heel shin  Required increase time to perform: Left hand, finger to nose, Right hand, finger to nose,   · Gross Motor Coordination:  WFL  · Postural Exam:  Patient presented with the following abnormalities:    · -       Rounded shoulders  · -       Forward head  · Sensation:    · -       Intact  · RLE ROM: WNL  · RLE Strength: WNL  · LLE ROM: WNL  · LLE Strength: WNL    Functional Mobility:  · Bed Mobility:     · Supine to Sit: modified independence  · Transfers:     · Sit to Stand:  supervision with no AD  · Gait: Pt amb 200' with no AD and CGA for balance. Pt with noted decrease emma and step length,  Pt also perfomred marching x 25' with CGA and no LOB.  · Balance: sitting standing/dynamic: (I).  Standing static : (S), dynamic: CGA      Therapeutic Activities and Exercises:   PT ed pt , caregiver and then his dtr on PT POC, safety awareness, use of gait belt, body mechanics and method of slowly lowering to the floor to prevent a fall if pt has another syncopal episode, use of chair alarm in room and they verbalized and demonstrated good understanding back to PT.  Pt's dtr arrived at end of session and so PT repeated the ed to her and she verbalized good  understanding back to PT.  White board updated in pts room.     AM-PAC 6 CLICK MOBILITY  Total Score:20     Patient left up in chair with all lines intact, call button in reach, chair alarm on, nsg , Emily,  Notified of pt up in chair and chair alarm intact. and caregiver and dtr present.  present.    GOALS:   Multidisciplinary Problems     Physical Therapy Goals        Problem: Physical Therapy Goal    Goal Priority Disciplines Outcome Goal Variances Interventions   Physical Therapy Goal     PT, PT/OT Ongoing, Progressing     Description:  Goals to be met by: 19     Patient will increase functional independence with mobility by performin. Sit to stand transfer with Modified [place]  2. Gait  x 300 feet with Supervision using no AD.   3.  Standing Lower extremity exercise program x15 reps per handout, with supervision to improve dynamic standing balance.                      History:     Past Medical History:   Diagnosis Date    Anemia in stage 3 chronic kidney disease 11/10/2016    BPH (benign prostatic hyperplasia) 10/26/2012    Cataract     Coronary artery disease involving native coronary artery of native heart without angina pectoris     Degeneration of lumbar or lumbosacral intervertebral disc 2014    Essential hypertension 2015    GERD (gastroesophageal reflux disease)     Hyperlipidemia     Lumbar radiculopathy, right 2013    Major depressive disorder with single episode, in partial remission 2017    Mild cognitive impairment with memory loss 2016    Osteoarthritis of right hip 2016    Osteoarthritis of spine with radiculopathy, lumbar region     S/P percutaneous transluminal coronary angioplasty     Spinal stenosis of lumbar region at multiple levels 2014    Thoracic or lumbosacral neuritis or radiculitis, unspecified 2015    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 edema,  with long-term current use of insulin 11/19/2015    Type 2 diabetes mellitus with diabetic polyneuropathy, with long-term current use of insulin 11/19/2015    Type 2 diabetes mellitus with stage 3 chronic kidney disease, with long-term current use of insulin 11/19/2015    Type 2 diabetes with peripheral circulatory disorder, controlled        Past Surgical History:   Procedure Laterality Date    ADENOIDECTOMY      CARDIAC CATHETERIZATION  1980    CATARACT EXTRACTION      CYST REMOVAL      rectum    EYE SURGERY      TONSILLECTOMY         Time Tracking:     PT Received On: 11/11/19  PT Start Time: 1120     PT Stop Time: 1200  PT Total Time (min): 40 min     Billable Minutes: Evaluation 17 and Therapeutic Activity 23      Marisa Joe, PT  11/11/2019

## 2019-11-12 ENCOUNTER — TELEPHONE (OUTPATIENT)
Dept: INTERNAL MEDICINE | Facility: CLINIC | Age: 84
End: 2019-11-12

## 2019-11-12 VITALS
SYSTOLIC BLOOD PRESSURE: 168 MMHG | TEMPERATURE: 97 F | OXYGEN SATURATION: 98 % | DIASTOLIC BLOOD PRESSURE: 81 MMHG | RESPIRATION RATE: 18 BRPM | HEART RATE: 87 BPM | WEIGHT: 190.69 LBS | BODY MASS INDEX: 25.83 KG/M2 | HEIGHT: 72 IN

## 2019-11-12 LAB
ACTH PLAS-MCNC: 14 PG/ML (ref 0–46)
ALBUMIN SERPL BCP-MCNC: 3 G/DL (ref 3.5–5.2)
ALP SERPL-CCNC: 101 U/L (ref 55–135)
ALT SERPL W/O P-5'-P-CCNC: 21 U/L (ref 10–44)
ANION GAP SERPL CALC-SCNC: 7 MMOL/L (ref 8–16)
AST SERPL-CCNC: 21 U/L (ref 10–40)
BASOPHILS # BLD AUTO: 0.07 K/UL (ref 0–0.2)
BASOPHILS NFR BLD: 1.3 % (ref 0–1.9)
BILIRUB SERPL-MCNC: 0.7 MG/DL (ref 0.1–1)
BUN SERPL-MCNC: 20 MG/DL (ref 8–23)
CALCIUM SERPL-MCNC: 8.5 MG/DL (ref 8.7–10.5)
CHLORIDE SERPL-SCNC: 105 MMOL/L (ref 95–110)
CO2 SERPL-SCNC: 24 MMOL/L (ref 23–29)
CREAT SERPL-MCNC: 1.2 MG/DL (ref 0.5–1.4)
DIFFERENTIAL METHOD: ABNORMAL
EOSINOPHIL # BLD AUTO: 0 K/UL (ref 0–0.5)
EOSINOPHIL NFR BLD: 0 % (ref 0–8)
ERYTHROCYTE [DISTWIDTH] IN BLOOD BY AUTOMATED COUNT: 13.9 % (ref 11.5–14.5)
EST. GFR  (AFRICAN AMERICAN): >60 ML/MIN/1.73 M^2
EST. GFR  (NON AFRICAN AMERICAN): 53.7 ML/MIN/1.73 M^2
GLUCOSE SERPL-MCNC: 210 MG/DL (ref 70–110)
HCT VFR BLD AUTO: 38.9 % (ref 40–54)
HGB BLD-MCNC: 12.5 G/DL (ref 14–18)
IMM GRANULOCYTES # BLD AUTO: 0.02 K/UL (ref 0–0.04)
IMM GRANULOCYTES NFR BLD AUTO: 0.4 % (ref 0–0.5)
LYMPHOCYTES # BLD AUTO: 1.6 K/UL (ref 1–4.8)
LYMPHOCYTES NFR BLD: 29.7 % (ref 18–48)
MAGNESIUM SERPL-MCNC: 1.7 MG/DL (ref 1.6–2.6)
MCH RBC QN AUTO: 28.5 PG (ref 27–31)
MCHC RBC AUTO-ENTMCNC: 32.1 G/DL (ref 32–36)
MCV RBC AUTO: 89 FL (ref 82–98)
MONOCYTES # BLD AUTO: 0.9 K/UL (ref 0.3–1)
MONOCYTES NFR BLD: 16.8 % (ref 4–15)
NEUTROPHILS # BLD AUTO: 2.9 K/UL (ref 1.8–7.7)
NEUTROPHILS NFR BLD: 51.8 % (ref 38–73)
NRBC BLD-RTO: 0 /100 WBC
PHOSPHATE SERPL-MCNC: 2.9 MG/DL (ref 2.7–4.5)
PLATELET # BLD AUTO: 230 K/UL (ref 150–350)
PMV BLD AUTO: 11.1 FL (ref 9.2–12.9)
POCT GLUCOSE: 214 MG/DL (ref 70–110)
POTASSIUM SERPL-SCNC: 4.4 MMOL/L (ref 3.5–5.1)
PROT SERPL-MCNC: 6.2 G/DL (ref 6–8.4)
RBC # BLD AUTO: 4.39 M/UL (ref 4.6–6.2)
SODIUM SERPL-SCNC: 136 MMOL/L (ref 136–145)
WBC # BLD AUTO: 5.52 K/UL (ref 3.9–12.7)

## 2019-11-12 PROCEDURE — 63600175 PHARM REV CODE 636 W HCPCS: Mod: HCNC | Performed by: STUDENT IN AN ORGANIZED HEALTH CARE EDUCATION/TRAINING PROGRAM

## 2019-11-12 PROCEDURE — 96372 THER/PROPH/DIAG INJ SC/IM: CPT

## 2019-11-12 PROCEDURE — 25000003 PHARM REV CODE 250: Mod: HCNC | Performed by: STUDENT IN AN ORGANIZED HEALTH CARE EDUCATION/TRAINING PROGRAM

## 2019-11-12 PROCEDURE — 99217 PR OBSERVATION CARE DISCHARGE: CPT | Mod: HCNC,GC,, | Performed by: HOSPITALIST

## 2019-11-12 PROCEDURE — G0378 HOSPITAL OBSERVATION PER HR: HCPCS | Mod: HCNC

## 2019-11-12 PROCEDURE — 96374 THER/PROPH/DIAG INJ IV PUSH: CPT

## 2019-11-12 PROCEDURE — 83735 ASSAY OF MAGNESIUM: CPT | Mod: HCNC

## 2019-11-12 PROCEDURE — 80053 COMPREHEN METABOLIC PANEL: CPT | Mod: HCNC

## 2019-11-12 PROCEDURE — 36415 COLL VENOUS BLD VENIPUNCTURE: CPT | Mod: HCNC

## 2019-11-12 PROCEDURE — 99217 PR OBSERVATION CARE DISCHARGE: ICD-10-PCS | Mod: HCNC,GC,, | Performed by: HOSPITALIST

## 2019-11-12 PROCEDURE — 85025 COMPLETE CBC W/AUTO DIFF WBC: CPT | Mod: HCNC

## 2019-11-12 PROCEDURE — 84100 ASSAY OF PHOSPHORUS: CPT | Mod: HCNC

## 2019-11-12 RX ORDER — MAGNESIUM SULFATE HEPTAHYDRATE 40 MG/ML
2 INJECTION, SOLUTION INTRAVENOUS ONCE
Status: COMPLETED | OUTPATIENT
Start: 2019-11-12 | End: 2019-11-12

## 2019-11-12 RX ADMIN — FINASTERIDE 5 MG: 5 TABLET, FILM COATED ORAL at 08:11

## 2019-11-12 RX ADMIN — GABAPENTIN 300 MG: 300 CAPSULE ORAL at 08:11

## 2019-11-12 RX ADMIN — INSULIN ASPART 2 UNITS: 100 INJECTION, SOLUTION INTRAVENOUS; SUBCUTANEOUS at 08:11

## 2019-11-12 RX ADMIN — INSULIN ASPART 5 UNITS: 100 INJECTION, SOLUTION INTRAVENOUS; SUBCUTANEOUS at 08:11

## 2019-11-12 RX ADMIN — LISINOPRIL 20 MG: 20 TABLET ORAL at 08:11

## 2019-11-12 RX ADMIN — DULOXETINE HYDROCHLORIDE 60 MG: 60 CAPSULE, DELAYED RELEASE ORAL at 08:11

## 2019-11-12 RX ADMIN — INSULIN DETEMIR 16 UNITS: 100 INJECTION, SOLUTION SUBCUTANEOUS at 08:11

## 2019-11-12 RX ADMIN — ATORVASTATIN CALCIUM 40 MG: 20 TABLET, FILM COATED ORAL at 08:11

## 2019-11-12 RX ADMIN — MAGNESIUM SULFATE IN WATER 2 G: 40 INJECTION, SOLUTION INTRAVENOUS at 08:11

## 2019-11-12 NOTE — SUBJECTIVE & OBJECTIVE
Interval History:     Review of Systems   Constitutional: Negative for activity change, appetite change, chills, diaphoresis, fatigue, fever and unexpected weight change.   HENT: Negative for rhinorrhea, sore throat and trouble swallowing.    Eyes: Positive for visual disturbance. Negative for photophobia and pain.   Respiratory: Negative for cough, chest tightness and shortness of breath.    Cardiovascular: Negative for chest pain, palpitations and leg swelling.   Gastrointestinal: Negative for abdominal distention, abdominal pain, blood in stool, constipation, diarrhea, nausea and vomiting.   Endocrine: Negative for cold intolerance, heat intolerance and polyuria.   Genitourinary: Negative for difficulty urinating, dysuria, flank pain, frequency and hematuria.   Musculoskeletal: Positive for arthralgias. Negative for back pain, gait problem, myalgias, neck pain and neck stiffness.   Skin: Positive for pallor (during the syncopal episode). Negative for rash.   Neurological: Positive for syncope. Negative for tremors, seizures, facial asymmetry, speech difficulty, weakness, light-headedness, numbness and headaches.   Psychiatric/Behavioral: Negative for confusion and sleep disturbance.     Objective:     Vital Signs (Most Recent):  Temp: 97 °F (36.1 °C) (11/12/19 1113)  Pulse: 87 (11/12/19 1113)  Resp: 18 (11/12/19 1113)  BP: (!) 168/81 (11/12/19 1113)  SpO2: 98 % (11/12/19 1113) Vital Signs (24h Range):  Temp:  [96.7 °F (35.9 °C)-98.6 °F (37 °C)] 97 °F (36.1 °C)  Pulse:  [] 87  Resp:  [18-20] 18  SpO2:  [91 %-98 %] 98 %  BP: (111-191)/(53-83) 168/81     Weight: 86.5 kg (190 lb 11.2 oz)  Body mass index is 26.11 kg/m².    Intake/Output Summary (Last 24 hours) at 11/12/2019 1326  Last data filed at 11/11/2019 1800  Gross per 24 hour   Intake 140 ml   Output --   Net 140 ml      Physical Exam   Constitutional: He is oriented to person, place, and time. He appears well-developed and well-nourished. No distress.    HENT:   Head: Normocephalic and atraumatic.   Mouth/Throat: Oropharynx is clear and moist. No oropharyngeal exudate.   Eyes: Pupils are equal, round, and reactive to light. Conjunctivae and EOM are normal. No scleral icterus.   Neck: Normal range of motion. Neck supple. No thyromegaly present.   Cardiovascular: Normal rate, regular rhythm, normal heart sounds and intact distal pulses.   Pulmonary/Chest: Effort normal and breath sounds normal. No respiratory distress. He has no wheezes. He has no rales.   Abdominal: Soft. Bowel sounds are normal. He exhibits no distension. There is no tenderness.   Musculoskeletal: Normal range of motion. He exhibits no edema or tenderness.   Neurological: He is alert and oriented to person, place, and time.   Skin: Skin is dry. Capillary refill takes 2 to 3 seconds. He is not diaphoretic.       Significant Labs: All pertinent labs within the past 24 hours have been reviewed.    Significant Imaging: I have reviewed all pertinent imaging results/findings within the past 24 hours.

## 2019-11-12 NOTE — ASSESSMENT & PLAN NOTE
MI s/p angioplasty 1994  No symptoms of angina or diaphoresis or atypical chest pain  Continue aspirin  Not on any other AC  - discontinue beta-blocker in setting of orthostatic hypotension

## 2019-11-12 NOTE — PLAN OF CARE
Pt aaox4. Personal sitter at bedside. V/s stable. Iv access d/c. Discharge teaching presented to pt, understanding verbalized.Pt transported off unit via wheelchair. Sitter stated she would bring pt home.  No complaints at this time.

## 2019-11-12 NOTE — DISCHARGE INSTRUCTIONS
"We believe that the cause of these "fainting" episodes is orthostatic hypotension, which is when your blood pressure gets too low when you go from lying or sitting to standing. There is also probably a component of postprandial hypotension, which is when blood pressures get low 1-2 hours after a meal. To counteract this, we have discontinued some of your blood pressure medications - please stop taking the tamsulosin (flomax) and metoprolol. Additionally, we would recommend liberalizing salt and fluids in your diet. Crossing your legs before standing or clenching your hands can help improve blood pressure. Additionally, going slowly from a sit to a stand can help. Pressure stockings may also help prevent orthostatic hypotension. To help prevent low blood pressures after eating, we recommend small meals that are low in carbohydrates.  "

## 2019-11-12 NOTE — PLAN OF CARE
11/12/19 0833   Post-Acute Status   Post-Acute Authorization Home Health/Hospice   Home Health/Hospice Status Awaiting Internal Medical Clearance

## 2019-11-12 NOTE — PLAN OF CARE
[facility]    HOME HEALTH ORDERS  FACE TO FACE ENCOUNTER    Patient Name: Nickolas Blake  YOB: 1931    PCP: Joe Klein MD   PCP Address: [address] / [place] [address]  PCP Phone Number: [phone]  PCP Fax: [phone]    Encounter Date: 11/12/2019    Admit to Home Health    Diagnoses:  Active Hospital Problems    Diagnosis  POA    *Syncope [R55]  Yes    Hypocalcemia [E83.51]  Unknown    Hypertension, essential [I10]  Yes    Type 2 diabetes mellitus with diabetic polyneuropathy, with long-term current use of insulin [E11.42, Z79.4]  Not Applicable    Coronary artery disease involving native coronary artery of native heart without angina pectoris [I25.10]  Yes     Chronic    Benign prostatic hyperplasia without lower urinary tract symptoms [N40.0]  Yes    Hyperlipidemia [E78.5]  Yes      Resolved Hospital Problems   No resolved problems to display.       Future Appointments   Date Time Provider Department Center   11/22/2019  9:00 AM Ender Mendoza MD [facility] CARDIO [facility]   12/5/2019  9:00 AM Keshia Aldana NP [facility]ODIA [facility]     Follow-up Information     [facility] In 1 day.    Specialty:  Home Health Services  Contact information:  [address] [place] [address].  [address]  [facility] [address] [phone]                     I have seen and examined this patient face to face today. My clinical findings that support the need for the home health skilled services and home bound status are the following:  Weakness/numbness causing balance and gait disturbance due to Orthostatic hypotension making it taxing to leave home.    Allergies:Review of patient's allergies indicates:  No Known Allergies    Diet: diabetic diet: 2200 calorie    Activities: ambulate in house with assistance    Nursing:   SN to complete comprehensive assessment including routine vital signs. Instruct on disease process and s/s of complications to report to MD.  "Review/verify medication list sent home with the patient at time of discharge  and instruct patient/caregiver as needed. Frequency may be adjusted depending on start of care date.    Notify MD if SBP > 160 or < 90; DBP > 90 or < 50; HR > 120 or < 50; Temp > 101;      CONSULTS:    Physical Therapy to evaluate and treat. Evaluate for home safety and equipment needs; Establish/upgrade home exercise program. Perform / instruct on therapeutic exercises, gait training, transfer training, and Range of Motion.  Occupational Therapy to evaluate and treat. Evaluate home environment for safety and equipment needs. Perform/Instruct on transfers, ADL training, ROM, and therapeutic exercises.   to evaluate for community resources/long-range planning.    MISCELLANEOUS CARE:  Diabetic Care:   SN to perform and educate Diabetic management with blood glucose monitoring:, Fingerstick blood sugar AC and HS and Report CBG < 60 or > 350 to physician.    WOUND CARE ORDERS  n/a      Medications: Review discharge medications with patient and family and provide education.      Current Discharge Medication List      CONTINUE these medications which have NOT CHANGED    Details   acetaminophen (TYLENOL) 500 mg Cap Take 1 capsule by mouth every 6 to 8 hours as needed.       ascorbic acid, vitamin C, (VITAMIN C) 250 MG tablet Take 1 tablet (250 mg total) by mouth 2 (two) times daily.      aspirin (ECOTRIN) 81 MG EC tablet Take 81 mg by mouth once daily.      atorvastatin (LIPITOR) 40 MG tablet Take 1 tablet (40 mg total) by mouth once daily.  Qty: 90 tablet, Refills: 3    Associated Diagnoses: Coronary artery disease involving native coronary artery of native heart without angina pectoris; Mixed hyperlipidemia      BD ALCOHOL SWABS PadM USE FOUR TIMES DAILY  Qty: 200 each, Refills: 11      BD ULTRA-FINE SHORT PEN NEEDLE 31 gauge x 5/16" Ndle Inject 1 application into the skin 5 (five) times daily.  Qty: 450 each, Refills: 11      !! " "blood sugar diagnostic (ACCU-CHEK [place] PLUS TEST STRP) Strp TEST TWO TIMES DAILY WITH MEALS  Qty: 200 strip, Refills: 3    Associated Diagnoses: Type 2 diabetes mellitus with stage 3 chronic kidney disease, with long-term current use of insulin      !! blood sugar diagnostic Strp To check BG 3 times daily, to use with insurance preferred meter  Qty: 300 strip, Refills: 99      cholecalciferol, vitamin D3, (VITAMIN D3) 2,000 unit Cap Take 1 capsule by mouth 2 (two) times daily.      DULoxetine (CYMBALTA) 60 MG capsule Take 1 capsule (60 mg total) by mouth once daily.  Qty: 90 capsule, Refills: 0      finasteride (PROSCAR) 5 mg tablet TAKE 1 TABLET EVERY DAY  Qty: 90 tablet, Refills: 3      gabapentin (NEURONTIN) 300 MG capsule Take 1 capsule (300 mg total) by mouth 3 (three) times daily.  Qty: 270 capsule, Refills: 1      insulin aspart U-100 (NOVOLOG) 100 unit/mL InPn pen Inject 8 Units into the skin 3 (three) times daily with meals.  Qty: 1 Box, Refills: 6    Associated Diagnoses: Type 2 diabetes mellitus with stage 3 chronic kidney disease, with long-term current use of insulin      insulin glargine (LANTUS U-100 INSULIN) 100 unit/mL injection Inject 20 Units into the skin once daily.  Qty: 2 vial, Refills: 6    Associated Diagnoses: Type 2 diabetes mellitus with stage 3 chronic kidney disease, with long-term current use of insulin      insulin syringe-needle U-100 0.5 mL 30 gauge x 5/16" Syrg To use with Lantus vials - 2 times daily  Qty: 200 each, Refills: 6    Associated Diagnoses: Type 2 diabetes mellitus with stage 3 chronic kidney disease, with long-term current use of insulin      lancets Misc 1 each by Misc.(Non-Drug; Combo Route) route 2 (two) times daily.  Qty: 200 each, Refills: 3      lisinopril (PRINIVIL,ZESTRIL) 20 MG tablet Take 1 tablet (20 mg total) by mouth once daily.  Qty: 90 tablet, Refills: 3    Associated Diagnoses: Hypertension, essential       !! - Potential duplicate medications found. " Please discuss with provider.      STOP taking these medications       metoprolol succinate (TOPROL-XL) 50 MG 24 hr tablet Comments:   Reason for Stopping:         tamsulosin (FLOMAX) 0.4 mg Cap Comments:   Reason for Stopping:         fluticasone (FLONASE) 50 mcg/actuation nasal spray Comments:   Reason for Stopping:               I certify that this patient is confined to his home and needs intermittent skilled nursing care, physical therapy and occupational therapy.

## 2019-11-12 NOTE — PLAN OF CARE
11/12/19 1258   Final Note   Assessment Type Final Discharge Note   Anticipated Discharge Disposition Home-Health   What phone number can be called within the next 1-3 days to see how you are doing after discharge? [phone]   Discharge plans and expectations educations in teach back method with documentation complete? Yes   Right Care Referral Info   Post Acute Recommendation Home-care   Referral Type Home Health    Facility Name [facility]      Future Appointments   Date Time Provider Department Center   11/22/2019  9:00 AM Ender Mendoza MD [facility]IO [facility] [place]   12/5/2019  9:00 AM Keshia Aldana NP [facility] [place]

## 2019-11-12 NOTE — DISCHARGE SUMMARY
[facility] Hospital Medicine  Discharge Summary      Patient Name: Nickolas Blake  MRN: [id number]  Admission Date: 11/10/2019  Hospital Length of Stay: 1 days  Discharge Date and Time:  11/12/2019 1:49 PM  Attending Physician: No att. providers found   Discharging Provider: Luis Antonio Miner MD  Primary Care Provider: Joe Klein MD  Hospital Medicine Team: Networked reference to record PCT  Luis Antonio Miner MD    HPI:   88M with known hx of Uncontrolled IDDM2 with neuropathy, HTN, HLD, MI s/p angioplasty 1994, Mild cognitive impairment, BPH, OA of right hip and lumbar spinal stenosis with radiculopathy is brought to the ER after a witnessed event of syncope. Patient got up after a meal with family at an outside restaurant, was walking back to the car when his gait became unsteady and staggering, he started noticing light headedness and finally visual black out before passing out. Son, walking next to him, held him and avoided the fall but they report patient was right behind the car when this happened and still was mentioning he could not see the car, body was entirely flaccid and patient was totally pale when he passed out and was knocked out for less than a minute before regaining concsiousness followed by a less than 5 min duration of confusion after which he finally regained awareness of what had happened. Complaints of orthostatic light headedness.  Denies any pre-syncopal event, seizure/shaking/spasm of any extremity or the body, tongue bite or urine or bowel incontinence during event, prolonged confusion after the event.     Similar such 2 events have happened in the last month where patient had a arm shaking-spasm and a fall but never reported to the ER. Patient lives alone but has sitters with him 24/7. Baseline functional status- fairly dependent on sitters for his ADLs. Limited mobility due to OA right knee pain which limits family's assessment of dizziness, sob and gait abnormality.  Finally patient saw his PCP Dr Klein on 11/7 and was put on a holter for 48 hours.    Denies palpitations, chest pain, recent diarrhea/melena/brbpr, runny nose, chills, fever, cough, SOB, abd pain, headache, hx of seizures, sick contacts, weight loss, urinary or bowel changes, dysuria/hematuria, blood loss, confusion, back pain, slurred speech, facial droop or weakness/numbness of extremities, fatigue, leg swelling, change in appetite, back pain.    No alcohol drinking habit - no recent intake  Smoking for 40 years (chain smoker at one point per daughter at bedside) but quit since 1986  Recent change in medications: Flomax was started on 11/7 by Dr Klein for BPH.    Work-up in ER  Normal Hgb and wbc  CXR no consolidation or infiltrates but mild left sided effusion with basal atelectasis  BNP 46, Tn not elevated, EKG NSR  CMP showed mild hypokalemia and also hypocalcemia (corrected 7.7)  Normal kidney and liver functions        * No surgery found *      Hospital Course:   Admitted to [facility] for evaluation of syncopal episodes. Bedside echo showed 50% aortic stenosis and positive orthostatic vitals. Cardiology consulted- TTE shows no abnormality. Orthostatic hypotension is present which is the prime suspect for the syncopal events. Stopped flomax and anti-htn but resumed lisinopril today after BP improved to 140s. Cortisol low normal. Holter monitor interrogated and was unremarkable. Will discharge today, 11/12, and hold flomax and beta-blocker.       Vital Signs (Most Recent):  Temp: 97 °F (36.1 °C) (11/12/19 1113)  Pulse: 87 (11/12/19 1113)  Resp: 18 (11/12/19 1113)  BP: (!) 168/81 (11/12/19 1113)  SpO2: 98 % (11/12/19 1113) Vital Signs (24h Range):  Temp:  [96.7 °F (35.9 °C)-98.6 °F (37 °C)] 97 °F (36.1 °C)  Pulse:  [] 87  Resp:  [18-20] 18  SpO2:  [91 %-98 %] 98 %  BP: (111-191)/(53-83) 168/81      Weight: 86.5 kg (190 lb 11.2 oz)  Body mass index is 26.11 kg/m².     Intake/Output Summary (Last 24  hours) at 11/12/2019 1326  Last data filed at 11/11/2019 1800      Gross per 24 hour   Intake 140 ml   Output --   Net 140 ml      Physical Exam   Constitutional: He is oriented to person, place, and time. He appears well-developed and well-nourished. No distress.   HENT:   Head: Normocephalic and atraumatic.   Mouth/Throat: Oropharynx is clear and moist. No oropharyngeal exudate.   Eyes: Pupils are equal, round, and reactive to light. Conjunctivae and EOM are normal. No scleral icterus.   Neck: Normal range of motion. Neck supple. No thyromegaly present.   Cardiovascular: Normal rate, regular rhythm, normal heart sounds and intact distal pulses.   Pulmonary/Chest: Effort normal and breath sounds normal. No respiratory distress. He has no wheezes. He has no rales.   Abdominal: Soft. Bowel sounds are normal. He exhibits no distension. There is no tenderness.   Musculoskeletal: Normal range of motion. He exhibits no edema or tenderness.   Neurological: He is alert and oriented to person, place, and time.   Skin: Skin is dry. Capillary refill takes 2 to 3 seconds. He is not diaphoretic.     Consults:   Consults (From admission, onward)        Status Ordering Provider     Inpatient consult to Cardiology  Once     Provider:  (Not yet assigned)    Completed KIERRA GALLOWAY          Hypertension, essential  Takes lisinopril and metoprolol at home  Held off anti-htn initially in the setting of recent syncopal episodes and hypotension in the ER  Reintroduced Lisinopril once orthostatic vitals were stable after fluid resuscitation and holding off flomax      Coronary artery disease involving native coronary artery of native heart without angina pectoris  MI s/p angioplasty 1994  No symptoms of angina or diaphoresis or atypical chest pain  Continue aspirin  Not on any other AC  - discontinue beta-blocker in setting of orthostatic hypotension      Benign prostatic hyperplasia without lower urinary tract symptoms  Continue  Finasteride but hold flomax        Final Active Diagnoses:    Diagnosis Date Noted POA    PRINCIPAL PROBLEM:  Syncope [R55] 11/10/2019 Yes    Hypocalcemia [E83.51] 11/10/2019 Unknown    Hypertension, essential [I10] 01/17/2017 Yes    Type 2 diabetes mellitus with diabetic polyneuropathy, with long-term current use of insulin [E11.42, Z79.4] 11/19/2015 Not Applicable    Coronary artery disease involving native coronary artery of native heart without angina pectoris [I25.10]  Yes     Chronic    Benign prostatic hyperplasia without lower urinary tract symptoms [N40.0] 10/26/2012 Yes    Hyperlipidemia [E78.5] 10/26/2012 Yes      Problems Resolved During this Admission:       Discharged Condition: good    Disposition: Home or Self Care    Follow Up:  Follow-up Information     [facility].    Specialty:  Home Health Services  Contact information:  [address] [place] [address].  [address]  [place] [id number]  [phone]                 Patient Instructions:      Ambulatory referral to Outpatient Case Management   Referral Priority: Routine Referral Type: Consultation   Referral Reason: Specialty Services Required   Number of Visits Requested: 1       Significant Diagnostic Studies: Labs: All labs within the past 24 hours have been reviewed    Pending Diagnostic Studies:     None         Medications:  Reconciled Home Medications:      Medication List      CHANGE how you take these medications    insulin aspart U-100 100 unit/mL (3 mL) Inpn pen  Commonly known as:  NovoLOG  Inject 8 Units into the skin 3 (three) times daily with meals.  What changed:    · how much to take  · additional instructions     insulin glargine 100 unit/mL injection  Commonly known as:  LANTUS U-100 INSULIN  Inject 20 Units into the skin once daily.  What changed:  how much to take        CONTINUE taking these medications    acetaminophen 500 mg Cap  Commonly known as:  TYLENOL  Take 1 capsule by mouth every 6 to 8 hours as  "needed.     ascorbic acid (vitamin C) 250 MG tablet  Commonly known as:  VITAMIN C  Take 1 tablet (250 mg total) by mouth 2 (two) times daily.     aspirin 81 MG EC tablet  Commonly known as:  ECOTRIN  Take 81 mg by mouth once daily.     atorvastatin 40 MG tablet  Commonly known as:  LIPITOR  Take 1 tablet (40 mg total) by mouth once daily.     BD ALCOHOL SWABS Padm  Generic drug:  alcohol swabs  USE FOUR TIMES DAILY     BD ULTRA-FINE SHORT PEN NEEDLE 31 gauge x 5/16" Ndle  Generic drug:  pen needle, diabetic  Inject 1 application into the skin 5 (five) times daily.     * blood sugar diagnostic Strp  Commonly known as:  ACCU-CHEK NEWTNO PLUS TEST STRP  TEST TWO TIMES DAILY WITH MEALS     * blood sugar diagnostic Strp  To check BG 3 times daily, to use with insurance preferred meter     DULoxetine 60 MG capsule  Commonly known as:  CYMBALTA  Take 1 capsule (60 mg total) by mouth once daily.     finasteride 5 mg tablet  Commonly known as:  PROSCAR  TAKE 1 TABLET EVERY DAY     gabapentin 300 MG capsule  Commonly known as:  NEURONTIN  Take 1 capsule (300 mg total) by mouth 3 (three) times daily.     insulin syringe-needle U-100 0.5 mL 30 gauge x 5/16" Syrg  To use with Lantus vials - 2 times daily     lancets Misc  1 each by Misc.(Non-Drug; Combo Route) route 2 (two) times daily.     lisinopril 20 MG tablet  Commonly known as:  PRINIVIL,ZESTRIL  Take 1 tablet (20 mg total) by mouth once daily.     VITAMIN D3 2,000 unit Cap  Generic drug:  cholecalciferol (vitamin D3)  Take 1 capsule by mouth 2 (two) times daily.         * This list has 2 medication(s) that are the same as other medications prescribed for you. Read the directions carefully, and ask your doctor or other care provider to review them with you.            STOP taking these medications    metoprolol succinate 50 MG 24 hr tablet  Commonly known as:  TOPROL-XL     tamsulosin 0.4 mg Cap  Commonly known as:  FLOMAX            Indwelling Lines/Drains at time of " discharge:   Lines/Drains/Airways     None                 Time spent on the discharge of patient: >30 minutes  Patient was seen and examined on the date of discharge and determined to be suitable for discharge.         Luis Antonio Miner MD  Department of Hospital Medicine  [facility]   normal rate and rhythm, no chest pain and no edema.

## 2019-11-13 PROCEDURE — G0180 PR HOME HEALTH MD CERTIFICATION: ICD-10-PCS | Mod: ,,, | Performed by: HOSPITALIST

## 2019-11-13 PROCEDURE — G0180 MD CERTIFICATION HHA PATIENT: HCPCS | Mod: ,,, | Performed by: HOSPITALIST

## 2019-11-14 ENCOUNTER — PATIENT OUTREACH (OUTPATIENT)
Dept: ADMINISTRATIVE | Facility: OTHER | Age: 84
End: 2019-11-14

## 2019-11-18 ENCOUNTER — TELEPHONE (OUTPATIENT)
Dept: INTERNAL MEDICINE | Facility: CLINIC | Age: 84
End: 2019-11-18

## 2019-11-18 ENCOUNTER — OUTPATIENT CASE MANAGEMENT (OUTPATIENT)
Dept: ADMINISTRATIVE | Facility: OTHER | Age: 84
End: 2019-11-18

## 2019-11-18 RX ORDER — NEOMYCIN SULFATE, POLYMYXIN B SULFATE, HYDROCORTISONE 3.5; 10000; 1 MG/ML; [USP'U]/ML; MG/ML
3 SOLUTION/ DROPS AURICULAR (OTIC) 4 TIMES DAILY
Qty: 1 BOTTLE | Refills: 1 | Status: SHIPPED | OUTPATIENT
Start: 2019-11-18 | End: 2019-11-28

## 2019-11-18 NOTE — TELEPHONE ENCOUNTER
I can call him in some ear draw ox but if having severe symptoms then Please triage patient to an urgent care appointment today. Thank you.

## 2019-11-18 NOTE — TELEPHONE ENCOUNTER
----- Message from Hunter Dorado sent at 11/18/2019 12:21 PM CST -----  Contact: Care Taker Angella [phone] or [phone]  RX request - refill or new RX.  Is this a refill or new RX:    RX name and strength: Ear Drips/Antibiotics     Pharmacy name and phone #[id number] - [place], [place] - [address] AT [facility] [phone] (Phone) [phone] (Fax    Comments:  Ear drops for the pain in the both ears, can not hear out of right ear and very little of the left, please call to discuss, also requesting antibiotics. Care Taker Angella [phone] or [phone]  Please call an advise  Thank you

## 2019-11-18 NOTE — TELEPHONE ENCOUNTER
----- Message from Ginger Osborn sent at 11/18/2019  9:17 AM CST -----  Contact: Airam Davies [phone]  Patient would like to get medical advice.  Symptoms (please be specific):  Both ears ache level 10, headache, loss of appetite, and off balance  How long has patient had these symptoms:  3 days  Pharmacy name and phone # [id number] - [place], [place] - [address] AT [facility] [phone] (Phone) [phone] (Fax)    Comments:  The home health nurse told her to call to see if he need antibiotics

## 2019-11-18 NOTE — TELEPHONE ENCOUNTER
----- Message from Ginger Osborn sent at 11/18/2019  2:23 PM CST -----  Contact: Mary/ Angella [phone]   Patient would like to get medical advice.  Symptoms (please be specific):  Severe ear ache which made him lose hearing, nasal congestion, coughing, and clear mucus, no appetite  How long has patient had these symptoms:  1 day  Pharmacy name and phone # [id number] [place] - [address] AT [facility] [phone] (Phone) [phone] (Fax)

## 2019-11-19 NOTE — TELEPHONE ENCOUNTER
Spoke with ira.  I had forwarded message yesterday to staff to triage to urgent care.  They received a call late.  Explain our current message and volume.  Apparently their initial message came on the weekend to the triage nurse and they stated they could not get a hold of anybody.  We into detail over his current condition.  He was in the hospital recently following our last visit for recurrence syncope his Flomax was discontinued.  I reviewed all is upcoming appointments.  They have elected for 24 hr sitting at this time which I think is a tremendous idea.  Will schedule him for a follow-up in about 3 or 4 weeks.  He seems to be doing better at this time they had hold Q-tip gotten out of his ear.

## 2019-11-19 NOTE — TELEPHONE ENCOUNTER
Pt sitter states that his family is outraged and would like a call back to explain why it took all day to get a call back. Pt was in severe pain all day waiting. Apologized to pt sitter however she states it is still unacceptable. Please call at your convince.

## 2019-11-20 ENCOUNTER — PATIENT OUTREACH (OUTPATIENT)
Dept: ADMINISTRATIVE | Facility: OTHER | Age: 84
End: 2019-11-20

## 2019-11-27 ENCOUNTER — EXTERNAL HOME HEALTH (OUTPATIENT)
Dept: HOME HEALTH SERVICES | Facility: HOSPITAL | Age: 84
End: 2019-11-27
Payer: MEDICARE

## 2019-11-29 NOTE — PROGRESS NOTES
"Subjective:      Patient ID: Nickolas Blake is a 88 y.o. male.    Chief Complaint: Follow up with Type 2 DM    History of Present Illness  Nickolas Blake presents today for follow up of Type 2 DM. Previous patient of MIRZA Read NP. Last seen in Dec 2018.  This is her his first visit with me.    Recently discharged from [facility] with hypotension; medications adjusted.     With regards to the diabetes:    Was followed in [facility]. Began in chronic DM clinic 05/2016     Diagnosed with Type 2  DM: 1980's  Family History of Type 2 DM in mother, brothers, and sister  Known complications:    DKA-none  RN-resolved  PN-mild  Nephropathy-positive   CAD-denies     Current regimen:  Lantus 22 units daily (morning)  Novolog 12 units with meals - pt prescribed 8 units but sitter self adjusts insulin dosage based on BG readings - using isf 75 above 180    Missed doses-denies    Other medications tried: Metformin, Starlix and Glipizide at one time. He is unclear when the medications were discontinued. januvia  - discontinued due to elevated BG     The patient's sitter (Ms. Begum)  arrives today. No logs today.   Reports BGs are ranging 160-270; sometimes 300 when going out to eat with family     Hypoglycemia: denies    Symptoms in the past include feeling jittery, blurred vision   Treats appropriately by drinking a small coke or orange juice      Type of Glucose Meter: Accu-chek -2 years old     Discussed Dexcom and he is not interested     Physical Activity: No formal exercise. Limited by spinal stenosis. He is in wheelchair today.      Dietary recall: He is eating 3 meals daily.  Breakfast: 3 times week has oatmeal; omelet or turkey katz   Lunch: out to eat twice a week, steak with sweet potato; or home -cooked   Dinner: pot roast, mac-n-cheese, corn; same as lunch  Snacks: "anything he can get his hands on" -fruit and sardines (rarely)     Occasionally he takes a sugar free snack with milk at 8pm     Education - last " visit: has been in the past    Has a Medic alert tag -has but does not wear  Glucagon/Baqsimi- has but states probably      Diabetes Management Status  Statin: Taking  ACE/ARB: Taking    Lab Results   Component Value Date    HGBA1C 8.7 (H) 2019    HGBA1C 7.7 (H) 2019    HGBA1C 6.7 (H) 2018     Screening or Prevention Patient's value Goal Complete/Controlled?   HgA1C Testing and Control   Lab Results   Component Value Date    HGBA1C 8.7 (H) 2019      Annually/Less than 8% No   Lipid profile : 2019 Annually Yes   LDL control Lab Results   Component Value Date    LDLCALC 75.2 2019    Annually/Less than 100 mg/dl  Yes   Nephropathy screening Lab Results   Component Value Date    LABMICR 138.0 2019     Lab Results   Component Value Date    PROTEINUA Negative 2019    Annually Yes   Blood pressure BP Readings from Last 1 Encounters:   19 136/84    Less than 140/90 No   Dilated retinal exam : 2019 Annually Yes   Foot exam   : 2019 Annually Yes     With regards to thyroid nodule,   US thyroid 16  COMPARISON: Thyroid ultrasound from 12.    TECHNIQUE: Ultrasonographic images of the right and left lobes of the thyroid, as well as the isthmus, were obtained. Region immediately adjacent to thyroid was sonographically assessed for lymphadenopathy and masses.    FINDINGS:   The thyroid is symmetric in shape.  The right lobe measures 5.6 x 2.6 x 2.7 cm.  Vascularity is within normal limits.  There is a 0.4 x 0.3 x 0.3 cm nodule identified within the midportion.    The left lobe measures 5.6 x 2.1 x 2.5 cm.  Vascularity is within normal limits.  Four subcentimeter nodules are identified within the left lobe.  The largest measures 0.8 x 1.1 x 0.7 cm and is located within the inferior portion.  No nodules meet criteria for fine needle aspiration.    No abnormal appearing lymph nodes are identified.    Review of Systems   Constitutional: Negative for  "fatigue.   Eyes: Negative for visual disturbance.   Respiratory: Negative for shortness of breath.    Cardiovascular: Negative for chest pain.   Gastrointestinal: Negative for abdominal pain.   Musculoskeletal: Negative for arthralgias.   Skin: Negative for wound.   Neurological: Negative for headaches.   Hematological: Does not bruise/bleed easily.   Psychiatric/Behavioral: Negative for sleep disturbance.       Objective:   Physical Exam   Constitutional: He appears well-developed.   Neck: No thyromegaly present.   Cardiovascular: Normal rate.   Pulmonary/Chest: Effort normal.   Abdominal: Soft.   Vitals reviewed.  injection sites are ok. No lipo hypertropthy or atrophy    Diabetes Foot Exam:   Feet no cuts or  scratches  Shoes appropriate  Decreased vibration to left and no vibratory sensation to right  Has monofilament sensation bilaterally    Visit Vitals  /84   Ht 5' 11.65" (1.82 m)   Wt 81.6 kg (180 lb)   BMI 24.65 kg/m²        Lab Review:   Lab Results   Component Value Date    HGBA1C 8.7 (H) 11/07/2019    HGBA1C 7.7 (H) 05/28/2019    HGBA1C 6.7 (H) 11/20/2018      Lab Results   Component Value Date    CHOL 145 11/07/2019    HDL 37 (L) 11/07/2019    LDLCALC 75.2 11/07/2019    TRIG 164 (H) 11/07/2019    CHOLHDL 25.5 11/07/2019     Lab Results   Component Value Date     11/12/2019    K 4.4 11/12/2019     11/12/2019    CO2 24 11/12/2019     (H) 11/12/2019    BUN 20 11/12/2019    CREATININE 1.2 11/12/2019    CALCIUM 8.5 (L) 11/12/2019    PROT 6.2 11/12/2019    ALBUMIN 3.0 (L) 11/12/2019    BILITOT 0.7 11/12/2019    ALKPHOS 101 11/12/2019    AST 21 11/12/2019    ALT 21 11/12/2019    ANIONGAP 7 (L) 11/12/2019    ESTGFRAFRICA >60.0 11/12/2019    EGFRNONAA 53.7 (A) 11/12/2019    TSH 0.268 (L) 11/10/2019     No results found for: [id number]  Assessment and Plan     1. Type 2 diabetes mellitus with stage 3 chronic kidney disease, with long-term current use of insulin  Ambulatory " referral/consult to Endocrinology    blood-glucose meter kit    lancets Misc    blood sugar diagnostic Strp    glucagon, human recombinant, (GLUCAGON EMERGENCY KIT, HUMAN,) 1 mg SolR    Ambulatory referral to Nephrology    Comprehensive metabolic panel    Hemoglobin A1c   2. Proteinuria, unspecified type  Ambulatory referral to Nephrology   3. Type 2 diabetes mellitus with diabetic polyneuropathy, with long-term current use of insulin     4. Multinodular thyroid     5. Mixed Alzheimer's and vascular dementia     6. Mixed hyperlipidemia     7. Hypertension, essential         Type 2 diabetes mellitus with diabetic polyneuropathy, with long-term current use of insulin  -- Reviewed goals of therapy are to get the best control we can without hypoglycemia  -- Discussed dexcom and he is not interested  Medication changes:   Increase Lantus to 24 units daily   Continue Novolog 12 units + SSI    -- Reviewed patient's current insulin regimen. Clarified proper insulin dose and timing in relation to meals, etc. Insulin injection sites and proper rotation instructed.    -- Advised frequent self blood glucose monitoring.  Patient encouraged to document glucose results and bring them to every clinic visit    -- Hypoglycemia precautions discussed.   -- Call for Bg repeatedly < 90 or > 180.   -- Close adherence to lifestyle changes recommended.   -- Periodic follow ups for eye evaluations, foot care and dental care suggested.  -- Will give list of low carb snacks       Type 2 diabetes mellitus with stage 3 chronic kidney disease, with long-term current use of insulin  -- See above     Multinodular thyroid  -- No thyroid US since 2016 -will discuss at next visit     Mixed Alzheimer's and vascular dementia  - stable with current regimen   - followed by Neurology     Hyperlipidemia  Controlled   On statin per ADA recommendations      Hypertension, essential  -- on ACEI  -- Controlled  -- Blood pressure goals discussed with  patient      Proteinuria  -- Refer to nephrology    Follow up in about 6 months (around 6/5/2020).    Case discussed with Dr. Lee  Recommendations were discussed with the patient in detail  The patient verbalized understanding and agrees with the plan outlined as above.     Keshia Aldana, NP

## 2019-12-03 NOTE — PROGRESS NOTES
11/20/19 Family declined opcm enrollment.  Reason- needs met at this time.        Jennifer Terry RN  Outpatient Care Management

## 2019-12-04 ENCOUNTER — PATIENT OUTREACH (OUTPATIENT)
Dept: ADMINISTRATIVE | Facility: OTHER | Age: 84
End: 2019-12-04

## 2019-12-05 ENCOUNTER — OFFICE VISIT (OUTPATIENT)
Dept: ENDOCRINOLOGY | Facility: CLINIC | Age: 84
End: 2019-12-05
Attending: FAMILY MEDICINE
Payer: MEDICARE

## 2019-12-05 VITALS
HEIGHT: 72 IN | SYSTOLIC BLOOD PRESSURE: 136 MMHG | BODY MASS INDEX: 24.38 KG/M2 | WEIGHT: 180 LBS | DIASTOLIC BLOOD PRESSURE: 84 MMHG

## 2019-12-05 DIAGNOSIS — G30.9 MIXED ALZHEIMER'S AND VASCULAR DEMENTIA: ICD-10-CM

## 2019-12-05 DIAGNOSIS — F01.50 MIXED ALZHEIMER'S AND VASCULAR DEMENTIA: ICD-10-CM

## 2019-12-05 DIAGNOSIS — F02.80 MIXED ALZHEIMER'S AND VASCULAR DEMENTIA: ICD-10-CM

## 2019-12-05 DIAGNOSIS — Z79.4 TYPE 2 DIABETES MELLITUS WITH DIABETIC POLYNEUROPATHY, WITH LONG-TERM CURRENT USE OF INSULIN: ICD-10-CM

## 2019-12-05 DIAGNOSIS — R80.9 PROTEINURIA, UNSPECIFIED TYPE: ICD-10-CM

## 2019-12-05 DIAGNOSIS — N18.30 TYPE 2 DIABETES MELLITUS WITH STAGE 3 CHRONIC KIDNEY DISEASE, WITH LONG-TERM CURRENT USE OF INSULIN: ICD-10-CM

## 2019-12-05 DIAGNOSIS — E78.2 MIXED HYPERLIPIDEMIA: ICD-10-CM

## 2019-12-05 DIAGNOSIS — E11.42 TYPE 2 DIABETES MELLITUS WITH DIABETIC POLYNEUROPATHY, WITH LONG-TERM CURRENT USE OF INSULIN: ICD-10-CM

## 2019-12-05 DIAGNOSIS — I10 HYPERTENSION, ESSENTIAL: ICD-10-CM

## 2019-12-05 DIAGNOSIS — E11.22 TYPE 2 DIABETES MELLITUS WITH STAGE 3 CHRONIC KIDNEY DISEASE, WITH LONG-TERM CURRENT USE OF INSULIN: ICD-10-CM

## 2019-12-05 DIAGNOSIS — E04.2 MULTINODULAR THYROID: ICD-10-CM

## 2019-12-05 DIAGNOSIS — Z79.4 TYPE 2 DIABETES MELLITUS WITH STAGE 3 CHRONIC KIDNEY DISEASE, WITH LONG-TERM CURRENT USE OF INSULIN: ICD-10-CM

## 2019-12-05 PROCEDURE — 1101F PT FALLS ASSESS-DOCD LE1/YR: CPT | Mod: HCNC,CPTII,S$GLB, | Performed by: NURSE PRACTITIONER

## 2019-12-05 PROCEDURE — 1126F AMNT PAIN NOTED NONE PRSNT: CPT | Mod: HCNC,S$GLB,, | Performed by: NURSE PRACTITIONER

## 2019-12-05 PROCEDURE — 99214 OFFICE O/P EST MOD 30 MIN: CPT | Mod: HCNC,S$GLB,, | Performed by: NURSE PRACTITIONER

## 2019-12-05 PROCEDURE — 99499 RISK ADDL DX/OHS AUDIT: ICD-10-PCS | Mod: HCNC,S$GLB,, | Performed by: NURSE PRACTITIONER

## 2019-12-05 PROCEDURE — 1101F PR PT FALLS ASSESS DOC 0-1 FALLS W/OUT INJ PAST YR: ICD-10-PCS | Mod: HCNC,CPTII,S$GLB, | Performed by: NURSE PRACTITIONER

## 2019-12-05 PROCEDURE — 1126F PR PAIN SEVERITY QUANTIFIED, NO PAIN PRESENT: ICD-10-PCS | Mod: HCNC,S$GLB,, | Performed by: NURSE PRACTITIONER

## 2019-12-05 PROCEDURE — 99999 PR PBB SHADOW E&M-EST. PATIENT-LVL IV: CPT | Mod: PBBFAC,HCNC,, | Performed by: NURSE PRACTITIONER

## 2019-12-05 PROCEDURE — 99214 PR OFFICE/OUTPT VISIT, EST, LEVL IV, 30-39 MIN: ICD-10-PCS | Mod: HCNC,S$GLB,, | Performed by: NURSE PRACTITIONER

## 2019-12-05 PROCEDURE — 99499 UNLISTED E&M SERVICE: CPT | Mod: HCNC,S$GLB,, | Performed by: NURSE PRACTITIONER

## 2019-12-05 PROCEDURE — 99999 PR PBB SHADOW E&M-EST. PATIENT-LVL IV: ICD-10-PCS | Mod: PBBFAC,HCNC,, | Performed by: NURSE PRACTITIONER

## 2019-12-05 PROCEDURE — 1159F MED LIST DOCD IN RCRD: CPT | Mod: HCNC,S$GLB,, | Performed by: NURSE PRACTITIONER

## 2019-12-05 PROCEDURE — 1159F PR MEDICATION LIST DOCUMENTED IN MEDICAL RECORD: ICD-10-PCS | Mod: HCNC,S$GLB,, | Performed by: NURSE PRACTITIONER

## 2019-12-05 RX ORDER — LANCETS
EACH MISCELLANEOUS
Qty: 100 EACH | Refills: 0 | Status: SHIPPED | OUTPATIENT
Start: 2019-12-05

## 2019-12-05 RX ORDER — INSULIN PUMP SYRINGE, 3 ML
EACH MISCELLANEOUS
Qty: 1 EACH | Refills: 0 | Status: SHIPPED | OUTPATIENT
Start: 2019-12-05

## 2019-12-05 NOTE — PATIENT INSTRUCTIONS
Snacks can be an important part of a balanced, healthy meal plan. They allow you to eat more frequently, feeling full and satisfied throughout the day. Also, they allow you to spread carbohydrates evenly, which may stabilize blood sugars.  Plus, snacks are enjoyable!     The amount of carbohydrate needed at snacks varies. Generally, about 15-30 grams of carbohydrate per snack is recommended.  Below you will find some tasty treats.       0-5 gm carb   Crystal Light   Vitamin Water Zero   Herbal tea, unsweetened   2 tsp peanut butter on celery   1./2 cup sugar-free jell-o   1 sugar-free popsicle   ¼ cup blueberries   8oz Blue Maria Elena unsweetened almond milk   5 baby carrots & celery sticks, cucumbers, bell peppers dipped in ¼ cup salsa, 2Tbsp light ranch dressing or 2Tbsp plain Greek yogurt   10 Goldfish crackers   ½ oz low-fat cheese or string cheese   1 closed handful of nuts, unsalted   1 Tbsp of sunflower seeds, unsalted   1 cup Smart Pop popcorn   1 whole grain brown rice cake        15 gm carb   1 small piece of fruit or ½ banana or 1/2 cup lite canned fruit   3 mateusz cracker squares   3 cups Smart Pop popcorn, top spray butter, Brown lite salt or cinnamon and Truvia   5 Vanilla Wafers   ½ cup low fat, no added sugar ice cream or frozen yogurt (Blue bell, Blue Bunny, Weight Watchers, Skinny Cow)   ½ turkey, ham, or chicken sandwich   ½ c fruit with ½ c Cottage cheese   4-6 unsalted wheat crackers with 1 oz low fat cheese or 1 tbsp peanut butter    30-45 goldfish crackers (depending on flavor)    7-8 Catholic mini brown rice cakes (caramel, apple cinnamon, chocolate)    12 Catholic mini brown rice cakes (cheddar, bbq, ranch)    1/3 cup hummus dip with raw veg   1/2 whole wheat fabricio, 1Tbsp hummus   Mini Pizza (1/2 whole wheat English muffin, low-fat  cheese, tomato sauce)   100 calorie snack pack (Oreo, Chips Ahoy, Ritz Mix, Baked Cheetos)   4-6 oz. light or Greek Style yogurt  (Chun, Priti, [facility], [facility])   ½ cup sugar-free pudding     6 in. wheat tortilla or fabricio oven toasted chips (topped with spray butter flavoring, cinnamon, Truvia OR spray butter, garlic powder, chili powder)    18 BBQ Popchips (available at [employer], [employer], Fresh Market)

## 2019-12-05 NOTE — ASSESSMENT & PLAN NOTE
-- Reviewed goals of therapy are to get the best control we can without hypoglycemia  -- Discussed dexcom and he is not interested  Medication changes:   Increase Lantus to 24 units daily   Continue Novolog 12 units + SSI    -- Reviewed patient's current insulin regimen. Clarified proper insulin dose and timing in relation to meals, etc. Insulin injection sites and proper rotation instructed.    -- Advised frequent self blood glucose monitoring.  Patient encouraged to document glucose results and bring them to every clinic visit    -- Hypoglycemia precautions discussed.   -- Call for Bg repeatedly < 90 or > 180.   -- Close adherence to lifestyle changes recommended.   -- Periodic follow ups for eye evaluations, foot care and dental care suggested.  -- Will give list of low carb snacks

## 2019-12-05 NOTE — LETTER
December 5, 2019      Joe Bunn MD  [address]  [facility] [address]           [facility] [place]  [address]  [facility] [address]  Phone: [phone]  Fax: [phone]          Patient: Nickolas Blake   MR Number: [id number]   YOB: 1931   Date of Visit: 12/5/2019       Dear Dr. Joe Bunn:    Thank you for referring Nickolas Blake to me for evaluation. Attached you will find relevant portions of my assessment and plan of care.    If you have questions, please do not hesitate to call me. I look forward to following Nickolas Blake along with you.    Sincerely,    Keshia Aldana, PIERCE    Enclosure  CC:  No Recipients    If you would like to receive this communication electronically, please contact [email or web address] or [phone] to request more information on [employer] access.    For providers and/or their staff who would like to refer a patient to [facility], please contact us through our one-stop-shop provider referral line, [facility], at [phone].    If you feel you have received this communication in error or would no longer like to receive these types of communications, please e-mail [email or web address]

## 2019-12-12 ENCOUNTER — OFFICE VISIT (OUTPATIENT)
Dept: INTERNAL MEDICINE | Facility: CLINIC | Age: 84
End: 2019-12-12
Attending: FAMILY MEDICINE
Payer: MEDICARE

## 2019-12-12 VITALS
HEART RATE: 113 BPM | DIASTOLIC BLOOD PRESSURE: 78 MMHG | SYSTOLIC BLOOD PRESSURE: 158 MMHG | OXYGEN SATURATION: 96 % | TEMPERATURE: 98 F | HEIGHT: 71 IN | WEIGHT: 183.88 LBS | BODY MASS INDEX: 25.74 KG/M2

## 2019-12-12 DIAGNOSIS — R93.89 ABNORMAL CHEST X-RAY: ICD-10-CM

## 2019-12-12 DIAGNOSIS — Z79.4 TYPE 2 DIABETES MELLITUS WITH DIABETIC POLYNEUROPATHY, WITH LONG-TERM CURRENT USE OF INSULIN: ICD-10-CM

## 2019-12-12 DIAGNOSIS — L98.9 SKIN LESION: ICD-10-CM

## 2019-12-12 DIAGNOSIS — I10 HYPERTENSION, ESSENTIAL: ICD-10-CM

## 2019-12-12 DIAGNOSIS — N18.30 TYPE 2 DIABETES MELLITUS WITH STAGE 3 CHRONIC KIDNEY DISEASE, WITH LONG-TERM CURRENT USE OF INSULIN: ICD-10-CM

## 2019-12-12 DIAGNOSIS — I95.1 ORTHOSTASIS: ICD-10-CM

## 2019-12-12 DIAGNOSIS — E78.5 HYPERLIPIDEMIA, UNSPECIFIED HYPERLIPIDEMIA TYPE: ICD-10-CM

## 2019-12-12 DIAGNOSIS — I25.10 CORONARY ARTERY DISEASE INVOLVING NATIVE CORONARY ARTERY OF NATIVE HEART WITHOUT ANGINA PECTORIS: Chronic | ICD-10-CM

## 2019-12-12 DIAGNOSIS — E11.22 TYPE 2 DIABETES MELLITUS WITH STAGE 3 CHRONIC KIDNEY DISEASE, WITH LONG-TERM CURRENT USE OF INSULIN: ICD-10-CM

## 2019-12-12 DIAGNOSIS — E11.42 TYPE 2 DIABETES MELLITUS WITH DIABETIC POLYNEUROPATHY, WITH LONG-TERM CURRENT USE OF INSULIN: ICD-10-CM

## 2019-12-12 DIAGNOSIS — R55 SYNCOPE, UNSPECIFIED SYNCOPE TYPE: ICD-10-CM

## 2019-12-12 DIAGNOSIS — R80.9 PROTEINURIA, UNSPECIFIED TYPE: ICD-10-CM

## 2019-12-12 DIAGNOSIS — Z79.4 TYPE 2 DIABETES MELLITUS WITH STAGE 3 CHRONIC KIDNEY DISEASE, WITH LONG-TERM CURRENT USE OF INSULIN: ICD-10-CM

## 2019-12-12 DIAGNOSIS — R91.1 PULMONARY NODULE: ICD-10-CM

## 2019-12-12 DIAGNOSIS — R55 NEAR SYNCOPE: Primary | ICD-10-CM

## 2019-12-12 DIAGNOSIS — N40.0 BENIGN PROSTATIC HYPERPLASIA WITHOUT LOWER URINARY TRACT SYMPTOMS: ICD-10-CM

## 2019-12-12 PROCEDURE — 1125F PR PAIN SEVERITY QUANTIFIED, PAIN PRESENT: ICD-10-PCS | Mod: HCNC,S$GLB,, | Performed by: FAMILY MEDICINE

## 2019-12-12 PROCEDURE — 99214 PR OFFICE/OUTPT VISIT, EST, LEVL IV, 30-39 MIN: ICD-10-PCS | Mod: HCNC,S$GLB,, | Performed by: FAMILY MEDICINE

## 2019-12-12 PROCEDURE — 1125F AMNT PAIN NOTED PAIN PRSNT: CPT | Mod: HCNC,S$GLB,, | Performed by: FAMILY MEDICINE

## 2019-12-12 PROCEDURE — 1101F PT FALLS ASSESS-DOCD LE1/YR: CPT | Mod: HCNC,CPTII,S$GLB, | Performed by: FAMILY MEDICINE

## 2019-12-12 PROCEDURE — 99214 OFFICE O/P EST MOD 30 MIN: CPT | Mod: HCNC,S$GLB,, | Performed by: FAMILY MEDICINE

## 2019-12-12 PROCEDURE — 1159F MED LIST DOCD IN RCRD: CPT | Mod: HCNC,S$GLB,, | Performed by: FAMILY MEDICINE

## 2019-12-12 PROCEDURE — 99499 RISK ADDL DX/OHS AUDIT: ICD-10-PCS | Mod: HCNC,S$GLB,, | Performed by: FAMILY MEDICINE

## 2019-12-12 PROCEDURE — 1101F PR PT FALLS ASSESS DOC 0-1 FALLS W/OUT INJ PAST YR: ICD-10-PCS | Mod: HCNC,CPTII,S$GLB, | Performed by: FAMILY MEDICINE

## 2019-12-12 PROCEDURE — 99999 PR PBB SHADOW E&M-EST. PATIENT-LVL V: ICD-10-PCS | Mod: PBBFAC,HCNC,, | Performed by: FAMILY MEDICINE

## 2019-12-12 PROCEDURE — 99499 UNLISTED E&M SERVICE: CPT | Mod: HCNC,S$GLB,, | Performed by: FAMILY MEDICINE

## 2019-12-12 PROCEDURE — 99999 PR PBB SHADOW E&M-EST. PATIENT-LVL V: CPT | Mod: PBBFAC,HCNC,, | Performed by: FAMILY MEDICINE

## 2019-12-12 PROCEDURE — 1159F PR MEDICATION LIST DOCUMENTED IN MEDICAL RECORD: ICD-10-PCS | Mod: HCNC,S$GLB,, | Performed by: FAMILY MEDICINE

## 2019-12-12 RX ORDER — GABAPENTIN 300 MG/1
300 CAPSULE ORAL NIGHTLY
Qty: 30 CAPSULE | Refills: 1
Start: 2019-12-12 | End: 2021-03-24

## 2019-12-12 NOTE — PATIENT INSTRUCTIONS
See standing or future lab orders and please draw A1C, CMP, Lipids and CBC - in 3 months, thank you.    Information about cholesterol, high blood pressure and healthy diet and activity recommendations can be found at the following links on the Internet:    http://www.nhlbi.nih.gov/health/health-topics/topics/hbc  http://www.nhlbi.nih.gov/health/educational/lose_wt/index.htm  Http://www.nhlbi.nih.gov/files/docs/public/heart/hbp_low.pdf  http://www.heart.org/HEARTORG/  http://diabetes.org/  https://www.cdc.gov/  Https://healthfinder.gov/  https://health.gov/dietaryguidelines/2015/guidelines/  https://health.gov/paguidelines/second-edition/pdf/Physical_Activity_Guidelines_2nd_edition.pdf

## 2019-12-12 NOTE — PROGRESS NOTES
Subjective:       Patient ID: Nickolas Blake is a 88 y.o. male.    Chief Complaint: Follow-up    Patient presents for a post hospitalization follow up visit. Current complaints addressed in the ROS section. Reviewed the pertinent chart data including (but not limited to) labs, imaging, cardiovascular, procedures and available ER/DC Summary and inpatient provider notes.    Copy D/C Summary:    Additions/corrections are as follows:     Mr. Blkae is an 87yo man with HTN, HLD, Dm2 with polyneuropathy, MI s/p angioplasty '94, BPH, OA, and MCI presenting with episode of syncope which occurred while walking to his car from a restaurant. Confused x5 minutes afterward, prodrome of lightheadedness. Found to be hypotensive at that time and with + orthostatics upon admission. No chest pain/palpitations.     Of note, he has had multiple episodes of syncope in recent weeks. Previous episode was accompanied by spasms of right arm as well as 15-20 minutes of confusion afterward per caretaker. Had placed on Holter monitor as outpatient which he was wearing at time of this event. Cardiology interrogated: negative for arrhythmia. TTE unremarkable. Recent carotid doppler as outpatient unremarkable.     Suspect this is medication related vs neurogenic orthostatic hypotension vs postprandial. Holding tamsulosin and BB (despite hx of CAD, feel that fall risk outweighs any CV benefit). Counseled on isometric handgrip, leg crossing, liberalizing salt/fluid intake, rising slowly from recumbent position, compression stocking use, and taking in small meals. If continues to have these episodes, would consider pharmacologic therapy but holding off for now in setting of supine hypertension. Discharge with  and 24hr caretaker support.      Jayson Gloria MD  [facility]           Silva copied text    Lukever for details  [facility] Hospital Medicine  Discharge Summary        Patient Name: Nickolas  JESSICA Blake  MRN: [id number]  Admission Date: 11/10/2019  Hospital Length of Stay: 1 days  Discharge Date and Time:  11/12/2019 1:49 PM  Attending Physician: No att. providers found   Discharging Provider: Luis Antonio Miner MD  Primary Care Provider: Joe Klein MD  Hospital Medicine Team: Networked reference to record PCT  Luis Antonio Miner MD     HPI:   88M with known hx of Uncontrolled IDDM2 with neuropathy, HTN, HLD, MI s/p angioplasty 1994, Mild cognitive impairment, BPH, OA of right hip and lumbar spinal stenosis with radiculopathy is brought to the ER after a witnessed event of syncope. Patient got up after a meal with family at an outside restaurant, was walking back to the car when his gait became unsteady and staggering, he started noticing light headedness and finally visual black out before passing out. Son, walking next to him, held him and avoided the fall but they report patient was right behind the car when this happened and still was mentioning he could not see the car, body was entirely flaccid and patient was totally pale when he passed out and was knocked out for less than a minute before regaining concsiousness followed by a less than 5 min duration of confusion after which he finally regained awareness of what had happened. Complaints of orthostatic light headedness.  Denies any pre-syncopal event, seizure/shaking/spasm of any extremity or the body, tongue bite or urine or bowel incontinence during event, prolonged confusion after the event.      Similar such 2 events have happened in the last month where patient had a arm shaking-spasm and a fall but never reported to the ER. Patient lives alone but has sitters with him 24/7. Baseline functional status- fairly dependent on sitters for his ADLs. Limited mobility due to OA right knee pain which limits family's assessment of dizziness, sob and gait abnormality. Finally patient saw his PCP Dr Klein on 11/7 and was put on a holter for 48 hours.     Denies  palpitations, chest pain, recent diarrhea/melena/brbpr, runny nose, chills, fever, cough, SOB, abd pain, headache, hx of seizures, sick contacts, weight loss, urinary or bowel changes, dysuria/hematuria, blood loss, confusion, back pain, slurred speech, facial droop or weakness/numbness of extremities, fatigue, leg swelling, change in appetite, back pain.     No alcohol drinking habit - no recent intake  Smoking for 40 years (chain smoker at one point per daughter at bedside) but quit since 1986  Recent change in medications: Flomax was started on 11/7 by Dr Klein for BPH.     Work-up in ER  Normal Hgb and wbc  CXR no consolidation or infiltrates but mild left sided effusion with basal atelectasis  BNP 46, Tn not elevated, EKG NSR  CMP showed mild hypokalemia and also hypocalcemia (corrected 7.7)  Normal kidney and liver functions          * No surgery found *       Hospital Course:   Admitted to [facility] for evaluation of syncopal episodes. Bedside echo showed 50% aortic stenosis and positive orthostatic vitals. Cardiology consulted- TTE shows no abnormality. Orthostatic hypotension is present which is the prime suspect for the syncopal events. Stopped flomax and anti-htn but resumed lisinopril today after BP improved to 140s. Cortisol low normal. Holter monitor interrogated and was unremarkable. Will discharge today, 11/12, and hold flomax and beta-blocker.         Vital Signs (Most Recent):  Temp: 97 °F (36.1 °C) (11/12/19 1113)  Pulse: 87 (11/12/19 1113)  Resp: 18 (11/12/19 1113)  BP: (!) 168/81 (11/12/19 1113)  SpO2: 98 % (11/12/19 1113) Vital Signs (24h Range):  Temp:  (96.7 °F (35.9 °C)-98.6 °F (37 °C)) 97 °F (36.1 °C)  Pulse:  () 87  Resp:  (18-20) 18  SpO2:  (91 %-98 %) 98 %  BP: (111-191)/(53-83) 168/81     Weight: 86.5 kg (190 lb 11.2 oz)  Body mass index is 26.11 kg/m².     Intake/Output Summary (Last 24 hours) at 11/12/2019 1326  Last data filed at 11/11/2019 1800         Gross per 24  hour  Intake 140 ml  Output -  Net 140 ml     Physical Exam   Constitutional: He is oriented to person, place, and time. He appears well-developed and well-nourished. No distress.   HENT:   Head: Normocephalic and atraumatic.   Mouth/Throat: Oropharynx is clear and moist. No oropharyngeal exudate.   Eyes: Pupils are equal, round, and reactive to light. Conjunctivae and EOM are normal. No scleral icterus.   Neck: Normal range of motion. Neck supple. No thyromegaly present.   Cardiovascular: Normal rate, regular rhythm, normal heart sounds and intact distal pulses.   Pulmonary/Chest: Effort normal and breath sounds normal. No respiratory distress. He has no wheezes. He has no rales.   Abdominal: Soft. Bowel sounds are normal. He exhibits no distension. There is no tenderness.   Musculoskeletal: Normal range of motion. He exhibits no edema or tenderness.   Neurological: He is alert and oriented to person, place, and time.   Skin: Skin is dry. Capillary refill takes 2 to 3 seconds. He is not diaphoretic.      Consults:     Consults (From admission, onward)        Status Ordering Provider        Inpatient consult to Cardiology  Once    Provider:  (Not yet assigned)   Completed KIERRA GALLOWAY           Hypertension, essential  Takes lisinopril and metoprolol at home  Held off anti-htn initially in the setting of recent syncopal episodes and hypotension in the ER  Reintroduced Lisinopril once orthostatic vitals were stable after fluid resuscitation and holding off flomax        Coronary artery disease involving native coronary artery of native heart without angina pectoris  MI s/p angioplasty 1994  No symptoms of angina or diaphoresis or atypical chest pain  Continue aspirin  Not on any other AC  - discontinue beta-blocker in setting of orthostatic hypotension        Benign prostatic hyperplasia without lower urinary tract symptoms  Continue Finasteride but hold flomax             Final Active  Diagnoses:    Diagnosis Date Noted POA   PRINCIPAL PROBLEM:  Syncope (R55) 11/10/2019 Yes   Hypocalcemia (E83.51) 11/10/2019 Unknown   Hypertension, essential (I10) 01/17/2017 Yes   Type 2 diabetes mellitus with diabetic polyneuropathy, with long-term current use of insulin (E11.42, Z79.4) 11/19/2015 Not Applicable   Coronary artery disease involving native coronary artery of native heart without angina pectoris (I25.10)   Yes      Chronic   Benign prostatic hyperplasia without lower urinary tract symptoms (N40.0) 10/26/2012 Yes   Hyperlipidemia (E78.5) 10/26/2012 Yes     Problems Resolved During this Admission:        Discharged Condition: good     Disposition: Home or Self Care     Follow Up:    Follow-up Information       [facility] [place] [facility].   Specialty:  Home Health Services  Contact information:  [address] [place] [address].  [address]  [place] [address]  [phone]                     Patient Instructions:        Ambulatory referral to Outpatient Case Management  Referral Priority: Routine Referral Type: Consultation  Referral Reason: Specialty Services Required  Number of Visits Requested: 1        Significant Diagnostic Studies: Labs: All labs within the past 24 hours have been reviewed       Pending Diagnostic Studies:     None        Medications:  Reconciled Home Medications:        Medication List     CHANGE how you take these medications   insulin aspart U-100 100 unit/mL (3 mL) Inpn pen  Commonly known as:  NovoLOG  Inject 8 Units into the skin 3 (three) times daily with meals.  What changed:    · how much to take  · additional instructions     insulin glargine 100 unit/mL injection  Commonly known as:  LANTUS U-100 INSULIN  Inject 20 Units into the skin once daily.  What changed:  how much to take        CONTINUE taking these medications   acetaminophen 500 mg Cap  Commonly known as:  TYLENOL  Take 1 capsule by mouth every 6 to 8 hours as needed.     ascorbic acid (vitamin C) 250 MG  "tablet  Commonly known as:  VITAMIN C  Take 1 tablet (250 mg total) by mouth 2 (two) times daily.     aspirin 81 MG EC tablet  Commonly known as:  ECOTRIN  Take 81 mg by mouth once daily.     atorvastatin 40 MG tablet  Commonly known as:  LIPITOR  Take 1 tablet (40 mg total) by mouth once daily.     BD ALCOHOL SWABS Padm  Generic drug:  alcohol swabs  USE FOUR TIMES DAILY     BD ULTRA-FINE SHORT PEN NEEDLE 31 gauge x 5/16" Ndle  Generic drug:  pen needle, diabetic  Inject 1 application into the skin 5 (five) times daily.     * blood sugar diagnostic Strp  Commonly known as:  ACCU-CHEK NEWTON PLUS TEST STRP  TEST TWO TIMES DAILY WITH MEALS     * blood sugar diagnostic Strp  To check BG 3 times daily, to use with insurance preferred meter     DULoxetine 60 MG capsule  Commonly known as:  CYMBALTA  Take 1 capsule (60 mg total) by mouth once daily.     finasteride 5 mg tablet  Commonly known as:  PROSCAR  TAKE 1 TABLET EVERY DAY     gabapentin 300 MG capsule  Commonly known as:  NEURONTIN  Take 1 capsule (300 mg total) by mouth 3 (three) times daily.     insulin syringe-needle U-100 0.5 mL 30 gauge x 5/16" Syrg  To use with Lantus vials - 2 times daily     lancets Misc  1 each by Misc.(Non-Drug; Combo Route) route 2 (two) times daily.     lisinopril 20 MG tablet  Commonly known as:  PRINIVIL,ZESTRIL  Take 1 tablet (20 mg total) by mouth once daily.     VITAMIN D3 2,000 unit Cap  Generic drug:  cholecalciferol (vitamin D3)  Take 1 capsule by mouth 2 (two) times daily.         * This list has 2 medication(s) that are the same as other medications prescribed for you. Read the directions carefully, and ask your doctor or other care provider to review them with you.          STOP taking these medications   metoprolol succinate 50 MG 24 hr tablet  Commonly known as:  TOPROL-XL     tamsulosin 0.4 mg Cap  Commonly known as:  FLOMAX              Indwelling Lines/Drains at time of discharge:     Lines/Drains/Airways "     None                     Time spent on the discharge of patient: >30 minutes  Patient was seen and examined on the date of discharge and determined to be suitable for discharge.           Luis Antoino Miner MD  Department of Hospital Medicine  [facility]    Cosigned by: Jayson Gloria MD at 11/12/2019  2:22 PM        Review of Systems   Constitutional: Negative for chills, fatigue, fever and unexpected weight change.   HENT: Negative for congestion and trouble swallowing.    Eyes: Negative for redness and visual disturbance.   Respiratory: Negative for cough, chest tightness and shortness of breath.    Cardiovascular: Negative for chest pain, palpitations and leg swelling.   Gastrointestinal: Negative for abdominal pain and blood in stool.   Genitourinary: Negative for difficulty urinating and hematuria.   Musculoskeletal: Positive for arthralgias and back pain. Negative for gait problem, joint swelling, myalgias and neck pain.   Skin: Negative for color change and rash.   Neurological: Negative for tremors, speech difficulty, weakness, numbness and headaches.   Hematological: Negative for adenopathy. Does not bruise/bleed easily.   Psychiatric/Behavioral: Negative for behavioral problems, confusion and sleep disturbance. The patient is not nervous/anxious.        Objective:      Physical Exam   Constitutional: He is oriented to person, place, and time. He appears well-developed and well-nourished.   HENT:   Head: Normocephalic and atraumatic.   Right Ear: A foreign body is present.   Left Ear: A foreign body is present.   Eyes: Conjunctivae are normal. No scleral icterus.   Neck: Normal range of motion. Neck supple. Carotid bruit is not present.   Cardiovascular: Normal rate, regular rhythm, normal heart sounds and intact distal pulses. Exam reveals no gallop and no friction rub.   No murmur heard.  Pulmonary/Chest: Effort normal and breath sounds normal. No respiratory distress. He has  "no wheezes. He has no rales.   Abdominal: He exhibits no distension. There is no tenderness.   Musculoskeletal: He exhibits no edema or deformity.   Neurological: He is alert and oriented to person, place, and time. He displays no tremor. No cranial nerve deficit. Coordination and gait normal.   Skin: Skin is warm and dry. No rash noted. He is not diaphoretic. No erythema.   Psychiatric: He has a normal mood and affect. His behavior is normal. Judgment and thought content normal.   Nursing note and vitals reviewed.      Assessment:       1. Near syncope    2. Orthostasis    3. Skin lesion    4. Coronary artery disease involving native coronary artery of native heart without angina pectoris    5. Type 2 diabetes mellitus with stage 3 chronic kidney disease, with long-term current use of insulin    6. Type 2 diabetes mellitus with diabetic polyneuropathy, with long-term current use of insulin    7. Hypertension, essential    8. Hyperlipidemia, unspecified hyperlipidemia type    9. Benign prostatic hyperplasia without lower urinary tract symptoms    10. Pulmonary nodule    11. Abnormal chest x-ray    12. Syncope, unspecified syncope type    13. Proteinuria, unspecified type        Plan:     Medication List with Changes/Refills   Current Medications    ACETAMINOPHEN (TYLENOL) 500 MG CAP    Take 1 capsule by mouth every 6 to 8 hours as needed.     ASCORBIC ACID, VITAMIN C, (VITAMIN C) 250 MG TABLET    Take 1 tablet (250 mg total) by mouth 2 (two) times daily.    ASPIRIN (ECOTRIN) 81 MG EC TABLET    Take 81 mg by mouth once daily.    ATORVASTATIN (LIPITOR) 40 MG TABLET    Take 1 tablet (40 mg total) by mouth once daily.    BD ALCOHOL SWABS PADM    USE FOUR TIMES DAILY    BD ULTRA-FINE SHORT PEN NEEDLE 31 GAUGE X 5/16" NDLE    Inject 1 application into the skin 5 (five) times daily.    BLOOD SUGAR DIAGNOSTIC (ACCU-CHEK NEWTON PLUS TEST STRP) STRP    TEST TWO TIMES DAILY WITH MEALS    BLOOD SUGAR DIAGNOSTIC STRP    To check " "BG 3 times daily, to use with insurance preferred meter    BLOOD SUGAR DIAGNOSTIC STRP    To check BG 4 times daily, to use with insurance preferred meter    BLOOD-GLUCOSE METER KIT    To check BG 4 times daily, to use with insurance preferred meter    CHOLECALCIFEROL, VITAMIN D3, (VITAMIN D3) 2,000 UNIT CAP    Take 1 capsule by mouth 2 (two) times daily.    DULOXETINE (CYMBALTA) 60 MG CAPSULE    Take 1 capsule (60 mg total) by mouth once daily.    FINASTERIDE (PROSCAR) 5 MG TABLET    TAKE 1 TABLET EVERY DAY    GLUCAGON, HUMAN RECOMBINANT, (GLUCAGON EMERGENCY KIT, HUMAN,) 1 MG SOLR    Inject 1 mg into the muscle as needed.    INSULIN ASPART U-100 (NOVOLOG) 100 UNIT/ML INPN PEN    Inject 8 Units into the skin 3 (three) times daily with meals.    INSULIN GLARGINE (LANTUS U-100 INSULIN) 100 UNIT/ML INJECTION    Inject 20 Units into the skin once daily.    INSULIN SYRINGE-NEEDLE U-100 0.5 ML 30 GAUGE X 5/16" SYRG    To use with Lantus vials - 2 times daily    LANCETS MISC    1 each by Misc.(Non-Drug; Combo Route) route 2 (two) times daily.    LANCETS MISC    To check BG 4 times daily, to use with insurance preferred meter    LISINOPRIL (PRINIVIL,ZESTRIL) 20 MG TABLET    Take 1 tablet (20 mg total) by mouth once daily.   Changed and/or Refilled Medications    Modified Medication Previous Medication    GABAPENTIN (NEURONTIN) 300 MG CAPSULE gabapentin (NEURONTIN) 300 MG capsule       Take 1 capsule (300 mg total) by mouth every evening.    Take 1 capsule (300 mg total) by mouth 3 (three) times daily.     Nickolas was seen today for follow-up.    Diagnoses and all orders for this visit:    Near syncope    Orthostasis    Skin lesion  -     Ambulatory referral to Dermatology    Coronary artery disease involving native coronary artery of native heart without angina pectoris    Type 2 diabetes mellitus with stage 3 chronic kidney disease, with long-term current use of insulin  -     Comprehensive metabolic panel; Future  -     " CBC Without Differential; Future  -     TSH; Future  -     Hemoglobin A1c; Future    Type 2 diabetes mellitus with diabetic polyneuropathy, with long-term current use of insulin    Hypertension, essential    Hyperlipidemia, unspecified hyperlipidemia type    Benign prostatic hyperplasia without lower urinary tract symptoms    Pulmonary nodule  -     Ambulatory referral to Pulmonology    Abnormal chest x-ray  -     Ambulatory referral to Pulmonology    Syncope, unspecified syncope type  -     Comprehensive metabolic panel; Future  -     CBC Without Differential; Future  -     TSH; Future  -     Hemoglobin A1c; Future    Proteinuria, unspecified type  -     Ambulatory referral to Nephrology    Other orders  -     gabapentin (NEURONTIN) 300 MG capsule; Take 1 capsule (300 mg total) by mouth every evening.      See meds, orders, follow up, routing and instructions sections of encounter and AVS. Discussed with patient and provided on AVS.    Patient was in the hospital for recurrence syncopal episodes recently.  His metoprolol, and Flomax were held.  His blood pressure is a little bit elevated today however due to the syncope we may have to accept that.  He has a scheduled follow-up for high blood pressure with his cardiologist next week.  I did review his 48 hr Holter monitor and echocardiogram that were done recently.  No definite adverse information.  His last A1c was 8.7.  He presented with his caretaker today.  He has declined around the clock care he has them for a few hours during the daytime and a family member that visits for an hour in the evening.  His mental status is intact today.  I did review his recent laboratory and his renal function and blood count were stable.  He is due for followups to Neurology pulmonology and Nephrology.  We would like to see him back in approximately 3 months with laboratory, and notify us for any significant decline in the meantime.    He had a small verrucous skin lesion to the  dorsum of the left hand and will get a dermatology consult.  Likely a seborrheic keratosis.  He does have dry skin otherwise.

## 2019-12-17 ENCOUNTER — TELEPHONE (OUTPATIENT)
Dept: PULMONOLOGY | Facility: CLINIC | Age: 84
End: 2019-12-17

## 2019-12-17 NOTE — TELEPHONE ENCOUNTER
I spoke to Mr Blake to let him know we do not have any available appointments at this time with Dr Crespo for a Tuesday due to that being Dr Crespo's procedure day. Also, I do not have any available appointments on Thursday's right now due to Dr Crespo's January schedule bring full. I will call Mr Blake if Dr Crespo has any cancellations and/or when Dr Crespo's February schedule opens. Patient verbalized understanding.

## 2019-12-17 NOTE — TELEPHONE ENCOUNTER
----- Message from Radha Villarreal sent at 12/17/2019  9:10 AM CST -----  Contact: pt   [phone]  Pt needs to schedule on Tuesday or Thursday morning.  10:00am is the best time. Pt is schedule for Jan. 2 20 and pt cannot come at that time.

## 2019-12-19 ENCOUNTER — OFFICE VISIT (OUTPATIENT)
Dept: CARDIOLOGY | Facility: CLINIC | Age: 84
End: 2019-12-19
Attending: FAMILY MEDICINE
Payer: MEDICARE

## 2019-12-19 VITALS
HEART RATE: 91 BPM | SYSTOLIC BLOOD PRESSURE: 144 MMHG | BODY MASS INDEX: 25.83 KG/M2 | HEIGHT: 71 IN | WEIGHT: 184.5 LBS | DIASTOLIC BLOOD PRESSURE: 62 MMHG

## 2019-12-19 DIAGNOSIS — R55 SYNCOPE AND COLLAPSE: Primary | ICD-10-CM

## 2019-12-19 PROCEDURE — 1101F PT FALLS ASSESS-DOCD LE1/YR: CPT | Mod: HCNC,CPTII,GC,S$GLB | Performed by: STUDENT IN AN ORGANIZED HEALTH CARE EDUCATION/TRAINING PROGRAM

## 2019-12-19 PROCEDURE — 99215 OFFICE O/P EST HI 40 MIN: CPT | Mod: HCNC,GC,S$GLB, | Performed by: STUDENT IN AN ORGANIZED HEALTH CARE EDUCATION/TRAINING PROGRAM

## 2019-12-19 PROCEDURE — 1159F PR MEDICATION LIST DOCUMENTED IN MEDICAL RECORD: ICD-10-PCS | Mod: HCNC,GC,S$GLB, | Performed by: STUDENT IN AN ORGANIZED HEALTH CARE EDUCATION/TRAINING PROGRAM

## 2019-12-19 PROCEDURE — 99999 PR PBB SHADOW E&M-EST. PATIENT-LVL V: ICD-10-PCS | Mod: PBBFAC,HCNC,GC, | Performed by: STUDENT IN AN ORGANIZED HEALTH CARE EDUCATION/TRAINING PROGRAM

## 2019-12-19 PROCEDURE — 99999 PR PBB SHADOW E&M-EST. PATIENT-LVL V: CPT | Mod: PBBFAC,HCNC,GC, | Performed by: STUDENT IN AN ORGANIZED HEALTH CARE EDUCATION/TRAINING PROGRAM

## 2019-12-19 PROCEDURE — 1101F PR PT FALLS ASSESS DOC 0-1 FALLS W/OUT INJ PAST YR: ICD-10-PCS | Mod: HCNC,CPTII,GC,S$GLB | Performed by: STUDENT IN AN ORGANIZED HEALTH CARE EDUCATION/TRAINING PROGRAM

## 2019-12-19 PROCEDURE — 99215 PR OFFICE/OUTPT VISIT, EST, LEVL V, 40-54 MIN: ICD-10-PCS | Mod: HCNC,GC,S$GLB, | Performed by: STUDENT IN AN ORGANIZED HEALTH CARE EDUCATION/TRAINING PROGRAM

## 2019-12-19 PROCEDURE — 1159F MED LIST DOCD IN RCRD: CPT | Mod: HCNC,GC,S$GLB, | Performed by: STUDENT IN AN ORGANIZED HEALTH CARE EDUCATION/TRAINING PROGRAM

## 2019-12-19 PROCEDURE — 1126F PR PAIN SEVERITY QUANTIFIED, NO PAIN PRESENT: ICD-10-PCS | Mod: HCNC,GC,S$GLB, | Performed by: STUDENT IN AN ORGANIZED HEALTH CARE EDUCATION/TRAINING PROGRAM

## 2019-12-19 PROCEDURE — 1126F AMNT PAIN NOTED NONE PRSNT: CPT | Mod: HCNC,GC,S$GLB, | Performed by: STUDENT IN AN ORGANIZED HEALTH CARE EDUCATION/TRAINING PROGRAM

## 2019-12-19 NOTE — PATIENT INSTRUCTIONS
You were seen in Cardiology clinic today for syncopal episodes which as since resolved since discontinuing Toprolol and tamsulosin.       Low Blood Pressure (All Causes)  A blood pressure reading is made up of 2 numbers There is a top number over a bottom number. The top number is the systolic pressure. The bottom number is the diastolic pressure. A normal blood pressure is a systolic pressure less than 120 over a diastolic pressure less than 80. Low blood pressure (hypotension) is a blood pressure that is less than what is normal for you. Low blood pressure can cause dizziness, lightheadedness, or fainting.  Some medicines can cause low blood pressure. They include:  · High blood pressure pills  · Water pills (diuretics)  · Some heart medicines  · Some antidepressants  · Pain, anxiety, sedative, and sleeping medicines  Other causes include:  · Dehydration, severe infection, or fever  · Blood loss, such as bleeding from the stomach or intestines.  · Heart failure  · Change in heart rate or rhythm (arrhythmia)  · A drop in blood pressure from a sudden change in body position, from lying down to standing (orthostatic hypotension)  · Alcohol or drug intoxication  · Neurological diseases that impair the autonomic nervous system  Treatment will depend on what is causing your low blood pressure.  Home care  Follow these guidelines when caring for yourself at home:  · Rest until your symptoms get better.  · Keep a record of your symptoms and what you were doing when they occurred. Bring the record with you to your next appointment.  · Be aware of how quickly your blood pressure drops when you become dehydrated, spend a lot of time in the sun, or have low blood sugar. Take measures to prevent blood pressure drops at these times.  · Follow the treatment plan described by your healthcare provider.  Follow-up care  Follow up with your healthcare provider, or as advised.  When to seek medical advice  Call your healthcare  provider right away if any of these occur:  · Dizziness, lightheadedness, or fainting  · Black or red color in your stools or vomit  · Shortness of breath or difficulty breathing  · Chest, shoulder, arm, neck, or upper back pain  · Abdominal pain  · Diarrhea or vomiting that doesnt go away  · You arent able to eat or drink  · Fever of 100.4°F (38°C) or higher, or as directed by your healthcare provider  · Burning when you urinate  · Foul-smelling urine  Date Last Reviewed: 12/1/2016  © 8686-4069 [employer]. [address] [place] [id number]. All rights reserved. This information is not intended as a substitute for professional medical care. Always follow your healthcare professional's instructions.

## 2019-12-19 NOTE — PROGRESS NOTES
Cardiology Clinic Note  Reason for Visit: Consult for syncope    HPI:   Mr. Nickolas Blake is a 88 y.o. gentleman with history of CAD & MI s/p angioplasty sometime in the 1980-90s, HLD, HTN, Type II Diabetes with neuropathy, CKD III, and BPH who was recently admitted for syncope while walking (had a holter monitor on). During the admission, his holter was interrogated which did not reveal any significant arrhythmia, TTE was done which was unremarkable. He had a carotid ultrasound too which was negative. He was found on admission to be orthostatic positive. It was suspected that his multiple events of syncope were related to medication vs neurogenic orthostasis and patient's Toprol and tamsulosin were discontinued and he was counseled on isometric handgrip, leg crossing, liberalizing salt/fluid intake, rising slowly from recumbent position, compression stocking use, and taking in small meals. Midodrine was considered but not given due to supine HTN.     Since his discharge, Mr. Blake denies recurrent episodes of syncope or symptoms concerning for pre-syncope. More specifically, he denies experiencing any chest pain, chest pressure, sensation of irregular heart beat, palpations, lightheadedness, dizziness, LOC, weakness, visual disturbance, nausea, or cold sweats. Since his medication has been discontinued he has noted improvement in fatigue and notes feeling more energetic. In clinic today, his vitals are stable and blood pressure reading with 144/62 mmHg. He reports checking his blood pressure a minimum of two times daily and estimates systolic readings around the 150-140s and diastolic around the 70s.    ROS:    Review of Systems   Constitution: Negative for chills, decreased appetite, diaphoresis, fever, malaise/fatigue, night sweats, weight gain and weight loss.   HENT: Negative for congestion and sore throat.    Eyes: Negative for blurred vision, double vision and visual disturbance.   Cardiovascular:  Negative for chest pain, dyspnea on exertion, irregular heartbeat, leg swelling, near-syncope, orthopnea, palpitations, paroxysmal nocturnal dyspnea and syncope.   Respiratory: Negative for cough, shortness of breath, sleep disturbances due to breathing and wheezing.    Hematologic/Lymphatic: Negative for adenopathy. Does not bruise/bleed easily.   Skin: Negative for itching and rash.   Musculoskeletal: Positive for arthritis, back pain (right sided lumbar back pain, intermittent, chronic) and joint pain (bilateral arthritic knee pain, chronic). Negative for falls, gout and joint swelling.   Gastrointestinal: Negative for bloating, abdominal pain, constipation, diarrhea, heartburn, melena, nausea and vomiting.   Genitourinary: Negative for dysuria, frequency and hematuria.   Neurological: Negative for dizziness, focal weakness, headaches, light-headedness, loss of balance, numbness, paresthesias and seizures.   Psychiatric/Behavioral: Negative for altered mental status, depression, substance abuse and suicidal ideas. The patient does not have insomnia.      PMH:     Past Medical History:   Diagnosis Date    Anemia in stage 3 chronic kidney disease 11/10/2016    BPH (benign prostatic hyperplasia) 10/26/2012    Cataract     Coronary artery disease involving native coronary artery of native heart without angina pectoris     Degeneration of lumbar or lumbosacral intervertebral disc 2/4/2014    Essential hypertension 9/30/2015    GERD (gastroesophageal reflux disease)     Hyperlipidemia     Lumbar radiculopathy, right 12/9/2013    Major depressive disorder with single episode, 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    Mild cognitive impairment with memory loss 7/18/2016    Osteoarthritis of right hip 8/16/2016    Osteoarthritis of spine with radiculopathy, lumbar region     S/P percutaneous transluminal coronary angioplasty     Spinal stenosis of lumbar region at multiple levels 2/4/2014    Thoracic or  "lumbosacral neuritis or radiculitis, unspecified 4/21/2015    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 edema, with long-term current use of insulin 11/19/2015    Type 2 diabetes mellitus with diabetic polyneuropathy, with long-term current use of insulin 11/19/2015    Type 2 diabetes mellitus with stage 3 chronic kidney disease, with long-term current use of insulin 11/19/2015    Type 2 diabetes with peripheral circulatory disorder, controlled      Past Surgical History:   Procedure Laterality Date    ADENOIDECTOMY      CARDIAC CATHETERIZATION  1980    CATARACT EXTRACTION      CYST REMOVAL      rectum    EYE SURGERY      TONSILLECTOMY       Allergies:   Review of patient's allergies indicates:  No Known Allergies  Medications:     Current Outpatient Medications on File Prior to Visit   Medication Sig Dispense Refill    acetaminophen (TYLENOL) 500 mg Cap Take 1 capsule by mouth every 6 to 8 hours as needed.       ascorbic acid, vitamin C, (VITAMIN C) 250 MG tablet Take 1 tablet (250 mg total) by mouth 2 (two) times daily.      aspirin (ECOTRIN) 81 MG EC tablet Take 81 mg by mouth once daily.      atorvastatin (LIPITOR) 40 MG tablet Take 1 tablet (40 mg total) by mouth once daily. 90 tablet 3    BD ALCOHOL SWABS PadM USE FOUR TIMES DAILY 200 each 11    BD ULTRA-FINE SHORT PEN NEEDLE 31 gauge x 5/16" Ndle Inject 1 application into the skin 5 (five) times daily. 450 each 11    blood sugar diagnostic (ACCU-CHEK NEWTON PLUS TEST STRP) Strp TEST TWO TIMES DAILY WITH MEALS 200 strip 3    blood sugar diagnostic Strp To check BG 3 times daily, to use with insurance preferred meter 300 strip 99    blood sugar diagnostic Strp To check BG 4 times daily, to use with insurance preferred meter 100 strip 0    blood-glucose meter kit To check BG 4 times daily, to use with insurance preferred meter 1 each 0    cholecalciferol, vitamin D3, (VITAMIN D3) " "2,000 unit Cap Take 1 capsule by mouth 2 (two) times daily.      DULoxetine (CYMBALTA) 60 MG capsule Take 1 capsule (60 mg total) by mouth once daily. 90 capsule 0    finasteride (PROSCAR) 5 mg tablet TAKE 1 TABLET EVERY DAY 90 tablet 3    gabapentin (NEURONTIN) 300 MG capsule Take 1 capsule (300 mg total) by mouth every evening. 30 capsule 1    glucagon, human recombinant, (GLUCAGON EMERGENCY KIT, HUMAN,) 1 mg SolR Inject 1 mg into the muscle as needed. 1 each 1    insulin aspart U-100 (NOVOLOG) 100 unit/mL InPn pen Inject 8 Units into the skin 3 (three) times daily with meals. (Patient taking differently: Inject 12 Units into the skin 3 (three) times daily with meals. As per sliding scale) 1 Box 6    insulin glargine (LANTUS U-100 INSULIN) 100 unit/mL injection Inject 20 Units into the skin once daily. (Patient taking differently: Inject 22 Units into the skin once daily. ) 2 vial 6    insulin syringe-needle U-100 0.5 mL 30 gauge x 5/16" Syrg To use with Lantus vials - 2 times daily 200 each 6    lancets Misc 1 each by Misc.(Non-Drug; Combo Route) route 2 (two) times daily. 200 each 3    lancets Misc To check BG 4 times daily, to use with insurance preferred meter 100 each 0    lisinopril (PRINIVIL,ZESTRIL) 20 MG tablet Take 1 tablet (20 mg total) by mouth once daily. 90 tablet 3     No current facility-administered medications on file prior to visit.      Social History:     Social History     Tobacco Use    Smoking status: Former Smoker     Packs/day: 0.25     Years: 50.00     Pack years: 12.50     Types: Cigarettes     Last attempt to quit: 1980     Years since quittin.8    Smokeless tobacco: Never Used   Substance Use Topics    Alcohol use: No     Family History:     Family History   Problem Relation Age of Onset    Breast cancer Mother     Cancer Brother     Heart attack Brother     No Known Problems Father     No Known Problems Sister     No Known Problems Maternal Aunt     No " "Known Problems Maternal Uncle     No Known Problems Paternal Aunt     No Known Problems Paternal Uncle     No Known Problems Maternal Grandmother     No Known Problems Maternal Grandfather     No Known Problems Paternal Grandmother     No Known Problems Paternal Grandfather     Colon cancer Neg Hx     Colon polyps Neg Hx     Amblyopia Neg Hx     Blindness Neg Hx     Cataracts Neg Hx     Diabetes Neg Hx     Glaucoma Neg Hx     Hypertension Neg Hx     Macular degeneration Neg Hx     Retinal detachment Neg Hx     Strabismus Neg Hx     Stroke Neg Hx     Thyroid disease Neg Hx      Physical Exam:   BP (!) 144/62 (BP Location: Left arm, Patient Position: Sitting, BP Method: Large (Automatic))   Pulse 91   Ht 5' 11" (1.803 m)   Wt 83.7 kg (184 lb 8.4 oz)   BMI 25.74 kg/m²      Physical Exam   Constitutional: He appears well-developed and well-nourished. He is cooperative. He does not appear ill. No distress.   HENT:   Head: Normocephalic and atraumatic.   Mouth/Throat: Oropharynx is clear and moist.   rhinophyma   Eyes: Pupils are equal, round, and reactive to light. EOM are normal. No scleral icterus.   Neck: Normal range of motion. Neck supple. Normal carotid pulses and no JVD present. Carotid bruit is not present. No tracheal deviation present. No thyromegaly present.   Cardiovascular: Normal rate, regular rhythm, normal heart sounds and intact distal pulses. Exam reveals no gallop and no friction rub.   No murmur heard.  Pulses:       Carotid pulses are 2+ on the right side, and 2+ on the left side.       Radial pulses are 2+ on the right side, and 2+ on the left side.        Dorsalis pedis pulses are 2+ on the right side, and 2+ on the left side.        Posterior tibial pulses are 2+ on the right side, and 2+ on the left side.   Pulmonary/Chest: Effort normal and breath sounds normal. He has no wheezes. He has no rales.   Abdominal: Soft. Bowel sounds are normal. He exhibits no distension. There " is no tenderness.   Musculoskeletal: He exhibits no edema.   Lymphadenopathy:     He has no cervical adenopathy.   Neurological: He is alert. He exhibits normal muscle tone.   Skin: Skin is warm and dry. He is not diaphoretic.   Psychiatric: He has a normal mood and affect. His behavior is normal.   Nursing note and vitals reviewed.      Labs:     Lab Results   Component Value Date     11/12/2019    K 4.4 11/12/2019     11/12/2019    CO2 24 11/12/2019    BUN 20 11/12/2019    CREATININE 1.2 11/12/2019    ANIONGAP 7 (L) 11/12/2019     Lab Results   Component Value Date    HGBA1C 8.7 (H) 11/07/2019     Lab Results   Component Value Date    BNP 46 11/10/2019    BNP 40 06/04/2019    BNP 96 05/25/2016    Lab Results   Component Value Date    WBC 5.52 11/12/2019    HGB 12.5 (L) 11/12/2019    HCT 38.9 (L) 11/12/2019    HCT 24 (L) 11/11/2016     11/12/2019    GRAN 2.9 11/12/2019    GRAN 51.8 11/12/2019     Lab Results   Component Value Date    CHOL 145 11/07/2019    HDL 37 (L) 11/07/2019    LDLCALC 75.2 11/07/2019    TRIG 164 (H) 11/07/2019          Imaging:     TTE on 11/11/2019:  · Concentric left ventricular remodeling.  · Normal left ventricular systolic function. The estimated ejection fraction is 55%  · Normal LV diastolic function.  · No wall motion abnormalities.  · Normal right ventricular systolic function.  · Normal central venous pressure (3 mm Hg).    EKG on 11/10/2019:  Normal sinus rhythm  Premature ventricular complexes are no longer present  Lateral precordial lead placement is questionable    Ultrasound Bilateral Carotids w/ Doppler on 11/08/2019:  There is 0-19% right Internal Carotid Stenosis.  There is 0-19% left Internal Carotid Stenosis.    48-hr Holter Monitor on 11/08/2019:  Sinus rhythm with occasional PVCs and no patient reported symptoms.    Assessment/Plan:     Syncope  - Patient without evidence of arrhythmia on EKG or 48-hr Holter monitor, TTE within normal limits, carotid  ultrasound without significant stenosis.  - Patient reports resolutions of symptoms following discontinuation of tamsulosin and metprolol, agree with continuing to hold.  - Patient with HLD and history of MI, on Lipitor 40 mg and ASA 81 mg, would continue.     Discussed with Dr. Smith. RTC as needed     Signed:  Pat Gonzalez MD  12/19/2019 8:05 AM     Yes

## 2019-12-30 RX ORDER — DULOXETIN HYDROCHLORIDE 60 MG/1
CAPSULE, DELAYED RELEASE ORAL
Qty: 90 CAPSULE | Refills: 0 | Status: SHIPPED | OUTPATIENT
Start: 2019-12-30 | End: 2020-12-17 | Stop reason: SDUPTHER

## 2019-12-30 NOTE — PROGRESS NOTES
Refill Authorization Note     is requesting a refill authorization.    Brief assessment and rationale for refill: REVIEW: abnormal vitals           Medication Therapy Plan: bp elevated at cardiology 12/19; FOVS(3/20); approve 3 more     Medication reconciliation completed: No                         Comments:   Requested Prescriptions   Pending Prescriptions Disp Refills    DULoxetine (CYMBALTA) 60 MG capsule [Pharmacy Med Name: DULOXETINE HCL DR 60 MG CAP] 90 capsule 0     Sig: TAKE 1 CAPSULE BY MOUTH EVERY DAY       Psychiatry: Antidepressants - SNRI Failed - 12/30/2019  2:38 AM        Failed - Last BP in normal range within 360 days     BP Readings from Last 3 Encounters:   12/19/19 (!) 144/62   12/12/19 (!) 158/78   12/05/19 136/84              Passed - Patient is at least 18 years old        Passed - Office visit in past 6 months or future 90 days     Recent Outpatient Visits            1 week ago Syncope and collapse    [facility] Pat Gonzalez MD    2 weeks ago Near syncope    [facility] Joe Bunn MD    3 weeks ago Type 2 diabetes mellitus with stage 3 chronic kidney disease, with long-term current use of insulin    [facility] - Endocrinology [address] Keshai Aldana NP    1 month ago Near syncope    [facility] Medicine Joe Bunn MD    6 months ago Mixed Alzheimer's and vascular dementia    [facility] [address] Jose Johansen MD          Future Appointments              In 2 weeks MD [facility] - Neurology, [facility]    In 4 weeks MD [facility] - Dermatology, [facility]    In 2 months LAB, APPOINTMENT [facility], [facility]    In 2 months MD [facility] Shelton - Internal Medicine, [facility]    In 3 months MD [facility] G[facility] - Nephrology, [facility]                Passed - Cr is 1.4 or below and within 360 days     Creatinine   Date  Value Ref Range Status   11/12/2019 1.2 0.5 - 1.4 mg/dL Final   11/11/2019 1.3 0.5 - 1.4 mg/dL Final   11/10/2019 1.0 0.5 - 1.4 mg/dL Final              Passed - eGFR within 360 days     eGFR if non    Date Value Ref Range Status   11/12/2019 53.7 (A) >60 mL/min/1.73 m^2 Final     Comment:     Calculation used to obtain the estimated glomerular filtration  rate (eGFR) is the CKD-EPI equation.      11/11/2019 48.7 (A) >60 mL/min/1.73 m^2 Final     Comment:     Calculation used to obtain the estimated glomerular filtration  rate (eGFR) is the CKD-EPI equation.      11/10/2019 >60.0 >60 mL/min/1.73 m^2 Final     Comment:     Calculation used to obtain the estimated glomerular filtration  rate (eGFR) is the CKD-EPI equation.        eGFR if    Date Value Ref Range Status   11/12/2019 >60.0 >60 mL/min/1.73 m^2 Final   11/11/2019 56.3 (A) >60 mL/min/1.73 m^2 Final   11/10/2019 >60.0 >60 mL/min/1.73 m^2 Final

## 2020-01-16 ENCOUNTER — OFFICE VISIT (OUTPATIENT)
Dept: NEUROLOGY | Facility: CLINIC | Age: 85
End: 2020-01-16
Payer: MEDICARE

## 2020-01-16 VITALS
WEIGHT: 184 LBS | HEART RATE: 97 BPM | SYSTOLIC BLOOD PRESSURE: 127 MMHG | HEIGHT: 71 IN | BODY MASS INDEX: 25.76 KG/M2 | DIASTOLIC BLOOD PRESSURE: 80 MMHG

## 2020-01-16 DIAGNOSIS — F02.80 MIXED ALZHEIMER'S AND VASCULAR DEMENTIA: Primary | ICD-10-CM

## 2020-01-16 DIAGNOSIS — R55 SYNCOPE, UNSPECIFIED SYNCOPE TYPE: ICD-10-CM

## 2020-01-16 DIAGNOSIS — G30.9 MIXED ALZHEIMER'S AND VASCULAR DEMENTIA: Primary | ICD-10-CM

## 2020-01-16 DIAGNOSIS — F01.50 MIXED ALZHEIMER'S AND VASCULAR DEMENTIA: Primary | ICD-10-CM

## 2020-01-16 PROCEDURE — 99999 PR PBB SHADOW E&M-EST. PATIENT-LVL III: CPT | Mod: PBBFAC,HCNC,, | Performed by: PSYCHIATRY & NEUROLOGY

## 2020-01-16 PROCEDURE — 99499 UNLISTED E&M SERVICE: CPT | Mod: S$GLB,,, | Performed by: PSYCHIATRY & NEUROLOGY

## 2020-01-16 PROCEDURE — 1125F AMNT PAIN NOTED PAIN PRSNT: CPT | Mod: HCNC,S$GLB,, | Performed by: PSYCHIATRY & NEUROLOGY

## 2020-01-16 PROCEDURE — 1101F PR PT FALLS ASSESS DOC 0-1 FALLS W/OUT INJ PAST YR: ICD-10-PCS | Mod: HCNC,CPTII,S$GLB, | Performed by: PSYCHIATRY & NEUROLOGY

## 2020-01-16 PROCEDURE — 1159F PR MEDICATION LIST DOCUMENTED IN MEDICAL RECORD: ICD-10-PCS | Mod: HCNC,S$GLB,, | Performed by: PSYCHIATRY & NEUROLOGY

## 2020-01-16 PROCEDURE — 99999 PR PBB SHADOW E&M-EST. PATIENT-LVL III: ICD-10-PCS | Mod: PBBFAC,HCNC,, | Performed by: PSYCHIATRY & NEUROLOGY

## 2020-01-16 PROCEDURE — 1125F PR PAIN SEVERITY QUANTIFIED, PAIN PRESENT: ICD-10-PCS | Mod: HCNC,S$GLB,, | Performed by: PSYCHIATRY & NEUROLOGY

## 2020-01-16 PROCEDURE — 99499 RISK ADDL DX/OHS AUDIT: ICD-10-PCS | Mod: S$GLB,,, | Performed by: PSYCHIATRY & NEUROLOGY

## 2020-01-16 PROCEDURE — 1159F MED LIST DOCD IN RCRD: CPT | Mod: HCNC,S$GLB,, | Performed by: PSYCHIATRY & NEUROLOGY

## 2020-01-16 PROCEDURE — 1101F PT FALLS ASSESS-DOCD LE1/YR: CPT | Mod: HCNC,CPTII,S$GLB, | Performed by: PSYCHIATRY & NEUROLOGY

## 2020-01-16 PROCEDURE — 99213 OFFICE O/P EST LOW 20 MIN: CPT | Mod: HCNC,S$GLB,, | Performed by: PSYCHIATRY & NEUROLOGY

## 2020-01-16 PROCEDURE — 99213 PR OFFICE/OUTPT VISIT, EST, LEVL III, 20-29 MIN: ICD-10-PCS | Mod: HCNC,S$GLB,, | Performed by: PSYCHIATRY & NEUROLOGY

## 2020-01-16 NOTE — PROGRESS NOTES
"[facility] - NEUROLOGY  [facility], [place]    Date: January 16, 2020   Patient Name: Nickolas Blake   MRN: [id number]   PCP: Joe Klein  Referring Provider: Self, Aaareferral    Assessment:      This is Nickolas Blake, 88 y.o. male with s/p angioplasty 1994, mixed alzheimer/vascular dementia     Plan:      -  Encouraged physical activity and mobility    Follow up 12 months       I discussed side effects of the medications. I asked the patient to stop the medication if she notices serious adverse effects as we discussed and to seek immediate medical attention at an ER.     Edson Chung MD  [facility]   Department of Neurology    Subjective:      HPI:   Mr. Nickolas Blake is a 88 y.o. male who presents with a chief complaint of dementia and syncope    Patient had admit for syncope and orthostatic symptoms 11/2019 without arrhythmia on holter which improved following cessation of donepezil, metoprolol and tamsulosin.  Presents with caregiver and doing "200%" better than prior to admit.  Enjoys going to casino, going to restaurants, no issues with sleep or mood.  Not driving.  Does tend to resist walking/physical activity    Per [place] 6/2019:  "The patient was referred for neurological evaluation following a fall and minor head trauma without loss of consciousness though had been complaining of mild headache.  She was accompanied by his caregiver who did report that his headache has significantly improved.  He was at the emergency room on 06/04/2019 following the fall, at which time he reported that felt generally weak and fell and did hit his head.  His caregiver did report that he has had about 3 falls but without loss of consciousness.  He does report occasional lightheadedness.  He has very limited his ability to ambulate because of chronic lumbar spine disease and is a stents limited to a wheelchair.  A CT scan of the head without contrast showed no " "acute intracranial abnormality.  In addition, he had a CT scan of cervical spine that showed no acute fracture or dislocation.  There was moderate cervical spondylosis.  A CT scan of the lumbar spine showed no acute fracture or listhesis.  Stable moderate lumbar spondylosis.  Moderate calcific arteriosclerosis of the abdominal aorta.  There has been no significant progression in his cognitive difficulties though he has significant difficulty given adequate recent history and has difficulty retaining recent information.     The patient has had a history of gait instability related to chronic lumbar spine disease with status post laminectomy, type 2 diabetes with diabetic polyneuropathy with long-term use of insulin.  The patient has been seen by me in the past for significant memory difficulties consistent with a mixed Alzheimer's/vascular dementia.  He was last seen by me in August 2017 and canceled subsequent appointments.  He was on Aricept 5 mg daily however this was increased to 10 mg daily [facility] who has now been following him for his multiple problems despite the fact that he has a primary care physician at [facility].  He now has a  as he has significant difficulty with ambulating and requires help with ADLs."    PAST MEDICAL HISTORY:  Past Medical History:   Diagnosis Date    Anemia in stage 3 chronic kidney disease 11/10/2016    BPH (benign prostatic hyperplasia) 10/26/2012    Cataract     Coronary artery disease involving native coronary artery of native heart without angina pectoris     Degeneration of lumbar or lumbosacral intervertebral disc 2/4/2014    Essential hypertension 9/30/2015    GERD (gastroesophageal reflux disease)     Hyperlipidemia     Lumbar radiculopathy, right 12/9/2013    Major depressive disorder with single episode, 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    Mild cognitive impairment with memory loss 7/18/2016    Osteoarthritis of right hip 8/16/2016    " "Osteoarthritis of spine with radiculopathy, lumbar region     S/P percutaneous transluminal coronary angioplasty     Spinal stenosis of lumbar region at multiple levels 2/4/2014    Thoracic or lumbosacral neuritis or radiculitis, unspecified 4/21/2015    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 edema, with long-term current use of insulin 11/19/2015    Type 2 diabetes mellitus with diabetic polyneuropathy, with long-term current use of insulin 11/19/2015    Type 2 diabetes mellitus with stage 3 chronic kidney disease, with long-term current use of insulin 11/19/2015    Type 2 diabetes with peripheral circulatory disorder, controlled        PAST SURGICAL HISTORY:  Past Surgical History:   Procedure Laterality Date    ADENOIDECTOMY      CARDIAC CATHETERIZATION  1980    CATARACT EXTRACTION      CYST REMOVAL      rectum    EYE SURGERY      TONSILLECTOMY         CURRENT MEDS:  Current Outpatient Medications   Medication Sig Dispense Refill    acetaminophen (TYLENOL) 500 mg Cap Take 1 capsule by mouth every 6 to 8 hours as needed.       ascorbic acid, vitamin C, (VITAMIN C) 250 MG tablet Take 1 tablet (250 mg total) by mouth 2 (two) times daily.      aspirin (ECOTRIN) 81 MG EC tablet Take 81 mg by mouth once daily.      atorvastatin (LIPITOR) 40 MG tablet Take 1 tablet (40 mg total) by mouth once daily. 90 tablet 3    BD ALCOHOL SWABS PadM USE FOUR TIMES DAILY 200 each 11    BD ULTRA-FINE SHORT PEN NEEDLE 31 gauge x 5/16" Ndle Inject 1 application into the skin 5 (five) times daily. 450 each 11    blood sugar diagnostic (ACCU-CHEK NEWTON PLUS TEST STRP) Strp TEST TWO TIMES DAILY WITH MEALS 200 strip 3    blood sugar diagnostic Strp To check BG 3 times daily, to use with insurance preferred meter 300 strip 99    blood sugar diagnostic Strp To check BG 4 times daily, to use with insurance preferred meter 100 strip 0    blood-glucose meter kit " "To check BG 4 times daily, to use with insurance preferred meter 1 each 0    cholecalciferol, vitamin D3, (VITAMIN D3) 2,000 unit Cap Take 1 capsule by mouth 2 (two) times daily.      DULoxetine (CYMBALTA) 60 MG capsule TAKE 1 CAPSULE BY MOUTH EVERY DAY 90 capsule 0    finasteride (PROSCAR) 5 mg tablet TAKE 1 TABLET EVERY DAY 90 tablet 3    gabapentin (NEURONTIN) 300 MG capsule Take 1 capsule (300 mg total) by mouth every evening. 30 capsule 1    glucagon, human recombinant, (GLUCAGON EMERGENCY KIT, HUMAN,) 1 mg SolR Inject 1 mg into the muscle as needed. 1 each 1    insulin glargine (LANTUS U-100 INSULIN) 100 unit/mL injection Inject 20 Units into the skin once daily. (Patient taking differently: Inject 24 Units into the skin once daily. ) 2 vial 6    insulin syringe-needle U-100 0.5 mL 30 gauge x 5/16" Syrg To use with Lantus vials - 2 times daily 200 each 6    lancets Misc 1 each by Misc.(Non-Drug; Combo Route) route 2 (two) times daily. 200 each 3    lancets Misc To check BG 4 times daily, to use with insurance preferred meter 100 each 0    lisinopril (PRINIVIL,ZESTRIL) 20 MG tablet Take 1 tablet (20 mg total) by mouth once daily. 90 tablet 3    insulin aspart U-100 (NOVOLOG) 100 unit/mL InPn pen Inject 8 Units into the skin 3 (three) times daily with meals. (Patient taking differently: Inject 12 Units into the skin 3 (three) times daily with meals. As per sliding scale) 1 Box 6     No current facility-administered medications for this visit.        ALLERGIES:  Review of patient's allergies indicates:  No Known Allergies    FAMILY HISTORY:  Family History   Problem Relation Age of Onset    Breast cancer Mother     Cancer Brother     Heart attack Brother     No Known Problems Father     No Known Problems Sister     No Known Problems Maternal Aunt     No Known Problems Maternal Uncle     No Known Problems Paternal Aunt     No Known Problems Paternal Uncle     No Known Problems Maternal " "Grandmother     No Known Problems Maternal Grandfather     No Known Problems Paternal Grandmother     No Known Problems Paternal Grandfather     Colon cancer Neg Hx     Colon polyps Neg Hx     Amblyopia Neg Hx     Blindness Neg Hx     Cataracts Neg Hx     Diabetes Neg Hx     Glaucoma Neg Hx     Hypertension Neg Hx     Macular degeneration Neg Hx     Retinal detachment Neg Hx     Strabismus Neg Hx     Stroke Neg Hx     Thyroid disease Neg Hx        SOCIAL HISTORY:  Social History     Tobacco Use    Smoking status: Former Smoker     Packs/day: 0.25     Years: 50.00     Pack years: 12.50     Types: Cigarettes     Last attempt to quit: 1980     Years since quittin.9    Smokeless tobacco: Never Used   Substance Use Topics    Alcohol use: No    Drug use: No       Review of Systems:  12 review of systems is negative except for the symptoms mentioned in HPI.        Objective:     Vitals:    20 1037   BP: 127/80   Pulse: 97   Weight: 83.5 kg (184 lb)   Height: 5' 11" (1.803 m)       General: NAD, well nourished   Eyes: no tearing, discharge, no erythema   ENT: moist mucous membranes of the oral cavity, nares patent    Neck: Supple, full range of motion  Cardiovascular: Warm and well perfused, pulses equal and symmetrical  Lungs: Normal work of breathing, normal chest wall excursions  Skin: No rash, lesions, or breakdown on exposed skin  Psychiatry: Mood and affect are appropriate   Abdomen: soft, non tender, non distended  Extremeties: No cyanosis, clubbing or edema.    Neurological   MENTAL STATUS: Alert with bright affect and appropriately conversant   CRANIAL NERVES: Visual fields intact. PERRL. EOMI. Facial sensation intact. Face symmetrical. Hearing grossly intact. Full shoulder shrug bilaterally. Tongue protrudes midline   SENSORY: Sensation is intact to light touch throughout.    MOTOR: Normal bulk and tone. No pronator drift.     CEREBELLAR/COORDINATION/GAIT: Gait not tested, " presents in wheelchair

## 2020-01-16 NOTE — LETTER
May 11, 2017      Joe Bunn MD  [address]  [place] [address]           [facility]  [address]  [place] [address]  Phone: [phone]  Fax: [phone]          Patient: Nickolas Blake   MR Number: [id number]   YOB: 1931   Date of Visit: 5/9/2017       Dear Dr. Joe Bunn:    Thank you for referring Nickolas Blake to me for evaluation. Attached you will find relevant portions of my assessment and plan of care.    If you have questions, please do not hesitate to call me. I look forward to following Nickolas Blake along with you.    Sincerely,    Barbra Caputo, OD    Enclosure  CC:  No Recipients    If you would like to receive this communication electronically, please contact [email or web address] or [phone] to request more information on [employer] Link access.    For providers and/or their staff who would like to refer a patient to [facility], please contact us through our one-stop-shop provider referral line, [facility], at [phone].    If you feel you have received this communication in error or would no longer like to receive these types of communications, please e-mail [email or web address]          none

## 2020-02-24 ENCOUNTER — PATIENT OUTREACH (OUTPATIENT)
Dept: ADMINISTRATIVE | Facility: HOSPITAL | Age: 85
End: 2020-02-24

## 2020-04-08 ENCOUNTER — TELEPHONE (OUTPATIENT)
Dept: NEPHROLOGY | Facility: CLINIC | Age: 85
End: 2020-04-08

## 2020-11-05 ENCOUNTER — PES CALL (OUTPATIENT)
Dept: ADMINISTRATIVE | Facility: CLINIC | Age: 85
End: 2020-11-05

## 2020-11-19 ENCOUNTER — TELEPHONE (OUTPATIENT)
Dept: INTERNAL MEDICINE | Facility: CLINIC | Age: 85
End: 2020-11-19

## 2020-11-19 NOTE — TELEPHONE ENCOUNTER
----- Message from Ailyn Kirkpatrick sent at 11/19/2020  8:39 AM CST -----  Contact: Keke ( ira) @ [phone]  Patient ira would like him to have some labs ordered since it's has been over a year.  Has an appointment on 12/17.

## 2020-12-15 ENCOUNTER — LAB VISIT (OUTPATIENT)
Dept: LAB | Facility: HOSPITAL | Age: 85
End: 2020-12-15
Attending: FAMILY MEDICINE
Payer: MEDICARE

## 2020-12-15 DIAGNOSIS — E78.2 MIXED HYPERLIPIDEMIA: ICD-10-CM

## 2020-12-15 DIAGNOSIS — E11.22 TYPE 2 DIABETES MELLITUS WITH STAGE 3 CHRONIC KIDNEY DISEASE, WITH LONG-TERM CURRENT USE OF INSULIN: ICD-10-CM

## 2020-12-15 DIAGNOSIS — R55 SYNCOPE, UNSPECIFIED SYNCOPE TYPE: ICD-10-CM

## 2020-12-15 DIAGNOSIS — N18.30 TYPE 2 DIABETES MELLITUS WITH STAGE 3 CHRONIC KIDNEY DISEASE, WITH LONG-TERM CURRENT USE OF INSULIN: ICD-10-CM

## 2020-12-15 DIAGNOSIS — Z79.4 TYPE 2 DIABETES MELLITUS WITH STAGE 3 CHRONIC KIDNEY DISEASE, WITH LONG-TERM CURRENT USE OF INSULIN: ICD-10-CM

## 2020-12-15 LAB
ALBUMIN SERPL BCP-MCNC: 3.5 G/DL (ref 3.5–5.2)
ALP SERPL-CCNC: 114 U/L (ref 55–135)
ALT SERPL W/O P-5'-P-CCNC: 24 U/L (ref 10–44)
ANION GAP SERPL CALC-SCNC: 9 MMOL/L (ref 8–16)
AST SERPL-CCNC: 25 U/L (ref 10–40)
BILIRUB SERPL-MCNC: 0.8 MG/DL (ref 0.1–1)
BUN SERPL-MCNC: 27 MG/DL (ref 8–23)
CALCIUM SERPL-MCNC: 9.1 MG/DL (ref 8.7–10.5)
CHLORIDE SERPL-SCNC: 102 MMOL/L (ref 95–110)
CHOLEST SERPL-MCNC: 148 MG/DL (ref 120–199)
CHOLEST/HDLC SERPL: 3.9 {RATIO} (ref 2–5)
CO2 SERPL-SCNC: 27 MMOL/L (ref 23–29)
CREAT SERPL-MCNC: 1.2 MG/DL (ref 0.5–1.4)
ERYTHROCYTE [DISTWIDTH] IN BLOOD BY AUTOMATED COUNT: 14 % (ref 11.5–14.5)
EST. GFR  (AFRICAN AMERICAN): >60 ML/MIN/1.73 M^2
EST. GFR  (NON AFRICAN AMERICAN): 53.3 ML/MIN/1.73 M^2
ESTIMATED AVG GLUCOSE: 229 MG/DL (ref 68–131)
GLUCOSE SERPL-MCNC: 241 MG/DL (ref 70–110)
HBA1C MFR BLD HPLC: 9.6 % (ref 4–5.6)
HCT VFR BLD AUTO: 44.3 % (ref 40–54)
HDLC SERPL-MCNC: 38 MG/DL (ref 40–75)
HDLC SERPL: 25.7 % (ref 20–50)
HGB BLD-MCNC: 13.9 G/DL (ref 14–18)
LDLC SERPL CALC-MCNC: 86.2 MG/DL (ref 63–159)
MCH RBC QN AUTO: 27.7 PG (ref 27–31)
MCHC RBC AUTO-ENTMCNC: 31.4 G/DL (ref 32–36)
MCV RBC AUTO: 88 FL (ref 82–98)
NONHDLC SERPL-MCNC: 110 MG/DL
PLATELET # BLD AUTO: 303 K/UL (ref 150–350)
PMV BLD AUTO: 10.8 FL (ref 9.2–12.9)
POTASSIUM SERPL-SCNC: 4.4 MMOL/L (ref 3.5–5.1)
PROT SERPL-MCNC: 7.5 G/DL (ref 6–8.4)
RBC # BLD AUTO: 5.02 M/UL (ref 4.6–6.2)
SODIUM SERPL-SCNC: 138 MMOL/L (ref 136–145)
T4 FREE SERPL-MCNC: 0.97 NG/DL (ref 0.71–1.51)
TRIGL SERPL-MCNC: 119 MG/DL (ref 30–150)
TSH SERPL DL<=0.005 MIU/L-ACNC: 0.28 UIU/ML (ref 0.4–4)
WBC # BLD AUTO: 5.59 K/UL (ref 3.9–12.7)

## 2020-12-15 PROCEDURE — 83036 HEMOGLOBIN GLYCOSYLATED A1C: CPT | Mod: HCNC

## 2020-12-15 PROCEDURE — 84439 ASSAY OF FREE THYROXINE: CPT | Mod: HCNC

## 2020-12-15 PROCEDURE — 80061 LIPID PANEL: CPT | Mod: HCNC

## 2020-12-15 PROCEDURE — 36415 COLL VENOUS BLD VENIPUNCTURE: CPT | Mod: HCNC

## 2020-12-15 PROCEDURE — 85027 COMPLETE CBC AUTOMATED: CPT | Mod: HCNC

## 2020-12-15 PROCEDURE — 84443 ASSAY THYROID STIM HORMONE: CPT | Mod: HCNC

## 2020-12-15 PROCEDURE — 80053 COMPREHEN METABOLIC PANEL: CPT | Mod: HCNC

## 2020-12-17 ENCOUNTER — HOSPITAL ENCOUNTER (OUTPATIENT)
Dept: RADIOLOGY | Facility: HOSPITAL | Age: 85
Discharge: HOME | End: 2020-12-17
Attending: FAMILY MEDICINE
Payer: MEDICARE

## 2020-12-17 ENCOUNTER — IMMUNIZATION (OUTPATIENT)
Dept: PHARMACY | Facility: CLINIC | Age: 85
End: 2020-12-17

## 2020-12-17 ENCOUNTER — OFFICE VISIT (OUTPATIENT)
Dept: INTERNAL MEDICINE | Facility: CLINIC | Age: 85
End: 2020-12-17
Attending: FAMILY MEDICINE
Payer: MEDICARE

## 2020-12-17 ENCOUNTER — PATIENT OUTREACH (OUTPATIENT)
Dept: ADMINISTRATIVE | Facility: OTHER | Age: 85
End: 2020-12-17

## 2020-12-17 ENCOUNTER — IMMUNIZATION (OUTPATIENT)
Dept: PHARMACY | Facility: CLINIC | Age: 85
End: 2020-12-17
Payer: MEDICARE

## 2020-12-17 ENCOUNTER — PATIENT MESSAGE (OUTPATIENT)
Dept: PHARMACY | Facility: CLINIC | Age: 85
End: 2020-12-17

## 2020-12-17 VITALS
SYSTOLIC BLOOD PRESSURE: 134 MMHG | HEART RATE: 93 BPM | DIASTOLIC BLOOD PRESSURE: 62 MMHG | WEIGHT: 180.75 LBS | HEIGHT: 71 IN | BODY MASS INDEX: 25.31 KG/M2 | OXYGEN SATURATION: 99 %

## 2020-12-17 DIAGNOSIS — I10 HYPERTENSION, ESSENTIAL: ICD-10-CM

## 2020-12-17 DIAGNOSIS — E11.42 TYPE 2 DIABETES MELLITUS WITH DIABETIC POLYNEUROPATHY, WITH LONG-TERM CURRENT USE OF INSULIN: ICD-10-CM

## 2020-12-17 DIAGNOSIS — G47.00 INSOMNIA, UNSPECIFIED TYPE: ICD-10-CM

## 2020-12-17 DIAGNOSIS — N18.30 TYPE 2 DIABETES MELLITUS WITH STAGE 3 CHRONIC KIDNEY DISEASE, WITH LONG-TERM CURRENT USE OF INSULIN, UNSPECIFIED WHETHER STAGE 3A OR 3B CKD: ICD-10-CM

## 2020-12-17 DIAGNOSIS — Z79.4 TYPE 2 DIABETES MELLITUS WITH STAGE 3 CHRONIC KIDNEY DISEASE, WITH LONG-TERM CURRENT USE OF INSULIN: ICD-10-CM

## 2020-12-17 DIAGNOSIS — F32.4 MAJOR DEPRESSIVE DISORDER WITH SINGLE EPISODE, IN PARTIAL REMISSION: ICD-10-CM

## 2020-12-17 DIAGNOSIS — Z79.4 TYPE 2 DIABETES MELLITUS WITH STAGE 3 CHRONIC KIDNEY DISEASE, WITH LONG-TERM CURRENT USE OF INSULIN, UNSPECIFIED WHETHER STAGE 3A OR 3B CKD: Primary | ICD-10-CM

## 2020-12-17 DIAGNOSIS — M48.061 SPINAL STENOSIS OF LUMBAR REGION AT MULTIPLE LEVELS: ICD-10-CM

## 2020-12-17 DIAGNOSIS — E05.90 HYPERTHYROIDISM: ICD-10-CM

## 2020-12-17 DIAGNOSIS — E11.22 TYPE 2 DIABETES MELLITUS WITH STAGE 3 CHRONIC KIDNEY DISEASE, WITH LONG-TERM CURRENT USE OF INSULIN, UNSPECIFIED WHETHER STAGE 3A OR 3B CKD: ICD-10-CM

## 2020-12-17 DIAGNOSIS — F01.50 MIXED ALZHEIMER'S AND VASCULAR DEMENTIA: ICD-10-CM

## 2020-12-17 DIAGNOSIS — J90 PLEURAL EFFUSION: ICD-10-CM

## 2020-12-17 DIAGNOSIS — N18.30 STAGE 3 CHRONIC KIDNEY DISEASE, UNSPECIFIED WHETHER STAGE 3A OR 3B CKD: ICD-10-CM

## 2020-12-17 DIAGNOSIS — E78.5 HYPERLIPIDEMIA, UNSPECIFIED HYPERLIPIDEMIA TYPE: ICD-10-CM

## 2020-12-17 DIAGNOSIS — N18.30 TYPE 2 DIABETES MELLITUS WITH STAGE 3 CHRONIC KIDNEY DISEASE, WITH LONG-TERM CURRENT USE OF INSULIN, UNSPECIFIED WHETHER STAGE 3A OR 3B CKD: Primary | ICD-10-CM

## 2020-12-17 DIAGNOSIS — E11.22 TYPE 2 DIABETES MELLITUS WITH STAGE 3 CHRONIC KIDNEY DISEASE, WITH LONG-TERM CURRENT USE OF INSULIN: ICD-10-CM

## 2020-12-17 DIAGNOSIS — G30.9 MIXED ALZHEIMER'S AND VASCULAR DEMENTIA: ICD-10-CM

## 2020-12-17 DIAGNOSIS — S99.921A INJURY OF TOE ON RIGHT FOOT, INITIAL ENCOUNTER: ICD-10-CM

## 2020-12-17 DIAGNOSIS — N18.30 TYPE 2 DIABETES MELLITUS WITH STAGE 3 CHRONIC KIDNEY DISEASE, WITH LONG-TERM CURRENT USE OF INSULIN: ICD-10-CM

## 2020-12-17 DIAGNOSIS — F02.80 MIXED ALZHEIMER'S AND VASCULAR DEMENTIA: ICD-10-CM

## 2020-12-17 DIAGNOSIS — Z79.4 TYPE 2 DIABETES MELLITUS WITH DIABETIC POLYNEUROPATHY, WITH LONG-TERM CURRENT USE OF INSULIN: ICD-10-CM

## 2020-12-17 DIAGNOSIS — E11.22 TYPE 2 DIABETES MELLITUS WITH STAGE 3 CHRONIC KIDNEY DISEASE, WITH LONG-TERM CURRENT USE OF INSULIN, UNSPECIFIED WHETHER STAGE 3A OR 3B CKD: Primary | ICD-10-CM

## 2020-12-17 DIAGNOSIS — Z79.4 TYPE 2 DIABETES MELLITUS WITH STAGE 3 CHRONIC KIDNEY DISEASE, WITH LONG-TERM CURRENT USE OF INSULIN, UNSPECIFIED WHETHER STAGE 3A OR 3B CKD: ICD-10-CM

## 2020-12-17 DIAGNOSIS — I25.10 CORONARY ARTERY DISEASE INVOLVING NATIVE CORONARY ARTERY OF NATIVE HEART WITHOUT ANGINA PECTORIS: Chronic | ICD-10-CM

## 2020-12-17 PROCEDURE — 99999 PR PBB SHADOW E&M-EST. PATIENT-LVL V: ICD-10-PCS | Mod: PBBFAC,HCNC,, | Performed by: FAMILY MEDICINE

## 2020-12-17 PROCEDURE — 1126F PR PAIN SEVERITY QUANTIFIED, NO PAIN PRESENT: ICD-10-PCS | Mod: HCNC,S$GLB,, | Performed by: FAMILY MEDICINE

## 2020-12-17 PROCEDURE — 3288F PR FALLS RISK ASSESSMENT DOCUMENTED: ICD-10-PCS | Mod: HCNC,CPTII,S$GLB, | Performed by: FAMILY MEDICINE

## 2020-12-17 PROCEDURE — 73630 X-RAY EXAM OF FOOT: CPT | Mod: TC,HCNC,RT

## 2020-12-17 PROCEDURE — 1157F PR ADVANCE CARE PLAN OR EQUIV PRESENT IN MEDICAL RECORD: ICD-10-PCS | Mod: HCNC,S$GLB,, | Performed by: FAMILY MEDICINE

## 2020-12-17 PROCEDURE — 99999 PR PBB SHADOW E&M-EST. PATIENT-LVL V: CPT | Mod: PBBFAC,HCNC,, | Performed by: FAMILY MEDICINE

## 2020-12-17 PROCEDURE — 1126F AMNT PAIN NOTED NONE PRSNT: CPT | Mod: HCNC,S$GLB,, | Performed by: FAMILY MEDICINE

## 2020-12-17 PROCEDURE — 73630 XR FOOT COMPLETE 3 VIEW RIGHT: ICD-10-PCS | Mod: 26,HCNC,RT, | Performed by: RADIOLOGY

## 2020-12-17 PROCEDURE — 99397 PR PREVENTIVE VISIT,EST,65 & OVER: ICD-10-PCS | Mod: HCNC,S$GLB,, | Performed by: FAMILY MEDICINE

## 2020-12-17 PROCEDURE — 73630 X-RAY EXAM OF FOOT: CPT | Mod: 26,HCNC,RT, | Performed by: RADIOLOGY

## 2020-12-17 PROCEDURE — 1157F ADVNC CARE PLAN IN RCRD: CPT | Mod: HCNC,S$GLB,, | Performed by: FAMILY MEDICINE

## 2020-12-17 PROCEDURE — 1101F PR PT FALLS ASSESS DOC 0-1 FALLS W/OUT INJ PAST YR: ICD-10-PCS | Mod: HCNC,CPTII,S$GLB, | Performed by: FAMILY MEDICINE

## 2020-12-17 PROCEDURE — 71046 X-RAY EXAM CHEST 2 VIEWS: CPT | Mod: TC,HCNC

## 2020-12-17 PROCEDURE — 99499 RISK ADDL DX/OHS AUDIT: ICD-10-PCS | Mod: HCNC,S$GLB,, | Performed by: FAMILY MEDICINE

## 2020-12-17 PROCEDURE — 71046 XR CHEST PA AND LATERAL: ICD-10-PCS | Mod: 26,HCNC,, | Performed by: RADIOLOGY

## 2020-12-17 PROCEDURE — 71046 X-RAY EXAM CHEST 2 VIEWS: CPT | Mod: 26,HCNC,, | Performed by: RADIOLOGY

## 2020-12-17 PROCEDURE — 1101F PT FALLS ASSESS-DOCD LE1/YR: CPT | Mod: HCNC,CPTII,S$GLB, | Performed by: FAMILY MEDICINE

## 2020-12-17 PROCEDURE — 3288F FALL RISK ASSESSMENT DOCD: CPT | Mod: HCNC,CPTII,S$GLB, | Performed by: FAMILY MEDICINE

## 2020-12-17 PROCEDURE — 99499 UNLISTED E&M SERVICE: CPT | Mod: HCNC,S$GLB,, | Performed by: FAMILY MEDICINE

## 2020-12-17 PROCEDURE — 99397 PER PM REEVAL EST PAT 65+ YR: CPT | Mod: HCNC,S$GLB,, | Performed by: FAMILY MEDICINE

## 2020-12-17 RX ORDER — DULOXETIN HYDROCHLORIDE 60 MG/1
60 CAPSULE, DELAYED RELEASE ORAL DAILY
Qty: 90 CAPSULE | Refills: 3 | Status: ON HOLD | OUTPATIENT
Start: 2020-12-17 | End: 2021-11-28

## 2020-12-17 RX ORDER — FINASTERIDE 5 MG/1
5 TABLET, FILM COATED ORAL DAILY
COMMUNITY
End: 2021-06-17 | Stop reason: SDUPTHER

## 2020-12-17 NOTE — PROGRESS NOTES
Subjective:       Patient ID: Nickolas Blake is a 89 y.o. male.    Chief Complaint: Annual Exam    Established patient for an annual wellness check/physical exam and also chronic disease management. Specific complaints - see dictation and please see ROS.  P, S, Fm, Soc Hx's; Meds, allergies reviewed and reconciled.  Health maintenance file reviewed and addressed items due.      Established patient follows up for management of chronic medical illnesses with complaints today. Please see dictation and ROS for interval problems, specific complaints and disease management discussion.    P, S, Fm, Soc Hx's; Meds, allergies reviewed and reconciled.  Health maintenance file reviewed and addressed items due.    [facility] Hx from caretaker.    Overdue for follow-up due to COVID crisis.  Hemoglobin A1c was elevated.  Poor dietary compliance.  He is at home and has sitters part of the day but not at night.  Caretaker today, who is a sitter, describes difficulty sleeping well he will not sleep for days on end.  Unclear how this can be verified since there is no one watching him at night.  Unclear whether he is sleeping much during the day.  Most of his medications are filled at the [facility].  Compliance is unclear.  Currently states Lantus 24 units and a sliding scale of NovoLog.    No further syncopal episodes since last year.  His Flomax and beta-blocker were discontinued.    Patient is awake and alert today but does not offer much in the way of conversation.  Seems to have declined some degree.  No focal neurologic deficits.  No pain complaints today.  He is currently off of all pain medications except Tylenol.    Review of Systems   Unable to perform ROS: Dementia   Constitutional: Negative for appetite change, chills, diaphoresis, fatigue and fever.   HENT: Negative for congestion, postnasal drip, rhinorrhea, sore throat and trouble swallowing.    Eyes: Negative for visual disturbance.    Respiratory: Negative for cough, choking, chest tightness, shortness of breath and wheezing.    Cardiovascular: Negative for chest pain and leg swelling.   Gastrointestinal: Negative for abdominal distention, abdominal pain, diarrhea, nausea and vomiting.   Genitourinary: Negative for difficulty urinating.   Musculoskeletal: Positive for arthralgias, back pain and gait problem. Negative for myalgias.   Skin: Negative for rash.   Neurological: Negative for weakness, light-headedness and headaches.   Psychiatric/Behavioral: Positive for agitation, behavioral problems and sleep disturbance.       Objective:      Physical Exam  Vitals signs and nursing note reviewed.   Constitutional:       Appearance: He is well-developed. He is not diaphoretic.   HENT:      Head: Normocephalic and atraumatic.   Eyes:      General: No scleral icterus.     Conjunctiva/sclera: Conjunctivae normal.   Neck:      Musculoskeletal: Normal range of motion and neck supple.      Vascular: No carotid bruit.   Cardiovascular:      Rate and Rhythm: Normal rate and regular rhythm.      Heart sounds: Heart sounds are distant. No murmur. No friction rub. No gallop.    Pulmonary:      Effort: Pulmonary effort is normal. No respiratory distress.      Breath sounds: Normal breath sounds. No wheezing or rales.   Abdominal:      General: There is no distension.      Tenderness: There is no abdominal tenderness.   Musculoskeletal:         General: No deformity.   Skin:     General: Skin is warm and dry.      Findings: No erythema or rash.   Neurological:      Mental Status: He is alert.      GCS: GCS eye subscore is 4. GCS verbal subscore is 5. GCS motor subscore is 6.      Cranial Nerves: No cranial nerve deficit.      Motor: No tremor.      Coordination: Coordination normal.      Gait: Gait normal.   Psychiatric:         Behavior: Behavior is cooperative.         Cognition and Memory: Cognition is impaired. Memory is impaired.         Judgment: Judgment  is inappropriate.         Assessment:       1. Type 2 diabetes mellitus with stage 3 chronic kidney disease, with long-term current use of insulin, unspecified whether stage 3a or 3b CKD    2. Type 2 diabetes mellitus with diabetic polyneuropathy, with long-term current use of insulin    3. Stage 3 chronic kidney disease, unspecified whether stage 3a or 3b CKD    4. Hyperthyroidism    5. Hypertension, essential    6. Hyperlipidemia, unspecified hyperlipidemia type    7. Coronary artery disease involving native coronary artery of native heart without angina pectoris    8. Pleural effusion    9. Mixed Alzheimer's and vascular dementia    10. Spinal stenosis of lumbar region at multiple levels    11. Major depressive disorder with single episode, in partial remission    12. Type 2 diabetes mellitus with stage 3 chronic kidney disease, with long-term current use of insulin    13. Insomnia, unspecified type    14. Injury of toe on right foot, initial encounter        Plan:     Medication List with Changes/Refills   Current Medications    ACETAMINOPHEN (TYLENOL) 500 MG CAP    Take 1 capsule by mouth every 6 to 8 hours as needed.     ASCORBIC ACID, VITAMIN C, (VITAMIN C) 250 MG TABLET    Take 1 tablet (250 mg total) by mouth 2 (two) times daily.    ASPIRIN (ECOTRIN) 81 MG EC TABLET    Take 81 mg by mouth once daily.    ATORVASTATIN (LIPITOR) 40 MG TABLET    Take 1 tablet (40 mg total) by mouth once daily.    BLOOD SUGAR DIAGNOSTIC STRP    To check BG 3 times daily, to use with insurance preferred meter    BLOOD SUGAR DIAGNOSTIC STRP    To check BG 4 times daily, to use with insurance preferred meter    BLOOD-GLUCOSE METER KIT    To check BG 4 times daily, to use with insurance preferred meter    CHOLECALCIFEROL, VITAMIN D3, (VITAMIN D3) 2,000 UNIT CAP    Take 1 capsule by mouth 2 (two) times daily.    FINASTERIDE (PROSCAR) 5 MG TABLET    TAKE 1 TABLET EVERY DAY    FINASTERIDE (PROSCAR) 5 MG TABLET    Take 5 mg by mouth  "[facility] daily. [facility]    GABAPENTIN (NEURONTIN) 300 MG CAPSULE    Take 1 capsule (300 mg total) by mouth every evening.    GLUCAGON, HUMAN RECOMBINANT, (GLUCAGON EMERGENCY KIT, HUMAN,) 1 MG SOLR    Inject 1 mg into the muscle as needed.    INSULIN ASPART U-100 (NOVOLOG) 100 UNIT/ML INPN PEN    Inject 8 Units into the skin 3 (three) times daily with meals.    INSULIN GLARGINE,HUM.REC.ANLOG (LANTUS SOLOSTAR U-100 INSULIN SUBQ)    Inject 24 Units into the skin once daily.    INSULIN SYRINGE-NEEDLE U-100 0.5 ML 30 GAUGE X 5/16" SYRG    To use with Lantus vials - 2 times daily    LANCETS MISC    To check BG 4 times daily, to use with insurance preferred meter    LISINOPRIL (PRINIVIL,ZESTRIL) 20 MG TABLET    Take 1 tablet (20 mg total) by mouth once daily.   Changed and/or Refilled Medications    Modified Medication Previous Medication    DULOXETINE (CYMBALTA) 60 MG CAPSULE DULoxetine (CYMBALTA) 60 MG capsule       Take 1 capsule (60 mg total) by mouth once daily.    TAKE 1 CAPSULE BY MOUTH EVERY DAY   Discontinued Medications    BD ALCOHOL SWABS PADM    USE FOUR TIMES DAILY    BD ULTRA-FINE SHORT PEN NEEDLE 31 GAUGE X 5/16" NDLE    Inject 1 application into the skin 5 (five) times daily.    BLOOD SUGAR DIAGNOSTIC (ACCU-CHEK NEWTON PLUS TEST STRP) STRP    TEST TWO TIMES DAILY WITH MEALS    INSULIN GLARGINE (LANTUS U-100 INSULIN) 100 UNIT/ML INJECTION    Inject 20 Units into the skin once daily.    LANCETS MISC    1 each by Misc.(Non-Drug; Combo Route) route 2 (two) times daily.     Nickolas was seen today for annual exam.    Diagnoses and all orders for this visit:    Type 2 diabetes mellitus with stage 3 chronic kidney disease, with long-term current use of insulin, unspecified whether stage 3a or 3b CKD  -     Ambulatory referral/consult to Endocrinology; Future  -     Ambulatory referral/consult to Endocrinology; Future  -     Ambulatory referral/consult to Optometry; Future  -     Cancel: Ambulatory referral/consult to " Podiatry; Future  -     Ambulatory referral/consult to [facility]; Future  -     X-Ray Foot Complete Right; Future  -     Cancel: Ambulatory referral/consult to Podiatry; Future  -     Ambulatory referral/consult to Podiatry; Future    Type 2 diabetes mellitus with diabetic polyneuropathy, with long-term current use of insulin    Stage 3 chronic kidney disease, unspecified whether stage 3a or 3b CKD    Hyperthyroidism  -     Ambulatory referral/consult to Endocrinology; Future  -     Ambulatory referral/consult to Endocrinology; Future    Hypertension, essential    Hyperlipidemia, unspecified hyperlipidemia type    Coronary artery disease involving native coronary artery of native heart without angina pectoris    Pleural effusion  -     X-Ray Chest PA And Lateral; Future    Mixed Alzheimer's and vascular dementia  -     Ambulatory referral/consult to Neurology; Future  -     [facility]; Future    Spinal stenosis of lumbar region at multiple levels  -     Ambulatory referral/consult to [facility]; Future    Major depressive disorder with single episode, in partial remission  -     DULoxetine (CYMBALTA) 60 MG capsule; Take 1 capsule (60 mg total) by mouth once daily.    Type 2 diabetes mellitus with stage 3 chronic kidney disease, with long-term current use of insulin    Insomnia, unspecified type    Injury of toe on right foot, initial encounter  -     X-Ray Foot Complete Right; Future  -     Cancel: Ambulatory referral/consult to Podiatry; Future  -     Ambulatory referral/consult to Podiatry; Future      See meds, orders, follow up, routing and instructions sections of encounter and AVS. Discussed with patient and provided on AVS.  Discussed palliative care or home care consult with caretaker.  She will discuss with patient's son.  They seemed to be amenable to this.  Needs better  diabetic compliance.    They seem to be concerned about his insomnia.  I explained that there are not good pharmacologic treatments due to high level of side effect, confusion, falls, etc. I recommended a referral to Psychiatry, as we have in the past.  Patient has been combative concerning this in the past.  I explained that if we wish to address this situation, then we will need to pursue that avenue.  I am unable to write for any medications at this time.  They may continue melatonin.    Additionally, states he stubbed his toe approximately 1 week ago.  Exam the toe, there is no open skin break.  Did note erythema or ecchymosis to the IP joint of the right great toe without deformity.  He does have nail deformities bilaterally.  Recommended an x-ray today and an urgent referral to podiatry.      Diabetes Management Status    Statin: Taking  ACE/ARB: Taking    Screening or Prevention Patient's value Goal Complete/Controlled?   HgA1C Testing and Control   Lab Results   Component Value Date    HGBA1C 9.6 (H) 12/15/2020      Annually/Less than 8% No   Lipid profile : 12/15/2020 Annually Yes   LDL control Lab Results   Component Value Date    LDLCALC 86.2 12/15/2020    Annually/Less than 100 mg/dl  Yes   Nephropathy screening Lab Results   Component Value Date    LABMICR 138.0 05/28/2019     Lab Results   Component Value Date    PROTEINUA Negative 11/11/2019    Annually No   Blood pressure BP Readings from Last 1 Encounters:   12/17/20 134/62    Less than 140/90 Yes   Dilated retinal exam : 07/09/2019 Annually No   Foot exam   : 12/05/2019 Annually No

## 2020-12-17 NOTE — PATIENT INSTRUCTIONS
Flu shot today    Shingrix vaccine today.    [phone] - [facility].  Clinical  or Psychologist

## 2020-12-17 NOTE — PROGRESS NOTES
Health Maintenance Due   Topic Date Due    Eye Exam  07/09/2020    Foot Exam  12/05/2020     Updates were requested from care everywhere.  Chart was reviewed for overdue Proactive [facility] Encounters ([facility]) topics (CRS, Breast Cancer Screening, Eye exam)  Health Maintenance has been updated.  LINKS immunization registry triggered.  Immunizations were reconciled.

## 2020-12-19 ENCOUNTER — OFFICE VISIT (OUTPATIENT)
Dept: ENDOCRINOLOGY | Facility: CLINIC | Age: 85
End: 2020-12-19
Payer: MEDICARE

## 2020-12-19 VITALS
HEIGHT: 71 IN | BODY MASS INDEX: 25.2 KG/M2 | TEMPERATURE: 97 F | DIASTOLIC BLOOD PRESSURE: 64 MMHG | WEIGHT: 180 LBS | HEART RATE: 96 BPM | SYSTOLIC BLOOD PRESSURE: 112 MMHG | OXYGEN SATURATION: 97 %

## 2020-12-19 DIAGNOSIS — E07.89 OTHER SPECIFIED DISORDERS OF THYROID: ICD-10-CM

## 2020-12-19 DIAGNOSIS — E05.90 HYPERTHYROIDISM: ICD-10-CM

## 2020-12-19 DIAGNOSIS — N18.30 TYPE 2 DIABETES MELLITUS WITH STAGE 3 CHRONIC KIDNEY DISEASE, WITH LONG-TERM CURRENT USE OF INSULIN, UNSPECIFIED WHETHER STAGE 3A OR 3B CKD: ICD-10-CM

## 2020-12-19 DIAGNOSIS — R79.89 ABNORMAL THYROID BLOOD TEST: Primary | ICD-10-CM

## 2020-12-19 DIAGNOSIS — E11.22 TYPE 2 DIABETES MELLITUS WITH STAGE 3 CHRONIC KIDNEY DISEASE, WITH LONG-TERM CURRENT USE OF INSULIN, UNSPECIFIED WHETHER STAGE 3A OR 3B CKD: ICD-10-CM

## 2020-12-19 DIAGNOSIS — Z79.4 TYPE 2 DIABETES MELLITUS WITH STAGE 3 CHRONIC KIDNEY DISEASE, WITH LONG-TERM CURRENT USE OF INSULIN, UNSPECIFIED WHETHER STAGE 3A OR 3B CKD: ICD-10-CM

## 2020-12-19 PROCEDURE — 1157F PR ADVANCE CARE PLAN OR EQUIV PRESENT IN MEDICAL RECORD: ICD-10-PCS | Mod: HCNC,S$GLB,, | Performed by: INTERNAL MEDICINE

## 2020-12-19 PROCEDURE — 99214 PR OFFICE/OUTPT VISIT, EST, LEVL IV, 30-39 MIN: ICD-10-PCS | Mod: HCNC,S$GLB,, | Performed by: INTERNAL MEDICINE

## 2020-12-19 PROCEDURE — 1159F PR MEDICATION LIST DOCUMENTED IN MEDICAL RECORD: ICD-10-PCS | Mod: HCNC,S$GLB,, | Performed by: INTERNAL MEDICINE

## 2020-12-19 PROCEDURE — 99214 OFFICE O/P EST MOD 30 MIN: CPT | Mod: HCNC,S$GLB,, | Performed by: INTERNAL MEDICINE

## 2020-12-19 PROCEDURE — 1157F ADVNC CARE PLAN IN RCRD: CPT | Mod: HCNC,S$GLB,, | Performed by: INTERNAL MEDICINE

## 2020-12-19 PROCEDURE — 1159F MED LIST DOCD IN RCRD: CPT | Mod: HCNC,S$GLB,, | Performed by: INTERNAL MEDICINE

## 2020-12-19 PROCEDURE — 99999 PR PBB SHADOW E&M-EST. PATIENT-LVL V: ICD-10-PCS | Mod: PBBFAC,HCNC,, | Performed by: INTERNAL MEDICINE

## 2020-12-19 PROCEDURE — 99999 PR PBB SHADOW E&M-EST. PATIENT-LVL V: CPT | Mod: PBBFAC,HCNC,, | Performed by: INTERNAL MEDICINE

## 2020-12-19 NOTE — ASSESSMENT & PLAN NOTE
Differential:  Subclinical hyperthyroidism    Patient is clinically euthyroid.  Before proceeding further will order complete thyroid panel including TSI  Will pursue further treatment based on his follow-up workup

## 2020-12-19 NOTE — LETTER
December 19, 2020      Joe Bunn MD  [address]  [facility] [address]           [place] - Endocrinology  [address] [place] [address]  Phone: [phone]  Fax: [phone]          Patient: Nickolas Blake   MR Number: [id number]   YOB: 1931   Date of Visit: 12/19/2020       Dear Dr. Jeo Bunn:    Thank you for referring Nickolas Blake to me for evaluation. Attached you will find relevant portions of my assessment and plan of care.    If you have questions, please do not hesitate to call me. I look forward to following Nickolas Blake along with you.    Sincerely,    Ronn Du MD    Enclosure  CC:  No Recipients    If you would like to receive this communication electronically, please contact [email or web address] or [phone] to request more information on [employer] Link access.    For providers and/or their staff who would like to refer a patient to [facility], please contact us through our one-stop-shop provider referral line, [facility], at [phone].    If you feel you have received this communication in error or would no longer like to receive these types of communications, please e-mail [email or web address]

## 2020-12-19 NOTE — PROGRESS NOTES
Subjective:      Patient ID: Nickolas Blake is a 89 y.o. male.    Chief Complaint:  Diabetes      History of Present Illness    Nickolas Blake presents today for follow up of Type 2 DM. Previous patient of MIRZA Read and PIERCE Starr. Last seen in 2019.  This is her his first visit with me.        With regards to the diabetes:     Was followed in [facility]. Began in chronic DM clinic 05/2016      Diagnosed with Type 2  DM: 1980's  Family History of Type 2 DM in mother, brothers, and sister  Known complications:    DKA-none  RN-None, last eval- 7/2019  PN-mild, recently hurt his big toe.  Had negative x-ray.  Scheduled to see Podiatry on Wednesday  Nephropathy-positive   CAD-denies      Current regimen:  Lantus 24 units daily (morning)  Novolog 12 units with meals, caregiver gives correctional insulin- doesnot remember the dose     The patient's sitter (Ms. Begum)  arrives today.Sitter is the primary caretaker since last 6 years.  She is there Monday to Saturday.  Missed doses-denies during weekdays - son oversees him on Sunday and sitter reports his schedule on Sunday is erratic    No logs today.   Sitter reports he is alone from 7pm -7am  Few days he is awake and snacks all night inappropriately    Reports BGs are ranging 150-270; were 300s few weeks ago  Reports lately he is having bad days, has anger issues which drives his sugars high       Hypoglycemia: denies       Type of Glucose Meter: Accu-chek patria     Physical Activity: No formal exercise. Limited by spinal stenosis. He is in wheelchair today.      Dietary recall: He is eating 3 meals daily.  Breakfast: 3 times week has oatmeal- banana/blueberry;  Egg sandwich ,omelet or turkey katz +coffee  Lunch:steak with sweet potato; or home -cooked, corn , shrimp, gumbo. Heavy meal- somedays he eats only 2-3 spoons  Dinner: peanut butter jelly, mac-n-cheese, corn; grits, -light eater  Snacks -fruit and milk, reports patient indulges in sugar free snacks  "and ice creams      Education - last visit: has been in the past     Has a Medic alert tag -has but does not wear  Glucagon/Baqsimi- has but states probably    Diabetes Management Status    Statin: Taking  ACE/ARB: Taking    Screening or Prevention Patient's value Goal Complete/Controlled?   HgA1C Testing and Control   Lab Results   Component Value Date    HGBA1C 9.6 (H) 12/15/2020      Annually/Less than 8% No   Lipid profile : 12/15/2020 Annually Yes   LDL control Lab Results   Component Value Date    LDLCALC 86.2 12/15/2020    Annually/Less than 100 mg/dl  Yes   Nephropathy screening Lab Results   Component Value Date    LABMICR 138.0 2019     Lab Results   Component Value Date    PROTEINUA Negative 2019    Annually No   Blood pressure BP Readings from Last 1 Encounters:   20 112/64    Less than 140/90 Yes   Dilated retinal exam : 2019 Annually No   Foot exam   : 2019 Annually No      Regarding Subclinical hyperthyroidism:  Review of blood work notable for low TSH since 2019.  Normal free T4 levels.  No recent free T3 levels available    Last thyroid ultrasound 2016-notable for subcentimeter nodules.    On interview today patient denied chest discomfort, palpitations, diarrhea, heat intolerance or increased sweating    Weight gain of 7-10 pounds  +insomnia        Review of Systems    Review of 7 systems negative except as documented above      Objective:     /64   Pulse 96   Temp 97.3 °F (36.3 °C)   Ht 5' 11" (1.803 m)   Wt 81.6 kg (180 lb)   SpO2 97%   BMI 25.10 kg/m²   BP Readings from Last 3 Encounters:   20 112/64   20 134/62   20 127/80     Wt Readings from Last 1 Encounters:   20 0952 81.6 kg (180 lb)     Body mass index is 25.1 kg/m².      Physical Exam  Vitals signs reviewed.   Constitutional:       Appearance: He is well-developed.   Neck:      Thyroid: No thyromegaly.   Cardiovascular:      Rate and Rhythm: Normal rate. "   Pulmonary:      Effort: Pulmonary effort is normal.      Breath sounds: Normal breath sounds.   Abdominal:      Palpations: Abdomen is soft.   Skin:     General: Skin is warm.   Neurological:      Mental Status: He is alert.   Psychiatric:         Judgment: Judgment normal.                Lab Review:   Lab Results   Component Value Date    HGBA1C 9.6 (H) 12/15/2020     Lab Results   Component Value Date    CHOL 148 12/15/2020    HDL 38 (L) 12/15/2020    LDLCALC 86.2 12/15/2020    TRIG 119 12/15/2020    CHOLHDL 25.7 12/15/2020     Lab Results   Component Value Date     12/15/2020    K 4.4 12/15/2020     12/15/2020    CO2 27 12/15/2020     (H) 12/15/2020    BUN 27 (H) 12/15/2020    CREATININE 1.2 12/15/2020    CALCIUM 9.1 12/15/2020    PROT 7.5 12/15/2020    ALBUMIN 3.5 12/15/2020    BILITOT 0.8 12/15/2020    ALKPHOS 114 12/15/2020    AST 25 12/15/2020    ALT 24 12/15/2020    ANIONGAP 9 12/15/2020    ESTGFRAFRICA >60.0 12/15/2020    EGFRNONAA 53.3 (A) 12/15/2020    TSH 0.279 (L) 12/15/2020     No results found for: [id number]    Assessment and Plan     Type 2 diabetes mellitus with stage 3 chronic kidney disease, with long-term current use of insulin    89-year-old elderly male here for evaluation of uncontrolled type 2 diabetes.  Most recent A1c at 9.6 considering his elderly age and overall life expectancy, our goal A1c is less than 8 with no low sugars  -- Reviewed goals of therapy are to get the best control we can without hypoglycemia.    Based on the reported sugar readings by the caregiver and review of  his diet regimen, most likely elevated sugars secondary to inappropriate snacking.  Discussed the same with the patient and the caregiver.  Recommended to limit sugar treats to not more than 1 time per day.    For now will  Continue Lantus to 24 units daily   Continue Novolog 12 units + SSI      Recommended to send sugar logs in 1 week for review.    -- Reviewed patient's current insulin  regimen. Clarified proper insulin dose and timing in relation to meals, etc. Insulin injection sites and proper rotation instructed.    -- Advised frequent self blood glucose monitoring.  Patient encouraged to document glucose results and bring them to every clinic visit    -- Hypoglycemia precautions discussed.   -- Call for Bg repeatedly < 90 or > 180.   -- Close adherence to lifestyle changes recommended.       Abnormal thyroid blood test  Differential:  Subclinical hyperthyroidism    Patient is clinically euthyroid.  Before proceeding further will order complete thyroid panel including TSI  Will pursue further treatment based on his follow-up workup           Hyperlipidemia  Controlled   On statin per ADA recommendations        Hypertension, essential  -- on ACEI  -- Controlled  -- Blood pressure goals discussed with patient

## 2020-12-19 NOTE — ASSESSMENT & PLAN NOTE
89-year-old elderly male here for evaluation of uncontrolled type 2 diabetes.  Most recent A1c at 9.6 considering his elderly age and overall life expectancy, our goal A1c is less than 8 with no low sugars  -- Reviewed goals of therapy are to get the best control we can without hypoglycemia.    Based on the reported sugar readings by the caregiver and review of  his diet regimen, most likely elevated sugars secondary to inappropriate snacking.  Discussed the same with the patient and the caregiver.  Recommended to limit sugar treats to not more than 1 time per day.    For now will  Continue Lantus to 24 units daily   Continue Novolog 12 units + SSI      Recommended to send sugar logs in 1 week for review.    -- Reviewed patient's current insulin regimen. Clarified proper insulin dose and timing in relation to meals, etc. Insulin injection sites and proper rotation instructed.    -- Advised frequent self blood glucose monitoring.  Patient encouraged to document glucose results and bring them to every clinic visit    -- Hypoglycemia precautions discussed.   -- Call for Bg repeatedly < 90 or > 180.   -- Close adherence to lifestyle changes recommended.

## 2020-12-23 ENCOUNTER — OFFICE VISIT (OUTPATIENT)
Dept: PODIATRY | Facility: CLINIC | Age: 85
End: 2020-12-23
Attending: FAMILY MEDICINE
Payer: MEDICARE

## 2020-12-23 VITALS — SYSTOLIC BLOOD PRESSURE: 130 MMHG | DIASTOLIC BLOOD PRESSURE: 76 MMHG | HEART RATE: 90 BPM

## 2020-12-23 DIAGNOSIS — E11.51 DIABETES MELLITUS WITH PERIPHERAL VASCULAR DISEASE: ICD-10-CM

## 2020-12-23 DIAGNOSIS — S99.921A INJURY OF TOE ON RIGHT FOOT, INITIAL ENCOUNTER: ICD-10-CM

## 2020-12-23 DIAGNOSIS — B35.1 ONYCHOMYCOSIS DUE TO DERMATOPHYTE: Primary | ICD-10-CM

## 2020-12-23 PROCEDURE — 99499 RISK ADDL DX/OHS AUDIT: ICD-10-PCS | Mod: S$GLB,,, | Performed by: PODIATRIST

## 2020-12-23 PROCEDURE — 1125F PR PAIN SEVERITY QUANTIFIED, PAIN PRESENT: ICD-10-PCS | Mod: HCNC,S$GLB,, | Performed by: PODIATRIST

## 2020-12-23 PROCEDURE — 1157F ADVNC CARE PLAN IN RCRD: CPT | Mod: HCNC,S$GLB,, | Performed by: PODIATRIST

## 2020-12-23 PROCEDURE — 99499 UNLISTED E&M SERVICE: CPT | Mod: S$GLB,,, | Performed by: PODIATRIST

## 2020-12-23 PROCEDURE — 11721 DEBRIDE NAIL 6 OR MORE: CPT | Mod: Q8,HCNC,S$GLB, | Performed by: PODIATRIST

## 2020-12-23 PROCEDURE — 99203 PR OFFICE/OUTPT VISIT, NEW, LEVL III, 30-44 MIN: ICD-10-PCS | Mod: 25,HCNC,S$GLB, | Performed by: PODIATRIST

## 2020-12-23 PROCEDURE — 1125F AMNT PAIN NOTED PAIN PRSNT: CPT | Mod: HCNC,S$GLB,, | Performed by: PODIATRIST

## 2020-12-23 PROCEDURE — 99203 OFFICE O/P NEW LOW 30 MIN: CPT | Mod: 25,HCNC,S$GLB, | Performed by: PODIATRIST

## 2020-12-23 PROCEDURE — 1159F MED LIST DOCD IN RCRD: CPT | Mod: HCNC,S$GLB,, | Performed by: PODIATRIST

## 2020-12-23 PROCEDURE — 11721 PR DEBRIDEMENT OF NAILS, 6 OR MORE: ICD-10-PCS | Mod: Q8,HCNC,S$GLB, | Performed by: PODIATRIST

## 2020-12-23 PROCEDURE — 1159F PR MEDICATION LIST DOCUMENTED IN MEDICAL RECORD: ICD-10-PCS | Mod: HCNC,S$GLB,, | Performed by: PODIATRIST

## 2020-12-23 PROCEDURE — 99999 PR PBB SHADOW E&M-EST. PATIENT-LVL III: ICD-10-PCS | Mod: PBBFAC,HCNC,, | Performed by: PODIATRIST

## 2020-12-23 PROCEDURE — 1157F PR ADVANCE CARE PLAN OR EQUIV PRESENT IN MEDICAL RECORD: ICD-10-PCS | Mod: HCNC,S$GLB,, | Performed by: PODIATRIST

## 2020-12-23 PROCEDURE — 99999 PR PBB SHADOW E&M-EST. PATIENT-LVL III: CPT | Mod: PBBFAC,HCNC,, | Performed by: PODIATRIST

## 2020-12-27 ENCOUNTER — CLINICAL SUPPORT (OUTPATIENT)
Dept: URGENT CARE | Facility: CLINIC | Age: 85
End: 2020-12-27
Payer: MEDICARE

## 2020-12-27 DIAGNOSIS — Z03.818 ENCOUNTER FOR OBSERVATION FOR SUSPECTED EXPOSURE TO OTHER BIOLOGICAL AGENTS RULED OUT: Primary | ICD-10-CM

## 2020-12-27 LAB
CTP QC/QA: YES
SARS-COV-2 RDRP RESP QL NAA+PROBE: NEGATIVE

## 2020-12-27 PROCEDURE — U0002 COVID-19 LAB TEST NON-CDC: HCPCS | Mod: QW,S$GLB,, | Performed by: FAMILY MEDICINE

## 2020-12-27 PROCEDURE — U0002: ICD-10-PCS | Mod: QW,S$GLB,, | Performed by: FAMILY MEDICINE

## 2020-12-27 NOTE — PROGRESS NOTES
Patient presents to the clinic with COVID concerns, patient was given the option to see a provider.  Patient is symptomatic and elects to not see a provider, they were given a handout which recognizes their decision to not see the provider today, as well as recommendations to follow up with their PCP.  If their symptoms worsen, they will need to follow up with their PCP, any urgent care clinic, or ER for further evaluation.     Patient's temperature, O2 sats, and pulse were taken for this visit.   There were no vitals filed for this visit.      They were within normal limits.   If not with in normal, they were advised that they should see the provider for evaluation.  However, they adamantly refused despite provider's encouragement.

## 2020-12-31 ENCOUNTER — TELEPHONE (OUTPATIENT)
Dept: HOME HEALTH SERVICES | Facility: CLINIC | Age: 85
End: 2020-12-31

## 2020-12-31 NOTE — TELEPHONE ENCOUNTER
"Spoke with patient's caregiver this am to inform of home visit.  Patient caregiver did not see a need for a visit at this time.  Caregiver stated patient has had multiple "doctor appointments" in the last couple of weeks.  Will reschedule patient for follow up in 4 weeks.   "

## 2021-01-26 ENCOUNTER — TELEPHONE (OUTPATIENT)
Dept: HOME HEALTH SERVICES | Facility: CLINIC | Age: 86
End: 2021-01-26

## 2021-02-06 ENCOUNTER — PATIENT OUTREACH (OUTPATIENT)
Dept: ADMINISTRATIVE | Facility: OTHER | Age: 86
End: 2021-02-06

## 2021-03-23 ENCOUNTER — PATIENT OUTREACH (OUTPATIENT)
Dept: ADMINISTRATIVE | Facility: OTHER | Age: 86
End: 2021-03-23

## 2021-03-24 ENCOUNTER — OFFICE VISIT (OUTPATIENT)
Dept: NEUROLOGY | Facility: CLINIC | Age: 86
End: 2021-03-24
Payer: MEDICARE

## 2021-03-24 VITALS
SYSTOLIC BLOOD PRESSURE: 104 MMHG | DIASTOLIC BLOOD PRESSURE: 65 MMHG | HEART RATE: 104 BPM | WEIGHT: 180 LBS | BODY MASS INDEX: 25.1 KG/M2 | OXYGEN SATURATION: 100 %

## 2021-03-24 DIAGNOSIS — G30.9 MIXED ALZHEIMER'S AND VASCULAR DEMENTIA: Primary | ICD-10-CM

## 2021-03-24 DIAGNOSIS — F01.50 MIXED ALZHEIMER'S AND VASCULAR DEMENTIA: Primary | ICD-10-CM

## 2021-03-24 DIAGNOSIS — G47.00 INSOMNIA, UNSPECIFIED TYPE: ICD-10-CM

## 2021-03-24 DIAGNOSIS — F02.80 MIXED ALZHEIMER'S AND VASCULAR DEMENTIA: Primary | ICD-10-CM

## 2021-03-24 PROCEDURE — 1101F PR PT FALLS ASSESS DOC 0-1 FALLS W/OUT INJ PAST YR: ICD-10-PCS | Mod: CPTII,S$GLB,, | Performed by: PSYCHIATRY & NEUROLOGY

## 2021-03-24 PROCEDURE — 99999 PR PBB SHADOW E&M-EST. PATIENT-LVL V: ICD-10-PCS | Mod: PBBFAC,,, | Performed by: PSYCHIATRY & NEUROLOGY

## 2021-03-24 PROCEDURE — 1101F PT FALLS ASSESS-DOCD LE1/YR: CPT | Mod: CPTII,S$GLB,, | Performed by: PSYCHIATRY & NEUROLOGY

## 2021-03-24 PROCEDURE — 3288F PR FALLS RISK ASSESSMENT DOCUMENTED: ICD-10-PCS | Mod: CPTII,S$GLB,, | Performed by: PSYCHIATRY & NEUROLOGY

## 2021-03-24 PROCEDURE — 1159F PR MEDICATION LIST DOCUMENTED IN MEDICAL RECORD: ICD-10-PCS | Mod: S$GLB,,, | Performed by: PSYCHIATRY & NEUROLOGY

## 2021-03-24 PROCEDURE — 1159F MED LIST DOCD IN RCRD: CPT | Mod: S$GLB,,, | Performed by: PSYCHIATRY & NEUROLOGY

## 2021-03-24 PROCEDURE — 99499 RISK ADDL DX/OHS AUDIT: ICD-10-PCS | Mod: S$GLB,,, | Performed by: PSYCHIATRY & NEUROLOGY

## 2021-03-24 PROCEDURE — 1157F ADVNC CARE PLAN IN RCRD: CPT | Mod: S$GLB,,, | Performed by: PSYCHIATRY & NEUROLOGY

## 2021-03-24 PROCEDURE — 99999 PR PBB SHADOW E&M-EST. PATIENT-LVL V: CPT | Mod: PBBFAC,,, | Performed by: PSYCHIATRY & NEUROLOGY

## 2021-03-24 PROCEDURE — 99214 PR OFFICE/OUTPT VISIT, EST, LEVL IV, 30-39 MIN: ICD-10-PCS | Mod: S$GLB,,, | Performed by: PSYCHIATRY & NEUROLOGY

## 2021-03-24 PROCEDURE — 1157F PR ADVANCE CARE PLAN OR EQUIV PRESENT IN MEDICAL RECORD: ICD-10-PCS | Mod: S$GLB,,, | Performed by: PSYCHIATRY & NEUROLOGY

## 2021-03-24 PROCEDURE — 3288F FALL RISK ASSESSMENT DOCD: CPT | Mod: CPTII,S$GLB,, | Performed by: PSYCHIATRY & NEUROLOGY

## 2021-03-24 PROCEDURE — 99499 UNLISTED E&M SERVICE: CPT | Mod: S$GLB,,, | Performed by: PSYCHIATRY & NEUROLOGY

## 2021-03-24 PROCEDURE — 1126F AMNT PAIN NOTED NONE PRSNT: CPT | Mod: S$GLB,,, | Performed by: PSYCHIATRY & NEUROLOGY

## 2021-03-24 PROCEDURE — 99214 OFFICE O/P EST MOD 30 MIN: CPT | Mod: S$GLB,,, | Performed by: PSYCHIATRY & NEUROLOGY

## 2021-03-24 PROCEDURE — 1126F PR PAIN SEVERITY QUANTIFIED, NO PAIN PRESENT: ICD-10-PCS | Mod: S$GLB,,, | Performed by: PSYCHIATRY & NEUROLOGY

## 2021-03-24 RX ORDER — TRAZODONE HYDROCHLORIDE 50 MG/1
TABLET ORAL
Qty: 180 TABLET | Refills: 1 | Status: ON HOLD | OUTPATIENT
Start: 2021-03-24 | End: 2021-12-03 | Stop reason: SDUPTHER

## 2021-03-24 RX ORDER — METFORMIN HYDROCHLORIDE 500 MG/1
500 TABLET, EXTENDED RELEASE ORAL DAILY
Status: ON HOLD | COMMUNITY
Start: 2021-03-15 | End: 2021-12-18 | Stop reason: HOSPADM

## 2021-03-29 ENCOUNTER — PES CALL (OUTPATIENT)
Dept: ADMINISTRATIVE | Facility: CLINIC | Age: 86
End: 2021-03-29

## 2021-04-22 DIAGNOSIS — Z79.4 TYPE 2 DIABETES MELLITUS WITH STAGE 3 CHRONIC KIDNEY DISEASE, WITH LONG-TERM CURRENT USE OF INSULIN, UNSPECIFIED WHETHER STAGE 3A OR 3B CKD: Primary | ICD-10-CM

## 2021-04-22 DIAGNOSIS — E11.22 TYPE 2 DIABETES MELLITUS WITH STAGE 3 CHRONIC KIDNEY DISEASE, WITH LONG-TERM CURRENT USE OF INSULIN, UNSPECIFIED WHETHER STAGE 3A OR 3B CKD: Primary | ICD-10-CM

## 2021-04-22 DIAGNOSIS — N18.30 TYPE 2 DIABETES MELLITUS WITH STAGE 3 CHRONIC KIDNEY DISEASE, WITH LONG-TERM CURRENT USE OF INSULIN, UNSPECIFIED WHETHER STAGE 3A OR 3B CKD: Primary | ICD-10-CM

## 2021-04-23 ENCOUNTER — LAB VISIT (OUTPATIENT)
Dept: LAB | Facility: HOSPITAL | Age: 86
End: 2021-04-23
Attending: NURSE PRACTITIONER
Payer: MEDICARE

## 2021-04-23 DIAGNOSIS — N18.30 TYPE 2 DIABETES MELLITUS WITH STAGE 3 CHRONIC KIDNEY DISEASE, WITH LONG-TERM CURRENT USE OF INSULIN, UNSPECIFIED WHETHER STAGE 3A OR 3B CKD: ICD-10-CM

## 2021-04-23 DIAGNOSIS — Z79.4 TYPE 2 DIABETES MELLITUS WITH STAGE 3 CHRONIC KIDNEY DISEASE, WITH LONG-TERM CURRENT USE OF INSULIN, UNSPECIFIED WHETHER STAGE 3A OR 3B CKD: ICD-10-CM

## 2021-04-23 DIAGNOSIS — R79.89 ABNORMAL THYROID BLOOD TEST: ICD-10-CM

## 2021-04-23 DIAGNOSIS — E07.89 OTHER SPECIFIED DISORDERS OF THYROID: ICD-10-CM

## 2021-04-23 DIAGNOSIS — E11.22 TYPE 2 DIABETES MELLITUS WITH STAGE 3 CHRONIC KIDNEY DISEASE, WITH LONG-TERM CURRENT USE OF INSULIN, UNSPECIFIED WHETHER STAGE 3A OR 3B CKD: ICD-10-CM

## 2021-04-23 LAB
ALBUMIN SERPL BCP-MCNC: 3.8 G/DL (ref 3.5–5.2)
ALP SERPL-CCNC: 126 U/L (ref 55–135)
ALT SERPL W/O P-5'-P-CCNC: 19 U/L (ref 10–44)
ANION GAP SERPL CALC-SCNC: 11 MMOL/L (ref 8–16)
AST SERPL-CCNC: 29 U/L (ref 10–40)
BILIRUB SERPL-MCNC: 0.8 MG/DL (ref 0.1–1)
BUN SERPL-MCNC: 25 MG/DL (ref 8–23)
CALCIUM SERPL-MCNC: 9.1 MG/DL (ref 8.7–10.5)
CHLORIDE SERPL-SCNC: 105 MMOL/L (ref 95–110)
CO2 SERPL-SCNC: 24 MMOL/L (ref 23–29)
CREAT SERPL-MCNC: 1.2 MG/DL (ref 0.5–1.4)
EST. GFR  (AFRICAN AMERICAN): >60 ML/MIN/1.73 M^2
EST. GFR  (NON AFRICAN AMERICAN): 53.3 ML/MIN/1.73 M^2
ESTIMATED AVG GLUCOSE: 171 MG/DL (ref 68–131)
GLUCOSE SERPL-MCNC: 158 MG/DL (ref 70–110)
HBA1C MFR BLD: 7.6 % (ref 4–5.6)
POTASSIUM SERPL-SCNC: 4 MMOL/L (ref 3.5–5.1)
PROT SERPL-MCNC: 8 G/DL (ref 6–8.4)
SODIUM SERPL-SCNC: 140 MMOL/L (ref 136–145)
T3FREE SERPL-MCNC: 1.9 PG/ML (ref 2.3–4.2)
T4 FREE SERPL-MCNC: 1.07 NG/DL (ref 0.71–1.51)
TSH SERPL DL<=0.005 MIU/L-ACNC: 0.49 UIU/ML (ref 0.4–4)

## 2021-04-23 PROCEDURE — 84481 FREE ASSAY (FT-3): CPT | Performed by: INTERNAL MEDICINE

## 2021-04-23 PROCEDURE — 83036 HEMOGLOBIN GLYCOSYLATED A1C: CPT | Performed by: NURSE PRACTITIONER

## 2021-04-23 PROCEDURE — 84439 ASSAY OF FREE THYROXINE: CPT | Performed by: INTERNAL MEDICINE

## 2021-04-23 PROCEDURE — 80053 COMPREHEN METABOLIC PANEL: CPT | Performed by: NURSE PRACTITIONER

## 2021-04-23 PROCEDURE — 84443 ASSAY THYROID STIM HORMONE: CPT | Performed by: INTERNAL MEDICINE

## 2021-04-23 PROCEDURE — 84445 ASSAY OF TSI GLOBULIN: CPT | Performed by: INTERNAL MEDICINE

## 2021-04-26 ENCOUNTER — PATIENT OUTREACH (OUTPATIENT)
Dept: ADMINISTRATIVE | Facility: OTHER | Age: 86
End: 2021-04-26

## 2021-04-26 ENCOUNTER — PATIENT MESSAGE (OUTPATIENT)
Dept: ENDOCRINOLOGY | Facility: CLINIC | Age: 86
End: 2021-04-26

## 2021-04-26 LAB — TSI SER-ACNC: <0.1 IU/L

## 2021-04-27 ENCOUNTER — OFFICE VISIT (OUTPATIENT)
Dept: ENDOCRINOLOGY | Facility: CLINIC | Age: 86
End: 2021-04-27
Payer: MEDICARE

## 2021-04-27 VITALS
SYSTOLIC BLOOD PRESSURE: 110 MMHG | HEART RATE: 88 BPM | WEIGHT: 180 LBS | HEIGHT: 71 IN | BODY MASS INDEX: 25.2 KG/M2 | OXYGEN SATURATION: 96 % | DIASTOLIC BLOOD PRESSURE: 63 MMHG

## 2021-04-27 DIAGNOSIS — E78.2 MIXED HYPERLIPIDEMIA: ICD-10-CM

## 2021-04-27 DIAGNOSIS — R80.9 PROTEINURIA, UNSPECIFIED TYPE: ICD-10-CM

## 2021-04-27 DIAGNOSIS — E04.2 MULTINODULAR THYROID: Chronic | ICD-10-CM

## 2021-04-27 DIAGNOSIS — R79.89 ABNORMAL THYROID BLOOD TEST: ICD-10-CM

## 2021-04-27 DIAGNOSIS — E11.65 TYPE 2 DIABETES MELLITUS WITH HYPERGLYCEMIA, WITH LONG-TERM CURRENT USE OF INSULIN: Primary | Chronic | ICD-10-CM

## 2021-04-27 DIAGNOSIS — M81.0 OSTEOPOROSIS, UNSPECIFIED OSTEOPOROSIS TYPE, UNSPECIFIED PATHOLOGICAL FRACTURE PRESENCE: ICD-10-CM

## 2021-04-27 DIAGNOSIS — Z79.4 TYPE 2 DIABETES MELLITUS WITH HYPERGLYCEMIA, WITH LONG-TERM CURRENT USE OF INSULIN: Primary | Chronic | ICD-10-CM

## 2021-04-27 PROCEDURE — 1159F MED LIST DOCD IN RCRD: CPT | Mod: S$GLB,,, | Performed by: NURSE PRACTITIONER

## 2021-04-27 PROCEDURE — 99999 PR PBB SHADOW E&M-EST. PATIENT-LVL V: CPT | Mod: PBBFAC,,, | Performed by: NURSE PRACTITIONER

## 2021-04-27 PROCEDURE — 1159F PR MEDICATION LIST DOCUMENTED IN MEDICAL RECORD: ICD-10-PCS | Mod: S$GLB,,, | Performed by: NURSE PRACTITIONER

## 2021-04-27 PROCEDURE — 3051F HG A1C>EQUAL 7.0%<8.0%: CPT | Mod: CPTII,S$GLB,, | Performed by: NURSE PRACTITIONER

## 2021-04-27 PROCEDURE — 3051F PR MOST RECENT HEMOGLOBIN A1C LEVEL 7.0 - < 8.0%: ICD-10-PCS | Mod: CPTII,S$GLB,, | Performed by: NURSE PRACTITIONER

## 2021-04-27 PROCEDURE — 99214 OFFICE O/P EST MOD 30 MIN: CPT | Mod: S$GLB,,, | Performed by: NURSE PRACTITIONER

## 2021-04-27 PROCEDURE — 1157F PR ADVANCE CARE PLAN OR EQUIV PRESENT IN MEDICAL RECORD: ICD-10-PCS | Mod: S$GLB,,, | Performed by: NURSE PRACTITIONER

## 2021-04-27 PROCEDURE — 1157F ADVNC CARE PLAN IN RCRD: CPT | Mod: S$GLB,,, | Performed by: NURSE PRACTITIONER

## 2021-04-27 PROCEDURE — 99999 PR PBB SHADOW E&M-EST. PATIENT-LVL V: ICD-10-PCS | Mod: PBBFAC,,, | Performed by: NURSE PRACTITIONER

## 2021-04-27 PROCEDURE — 1126F PR PAIN SEVERITY QUANTIFIED, NO PAIN PRESENT: ICD-10-PCS | Mod: S$GLB,,, | Performed by: NURSE PRACTITIONER

## 2021-04-27 PROCEDURE — 99214 PR OFFICE/OUTPT VISIT, EST, LEVL IV, 30-39 MIN: ICD-10-PCS | Mod: S$GLB,,, | Performed by: NURSE PRACTITIONER

## 2021-04-27 PROCEDURE — 1126F AMNT PAIN NOTED NONE PRSNT: CPT | Mod: S$GLB,,, | Performed by: NURSE PRACTITIONER

## 2021-04-27 RX ORDER — LINAGLIPTIN 5 MG/1
5 TABLET, FILM COATED ORAL DAILY
Qty: 90 TABLET | Refills: 3 | Status: SHIPPED | OUTPATIENT
Start: 2021-04-27 | End: 2021-05-04

## 2021-04-27 RX ORDER — GLUCAGON INJECTION, SOLUTION 1 MG/.2ML
1 INJECTION, SOLUTION SUBCUTANEOUS
Qty: 2 SYRINGE | Refills: 0 | Status: SHIPPED | OUTPATIENT
Start: 2021-04-27

## 2021-04-28 ENCOUNTER — PES CALL (OUTPATIENT)
Dept: ADMINISTRATIVE | Facility: CLINIC | Age: 86
End: 2021-04-28

## 2021-05-04 ENCOUNTER — TELEPHONE (OUTPATIENT)
Dept: ENDOCRINOLOGY | Facility: CLINIC | Age: 86
End: 2021-05-04

## 2021-05-04 DIAGNOSIS — Z79.4 TYPE 2 DIABETES MELLITUS WITH HYPERGLYCEMIA, WITH LONG-TERM CURRENT USE OF INSULIN: Primary | ICD-10-CM

## 2021-05-04 DIAGNOSIS — E11.65 TYPE 2 DIABETES MELLITUS WITH HYPERGLYCEMIA, WITH LONG-TERM CURRENT USE OF INSULIN: Primary | ICD-10-CM

## 2021-05-04 RX ORDER — ALOGLIPTIN 12.5 MG/1
1 TABLET, FILM COATED ORAL DAILY
Qty: 30 TABLET | Refills: 3 | Status: SHIPPED | OUTPATIENT
Start: 2021-05-04 | End: 2021-06-03

## 2021-05-06 ENCOUNTER — TELEPHONE (OUTPATIENT)
Dept: INTERNAL MEDICINE | Facility: CLINIC | Age: 86
End: 2021-05-06
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
## 2021-05-06 DIAGNOSIS — F01.50 MIXED ALZHEIMER'S AND VASCULAR DEMENTIA: Primary | ICD-10-CM

## 2021-05-06 DIAGNOSIS — F02.80 MIXED ALZHEIMER'S AND VASCULAR DEMENTIA: Primary | ICD-10-CM

## 2021-05-06 DIAGNOSIS — G30.9 MIXED ALZHEIMER'S AND VASCULAR DEMENTIA: Primary | ICD-10-CM

## 2021-05-12 ENCOUNTER — TELEPHONE (OUTPATIENT)
Dept: INTERNAL MEDICINE | Facility: CLINIC | Age: 86
End: 2021-05-12

## 2021-05-13 ENCOUNTER — OUTPATIENT CASE MANAGEMENT (OUTPATIENT)
Dept: ADMINISTRATIVE | Facility: OTHER | Age: 86
End: 2021-05-13

## 2021-05-13 ENCOUNTER — HOSPITAL ENCOUNTER (OUTPATIENT)
Dept: RADIOLOGY | Facility: HOSPITAL | Age: 86
Discharge: HOME | End: 2021-05-13
Attending: NURSE PRACTITIONER
Payer: MEDICARE

## 2021-05-13 ENCOUNTER — PATIENT MESSAGE (OUTPATIENT)
Dept: ENDOCRINOLOGY | Facility: CLINIC | Age: 86
End: 2021-05-13

## 2021-05-13 DIAGNOSIS — E04.2 MULTINODULAR THYROID: ICD-10-CM

## 2021-05-13 PROCEDURE — 76536 US EXAM OF HEAD AND NECK: CPT | Mod: TC

## 2021-05-13 PROCEDURE — 76536 US SOFT TISSUE HEAD NECK THYROID: ICD-10-PCS | Mod: 26,,, | Performed by: RADIOLOGY

## 2021-05-13 PROCEDURE — 76536 US EXAM OF HEAD AND NECK: CPT | Mod: 26,,, | Performed by: RADIOLOGY

## 2021-05-17 ENCOUNTER — TELEPHONE (OUTPATIENT)
Dept: INTERNAL MEDICINE | Facility: CLINIC | Age: 86
End: 2021-05-17

## 2021-06-09 ENCOUNTER — HOSPITAL ENCOUNTER (OUTPATIENT)
Dept: RADIOLOGY | Facility: CLINIC | Age: 86
Discharge: HOME | End: 2021-06-09
Attending: NURSE PRACTITIONER
Payer: MEDICARE

## 2021-06-09 DIAGNOSIS — M81.0 OSTEOPOROSIS, UNSPECIFIED OSTEOPOROSIS TYPE, UNSPECIFIED PATHOLOGICAL FRACTURE PRESENCE: ICD-10-CM

## 2021-06-09 PROCEDURE — 77080 DXA BONE DENSITY AXIAL: CPT | Mod: 26,,, | Performed by: INTERNAL MEDICINE

## 2021-06-09 PROCEDURE — 77080 DEXA BONE DENSITY SPINE HIP: ICD-10-PCS | Mod: 26,,, | Performed by: INTERNAL MEDICINE

## 2021-06-09 PROCEDURE — 77080 DXA BONE DENSITY AXIAL: CPT | Mod: TC

## 2021-06-17 ENCOUNTER — HOSPITAL ENCOUNTER (OUTPATIENT)
Dept: RADIOLOGY | Facility: HOSPITAL | Age: 86
Discharge: HOME | End: 2021-06-17
Attending: FAMILY MEDICINE
Payer: MEDICARE

## 2021-06-17 ENCOUNTER — OFFICE VISIT (OUTPATIENT)
Dept: INTERNAL MEDICINE | Facility: CLINIC | Age: 86
End: 2021-06-17
Attending: FAMILY MEDICINE
Payer: MEDICARE

## 2021-06-17 VITALS
HEIGHT: 71 IN | WEIGHT: 178 LBS | BODY MASS INDEX: 24.92 KG/M2 | DIASTOLIC BLOOD PRESSURE: 60 MMHG | HEART RATE: 88 BPM | OXYGEN SATURATION: 99 % | SYSTOLIC BLOOD PRESSURE: 132 MMHG

## 2021-06-17 DIAGNOSIS — E05.90 HYPERTHYROIDISM: ICD-10-CM

## 2021-06-17 DIAGNOSIS — F02.80 MIXED ALZHEIMER'S AND VASCULAR DEMENTIA: Primary | ICD-10-CM

## 2021-06-17 DIAGNOSIS — E78.5 HYPERLIPIDEMIA, UNSPECIFIED HYPERLIPIDEMIA TYPE: ICD-10-CM

## 2021-06-17 DIAGNOSIS — F32.4 MAJOR DEPRESSIVE DISORDER WITH SINGLE EPISODE, IN PARTIAL REMISSION: ICD-10-CM

## 2021-06-17 DIAGNOSIS — E11.22 TYPE 2 DIABETES MELLITUS WITH STAGE 3 CHRONIC KIDNEY DISEASE, WITH LONG-TERM CURRENT USE OF INSULIN, UNSPECIFIED WHETHER STAGE 3A OR 3B CKD: ICD-10-CM

## 2021-06-17 DIAGNOSIS — I10 HYPERTENSION, ESSENTIAL: ICD-10-CM

## 2021-06-17 DIAGNOSIS — R91.1 PULMONARY NODULE: ICD-10-CM

## 2021-06-17 DIAGNOSIS — G30.9 MIXED ALZHEIMER'S AND VASCULAR DEMENTIA: Primary | ICD-10-CM

## 2021-06-17 DIAGNOSIS — I25.10 CORONARY ARTERY DISEASE INVOLVING NATIVE CORONARY ARTERY OF NATIVE HEART WITHOUT ANGINA PECTORIS: ICD-10-CM

## 2021-06-17 DIAGNOSIS — E11.65 TYPE 2 DIABETES MELLITUS WITH HYPERGLYCEMIA, WITH LONG-TERM CURRENT USE OF INSULIN: Chronic | ICD-10-CM

## 2021-06-17 DIAGNOSIS — F01.50 MIXED ALZHEIMER'S AND VASCULAR DEMENTIA: Primary | ICD-10-CM

## 2021-06-17 DIAGNOSIS — N18.30 TYPE 2 DIABETES MELLITUS WITH STAGE 3 CHRONIC KIDNEY DISEASE, WITH LONG-TERM CURRENT USE OF INSULIN, UNSPECIFIED WHETHER STAGE 3A OR 3B CKD: ICD-10-CM

## 2021-06-17 DIAGNOSIS — Z79.4 TYPE 2 DIABETES MELLITUS WITH HYPERGLYCEMIA, WITH LONG-TERM CURRENT USE OF INSULIN: Chronic | ICD-10-CM

## 2021-06-17 DIAGNOSIS — M48.061 SPINAL STENOSIS OF LUMBAR REGION AT MULTIPLE LEVELS: ICD-10-CM

## 2021-06-17 DIAGNOSIS — Z79.4 TYPE 2 DIABETES MELLITUS WITH STAGE 3 CHRONIC KIDNEY DISEASE, WITH LONG-TERM CURRENT USE OF INSULIN, UNSPECIFIED WHETHER STAGE 3A OR 3B CKD: ICD-10-CM

## 2021-06-17 DIAGNOSIS — I70.0 AORTIC ATHEROSCLEROSIS: ICD-10-CM

## 2021-06-17 PROCEDURE — 1101F PT FALLS ASSESS-DOCD LE1/YR: CPT | Mod: CPTII,S$GLB,, | Performed by: FAMILY MEDICINE

## 2021-06-17 PROCEDURE — 1159F PR MEDICATION LIST DOCUMENTED IN MEDICAL RECORD: ICD-10-PCS | Mod: S$GLB,,, | Performed by: FAMILY MEDICINE

## 2021-06-17 PROCEDURE — 3288F PR FALLS RISK ASSESSMENT DOCUMENTED: ICD-10-PCS | Mod: CPTII,S$GLB,, | Performed by: FAMILY MEDICINE

## 2021-06-17 PROCEDURE — 1157F ADVNC CARE PLAN IN RCRD: CPT | Mod: S$GLB,,, | Performed by: FAMILY MEDICINE

## 2021-06-17 PROCEDURE — 99999 PR PBB SHADOW E&M-EST. PATIENT-LVL IV: ICD-10-PCS | Mod: PBBFAC,,, | Performed by: FAMILY MEDICINE

## 2021-06-17 PROCEDURE — 99499 UNLISTED E&M SERVICE: CPT | Mod: S$GLB,,, | Performed by: FAMILY MEDICINE

## 2021-06-17 PROCEDURE — 99499 RISK ADDL DX/OHS AUDIT: ICD-10-PCS | Mod: S$GLB,,, | Performed by: FAMILY MEDICINE

## 2021-06-17 PROCEDURE — 71046 X-RAY EXAM CHEST 2 VIEWS: CPT | Mod: TC

## 2021-06-17 PROCEDURE — 3051F HG A1C>EQUAL 7.0%<8.0%: CPT | Mod: CPTII,S$GLB,, | Performed by: FAMILY MEDICINE

## 2021-06-17 PROCEDURE — 1159F MED LIST DOCD IN RCRD: CPT | Mod: S$GLB,,, | Performed by: FAMILY MEDICINE

## 2021-06-17 PROCEDURE — 99999 PR PBB SHADOW E&M-EST. PATIENT-LVL IV: CPT | Mod: PBBFAC,,, | Performed by: FAMILY MEDICINE

## 2021-06-17 PROCEDURE — 99214 OFFICE O/P EST MOD 30 MIN: CPT | Mod: S$GLB,,, | Performed by: FAMILY MEDICINE

## 2021-06-17 PROCEDURE — 71046 X-RAY EXAM CHEST 2 VIEWS: CPT | Mod: 26,,, | Performed by: RADIOLOGY

## 2021-06-17 PROCEDURE — 71046 XR CHEST PA AND LATERAL: ICD-10-PCS | Mod: 26,,, | Performed by: RADIOLOGY

## 2021-06-17 PROCEDURE — 1157F PR ADVANCE CARE PLAN OR EQUIV PRESENT IN MEDICAL RECORD: ICD-10-PCS | Mod: S$GLB,,, | Performed by: FAMILY MEDICINE

## 2021-06-17 PROCEDURE — 99214 PR OFFICE/OUTPT VISIT, EST, LEVL IV, 30-39 MIN: ICD-10-PCS | Mod: S$GLB,,, | Performed by: FAMILY MEDICINE

## 2021-06-17 PROCEDURE — 1101F PR PT FALLS ASSESS DOC 0-1 FALLS W/OUT INJ PAST YR: ICD-10-PCS | Mod: CPTII,S$GLB,, | Performed by: FAMILY MEDICINE

## 2021-06-17 PROCEDURE — 3051F PR MOST RECENT HEMOGLOBIN A1C LEVEL 7.0 - < 8.0%: ICD-10-PCS | Mod: CPTII,S$GLB,, | Performed by: FAMILY MEDICINE

## 2021-06-17 PROCEDURE — 1125F PR PAIN SEVERITY QUANTIFIED, PAIN PRESENT: ICD-10-PCS | Mod: S$GLB,,, | Performed by: FAMILY MEDICINE

## 2021-06-17 PROCEDURE — 1125F AMNT PAIN NOTED PAIN PRSNT: CPT | Mod: S$GLB,,, | Performed by: FAMILY MEDICINE

## 2021-06-17 PROCEDURE — 3288F FALL RISK ASSESSMENT DOCD: CPT | Mod: CPTII,S$GLB,, | Performed by: FAMILY MEDICINE

## 2021-06-17 RX ORDER — ALOGLIPTIN 25 MG/1
1 TABLET, FILM COATED ORAL DAILY
COMMUNITY
Start: 2021-06-10

## 2021-06-17 RX ORDER — INSULIN ASPART 100 [IU]/ML
6 INJECTION, SOLUTION INTRAVENOUS; SUBCUTANEOUS
Status: ON HOLD | COMMUNITY
End: 2021-12-13 | Stop reason: SDUPTHER

## 2021-06-23 ENCOUNTER — PATIENT MESSAGE (OUTPATIENT)
Dept: ENDOCRINOLOGY | Facility: CLINIC | Age: 86
End: 2021-06-23

## 2021-08-12 ENCOUNTER — LAB VISIT (OUTPATIENT)
Dept: LAB | Facility: HOSPITAL | Age: 86
End: 2021-08-12
Attending: NURSE PRACTITIONER
Payer: MEDICARE

## 2021-08-12 DIAGNOSIS — Z79.4 TYPE 2 DIABETES MELLITUS WITH HYPERGLYCEMIA, WITH LONG-TERM CURRENT USE OF INSULIN: Chronic | ICD-10-CM

## 2021-08-12 DIAGNOSIS — E11.65 TYPE 2 DIABETES MELLITUS WITH HYPERGLYCEMIA, WITH LONG-TERM CURRENT USE OF INSULIN: Chronic | ICD-10-CM

## 2021-08-12 LAB
ALBUMIN SERPL BCP-MCNC: 3.3 G/DL (ref 3.5–5.2)
ALP SERPL-CCNC: 106 U/L (ref 55–135)
ALT SERPL W/O P-5'-P-CCNC: 18 U/L (ref 10–44)
ANION GAP SERPL CALC-SCNC: 8 MMOL/L (ref 8–16)
AST SERPL-CCNC: 20 U/L (ref 10–40)
BILIRUB SERPL-MCNC: 0.8 MG/DL (ref 0.1–1)
BUN SERPL-MCNC: 20 MG/DL (ref 8–23)
CALCIUM SERPL-MCNC: 9 MG/DL (ref 8.7–10.5)
CHLORIDE SERPL-SCNC: 103 MMOL/L (ref 95–110)
CO2 SERPL-SCNC: 27 MMOL/L (ref 23–29)
CREAT SERPL-MCNC: 1.1 MG/DL (ref 0.5–1.4)
EST. GFR  (AFRICAN AMERICAN): >60 ML/MIN/1.73 M^2
EST. GFR  (NON AFRICAN AMERICAN): 58.8 ML/MIN/1.73 M^2
ESTIMATED AVG GLUCOSE: 197 MG/DL (ref 68–131)
GLUCOSE SERPL-MCNC: 220 MG/DL (ref 70–110)
HBA1C MFR BLD: 8.5 % (ref 4–5.6)
POTASSIUM SERPL-SCNC: 4.2 MMOL/L (ref 3.5–5.1)
PROT SERPL-MCNC: 6.8 G/DL (ref 6–8.4)
SODIUM SERPL-SCNC: 138 MMOL/L (ref 136–145)

## 2021-08-12 PROCEDURE — 83036 HEMOGLOBIN GLYCOSYLATED A1C: CPT | Performed by: NURSE PRACTITIONER

## 2021-08-12 PROCEDURE — 36415 COLL VENOUS BLD VENIPUNCTURE: CPT | Performed by: NURSE PRACTITIONER

## 2021-08-12 PROCEDURE — 80053 COMPREHEN METABOLIC PANEL: CPT | Performed by: NURSE PRACTITIONER

## 2021-08-13 ENCOUNTER — PATIENT MESSAGE (OUTPATIENT)
Dept: ENDOCRINOLOGY | Facility: CLINIC | Age: 86
End: 2021-08-13

## 2021-08-18 ENCOUNTER — PATIENT OUTREACH (OUTPATIENT)
Dept: ADMINISTRATIVE | Facility: OTHER | Age: 86
End: 2021-08-18

## 2021-08-18 ENCOUNTER — OFFICE VISIT (OUTPATIENT)
Dept: ENDOCRINOLOGY | Facility: CLINIC | Age: 86
End: 2021-08-18
Payer: MEDICARE

## 2021-08-18 DIAGNOSIS — E78.2 MIXED HYPERLIPIDEMIA: ICD-10-CM

## 2021-08-18 DIAGNOSIS — I10 HYPERTENSION, ESSENTIAL: ICD-10-CM

## 2021-08-18 DIAGNOSIS — R80.9 PROTEINURIA, UNSPECIFIED TYPE: ICD-10-CM

## 2021-08-18 DIAGNOSIS — E05.90 SUBCLINICAL HYPERTHYROIDISM: ICD-10-CM

## 2021-08-18 DIAGNOSIS — R79.89 ABNORMAL THYROID BLOOD TEST: ICD-10-CM

## 2021-08-18 DIAGNOSIS — M85.80 OSTEOPENIA, UNSPECIFIED LOCATION: ICD-10-CM

## 2021-08-18 DIAGNOSIS — Z79.4 TYPE 2 DIABETES MELLITUS WITH HYPERGLYCEMIA, WITH LONG-TERM CURRENT USE OF INSULIN: Chronic | ICD-10-CM

## 2021-08-18 DIAGNOSIS — E04.2 MULTINODULAR THYROID: Chronic | ICD-10-CM

## 2021-08-18 DIAGNOSIS — E11.65 TYPE 2 DIABETES MELLITUS WITH HYPERGLYCEMIA, WITH LONG-TERM CURRENT USE OF INSULIN: Chronic | ICD-10-CM

## 2021-08-18 PROCEDURE — 1160F RVW MEDS BY RX/DR IN RCRD: CPT | Mod: CPTII,95,, | Performed by: NURSE PRACTITIONER

## 2021-08-18 PROCEDURE — 1157F ADVNC CARE PLAN IN RCRD: CPT | Mod: CPTII,95,, | Performed by: NURSE PRACTITIONER

## 2021-08-18 PROCEDURE — 1159F MED LIST DOCD IN RCRD: CPT | Mod: CPTII,95,, | Performed by: NURSE PRACTITIONER

## 2021-08-18 PROCEDURE — 1160F PR REVIEW ALL MEDS BY PRESCRIBER/CLIN PHARMACIST DOCUMENTED: ICD-10-PCS | Mod: CPTII,95,, | Performed by: NURSE PRACTITIONER

## 2021-08-18 PROCEDURE — 1159F PR MEDICATION LIST DOCUMENTED IN MEDICAL RECORD: ICD-10-PCS | Mod: CPTII,95,, | Performed by: NURSE PRACTITIONER

## 2021-08-18 PROCEDURE — 99214 PR OFFICE/OUTPT VISIT, EST, LEVL IV, 30-39 MIN: ICD-10-PCS | Mod: 95,,, | Performed by: NURSE PRACTITIONER

## 2021-08-18 PROCEDURE — 3052F PR MOST RECENT HEMOGLOBIN A1C LEVEL 8.0 - < 9.0%: ICD-10-PCS | Mod: CPTII,95,, | Performed by: NURSE PRACTITIONER

## 2021-08-18 PROCEDURE — 99214 OFFICE O/P EST MOD 30 MIN: CPT | Mod: 95,,, | Performed by: NURSE PRACTITIONER

## 2021-08-18 PROCEDURE — 3052F HG A1C>EQUAL 8.0%<EQUAL 9.0%: CPT | Mod: CPTII,95,, | Performed by: NURSE PRACTITIONER

## 2021-08-18 PROCEDURE — 1157F PR ADVANCE CARE PLAN OR EQUIV PRESENT IN MEDICAL RECORD: ICD-10-PCS | Mod: CPTII,95,, | Performed by: NURSE PRACTITIONER

## 2021-08-18 RX ORDER — BLOOD-GLUCOSE TRANSMITTER
1 EACH MISCELLANEOUS CONTINUOUS
Qty: 1 EACH | Status: SHIPPED | OUTPATIENT
Start: 2021-08-18 | End: 2022-08-18

## 2021-08-18 RX ORDER — BLOOD-GLUCOSE SENSOR
3 EACH MISCELLANEOUS CONTINUOUS
Qty: 3 EACH | Status: SHIPPED | OUTPATIENT
Start: 2021-08-18 | End: 2022-08-18

## 2021-08-18 RX ORDER — BLOOD-GLUCOSE,RECEIVER,CONT
1 EACH MISCELLANEOUS CONTINUOUS
Qty: 1 EACH | Status: SHIPPED | OUTPATIENT
Start: 2021-08-18 | End: 2022-08-18

## 2021-09-08 ENCOUNTER — PATIENT MESSAGE (OUTPATIENT)
Dept: ENDOCRINOLOGY | Facility: CLINIC | Age: 86
End: 2021-09-08

## 2021-09-28 ENCOUNTER — TELEPHONE (OUTPATIENT)
Dept: CARDIOLOGY | Facility: CLINIC | Age: 86
End: 2021-09-28

## 2021-09-28 DIAGNOSIS — R00.2 PALPITATIONS: Primary | ICD-10-CM

## 2021-10-01 ENCOUNTER — OFFICE VISIT (OUTPATIENT)
Dept: CARDIOLOGY | Facility: CLINIC | Age: 86
End: 2021-10-01
Payer: MEDICARE

## 2021-10-01 ENCOUNTER — HOSPITAL ENCOUNTER (OUTPATIENT)
Dept: CARDIOLOGY | Facility: CLINIC | Age: 86
Discharge: HOME | End: 2021-10-01
Payer: MEDICARE

## 2021-10-01 VITALS
WEIGHT: 163.13 LBS | BODY MASS INDEX: 22.84 KG/M2 | DIASTOLIC BLOOD PRESSURE: 58 MMHG | OXYGEN SATURATION: 97 % | HEIGHT: 71 IN | SYSTOLIC BLOOD PRESSURE: 140 MMHG | HEART RATE: 72 BPM

## 2021-10-01 DIAGNOSIS — Z95.5 S/P CORONARY ARTERY STENT PLACEMENT: ICD-10-CM

## 2021-10-01 DIAGNOSIS — Z79.4 TYPE 2 DIABETES MELLITUS WITH HYPERGLYCEMIA, WITH LONG-TERM CURRENT USE OF INSULIN: Chronic | ICD-10-CM

## 2021-10-01 DIAGNOSIS — I65.23 BILATERAL CAROTID ARTERY STENOSIS: ICD-10-CM

## 2021-10-01 DIAGNOSIS — E78.2 MIXED HYPERLIPIDEMIA: ICD-10-CM

## 2021-10-01 DIAGNOSIS — R00.2 PALPITATIONS: ICD-10-CM

## 2021-10-01 DIAGNOSIS — I25.10 CORONARY ARTERY DISEASE INVOLVING NATIVE CORONARY ARTERY OF NATIVE HEART WITHOUT ANGINA PECTORIS: Primary | Chronic | ICD-10-CM

## 2021-10-01 DIAGNOSIS — E11.65 TYPE 2 DIABETES MELLITUS WITH HYPERGLYCEMIA, WITH LONG-TERM CURRENT USE OF INSULIN: Chronic | ICD-10-CM

## 2021-10-01 DIAGNOSIS — I70.0 AORTIC ATHEROSCLEROSIS: ICD-10-CM

## 2021-10-01 DIAGNOSIS — I10 HYPERTENSION, ESSENTIAL: ICD-10-CM

## 2021-10-01 PROCEDURE — 99214 PR OFFICE/OUTPT VISIT, EST, LEVL IV, 30-39 MIN: ICD-10-PCS | Mod: HCNC,S$GLB,, | Performed by: NURSE PRACTITIONER

## 2021-10-01 PROCEDURE — 99214 OFFICE O/P EST MOD 30 MIN: CPT | Mod: HCNC,S$GLB,, | Performed by: NURSE PRACTITIONER

## 2021-10-01 PROCEDURE — 1101F PR PT FALLS ASSESS DOC 0-1 FALLS W/OUT INJ PAST YR: ICD-10-PCS | Mod: HCNC,CPTII,S$GLB, | Performed by: NURSE PRACTITIONER

## 2021-10-01 PROCEDURE — 1160F RVW MEDS BY RX/DR IN RCRD: CPT | Mod: HCNC,CPTII,S$GLB, | Performed by: NURSE PRACTITIONER

## 2021-10-01 PROCEDURE — 99999 PR PBB SHADOW E&M-EST. PATIENT-LVL V: CPT | Mod: PBBFAC,HCNC,, | Performed by: NURSE PRACTITIONER

## 2021-10-01 PROCEDURE — 3288F PR FALLS RISK ASSESSMENT DOCUMENTED: ICD-10-PCS | Mod: HCNC,CPTII,S$GLB, | Performed by: NURSE PRACTITIONER

## 2021-10-01 PROCEDURE — 1157F ADVNC CARE PLAN IN RCRD: CPT | Mod: HCNC,CPTII,S$GLB, | Performed by: NURSE PRACTITIONER

## 2021-10-01 PROCEDURE — 99999 PR PBB SHADOW E&M-EST. PATIENT-LVL V: ICD-10-PCS | Mod: PBBFAC,HCNC,, | Performed by: NURSE PRACTITIONER

## 2021-10-01 PROCEDURE — 1159F MED LIST DOCD IN RCRD: CPT | Mod: HCNC,CPTII,S$GLB, | Performed by: NURSE PRACTITIONER

## 2021-10-01 PROCEDURE — 99499 RISK ADDL DX/OHS AUDIT: ICD-10-PCS | Mod: HCNC,S$GLB,, | Performed by: NURSE PRACTITIONER

## 2021-10-01 PROCEDURE — 1157F PR ADVANCE CARE PLAN OR EQUIV PRESENT IN MEDICAL RECORD: ICD-10-PCS | Mod: HCNC,CPTII,S$GLB, | Performed by: NURSE PRACTITIONER

## 2021-10-01 PROCEDURE — 1101F PT FALLS ASSESS-DOCD LE1/YR: CPT | Mod: HCNC,CPTII,S$GLB, | Performed by: NURSE PRACTITIONER

## 2021-10-01 PROCEDURE — 1126F PR PAIN SEVERITY QUANTIFIED, NO PAIN PRESENT: ICD-10-PCS | Mod: HCNC,CPTII,S$GLB, | Performed by: NURSE PRACTITIONER

## 2021-10-01 PROCEDURE — 1160F PR REVIEW ALL MEDS BY PRESCRIBER/CLIN PHARMACIST DOCUMENTED: ICD-10-PCS | Mod: HCNC,CPTII,S$GLB, | Performed by: NURSE PRACTITIONER

## 2021-10-01 PROCEDURE — 3052F HG A1C>EQUAL 8.0%<EQUAL 9.0%: CPT | Mod: HCNC,CPTII,S$GLB, | Performed by: NURSE PRACTITIONER

## 2021-10-01 PROCEDURE — 3052F PR MOST RECENT HEMOGLOBIN A1C LEVEL 8.0 - < 9.0%: ICD-10-PCS | Mod: HCNC,CPTII,S$GLB, | Performed by: NURSE PRACTITIONER

## 2021-10-01 PROCEDURE — 93000 EKG 12-LEAD: ICD-10-PCS | Mod: HCNC,S$GLB,, | Performed by: INTERNAL MEDICINE

## 2021-10-01 PROCEDURE — 1159F PR MEDICATION LIST DOCUMENTED IN MEDICAL RECORD: ICD-10-PCS | Mod: HCNC,CPTII,S$GLB, | Performed by: NURSE PRACTITIONER

## 2021-10-01 PROCEDURE — 93000 ELECTROCARDIOGRAM COMPLETE: CPT | Mod: HCNC,S$GLB,, | Performed by: INTERNAL MEDICINE

## 2021-10-01 PROCEDURE — 99499 UNLISTED E&M SERVICE: CPT | Mod: HCNC,S$GLB,, | Performed by: NURSE PRACTITIONER

## 2021-10-01 PROCEDURE — 3288F FALL RISK ASSESSMENT DOCD: CPT | Mod: HCNC,CPTII,S$GLB, | Performed by: NURSE PRACTITIONER

## 2021-10-01 PROCEDURE — 1126F AMNT PAIN NOTED NONE PRSNT: CPT | Mod: HCNC,CPTII,S$GLB, | Performed by: NURSE PRACTITIONER

## 2021-10-01 RX ORDER — CHOLECALCIFEROL (VITAMIN D3) 50 MCG
2000 TABLET ORAL DAILY
COMMUNITY

## 2021-11-04 ENCOUNTER — PATIENT MESSAGE (OUTPATIENT)
Dept: PODIATRY | Facility: CLINIC | Age: 86
End: 2021-11-04

## 2021-11-04 ENCOUNTER — OFFICE VISIT (OUTPATIENT)
Dept: PODIATRY | Facility: CLINIC | Age: 86
End: 2021-11-04
Payer: MEDICARE

## 2021-11-04 VITALS
DIASTOLIC BLOOD PRESSURE: 76 MMHG | HEART RATE: 86 BPM | WEIGHT: 163.13 LBS | SYSTOLIC BLOOD PRESSURE: 145 MMHG | HEIGHT: 71 IN | BODY MASS INDEX: 22.84 KG/M2

## 2021-11-04 DIAGNOSIS — L60.0 INGROWN NAIL: Primary | ICD-10-CM

## 2021-11-04 PROCEDURE — 1157F PR ADVANCE CARE PLAN OR EQUIV PRESENT IN MEDICAL RECORD: ICD-10-PCS | Mod: HCNC,CPTII,S$GLB, | Performed by: PODIATRIST

## 2021-11-04 PROCEDURE — 99214 PR OFFICE/OUTPT VISIT, EST, LEVL IV, 30-39 MIN: ICD-10-PCS | Mod: HCNC,S$GLB,, | Performed by: PODIATRIST

## 2021-11-04 PROCEDURE — 99999 PR PBB SHADOW E&M-EST. PATIENT-LVL IV: ICD-10-PCS | Mod: PBBFAC,HCNC,, | Performed by: PODIATRIST

## 2021-11-04 PROCEDURE — 99214 OFFICE O/P EST MOD 30 MIN: CPT | Mod: HCNC,S$GLB,, | Performed by: PODIATRIST

## 2021-11-04 PROCEDURE — 1157F ADVNC CARE PLAN IN RCRD: CPT | Mod: HCNC,CPTII,S$GLB, | Performed by: PODIATRIST

## 2021-11-04 PROCEDURE — 99999 PR PBB SHADOW E&M-EST. PATIENT-LVL IV: CPT | Mod: PBBFAC,HCNC,, | Performed by: PODIATRIST

## 2021-11-04 RX ORDER — CEPHALEXIN 500 MG/1
500 CAPSULE ORAL EVERY 8 HOURS
Qty: 30 CAPSULE | Refills: 0 | Status: ON HOLD | OUTPATIENT
Start: 2021-11-04 | End: 2021-11-28

## 2021-11-28 ENCOUNTER — HOSPITAL ENCOUNTER (INPATIENT)
Facility: HOSPITAL | Age: 86
LOS: 7 days | Discharge: NURSING, REHAB OR LONG-TERM CARE | DRG: 516 | End: 2021-12-07
Attending: EMERGENCY MEDICINE | Admitting: INTERNAL MEDICINE
Payer: MEDICARE

## 2021-11-28 DIAGNOSIS — S32.591A CLOSED FRACTURE OF RAMUS OF RIGHT PUBIS, INITIAL ENCOUNTER: ICD-10-CM
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
## 2021-11-28 DIAGNOSIS — G47.00 INSOMNIA, UNSPECIFIED TYPE: ICD-10-CM

## 2021-11-28 DIAGNOSIS — R33.8 POSTOPERATIVE URINARY RETENTION: ICD-10-CM

## 2021-11-28 DIAGNOSIS — S32.810A CLOSED PELVIC RING FRACTURE, INITIAL ENCOUNTER: ICD-10-CM

## 2021-11-28 DIAGNOSIS — R07.9 CHEST PAIN: ICD-10-CM

## 2021-11-28 DIAGNOSIS — R55 SYNCOPE: ICD-10-CM

## 2021-11-28 DIAGNOSIS — N99.89 POSTOPERATIVE URINARY RETENTION: ICD-10-CM

## 2021-11-28 DIAGNOSIS — R41.82 AMS (ALTERED MENTAL STATUS): Primary | ICD-10-CM

## 2021-11-28 DIAGNOSIS — M25.551 RIGHT HIP PAIN: ICD-10-CM

## 2021-11-28 PROBLEM — S32.82XA MULTIPLE FRACTURES OF PELVIS: Status: ACTIVE | Noted: 2021-11-28

## 2021-11-28 LAB
ALBUMIN SERPL BCP-MCNC: 3.5 G/DL (ref 3.5–5.2)
ALP SERPL-CCNC: 101 U/L (ref 55–135)
ALT SERPL W/O P-5'-P-CCNC: 25 U/L (ref 10–44)
ANION GAP SERPL CALC-SCNC: 14 MMOL/L (ref 8–16)
AST SERPL-CCNC: 32 U/L (ref 10–40)
BACTERIA #/AREA URNS AUTO: NORMAL /HPF
BASOPHILS # BLD AUTO: 0.04 K/UL (ref 0–0.2)
BASOPHILS NFR BLD: 0.2 % (ref 0–1.9)
BILIRUB SERPL-MCNC: 1.6 MG/DL (ref 0.1–1)
BILIRUB UR QL STRIP: NEGATIVE
BUN SERPL-MCNC: 28 MG/DL (ref 8–23)
CALCIUM SERPL-MCNC: 9.2 MG/DL (ref 8.7–10.5)
CHLORIDE SERPL-SCNC: 104 MMOL/L (ref 95–110)
CK SERPL-CCNC: 344 U/L (ref 20–200)
CLARITY UR REFRACT.AUTO: ABNORMAL
CO2 SERPL-SCNC: 19 MMOL/L (ref 23–29)
COLOR UR AUTO: YELLOW
CREAT SERPL-MCNC: 1.4 MG/DL (ref 0.5–1.4)
CTP QC/QA: YES
DIFFERENTIAL METHOD: ABNORMAL
EOSINOPHIL # BLD AUTO: 0 K/UL (ref 0–0.5)
EOSINOPHIL NFR BLD: 0.1 % (ref 0–8)
ERYTHROCYTE [DISTWIDTH] IN BLOOD BY AUTOMATED COUNT: 13.6 % (ref 11.5–14.5)
EST. GFR  (AFRICAN AMERICAN): 50.8 ML/MIN/1.73 M^2
EST. GFR  (NON AFRICAN AMERICAN): 43.9 ML/MIN/1.73 M^2
GLUCOSE SERPL-MCNC: 261 MG/DL (ref 70–110)
GLUCOSE UR QL STRIP: ABNORMAL
HCT VFR BLD AUTO: 34.9 % (ref 40–54)
HGB BLD-MCNC: 11.7 G/DL (ref 14–18)
HGB UR QL STRIP: NEGATIVE
HYALINE CASTS UR QL AUTO: 1 /LPF
IMM GRANULOCYTES # BLD AUTO: 0.11 K/UL (ref 0–0.04)
IMM GRANULOCYTES NFR BLD AUTO: 0.7 % (ref 0–0.5)
KETONES UR QL STRIP: ABNORMAL
LEUKOCYTE ESTERASE UR QL STRIP: NEGATIVE
LYMPHOCYTES # BLD AUTO: 0.5 K/UL (ref 1–4.8)
LYMPHOCYTES NFR BLD: 3.1 % (ref 18–48)
MCH RBC QN AUTO: 28.6 PG (ref 27–31)
MCHC RBC AUTO-ENTMCNC: 33.5 G/DL (ref 32–36)
MCV RBC AUTO: 85 FL (ref 82–98)
MICROSCOPIC COMMENT: NORMAL
MONOCYTES # BLD AUTO: 1.4 K/UL (ref 0.3–1)
MONOCYTES NFR BLD: 8.4 % (ref 4–15)
NEUTROPHILS # BLD AUTO: 14.4 K/UL (ref 1.8–7.7)
NEUTROPHILS NFR BLD: 87.5 % (ref 38–73)
NITRITE UR QL STRIP: NEGATIVE
NRBC BLD-RTO: 0 /100 WBC
PH UR STRIP: 5 [PH] (ref 5–8)
PLATELET # BLD AUTO: 229 K/UL (ref 150–450)
PMV BLD AUTO: 11.4 FL (ref 9.2–12.9)
POCT GLUCOSE: 277 MG/DL (ref 70–110)
POCT GLUCOSE: 343 MG/DL (ref 70–110)
POTASSIUM SERPL-SCNC: 4.9 MMOL/L (ref 3.5–5.1)
PROT SERPL-MCNC: 7 G/DL (ref 6–8.4)
PROT UR QL STRIP: ABNORMAL
RBC # BLD AUTO: 4.09 M/UL (ref 4.6–6.2)
RBC #/AREA URNS AUTO: 0 /HPF (ref 0–4)
SARS-COV-2 RDRP RESP QL NAA+PROBE: NEGATIVE
SODIUM SERPL-SCNC: 137 MMOL/L (ref 136–145)
SP GR UR STRIP: 1.02 (ref 1–1.03)
TROPONIN I SERPL DL<=0.01 NG/ML-MCNC: 0.02 NG/ML (ref 0–0.03)
TROPONIN I SERPL DL<=0.01 NG/ML-MCNC: 0.03 NG/ML (ref 0–0.03)
TROPONIN I SERPL DL<=0.01 NG/ML-MCNC: 0.04 NG/ML (ref 0–0.03)
URN SPEC COLLECT METH UR: ABNORMAL
WBC # BLD AUTO: 16.45 K/UL (ref 3.9–12.7)
WBC #/AREA URNS AUTO: 0 /HPF (ref 0–5)

## 2021-11-28 PROCEDURE — C9399 UNCLASSIFIED DRUGS OR BIOLOG: HCPCS | Mod: HCNC | Performed by: PHYSICIAN ASSISTANT

## 2021-11-28 PROCEDURE — 99285 PR EMERGENCY DEPT VISIT,LEVEL V: ICD-10-PCS | Mod: HCNC,CS,, | Performed by: EMERGENCY MEDICINE

## 2021-11-28 PROCEDURE — 96374 THER/PROPH/DIAG INJ IV PUSH: CPT | Mod: HCNC

## 2021-11-28 PROCEDURE — 85025 COMPLETE CBC W/AUTO DIFF WBC: CPT | Mod: HCNC | Performed by: STUDENT IN AN ORGANIZED HEALTH CARE EDUCATION/TRAINING PROGRAM

## 2021-11-28 PROCEDURE — 82962 GLUCOSE BLOOD TEST: CPT | Mod: HCNC

## 2021-11-28 PROCEDURE — 93010 ELECTROCARDIOGRAM REPORT: CPT | Mod: HCNC,,, | Performed by: INTERNAL MEDICINE

## 2021-11-28 PROCEDURE — 99285 EMERGENCY DEPT VISIT HI MDM: CPT | Mod: HCNC,CS,, | Performed by: EMERGENCY MEDICINE

## 2021-11-28 PROCEDURE — 63600175 PHARM REV CODE 636 W HCPCS: Mod: HCNC | Performed by: PHYSICIAN ASSISTANT

## 2021-11-28 PROCEDURE — A4216 STERILE WATER/SALINE, 10 ML: HCPCS | Mod: HCNC | Performed by: PHYSICIAN ASSISTANT

## 2021-11-28 PROCEDURE — 63600175 PHARM REV CODE 636 W HCPCS: Mod: HCNC | Performed by: NURSE PRACTITIONER

## 2021-11-28 PROCEDURE — 99220 PR INITIAL OBSERVATION CARE,LEVL III: CPT | Mod: HCNC,,, | Performed by: PHYSICIAN ASSISTANT

## 2021-11-28 PROCEDURE — 63600175 PHARM REV CODE 636 W HCPCS: Mod: HCNC | Performed by: EMERGENCY MEDICINE

## 2021-11-28 PROCEDURE — 93005 ELECTROCARDIOGRAM TRACING: CPT | Mod: HCNC

## 2021-11-28 PROCEDURE — G0378 HOSPITAL OBSERVATION PER HR: HCPCS | Mod: HCNC

## 2021-11-28 PROCEDURE — 25000003 PHARM REV CODE 250: Mod: HCNC | Performed by: PHYSICIAN ASSISTANT

## 2021-11-28 PROCEDURE — 84484 ASSAY OF TROPONIN QUANT: CPT | Mod: 91,HCNC | Performed by: STUDENT IN AN ORGANIZED HEALTH CARE EDUCATION/TRAINING PROGRAM

## 2021-11-28 PROCEDURE — 99220 PR INITIAL OBSERVATION CARE,LEVL III: ICD-10-PCS | Mod: HCNC,,, | Performed by: PHYSICIAN ASSISTANT

## 2021-11-28 PROCEDURE — 80053 COMPREHEN METABOLIC PANEL: CPT | Mod: HCNC | Performed by: STUDENT IN AN ORGANIZED HEALTH CARE EDUCATION/TRAINING PROGRAM

## 2021-11-28 PROCEDURE — 36415 COLL VENOUS BLD VENIPUNCTURE: CPT | Mod: HCNC | Performed by: PHYSICIAN ASSISTANT

## 2021-11-28 PROCEDURE — 81001 URINALYSIS AUTO W/SCOPE: CPT | Mod: HCNC | Performed by: STUDENT IN AN ORGANIZED HEALTH CARE EDUCATION/TRAINING PROGRAM

## 2021-11-28 PROCEDURE — 99285 EMERGENCY DEPT VISIT HI MDM: CPT | Mod: 25,HCNC

## 2021-11-28 PROCEDURE — 82550 ASSAY OF CK (CPK): CPT | Mod: HCNC | Performed by: STUDENT IN AN ORGANIZED HEALTH CARE EDUCATION/TRAINING PROGRAM

## 2021-11-28 PROCEDURE — 87040 BLOOD CULTURE FOR BACTERIA: CPT | Mod: HCNC | Performed by: PHYSICIAN ASSISTANT

## 2021-11-28 PROCEDURE — 84484 ASSAY OF TROPONIN QUANT: CPT | Mod: HCNC | Performed by: PHYSICIAN ASSISTANT

## 2021-11-28 PROCEDURE — U0002 COVID-19 LAB TEST NON-CDC: HCPCS | Mod: HCNC | Performed by: EMERGENCY MEDICINE

## 2021-11-28 PROCEDURE — 93010 EKG 12-LEAD: ICD-10-PCS | Mod: HCNC,,, | Performed by: INTERNAL MEDICINE

## 2021-11-28 RX ORDER — IBUPROFEN 200 MG
16 TABLET ORAL
Status: DISCONTINUED | OUTPATIENT
Start: 2021-11-28 | End: 2021-12-07 | Stop reason: HOSPADM

## 2021-11-28 RX ORDER — QUETIAPINE FUMARATE 25 MG/1
25 TABLET, FILM COATED ORAL ONCE
Status: COMPLETED | OUTPATIENT
Start: 2021-11-28 | End: 2021-11-28

## 2021-11-28 RX ORDER — CHOLECALCIFEROL (VITAMIN D3) 25 MCG
2000 TABLET ORAL DAILY
Status: DISCONTINUED | OUTPATIENT
Start: 2021-11-29 | End: 2021-12-07 | Stop reason: HOSPADM

## 2021-11-28 RX ORDER — MORPHINE SULFATE 2 MG/ML
2 INJECTION, SOLUTION INTRAMUSCULAR; INTRAVENOUS EVERY 6 HOURS PRN
Status: DISCONTINUED | OUTPATIENT
Start: 2021-11-28 | End: 2021-12-02

## 2021-11-28 RX ORDER — ASCORBIC ACID 250 MG
250 TABLET ORAL 2 TIMES DAILY
Status: DISCONTINUED | OUTPATIENT
Start: 2021-11-28 | End: 2021-12-07 | Stop reason: HOSPADM

## 2021-11-28 RX ORDER — MAG HYDROX/ALUMINUM HYD/SIMETH 200-200-20
30 SUSPENSION, ORAL (FINAL DOSE FORM) ORAL 4 TIMES DAILY PRN
Status: DISCONTINUED | OUTPATIENT
Start: 2021-11-28 | End: 2021-12-07 | Stop reason: HOSPADM

## 2021-11-28 RX ORDER — INSULIN ASPART 100 [IU]/ML
3 INJECTION, SOLUTION INTRAVENOUS; SUBCUTANEOUS
Status: DISCONTINUED | OUTPATIENT
Start: 2021-11-28 | End: 2021-12-01

## 2021-11-28 RX ORDER — IPRATROPIUM BROMIDE AND ALBUTEROL SULFATE 2.5; .5 MG/3ML; MG/3ML
3 SOLUTION RESPIRATORY (INHALATION) EVERY 6 HOURS PRN
Status: DISCONTINUED | OUTPATIENT
Start: 2021-11-28 | End: 2021-12-07 | Stop reason: HOSPADM

## 2021-11-28 RX ORDER — LISINOPRIL 20 MG/1
20 TABLET ORAL DAILY
Status: DISCONTINUED | OUTPATIENT
Start: 2021-11-29 | End: 2021-12-07 | Stop reason: HOSPADM

## 2021-11-28 RX ORDER — CEFTRIAXONE 1 G/1
1 INJECTION, POWDER, FOR SOLUTION INTRAMUSCULAR; INTRAVENOUS
Status: DISCONTINUED | OUTPATIENT
Start: 2021-11-28 | End: 2021-12-02

## 2021-11-28 RX ORDER — ATORVASTATIN CALCIUM 40 MG/1
40 TABLET, FILM COATED ORAL DAILY
Status: DISCONTINUED | OUTPATIENT
Start: 2021-11-29 | End: 2021-12-07 | Stop reason: HOSPADM

## 2021-11-28 RX ORDER — NALOXONE HCL 0.4 MG/ML
0.02 VIAL (ML) INJECTION
Status: DISCONTINUED | OUTPATIENT
Start: 2021-11-28 | End: 2021-12-07 | Stop reason: HOSPADM

## 2021-11-28 RX ORDER — SIMETHICONE 80 MG
1 TABLET,CHEWABLE ORAL 4 TIMES DAILY PRN
Status: DISCONTINUED | OUTPATIENT
Start: 2021-11-28 | End: 2021-12-07 | Stop reason: HOSPADM

## 2021-11-28 RX ORDER — TRAZODONE HYDROCHLORIDE 50 MG/1
50 TABLET ORAL NIGHTLY PRN
Status: DISCONTINUED | OUTPATIENT
Start: 2021-11-28 | End: 2021-12-02

## 2021-11-28 RX ORDER — HYDROCODONE BITARTRATE AND ACETAMINOPHEN 7.5; 325 MG/1; MG/1
1 TABLET ORAL EVERY 6 HOURS PRN
Status: DISCONTINUED | OUTPATIENT
Start: 2021-11-28 | End: 2021-12-02

## 2021-11-28 RX ORDER — GLUCAGON 1 MG
1 KIT INJECTION
Status: DISCONTINUED | OUTPATIENT
Start: 2021-11-28 | End: 2021-12-07 | Stop reason: HOSPADM

## 2021-11-28 RX ORDER — FINASTERIDE 5 MG/1
5 TABLET, FILM COATED ORAL DAILY
Status: DISCONTINUED | OUTPATIENT
Start: 2021-11-29 | End: 2021-12-07 | Stop reason: HOSPADM

## 2021-11-28 RX ORDER — TALC
6 POWDER (GRAM) TOPICAL NIGHTLY PRN
Status: DISCONTINUED | OUTPATIENT
Start: 2021-11-28 | End: 2021-12-07 | Stop reason: HOSPADM

## 2021-11-28 RX ORDER — INSULIN ASPART 100 [IU]/ML
0-5 INJECTION, SOLUTION INTRAVENOUS; SUBCUTANEOUS
Status: DISCONTINUED | OUTPATIENT
Start: 2021-11-28 | End: 2021-12-01

## 2021-11-28 RX ORDER — DULOXETIN HYDROCHLORIDE 30 MG/1
60 CAPSULE, DELAYED RELEASE ORAL DAILY
COMMUNITY
End: 2021-12-22 | Stop reason: SDUPTHER

## 2021-11-28 RX ORDER — HEPARIN SODIUM 5000 [USP'U]/ML
5000 INJECTION, SOLUTION INTRAVENOUS; SUBCUTANEOUS EVERY 8 HOURS
Status: DISCONTINUED | OUTPATIENT
Start: 2021-11-28 | End: 2021-11-30

## 2021-11-28 RX ORDER — POLYETHYLENE GLYCOL 3350 17 G/17G
17 POWDER, FOR SOLUTION ORAL DAILY
Status: DISCONTINUED | OUTPATIENT
Start: 2021-11-28 | End: 2021-12-07 | Stop reason: HOSPADM

## 2021-11-28 RX ORDER — MORPHINE SULFATE 4 MG/ML
4 INJECTION, SOLUTION INTRAMUSCULAR; INTRAVENOUS
Status: COMPLETED | OUTPATIENT
Start: 2021-11-28 | End: 2021-11-28

## 2021-11-28 RX ORDER — ASPIRIN 81 MG/1
81 TABLET ORAL DAILY
Status: DISCONTINUED | OUTPATIENT
Start: 2021-11-29 | End: 2021-12-07 | Stop reason: HOSPADM

## 2021-11-28 RX ORDER — ONDANSETRON 8 MG/1
8 TABLET, ORALLY DISINTEGRATING ORAL EVERY 8 HOURS PRN
Status: DISCONTINUED | OUTPATIENT
Start: 2021-11-28 | End: 2021-12-07 | Stop reason: HOSPADM

## 2021-11-28 RX ORDER — ACETAMINOPHEN 500 MG
1000 TABLET ORAL 3 TIMES DAILY
Status: DISCONTINUED | OUTPATIENT
Start: 2021-11-28 | End: 2021-12-03

## 2021-11-28 RX ORDER — ACETAMINOPHEN 500 MG
1000 TABLET ORAL EVERY 8 HOURS PRN
Status: DISCONTINUED | OUTPATIENT
Start: 2021-11-28 | End: 2021-11-28

## 2021-11-28 RX ORDER — SODIUM CHLORIDE 0.9 % (FLUSH) 0.9 %
10 SYRINGE (ML) INJECTION EVERY 8 HOURS
Status: DISCONTINUED | OUTPATIENT
Start: 2021-11-28 | End: 2021-12-07 | Stop reason: HOSPADM

## 2021-11-28 RX ORDER — ACETAMINOPHEN 325 MG/1
650 TABLET ORAL EVERY 4 HOURS PRN
Status: DISCONTINUED | OUTPATIENT
Start: 2021-11-28 | End: 2021-12-07 | Stop reason: HOSPADM

## 2021-11-28 RX ORDER — PROCHLORPERAZINE EDISYLATE 5 MG/ML
5 INJECTION INTRAMUSCULAR; INTRAVENOUS EVERY 6 HOURS PRN
Status: DISCONTINUED | OUTPATIENT
Start: 2021-11-28 | End: 2021-12-07 | Stop reason: HOSPADM

## 2021-11-28 RX ORDER — DULOXETIN HYDROCHLORIDE 60 MG/1
60 CAPSULE, DELAYED RELEASE ORAL DAILY
Status: DISCONTINUED | OUTPATIENT
Start: 2021-11-29 | End: 2021-12-02

## 2021-11-28 RX ORDER — IBUPROFEN 200 MG
24 TABLET ORAL
Status: DISCONTINUED | OUTPATIENT
Start: 2021-11-28 | End: 2021-12-07 | Stop reason: HOSPADM

## 2021-11-28 RX ADMIN — HEPARIN SODIUM 5000 UNITS: 5000 INJECTION INTRAVENOUS; SUBCUTANEOUS at 03:11

## 2021-11-28 RX ADMIN — HEPARIN SODIUM 5000 UNITS: 5000 INJECTION INTRAVENOUS; SUBCUTANEOUS at 09:11

## 2021-11-28 RX ADMIN — Medication 10 ML: at 09:11

## 2021-11-28 RX ADMIN — QUETIAPINE FUMARATE 25 MG: 25 TABLET ORAL at 06:11

## 2021-11-28 RX ADMIN — ACETAMINOPHEN 1000 MG: 500 TABLET ORAL at 09:11

## 2021-11-28 RX ADMIN — MORPHINE SULFATE 4 MG: 4 INJECTION INTRAVENOUS at 12:11

## 2021-11-28 RX ADMIN — MORPHINE SULFATE 2 MG: 2 INJECTION, SOLUTION INTRAMUSCULAR; INTRAVENOUS at 09:11

## 2021-11-28 RX ADMIN — CEFTRIAXONE 1 G: 1 INJECTION, POWDER, FOR SOLUTION INTRAMUSCULAR; INTRAVENOUS at 03:11

## 2021-11-28 RX ADMIN — INSULIN ASPART 3 UNITS: 100 INJECTION, SOLUTION INTRAVENOUS; SUBCUTANEOUS at 06:11

## 2021-11-28 RX ADMIN — INSULIN DETEMIR 10 UNITS: 100 INJECTION, SOLUTION SUBCUTANEOUS at 03:11

## 2021-11-28 RX ADMIN — Medication 250 MG: at 09:11

## 2021-11-28 RX ADMIN — Medication 10 ML: at 02:11

## 2021-11-28 RX ADMIN — POLYETHYLENE GLYCOL 3350 17 G: 17 POWDER, FOR SOLUTION ORAL at 02:11

## 2021-11-29 ENCOUNTER — ANESTHESIA EVENT (OUTPATIENT)
Dept: SURGERY | Facility: HOSPITAL | Age: 86
DRG: 516 | End: 2021-11-29
Payer: MEDICARE

## 2021-11-29 LAB
ANION GAP SERPL CALC-SCNC: 8 MMOL/L (ref 8–16)
ASCENDING AORTA: 3.62 CM
AV INDEX (PROSTH): 0.84
AV MEAN GRADIENT: 5 MMHG
AV PEAK GRADIENT: 8 MMHG
AV VALVE AREA: 3.31 CM2
AV VELOCITY RATIO: 0.79
BASOPHILS # BLD AUTO: 0.06 K/UL (ref 0–0.2)
BASOPHILS NFR BLD: 0.6 % (ref 0–1.9)
BSA FOR ECHO PROCEDURE: 1.94 M2
BUN SERPL-MCNC: 32 MG/DL (ref 8–23)
CALCIUM SERPL-MCNC: 8.3 MG/DL (ref 8.7–10.5)
CHLORIDE SERPL-SCNC: 105 MMOL/L (ref 95–110)
CO2 SERPL-SCNC: 20 MMOL/L (ref 23–29)
CREAT SERPL-MCNC: 1.3 MG/DL (ref 0.5–1.4)
CV ECHO LV RWT: 0.6 CM
DIFFERENTIAL METHOD: ABNORMAL
DOP CALC AO PEAK VEL: 1.44 M/S
DOP CALC AO VTI: 21.06 CM
DOP CALC LVOT AREA: 3.9 CM2
DOP CALC LVOT DIAMETER: 2.24 CM
DOP CALC LVOT PEAK VEL: 1.14 M/S
DOP CALC LVOT STROKE VOLUME: 69.72 CM3
DOP CALCLVOT PEAK VEL VTI: 17.7 CM
E WAVE DECELERATION TIME: 186.33 MSEC
E/A RATIO: 0.51
E/E' RATIO: 8.46 M/S
ECHO LV POSTERIOR WALL: 0.92 CM (ref 0.6–1.1)
EJECTION FRACTION: 60 %
EOSINOPHIL # BLD AUTO: 0 K/UL (ref 0–0.5)
EOSINOPHIL NFR BLD: 0 % (ref 0–8)
ERYTHROCYTE [DISTWIDTH] IN BLOOD BY AUTOMATED COUNT: 14.2 % (ref 11.5–14.5)
EST. GFR  (AFRICAN AMERICAN): 55.5 ML/MIN/1.73 M^2
EST. GFR  (NON AFRICAN AMERICAN): 48 ML/MIN/1.73 M^2
ESTIMATED AVG GLUCOSE: 214 MG/DL (ref 68–131)
FRACTIONAL SHORTENING: 31 % (ref 28–44)
GLUCOSE SERPL-MCNC: 283 MG/DL (ref 70–110)
HBA1C MFR BLD: 9.1 % (ref 4–5.6)
HCT VFR BLD AUTO: 30 % (ref 40–54)
HGB BLD-MCNC: 10 G/DL (ref 14–18)
IMM GRANULOCYTES # BLD AUTO: 0.05 K/UL (ref 0–0.04)
IMM GRANULOCYTES NFR BLD AUTO: 0.5 % (ref 0–0.5)
INTERVENTRICULAR SEPTUM: 0.89 CM (ref 0.6–1.1)
LA MAJOR: 5.74 CM
LA MINOR: 5.46 CM
LA WIDTH: 3.91 CM
LEFT ATRIUM SIZE: 3.65 CM
LEFT ATRIUM VOLUME INDEX MOD: 28.6 ML/M2
LEFT ATRIUM VOLUME INDEX: 35 ML/M2
LEFT ATRIUM VOLUME MOD: 55.51 CM3
LEFT ATRIUM VOLUME: 67.89 CM3
LEFT INTERNAL DIMENSION IN SYSTOLE: 2.11 CM (ref 2.1–4)
LEFT VENTRICLE DIASTOLIC VOLUME INDEX: 19.3 ML/M2
LEFT VENTRICLE DIASTOLIC VOLUME: 37.44 ML
LEFT VENTRICLE MASS INDEX: 38 G/M2
LEFT VENTRICLE SYSTOLIC VOLUME INDEX: 7.5 ML/M2
LEFT VENTRICLE SYSTOLIC VOLUME: 14.52 ML
LEFT VENTRICULAR INTERNAL DIMENSION IN DIASTOLE: 3.08 CM (ref 3.5–6)
LEFT VENTRICULAR MASS: 73.58 G
LV LATERAL E/E' RATIO: 6.11 M/S
LV SEPTAL E/E' RATIO: 13.75 M/S
LYMPHOCYTES # BLD AUTO: 0.7 K/UL (ref 1–4.8)
LYMPHOCYTES NFR BLD: 6.7 % (ref 18–48)
MCH RBC QN AUTO: 28.7 PG (ref 27–31)
MCHC RBC AUTO-ENTMCNC: 33.3 G/DL (ref 32–36)
MCV RBC AUTO: 86 FL (ref 82–98)
MONOCYTES # BLD AUTO: 1.2 K/UL (ref 0.3–1)
MONOCYTES NFR BLD: 11.3 % (ref 4–15)
MV PEAK A VEL: 1.07 M/S
MV PEAK E VEL: 0.55 M/S
MV STENOSIS PRESSURE HALF TIME: 54.04 MS
MV VALVE AREA P 1/2 METHOD: 4.07 CM2
NEUTROPHILS # BLD AUTO: 8.4 K/UL (ref 1.8–7.7)
NEUTROPHILS NFR BLD: 80.9 % (ref 38–73)
NRBC BLD-RTO: 0 /100 WBC
PISA TR MAX VEL: 2.5 M/S
PLATELET # BLD AUTO: 201 K/UL (ref 150–450)
PMV BLD AUTO: 11.3 FL (ref 9.2–12.9)
POCT GLUCOSE: 322 MG/DL (ref 70–110)
POCT GLUCOSE: 352 MG/DL (ref 70–110)
POCT GLUCOSE: 366 MG/DL (ref 70–110)
POTASSIUM SERPL-SCNC: 4.5 MMOL/L (ref 3.5–5.1)
PULM VEIN S/D RATIO: 0.8
PV PEAK D VEL: 0.74 M/S
PV PEAK S VEL: 0.59 M/S
RA MAJOR: 4.89 CM
RA WIDTH: 4.04 CM
RBC # BLD AUTO: 3.48 M/UL (ref 4.6–6.2)
RIGHT VENTRICULAR END-DIASTOLIC DIMENSION: 3.07 CM
RV TISSUE DOPPLER FREE WALL SYSTOLIC VELOCITY 1 (APICAL 4 CHAMBER VIEW): 21.44 CM/S
SINUS: 3.33 CM
SODIUM SERPL-SCNC: 133 MMOL/L (ref 136–145)
STJ: 2.94 CM
TDI LATERAL: 0.09 M/S
TDI SEPTAL: 0.04 M/S
TDI: 0.07 M/S
TR MAX PG: 25 MMHG
TRICUSPID ANNULAR PLANE SYSTOLIC EXCURSION: 2.36 CM
TROPONIN I SERPL DL<=0.01 NG/ML-MCNC: 0.04 NG/ML (ref 0–0.03)
TROPONIN I SERPL DL<=0.01 NG/ML-MCNC: 0.04 NG/ML (ref 0–0.03)
WBC # BLD AUTO: 10.34 K/UL (ref 3.9–12.7)

## 2021-11-29 PROCEDURE — 97535 SELF CARE MNGMENT TRAINING: CPT | Mod: HCNC

## 2021-11-29 PROCEDURE — 63600175 PHARM REV CODE 636 W HCPCS: Mod: HCNC | Performed by: PHYSICIAN ASSISTANT

## 2021-11-29 PROCEDURE — 83036 HEMOGLOBIN GLYCOSYLATED A1C: CPT | Mod: HCNC | Performed by: PHYSICIAN ASSISTANT

## 2021-11-29 PROCEDURE — 80048 BASIC METABOLIC PNL TOTAL CA: CPT | Mod: HCNC | Performed by: PHYSICIAN ASSISTANT

## 2021-11-29 PROCEDURE — 99900035 HC TECH TIME PER 15 MIN (STAT): Mod: HCNC

## 2021-11-29 PROCEDURE — 97530 THERAPEUTIC ACTIVITIES: CPT | Mod: HCNC

## 2021-11-29 PROCEDURE — 92610 EVALUATE SWALLOWING FUNCTION: CPT | Mod: HCNC

## 2021-11-29 PROCEDURE — 36415 COLL VENOUS BLD VENIPUNCTURE: CPT | Mod: HCNC | Performed by: PHYSICIAN ASSISTANT

## 2021-11-29 PROCEDURE — 25000003 PHARM REV CODE 250: Mod: HCNC | Performed by: NURSE PRACTITIONER

## 2021-11-29 PROCEDURE — 99223 1ST HOSP IP/OBS HIGH 75: CPT | Mod: 57,HCNC,, | Performed by: ORTHOPAEDIC SURGERY

## 2021-11-29 PROCEDURE — 94761 N-INVAS EAR/PLS OXIMETRY MLT: CPT | Mod: HCNC

## 2021-11-29 PROCEDURE — 97161 PT EVAL LOW COMPLEX 20 MIN: CPT | Mod: HCNC

## 2021-11-29 PROCEDURE — A4216 STERILE WATER/SALINE, 10 ML: HCPCS | Mod: HCNC | Performed by: PHYSICIAN ASSISTANT

## 2021-11-29 PROCEDURE — G0378 HOSPITAL OBSERVATION PER HR: HCPCS | Mod: HCNC

## 2021-11-29 PROCEDURE — 85025 COMPLETE CBC W/AUTO DIFF WBC: CPT | Mod: HCNC | Performed by: PHYSICIAN ASSISTANT

## 2021-11-29 PROCEDURE — 99223 PR INITIAL HOSPITAL CARE,LEVL III: ICD-10-PCS | Mod: 57,HCNC,, | Performed by: ORTHOPAEDIC SURGERY

## 2021-11-29 PROCEDURE — 99226 PR SUBSEQUENT OBSERVATION CARE,LEVEL III: ICD-10-PCS | Mod: HCNC,,, | Performed by: PHYSICIAN ASSISTANT

## 2021-11-29 PROCEDURE — 84484 ASSAY OF TROPONIN QUANT: CPT | Mod: HCNC | Performed by: PHYSICIAN ASSISTANT

## 2021-11-29 PROCEDURE — 25000003 PHARM REV CODE 250: Mod: HCNC | Performed by: PHYSICIAN ASSISTANT

## 2021-11-29 PROCEDURE — 97165 OT EVAL LOW COMPLEX 30 MIN: CPT | Mod: HCNC

## 2021-11-29 PROCEDURE — 99226 PR SUBSEQUENT OBSERVATION CARE,LEVEL III: CPT | Mod: HCNC,,, | Performed by: PHYSICIAN ASSISTANT

## 2021-11-29 RX ORDER — SODIUM CHLORIDE 0.9 % (FLUSH) 0.9 %
10 SYRINGE (ML) INJECTION
Status: DISCONTINUED | OUTPATIENT
Start: 2021-11-29 | End: 2021-12-07 | Stop reason: HOSPADM

## 2021-11-29 RX ADMIN — Medication 10 ML: at 10:11

## 2021-11-29 RX ADMIN — Medication 10 ML: at 05:11

## 2021-11-29 RX ADMIN — ATORVASTATIN CALCIUM 40 MG: 40 TABLET, FILM COATED ORAL at 08:11

## 2021-11-29 RX ADMIN — HEPARIN SODIUM 5000 UNITS: 5000 INJECTION INTRAVENOUS; SUBCUTANEOUS at 10:11

## 2021-11-29 RX ADMIN — INSULIN ASPART 3 UNITS: 100 INJECTION, SOLUTION INTRAVENOUS; SUBCUTANEOUS at 04:11

## 2021-11-29 RX ADMIN — INSULIN ASPART 3 UNITS: 100 INJECTION, SOLUTION INTRAVENOUS; SUBCUTANEOUS at 08:11

## 2021-11-29 RX ADMIN — Medication 2000 UNITS: at 08:11

## 2021-11-29 RX ADMIN — INSULIN ASPART 3 UNITS: 100 INJECTION, SOLUTION INTRAVENOUS; SUBCUTANEOUS at 01:11

## 2021-11-29 RX ADMIN — TRAZODONE HYDROCHLORIDE 50 MG: 50 TABLET ORAL at 09:11

## 2021-11-29 RX ADMIN — CEFTRIAXONE 1 G: 1 INJECTION, POWDER, FOR SOLUTION INTRAMUSCULAR; INTRAVENOUS at 04:11

## 2021-11-29 RX ADMIN — ACETAMINOPHEN 1000 MG: 500 TABLET ORAL at 04:11

## 2021-11-29 RX ADMIN — DULOXETINE 60 MG: 60 CAPSULE, DELAYED RELEASE ORAL at 09:11

## 2021-11-29 RX ADMIN — ACETAMINOPHEN 1000 MG: 500 TABLET ORAL at 08:11

## 2021-11-29 RX ADMIN — HYDROCODONE BITARTRATE AND ACETAMINOPHEN 1 TABLET: 7.5; 325 TABLET ORAL at 11:11

## 2021-11-29 RX ADMIN — FINASTERIDE 5 MG: 5 TABLET, FILM COATED ORAL at 08:11

## 2021-11-29 RX ADMIN — Medication 250 MG: at 08:11

## 2021-11-29 RX ADMIN — INSULIN ASPART 4 UNITS: 100 INJECTION, SOLUTION INTRAVENOUS; SUBCUTANEOUS at 08:11

## 2021-11-29 RX ADMIN — INSULIN ASPART 5 UNITS: 100 INJECTION, SOLUTION INTRAVENOUS; SUBCUTANEOUS at 04:11

## 2021-11-29 RX ADMIN — Medication 10 ML: at 02:11

## 2021-11-29 RX ADMIN — HEPARIN SODIUM 5000 UNITS: 5000 INJECTION INTRAVENOUS; SUBCUTANEOUS at 05:11

## 2021-11-29 RX ADMIN — HYDROCODONE BITARTRATE AND ACETAMINOPHEN 1 TABLET: 7.5; 325 TABLET ORAL at 09:11

## 2021-11-29 RX ADMIN — LISINOPRIL 20 MG: 20 TABLET ORAL at 08:11

## 2021-11-29 RX ADMIN — HEPARIN SODIUM 5000 UNITS: 5000 INJECTION INTRAVENOUS; SUBCUTANEOUS at 04:11

## 2021-11-29 RX ADMIN — Medication 6 MG: at 09:11

## 2021-11-29 RX ADMIN — Medication 250 MG: at 09:11

## 2021-11-29 RX ADMIN — INSULIN ASPART 4 UNITS: 100 INJECTION, SOLUTION INTRAVENOUS; SUBCUTANEOUS at 01:11

## 2021-11-29 RX ADMIN — ASPIRIN 81 MG: 81 TABLET, COATED ORAL at 08:11

## 2021-11-30 ENCOUNTER — ANESTHESIA (OUTPATIENT)
Dept: SURGERY | Facility: HOSPITAL | Age: 86
DRG: 516 | End: 2021-11-30
Payer: MEDICARE

## 2021-11-30 LAB
ANION GAP SERPL CALC-SCNC: 6 MMOL/L (ref 8–16)
BASOPHILS # BLD AUTO: 0.05 K/UL (ref 0–0.2)
BASOPHILS NFR BLD: 0.6 % (ref 0–1.9)
BUN SERPL-MCNC: 36 MG/DL (ref 8–23)
CALCIUM SERPL-MCNC: 8.3 MG/DL (ref 8.7–10.5)
CHLORIDE SERPL-SCNC: 103 MMOL/L (ref 95–110)
CO2 SERPL-SCNC: 22 MMOL/L (ref 23–29)
CREAT SERPL-MCNC: 1.3 MG/DL (ref 0.5–1.4)
DIFFERENTIAL METHOD: ABNORMAL
EOSINOPHIL # BLD AUTO: 0 K/UL (ref 0–0.5)
EOSINOPHIL NFR BLD: 0.1 % (ref 0–8)
ERYTHROCYTE [DISTWIDTH] IN BLOOD BY AUTOMATED COUNT: 14.2 % (ref 11.5–14.5)
EST. GFR  (AFRICAN AMERICAN): 55.5 ML/MIN/1.73 M^2
EST. GFR  (NON AFRICAN AMERICAN): 48 ML/MIN/1.73 M^2
GLUCOSE SERPL-MCNC: 289 MG/DL (ref 70–110)
HCT VFR BLD AUTO: 31.3 % (ref 40–54)
HGB BLD-MCNC: 10.6 G/DL (ref 14–18)
IMM GRANULOCYTES # BLD AUTO: 0.04 K/UL (ref 0–0.04)
IMM GRANULOCYTES NFR BLD AUTO: 0.5 % (ref 0–0.5)
LYMPHOCYTES # BLD AUTO: 1.2 K/UL (ref 1–4.8)
LYMPHOCYTES NFR BLD: 15.5 % (ref 18–48)
MCH RBC QN AUTO: 29.2 PG (ref 27–31)
MCHC RBC AUTO-ENTMCNC: 33.9 G/DL (ref 32–36)
MCV RBC AUTO: 86 FL (ref 82–98)
MONOCYTES # BLD AUTO: 0.9 K/UL (ref 0.3–1)
MONOCYTES NFR BLD: 11.5 % (ref 4–15)
NEUTROPHILS # BLD AUTO: 5.6 K/UL (ref 1.8–7.7)
NEUTROPHILS NFR BLD: 71.8 % (ref 38–73)
NRBC BLD-RTO: 0 /100 WBC
PLATELET # BLD AUTO: 167 K/UL (ref 150–450)
PMV BLD AUTO: 11.4 FL (ref 9.2–12.9)
POCT GLUCOSE: 255 MG/DL (ref 70–110)
POCT GLUCOSE: 302 MG/DL (ref 70–110)
POCT GLUCOSE: 330 MG/DL (ref 70–110)
POCT GLUCOSE: 340 MG/DL (ref 70–110)
POCT GLUCOSE: 348 MG/DL (ref 70–110)
POCT GLUCOSE: 387 MG/DL (ref 70–110)
POTASSIUM SERPL-SCNC: 4.7 MMOL/L (ref 3.5–5.1)
RBC # BLD AUTO: 3.63 M/UL (ref 4.6–6.2)
SODIUM SERPL-SCNC: 131 MMOL/L (ref 136–145)
WBC # BLD AUTO: 7.75 K/UL (ref 3.9–12.7)

## 2021-11-30 PROCEDURE — A4216 STERILE WATER/SALINE, 10 ML: HCPCS | Mod: HCNC | Performed by: PHYSICIAN ASSISTANT

## 2021-11-30 PROCEDURE — 25000003 PHARM REV CODE 250: Mod: HCNC | Performed by: ORTHOPAEDIC SURGERY

## 2021-11-30 PROCEDURE — 71000039 HC RECOVERY, EACH ADD'L HOUR: Mod: HCNC | Performed by: ORTHOPAEDIC SURGERY

## 2021-11-30 PROCEDURE — 37000008 HC ANESTHESIA 1ST 15 MINUTES: Mod: HCNC | Performed by: ORTHOPAEDIC SURGERY

## 2021-11-30 PROCEDURE — 27201423 OPTIME MED/SURG SUP & DEVICES STERILE SUPPLY: Mod: HCNC | Performed by: ORTHOPAEDIC SURGERY

## 2021-11-30 PROCEDURE — 63600175 PHARM REV CODE 636 W HCPCS: Mod: HCNC | Performed by: ANESTHESIOLOGY

## 2021-11-30 PROCEDURE — 63600175 PHARM REV CODE 636 W HCPCS: Mod: HCNC | Performed by: STUDENT IN AN ORGANIZED HEALTH CARE EDUCATION/TRAINING PROGRAM

## 2021-11-30 PROCEDURE — 36000711: Mod: HCNC | Performed by: ORTHOPAEDIC SURGERY

## 2021-11-30 PROCEDURE — C1769 GUIDE WIRE: HCPCS | Mod: HCNC | Performed by: ORTHOPAEDIC SURGERY

## 2021-11-30 PROCEDURE — D9220A PRA ANESTHESIA: ICD-10-PCS | Mod: HCNC,,, | Performed by: ANESTHESIOLOGY

## 2021-11-30 PROCEDURE — 71000033 HC RECOVERY, INTIAL HOUR: Mod: HCNC | Performed by: ORTHOPAEDIC SURGERY

## 2021-11-30 PROCEDURE — 82962 GLUCOSE BLOOD TEST: CPT | Mod: HCNC | Performed by: ORTHOPAEDIC SURGERY

## 2021-11-30 PROCEDURE — 85025 COMPLETE CBC W/AUTO DIFF WBC: CPT | Mod: HCNC | Performed by: PHYSICIAN ASSISTANT

## 2021-11-30 PROCEDURE — 99233 SBSQ HOSP IP/OBS HIGH 50: CPT | Mod: HCNC,,, | Performed by: PHYSICIAN ASSISTANT

## 2021-11-30 PROCEDURE — 25000003 PHARM REV CODE 250: Mod: HCNC | Performed by: NURSE PRACTITIONER

## 2021-11-30 PROCEDURE — 36415 COLL VENOUS BLD VENIPUNCTURE: CPT | Mod: HCNC | Performed by: PHYSICIAN ASSISTANT

## 2021-11-30 PROCEDURE — 99233 PR SUBSEQUENT HOSPITAL CARE,LEVL III: ICD-10-PCS | Mod: HCNC,,, | Performed by: PHYSICIAN ASSISTANT

## 2021-11-30 PROCEDURE — 25000003 PHARM REV CODE 250: Mod: HCNC | Performed by: ANESTHESIOLOGY

## 2021-11-30 PROCEDURE — C1713 ANCHOR/SCREW BN/BN,TIS/BN: HCPCS | Mod: HCNC | Performed by: ORTHOPAEDIC SURGERY

## 2021-11-30 PROCEDURE — 63600175 PHARM REV CODE 636 W HCPCS: Mod: HCNC | Performed by: NURSE PRACTITIONER

## 2021-11-30 PROCEDURE — 25000003 PHARM REV CODE 250: Mod: HCNC | Performed by: STUDENT IN AN ORGANIZED HEALTH CARE EDUCATION/TRAINING PROGRAM

## 2021-11-30 PROCEDURE — 80048 BASIC METABOLIC PNL TOTAL CA: CPT | Mod: HCNC | Performed by: PHYSICIAN ASSISTANT

## 2021-11-30 PROCEDURE — 94761 N-INVAS EAR/PLS OXIMETRY MLT: CPT | Mod: HCNC

## 2021-11-30 PROCEDURE — 36000710: Mod: HCNC | Performed by: ORTHOPAEDIC SURGERY

## 2021-11-30 PROCEDURE — 11000001 HC ACUTE MED/SURG PRIVATE ROOM: Mod: HCNC

## 2021-11-30 PROCEDURE — 63600175 PHARM REV CODE 636 W HCPCS: Mod: HCNC | Performed by: PHYSICIAN ASSISTANT

## 2021-11-30 PROCEDURE — 27216 TREAT PELVIC RING FRACTURE: CPT | Mod: HCNC,RT,, | Performed by: ORTHOPAEDIC SURGERY

## 2021-11-30 PROCEDURE — 27217 PR OPEN INTERN FIX ANTER PELV RING FX: ICD-10-PCS | Mod: HCNC,RT,, | Performed by: ORTHOPAEDIC SURGERY

## 2021-11-30 PROCEDURE — 25000003 PHARM REV CODE 250: Mod: HCNC | Performed by: PHYSICIAN ASSISTANT

## 2021-11-30 PROCEDURE — 27217 TREAT PELVIC RING FRACTURE: CPT | Mod: HCNC,RT,, | Performed by: ORTHOPAEDIC SURGERY

## 2021-11-30 PROCEDURE — D9220A PRA ANESTHESIA: Mod: HCNC,,, | Performed by: ANESTHESIOLOGY

## 2021-11-30 PROCEDURE — 27216 PR PERCUT FIX POST PELV RING FX: ICD-10-PCS | Mod: HCNC,RT,, | Performed by: ORTHOPAEDIC SURGERY

## 2021-11-30 PROCEDURE — 37000009 HC ANESTHESIA EA ADD 15 MINS: Mod: HCNC | Performed by: ORTHOPAEDIC SURGERY

## 2021-11-30 PROCEDURE — 71000015 HC POSTOP RECOV 1ST HR: Mod: HCNC | Performed by: ORTHOPAEDIC SURGERY

## 2021-11-30 DEVICE — SCREW WASHER CANN 6.5MM SS: Type: IMPLANTABLE DEVICE | Site: PELVIS | Status: FUNCTIONAL

## 2021-11-30 RX ORDER — LIDOCAINE HYDROCHLORIDE AND EPINEPHRINE 10; 10 MG/ML; UG/ML
INJECTION, SOLUTION INFILTRATION; PERINEURAL
Status: DISCONTINUED | OUTPATIENT
Start: 2021-11-30 | End: 2021-11-30 | Stop reason: HOSPADM

## 2021-11-30 RX ORDER — DEXMEDETOMIDINE HYDROCHLORIDE 100 UG/ML
INJECTION, SOLUTION INTRAVENOUS
Status: DISCONTINUED | OUTPATIENT
Start: 2021-11-30 | End: 2021-11-30

## 2021-11-30 RX ORDER — BUPIVACAINE HYDROCHLORIDE AND EPINEPHRINE 2.5; 5 MG/ML; UG/ML
INJECTION, SOLUTION EPIDURAL; INFILTRATION; INTRACAUDAL; PERINEURAL
Status: DISCONTINUED | OUTPATIENT
Start: 2021-11-30 | End: 2021-11-30 | Stop reason: HOSPADM

## 2021-11-30 RX ORDER — PHENYLEPHRINE HCL IN 0.9% NACL 1 MG/10 ML
SYRINGE (ML) INTRAVENOUS
Status: DISCONTINUED | OUTPATIENT
Start: 2021-11-30 | End: 2021-11-30

## 2021-11-30 RX ORDER — CEFAZOLIN SODIUM 1 G/3ML
2 INJECTION, POWDER, FOR SOLUTION INTRAMUSCULAR; INTRAVENOUS
Status: COMPLETED | OUTPATIENT
Start: 2021-11-30 | End: 2021-11-30

## 2021-11-30 RX ORDER — FENTANYL CITRATE 50 UG/ML
INJECTION, SOLUTION INTRAMUSCULAR; INTRAVENOUS
Status: DISCONTINUED | OUTPATIENT
Start: 2021-11-30 | End: 2021-11-30

## 2021-11-30 RX ORDER — HYDROMORPHONE HYDROCHLORIDE 1 MG/ML
0.2 INJECTION, SOLUTION INTRAMUSCULAR; INTRAVENOUS; SUBCUTANEOUS EVERY 5 MIN PRN
Status: DISCONTINUED | OUTPATIENT
Start: 2021-11-30 | End: 2021-11-30 | Stop reason: HOSPADM

## 2021-11-30 RX ORDER — ONDANSETRON 2 MG/ML
4 INJECTION INTRAMUSCULAR; INTRAVENOUS DAILY PRN
Status: DISCONTINUED | OUTPATIENT
Start: 2021-11-30 | End: 2021-11-30 | Stop reason: HOSPADM

## 2021-11-30 RX ORDER — PROPOFOL 10 MG/ML
VIAL (ML) INTRAVENOUS
Status: DISCONTINUED | OUTPATIENT
Start: 2021-11-30 | End: 2021-11-30

## 2021-11-30 RX ORDER — MUPIROCIN 20 MG/G
OINTMENT TOPICAL
Status: DISCONTINUED | OUTPATIENT
Start: 2021-11-30 | End: 2021-11-30 | Stop reason: HOSPADM

## 2021-11-30 RX ORDER — CEFAZOLIN SODIUM 1 G/3ML
2 INJECTION, POWDER, FOR SOLUTION INTRAMUSCULAR; INTRAVENOUS
Status: DISCONTINUED | OUTPATIENT
Start: 2021-11-30 | End: 2021-11-30

## 2021-11-30 RX ORDER — LIDOCAINE HYDROCHLORIDE 20 MG/ML
INJECTION INTRAVENOUS
Status: DISCONTINUED | OUTPATIENT
Start: 2021-11-30 | End: 2021-11-30

## 2021-11-30 RX ORDER — ACETAMINOPHEN 10 MG/ML
INJECTION, SOLUTION INTRAVENOUS
Status: DISCONTINUED | OUTPATIENT
Start: 2021-11-30 | End: 2021-11-30

## 2021-11-30 RX ORDER — PROPOFOL 10 MG/ML
VIAL (ML) INTRAVENOUS CONTINUOUS PRN
Status: DISCONTINUED | OUTPATIENT
Start: 2021-11-30 | End: 2021-11-30

## 2021-11-30 RX ORDER — ONDANSETRON 2 MG/ML
INJECTION INTRAMUSCULAR; INTRAVENOUS
Status: DISCONTINUED | OUTPATIENT
Start: 2021-11-30 | End: 2021-11-30

## 2021-11-30 RX ADMIN — FENTANYL CITRATE 25 MCG: 50 INJECTION, SOLUTION INTRAMUSCULAR; INTRAVENOUS at 09:11

## 2021-11-30 RX ADMIN — ACETAMINOPHEN 1000 MG: 10 INJECTION, SOLUTION INTRAVENOUS at 09:11

## 2021-11-30 RX ADMIN — FENTANYL CITRATE 25 MCG: 50 INJECTION, SOLUTION INTRAMUSCULAR; INTRAVENOUS at 07:11

## 2021-11-30 RX ADMIN — INSULIN ASPART 4 UNITS: 100 INJECTION, SOLUTION INTRAVENOUS; SUBCUTANEOUS at 05:11

## 2021-11-30 RX ADMIN — INSULIN ASPART 2 UNITS: 100 INJECTION, SOLUTION INTRAVENOUS; SUBCUTANEOUS at 11:11

## 2021-11-30 RX ADMIN — DEXMEDETOMIDINE HYDROCHLORIDE 12 MCG: 100 INJECTION, SOLUTION, CONCENTRATE INTRAVENOUS at 09:11

## 2021-11-30 RX ADMIN — ONDANSETRON 4 MG: 2 INJECTION INTRAMUSCULAR; INTRAVENOUS at 10:11

## 2021-11-30 RX ADMIN — Medication 250 MG: at 09:11

## 2021-11-30 RX ADMIN — CEFTRIAXONE 1 G: 1 INJECTION, POWDER, FOR SOLUTION INTRAMUSCULAR; INTRAVENOUS at 03:11

## 2021-11-30 RX ADMIN — INSULIN HUMAN 4 UNITS: 100 INJECTION, SOLUTION PARENTERAL at 06:11

## 2021-11-30 RX ADMIN — PROPOFOL 50 MG: 10 INJECTION, EMULSION INTRAVENOUS at 07:11

## 2021-11-30 RX ADMIN — ACETAMINOPHEN 1000 MG: 500 TABLET ORAL at 09:11

## 2021-11-30 RX ADMIN — CEFAZOLIN 2 G: 330 INJECTION, POWDER, FOR SOLUTION INTRAMUSCULAR; INTRAVENOUS at 08:11

## 2021-11-30 RX ADMIN — HYDROCODONE BITARTRATE AND ACETAMINOPHEN 1 TABLET: 7.5; 325 TABLET ORAL at 06:11

## 2021-11-30 RX ADMIN — SODIUM CHLORIDE: 0.9 INJECTION, SOLUTION INTRAVENOUS at 06:11

## 2021-11-30 RX ADMIN — ACETAMINOPHEN 1000 MG: 500 TABLET ORAL at 03:11

## 2021-11-30 RX ADMIN — MORPHINE SULFATE 2 MG: 2 INJECTION, SOLUTION INTRAMUSCULAR; INTRAVENOUS at 09:11

## 2021-11-30 RX ADMIN — INSULIN ASPART 3 UNITS: 100 INJECTION, SOLUTION INTRAVENOUS; SUBCUTANEOUS at 06:11

## 2021-11-30 RX ADMIN — Medication 10 ML: at 10:11

## 2021-11-30 RX ADMIN — MUPIROCIN: 20 OINTMENT TOPICAL at 06:11

## 2021-11-30 RX ADMIN — INSULIN ASPART 5 UNITS: 100 INJECTION, SOLUTION INTRAVENOUS; SUBCUTANEOUS at 06:11

## 2021-11-30 RX ADMIN — CEFAZOLIN 2 G: 330 INJECTION, POWDER, FOR SOLUTION INTRAMUSCULAR; INTRAVENOUS at 03:11

## 2021-11-30 RX ADMIN — Medication 100 MCG: at 09:11

## 2021-11-30 RX ADMIN — LIDOCAINE HYDROCHLORIDE 100 MG: 20 INJECTION, SOLUTION INTRAVENOUS at 07:11

## 2021-11-30 RX ADMIN — Medication 10 ML: at 02:11

## 2021-11-30 RX ADMIN — TRAZODONE HYDROCHLORIDE 50 MG: 50 TABLET ORAL at 11:11

## 2021-11-30 RX ADMIN — Medication 50 MCG/KG/MIN: at 07:11

## 2021-11-30 RX ADMIN — HEPARIN SODIUM 5000 UNITS: 5000 INJECTION INTRAVENOUS; SUBCUTANEOUS at 06:11

## 2021-11-30 RX ADMIN — Medication 10 ML: at 06:11

## 2021-12-01 LAB
ANION GAP SERPL CALC-SCNC: 10 MMOL/L (ref 8–16)
ANION GAP SERPL CALC-SCNC: 11 MMOL/L (ref 8–16)
BASOPHILS # BLD AUTO: 0.05 K/UL (ref 0–0.2)
BASOPHILS NFR BLD: 0.6 % (ref 0–1.9)
BUN SERPL-MCNC: 32 MG/DL (ref 8–23)
BUN SERPL-MCNC: 32 MG/DL (ref 8–23)
CALCIUM SERPL-MCNC: 7.8 MG/DL (ref 8.7–10.5)
CALCIUM SERPL-MCNC: 8 MG/DL (ref 8.7–10.5)
CHLORIDE SERPL-SCNC: 102 MMOL/L (ref 95–110)
CHLORIDE SERPL-SCNC: 103 MMOL/L (ref 95–110)
CO2 SERPL-SCNC: 20 MMOL/L (ref 23–29)
CO2 SERPL-SCNC: 21 MMOL/L (ref 23–29)
CREAT SERPL-MCNC: 1.2 MG/DL (ref 0.5–1.4)
CREAT SERPL-MCNC: 1.2 MG/DL (ref 0.5–1.4)
DIFFERENTIAL METHOD: ABNORMAL
EOSINOPHIL # BLD AUTO: 0.3 K/UL (ref 0–0.5)
EOSINOPHIL NFR BLD: 3.4 % (ref 0–8)
ERYTHROCYTE [DISTWIDTH] IN BLOOD BY AUTOMATED COUNT: 14.4 % (ref 11.5–14.5)
EST. GFR  (AFRICAN AMERICAN): >60 ML/MIN/1.73 M^2
EST. GFR  (AFRICAN AMERICAN): >60 ML/MIN/1.73 M^2
EST. GFR  (NON AFRICAN AMERICAN): 52.9 ML/MIN/1.73 M^2
EST. GFR  (NON AFRICAN AMERICAN): 52.9 ML/MIN/1.73 M^2
GLUCOSE SERPL-MCNC: 311 MG/DL (ref 70–110)
GLUCOSE SERPL-MCNC: 383 MG/DL (ref 70–110)
HCT VFR BLD AUTO: 25.7 % (ref 40–54)
HGB BLD-MCNC: 8.3 G/DL (ref 14–18)
IMM GRANULOCYTES # BLD AUTO: 0.05 K/UL (ref 0–0.04)
IMM GRANULOCYTES NFR BLD AUTO: 0.6 % (ref 0–0.5)
LYMPHOCYTES # BLD AUTO: 1.1 K/UL (ref 1–4.8)
LYMPHOCYTES NFR BLD: 13 % (ref 18–48)
MCH RBC QN AUTO: 27.9 PG (ref 27–31)
MCHC RBC AUTO-ENTMCNC: 32.3 G/DL (ref 32–36)
MCV RBC AUTO: 86 FL (ref 82–98)
MONOCYTES # BLD AUTO: 1 K/UL (ref 0.3–1)
MONOCYTES NFR BLD: 12.2 % (ref 4–15)
NEUTROPHILS # BLD AUTO: 5.8 K/UL (ref 1.8–7.7)
NEUTROPHILS NFR BLD: 70.2 % (ref 38–73)
NRBC BLD-RTO: 0 /100 WBC
PLATELET # BLD AUTO: 198 K/UL (ref 150–450)
PMV BLD AUTO: 11.3 FL (ref 9.2–12.9)
POCT GLUCOSE: 348 MG/DL (ref 70–110)
POCT GLUCOSE: 418 MG/DL (ref 70–110)
POCT GLUCOSE: 428 MG/DL (ref 70–110)
POCT GLUCOSE: 438 MG/DL (ref 70–110)
POTASSIUM SERPL-SCNC: 4.5 MMOL/L (ref 3.5–5.1)
POTASSIUM SERPL-SCNC: 4.6 MMOL/L (ref 3.5–5.1)
RBC # BLD AUTO: 2.98 M/UL (ref 4.6–6.2)
SODIUM SERPL-SCNC: 133 MMOL/L (ref 136–145)
SODIUM SERPL-SCNC: 134 MMOL/L (ref 136–145)
WBC # BLD AUTO: 8.22 K/UL (ref 3.9–12.7)

## 2021-12-01 PROCEDURE — 85025 COMPLETE CBC W/AUTO DIFF WBC: CPT | Mod: HCNC | Performed by: PHYSICIAN ASSISTANT

## 2021-12-01 PROCEDURE — 97535 SELF CARE MNGMENT TRAINING: CPT | Mod: HCNC

## 2021-12-01 PROCEDURE — 25000003 PHARM REV CODE 250: Mod: HCNC | Performed by: STUDENT IN AN ORGANIZED HEALTH CARE EDUCATION/TRAINING PROGRAM

## 2021-12-01 PROCEDURE — C9399 UNCLASSIFIED DRUGS OR BIOLOG: HCPCS | Mod: HCNC | Performed by: PHYSICIAN ASSISTANT

## 2021-12-01 PROCEDURE — 97164 PT RE-EVAL EST PLAN CARE: CPT | Mod: HCNC

## 2021-12-01 PROCEDURE — 63600175 PHARM REV CODE 636 W HCPCS: Mod: HCNC | Performed by: PHYSICIAN ASSISTANT

## 2021-12-01 PROCEDURE — 97530 THERAPEUTIC ACTIVITIES: CPT | Mod: HCNC

## 2021-12-01 PROCEDURE — 97168 OT RE-EVAL EST PLAN CARE: CPT | Mod: HCNC

## 2021-12-01 PROCEDURE — 25000003 PHARM REV CODE 250: Mod: HCNC | Performed by: PHYSICIAN ASSISTANT

## 2021-12-01 PROCEDURE — 11000001 HC ACUTE MED/SURG PRIVATE ROOM: Mod: HCNC

## 2021-12-01 PROCEDURE — 36415 COLL VENOUS BLD VENIPUNCTURE: CPT | Mod: HCNC | Performed by: PHYSICIAN ASSISTANT

## 2021-12-01 PROCEDURE — 25000003 PHARM REV CODE 250: Mod: HCNC | Performed by: NURSE PRACTITIONER

## 2021-12-01 PROCEDURE — 99233 PR SUBSEQUENT HOSPITAL CARE,LEVL III: ICD-10-PCS | Mod: HCNC,,, | Performed by: PHYSICIAN ASSISTANT

## 2021-12-01 PROCEDURE — 80048 BASIC METABOLIC PNL TOTAL CA: CPT | Mod: 91,HCNC | Performed by: PHYSICIAN ASSISTANT

## 2021-12-01 PROCEDURE — A4216 STERILE WATER/SALINE, 10 ML: HCPCS | Mod: HCNC | Performed by: PHYSICIAN ASSISTANT

## 2021-12-01 PROCEDURE — 63600175 PHARM REV CODE 636 W HCPCS: Mod: HCNC | Performed by: NURSE PRACTITIONER

## 2021-12-01 PROCEDURE — 99233 SBSQ HOSP IP/OBS HIGH 50: CPT | Mod: HCNC,,, | Performed by: PHYSICIAN ASSISTANT

## 2021-12-01 RX ORDER — INSULIN ASPART 100 [IU]/ML
8 INJECTION, SOLUTION INTRAVENOUS; SUBCUTANEOUS
Status: DISCONTINUED | OUTPATIENT
Start: 2021-12-02 | End: 2021-12-07

## 2021-12-01 RX ORDER — SENNOSIDES 8.6 MG/1
8.6 TABLET ORAL DAILY
Status: DISCONTINUED | OUTPATIENT
Start: 2021-12-01 | End: 2021-12-05

## 2021-12-01 RX ORDER — INSULIN ASPART 100 [IU]/ML
1-10 INJECTION, SOLUTION INTRAVENOUS; SUBCUTANEOUS
Status: DISCONTINUED | OUTPATIENT
Start: 2021-12-01 | End: 2021-12-07 | Stop reason: HOSPADM

## 2021-12-01 RX ORDER — INSULIN ASPART 100 [IU]/ML
5 INJECTION, SOLUTION INTRAVENOUS; SUBCUTANEOUS
Status: DISCONTINUED | OUTPATIENT
Start: 2021-12-01 | End: 2021-12-01

## 2021-12-01 RX ADMIN — ATORVASTATIN CALCIUM 40 MG: 40 TABLET, FILM COATED ORAL at 08:12

## 2021-12-01 RX ADMIN — Medication 10 ML: at 02:12

## 2021-12-01 RX ADMIN — INSULIN ASPART 5 UNITS: 100 INJECTION, SOLUTION INTRAVENOUS; SUBCUTANEOUS at 11:12

## 2021-12-01 RX ADMIN — Medication 2000 UNITS: at 08:12

## 2021-12-01 RX ADMIN — Medication 250 MG: at 09:12

## 2021-12-01 RX ADMIN — APIXABAN 2.5 MG: 2.5 TABLET, FILM COATED ORAL at 09:12

## 2021-12-01 RX ADMIN — INSULIN ASPART 5 UNITS: 100 INJECTION, SOLUTION INTRAVENOUS; SUBCUTANEOUS at 05:12

## 2021-12-01 RX ADMIN — POLYETHYLENE GLYCOL 3350 17 G: 17 POWDER, FOR SOLUTION ORAL at 08:12

## 2021-12-01 RX ADMIN — LISINOPRIL 20 MG: 20 TABLET ORAL at 08:12

## 2021-12-01 RX ADMIN — HYDROCODONE BITARTRATE AND ACETAMINOPHEN 1 TABLET: 7.5; 325 TABLET ORAL at 02:12

## 2021-12-01 RX ADMIN — INSULIN ASPART 3 UNITS: 100 INJECTION, SOLUTION INTRAVENOUS; SUBCUTANEOUS at 08:12

## 2021-12-01 RX ADMIN — ACETAMINOPHEN 1000 MG: 500 TABLET ORAL at 09:12

## 2021-12-01 RX ADMIN — APIXABAN 2.5 MG: 2.5 TABLET, FILM COATED ORAL at 08:12

## 2021-12-01 RX ADMIN — DULOXETINE 60 MG: 60 CAPSULE, DELAYED RELEASE ORAL at 08:12

## 2021-12-01 RX ADMIN — Medication 10 ML: at 10:12

## 2021-12-01 RX ADMIN — Medication 250 MG: at 08:12

## 2021-12-01 RX ADMIN — ACETAMINOPHEN 1000 MG: 500 TABLET ORAL at 03:12

## 2021-12-01 RX ADMIN — SENNOSIDES 8.6 MG: 8.6 TABLET ORAL at 02:12

## 2021-12-01 RX ADMIN — Medication 10 ML: at 06:12

## 2021-12-01 RX ADMIN — INSULIN DETEMIR 8 UNITS: 100 INJECTION, SOLUTION SUBCUTANEOUS at 06:12

## 2021-12-01 RX ADMIN — INSULIN DETEMIR 14 UNITS: 100 INJECTION, SOLUTION SUBCUTANEOUS at 09:12

## 2021-12-01 RX ADMIN — MORPHINE SULFATE 2 MG: 2 INJECTION, SOLUTION INTRAMUSCULAR; INTRAVENOUS at 09:12

## 2021-12-01 RX ADMIN — CEFTRIAXONE 1 G: 1 INJECTION, POWDER, FOR SOLUTION INTRAMUSCULAR; INTRAVENOUS at 03:12

## 2021-12-01 RX ADMIN — FINASTERIDE 5 MG: 5 TABLET, FILM COATED ORAL at 08:12

## 2021-12-01 RX ADMIN — INSULIN ASPART 4 UNITS: 100 INJECTION, SOLUTION INTRAVENOUS; SUBCUTANEOUS at 09:12

## 2021-12-01 RX ADMIN — ASPIRIN 81 MG: 81 TABLET, COATED ORAL at 08:12

## 2021-12-01 RX ADMIN — HYDROCODONE BITARTRATE AND ACETAMINOPHEN 1 TABLET: 7.5; 325 TABLET ORAL at 11:12

## 2021-12-02 PROBLEM — R33.8 POSTOPERATIVE URINARY RETENTION: Status: ACTIVE | Noted: 2021-12-02

## 2021-12-02 PROBLEM — N99.89 POSTOPERATIVE URINARY RETENTION: Status: ACTIVE | Noted: 2021-12-02

## 2021-12-02 LAB
HCT VFR BLD AUTO: 25.9 % (ref 40–54)
HGB BLD-MCNC: 8.4 G/DL (ref 14–18)
POCT GLUCOSE: 122 MG/DL (ref 70–110)
POCT GLUCOSE: 214 MG/DL (ref 70–110)
POCT GLUCOSE: 216 MG/DL (ref 70–110)
POCT GLUCOSE: 257 MG/DL (ref 70–110)
SARS-COV-2 RNA RESP QL NAA+PROBE: NOT DETECTED

## 2021-12-02 PROCEDURE — 11000001 HC ACUTE MED/SURG PRIVATE ROOM: Mod: HCNC

## 2021-12-02 PROCEDURE — U0005 INFEC AGEN DETEC AMPLI PROBE: HCPCS | Performed by: INTERNAL MEDICINE

## 2021-12-02 PROCEDURE — 85014 HEMATOCRIT: CPT | Mod: HCNC | Performed by: PHYSICIAN ASSISTANT

## 2021-12-02 PROCEDURE — 99233 PR SUBSEQUENT HOSPITAL CARE,LEVL III: ICD-10-PCS | Mod: HCNC,,, | Performed by: PHYSICIAN ASSISTANT

## 2021-12-02 PROCEDURE — A4216 STERILE WATER/SALINE, 10 ML: HCPCS | Mod: HCNC | Performed by: PHYSICIAN ASSISTANT

## 2021-12-02 PROCEDURE — 25000003 PHARM REV CODE 250: Mod: HCNC | Performed by: NURSE PRACTITIONER

## 2021-12-02 PROCEDURE — 97112 NEUROMUSCULAR REEDUCATION: CPT | Mod: HCNC

## 2021-12-02 PROCEDURE — 25000242 PHARM REV CODE 250 ALT 637 W/ HCPCS: Mod: HCNC | Performed by: PHYSICIAN ASSISTANT

## 2021-12-02 PROCEDURE — 25000003 PHARM REV CODE 250: Mod: HCNC | Performed by: PHYSICIAN ASSISTANT

## 2021-12-02 PROCEDURE — U0003 INFECTIOUS AGENT DETECTION BY NUCLEIC ACID (DNA OR RNA); SEVERE ACUTE RESPIRATORY SYNDROME CORONAVIRUS 2 (SARS-COV-2) (CORONAVIRUS DISEASE [COVID-19]), AMPLIFIED PROBE TECHNIQUE, MAKING USE OF HIGH THROUGHPUT TECHNOLOGIES AS DESCRIBED BY CMS-2020-01-R: HCPCS | Mod: HCNC | Performed by: INTERNAL MEDICINE

## 2021-12-02 PROCEDURE — 85018 HEMOGLOBIN: CPT | Mod: HCNC | Performed by: PHYSICIAN ASSISTANT

## 2021-12-02 PROCEDURE — 97535 SELF CARE MNGMENT TRAINING: CPT | Mod: HCNC

## 2021-12-02 PROCEDURE — 97530 THERAPEUTIC ACTIVITIES: CPT | Mod: HCNC

## 2021-12-02 PROCEDURE — 99233 SBSQ HOSP IP/OBS HIGH 50: CPT | Mod: HCNC,,, | Performed by: PHYSICIAN ASSISTANT

## 2021-12-02 PROCEDURE — 36415 COLL VENOUS BLD VENIPUNCTURE: CPT | Mod: HCNC | Performed by: PHYSICIAN ASSISTANT

## 2021-12-02 PROCEDURE — 63600175 PHARM REV CODE 636 W HCPCS: Mod: HCNC | Performed by: NURSE PRACTITIONER

## 2021-12-02 PROCEDURE — 25000003 PHARM REV CODE 250: Mod: HCNC | Performed by: STUDENT IN AN ORGANIZED HEALTH CARE EDUCATION/TRAINING PROGRAM

## 2021-12-02 PROCEDURE — 94761 N-INVAS EAR/PLS OXIMETRY MLT: CPT | Mod: HCNC

## 2021-12-02 RX ORDER — METHOCARBAMOL 500 MG/1
500 TABLET, FILM COATED ORAL 4 TIMES DAILY
Status: DISCONTINUED | OUTPATIENT
Start: 2021-12-02 | End: 2021-12-02

## 2021-12-02 RX ORDER — CETIRIZINE HYDROCHLORIDE 5 MG/1
5 TABLET ORAL DAILY
Status: DISCONTINUED | OUTPATIENT
Start: 2021-12-02 | End: 2021-12-07 | Stop reason: HOSPADM

## 2021-12-02 RX ORDER — METHOCARBAMOL 500 MG/1
500 TABLET, FILM COATED ORAL 4 TIMES DAILY
Status: DISCONTINUED | OUTPATIENT
Start: 2021-12-02 | End: 2021-12-03

## 2021-12-02 RX ORDER — FLUTICASONE PROPIONATE 50 MCG
2 SPRAY, SUSPENSION (ML) NASAL DAILY
Status: DISCONTINUED | OUTPATIENT
Start: 2021-12-02 | End: 2021-12-07 | Stop reason: HOSPADM

## 2021-12-02 RX ORDER — DULOXETIN HYDROCHLORIDE 60 MG/1
60 CAPSULE, DELAYED RELEASE ORAL NIGHTLY
Status: DISCONTINUED | OUTPATIENT
Start: 2021-12-02 | End: 2021-12-07 | Stop reason: HOSPADM

## 2021-12-02 RX ORDER — OXYCODONE HYDROCHLORIDE 10 MG/1
10 TABLET ORAL EVERY 6 HOURS PRN
Status: DISCONTINUED | OUTPATIENT
Start: 2021-12-02 | End: 2021-12-03

## 2021-12-02 RX ORDER — OXYCODONE HYDROCHLORIDE 5 MG/1
5 TABLET ORAL EVERY 6 HOURS PRN
Status: DISCONTINUED | OUTPATIENT
Start: 2021-12-02 | End: 2021-12-05

## 2021-12-02 RX ORDER — TRAZODONE HYDROCHLORIDE 50 MG/1
50 TABLET ORAL NIGHTLY
Status: DISCONTINUED | OUTPATIENT
Start: 2021-12-02 | End: 2021-12-07 | Stop reason: HOSPADM

## 2021-12-02 RX ORDER — METHOCARBAMOL 500 MG/1
500 TABLET, FILM COATED ORAL ONCE
Status: COMPLETED | OUTPATIENT
Start: 2021-12-02 | End: 2021-12-02

## 2021-12-02 RX ADMIN — METHOCARBAMOL 500 MG: 500 TABLET ORAL at 04:12

## 2021-12-02 RX ADMIN — LISINOPRIL 20 MG: 20 TABLET ORAL at 08:12

## 2021-12-02 RX ADMIN — METHOCARBAMOL 500 MG: 500 TABLET ORAL at 12:12

## 2021-12-02 RX ADMIN — CETIRIZINE HYDROCHLORIDE 5 MG: 5 TABLET ORAL at 12:12

## 2021-12-02 RX ADMIN — INSULIN ASPART 8 UNITS: 100 INJECTION, SOLUTION INTRAVENOUS; SUBCUTANEOUS at 04:12

## 2021-12-02 RX ADMIN — HYDROCODONE BITARTRATE AND ACETAMINOPHEN 1 TABLET: 7.5; 325 TABLET ORAL at 08:12

## 2021-12-02 RX ADMIN — INSULIN ASPART 8 UNITS: 100 INJECTION, SOLUTION INTRAVENOUS; SUBCUTANEOUS at 08:12

## 2021-12-02 RX ADMIN — INSULIN ASPART 8 UNITS: 100 INJECTION, SOLUTION INTRAVENOUS; SUBCUTANEOUS at 12:12

## 2021-12-02 RX ADMIN — Medication 10 ML: at 04:12

## 2021-12-02 RX ADMIN — Medication 2000 UNITS: at 08:12

## 2021-12-02 RX ADMIN — Medication 10 ML: at 06:12

## 2021-12-02 RX ADMIN — SENNOSIDES 8.6 MG: 8.6 TABLET ORAL at 08:12

## 2021-12-02 RX ADMIN — Medication 10 ML: at 08:12

## 2021-12-02 RX ADMIN — INSULIN ASPART 4 UNITS: 100 INJECTION, SOLUTION INTRAVENOUS; SUBCUTANEOUS at 12:12

## 2021-12-02 RX ADMIN — FINASTERIDE 5 MG: 5 TABLET, FILM COATED ORAL at 08:12

## 2021-12-02 RX ADMIN — ACETAMINOPHEN 1000 MG: 500 TABLET ORAL at 08:12

## 2021-12-02 RX ADMIN — ASPIRIN 81 MG: 81 TABLET, COATED ORAL at 08:12

## 2021-12-02 RX ADMIN — Medication 250 MG: at 08:12

## 2021-12-02 RX ADMIN — INSULIN ASPART 4 UNITS: 100 INJECTION, SOLUTION INTRAVENOUS; SUBCUTANEOUS at 08:12

## 2021-12-02 RX ADMIN — ATORVASTATIN CALCIUM 40 MG: 40 TABLET, FILM COATED ORAL at 08:12

## 2021-12-02 RX ADMIN — DULOXETINE 60 MG: 60 CAPSULE, DELAYED RELEASE ORAL at 08:12

## 2021-12-02 RX ADMIN — POLYETHYLENE GLYCOL 3350 17 G: 17 POWDER, FOR SOLUTION ORAL at 08:12

## 2021-12-02 RX ADMIN — MORPHINE SULFATE 2 MG: 2 INJECTION, SOLUTION INTRAMUSCULAR; INTRAVENOUS at 04:12

## 2021-12-02 RX ADMIN — TRAZODONE HYDROCHLORIDE 50 MG: 50 TABLET ORAL at 08:12

## 2021-12-02 RX ADMIN — APIXABAN 2.5 MG: 2.5 TABLET, FILM COATED ORAL at 08:12

## 2021-12-02 RX ADMIN — METHOCARBAMOL 500 MG: 500 TABLET ORAL at 08:12

## 2021-12-02 RX ADMIN — FLUTICASONE PROPIONATE 100 MCG: 50 SPRAY, METERED NASAL at 12:12

## 2021-12-02 RX ADMIN — INSULIN DETEMIR 16 UNITS: 100 INJECTION, SOLUTION SUBCUTANEOUS at 08:12

## 2021-12-02 RX ADMIN — INSULIN ASPART 4 UNITS: 100 INJECTION, SOLUTION INTRAVENOUS; SUBCUTANEOUS at 04:12

## 2021-12-02 RX ADMIN — OXYCODONE 5 MG: 5 TABLET ORAL at 06:12

## 2021-12-03 LAB
ANION GAP SERPL CALC-SCNC: 13 MMOL/L (ref 8–16)
BACTERIA BLD CULT: NORMAL
BACTERIA BLD CULT: NORMAL
BILIRUB UR QL STRIP: NEGATIVE
BUN SERPL-MCNC: 30 MG/DL (ref 8–23)
CALCIUM SERPL-MCNC: 8.2 MG/DL (ref 8.7–10.5)
CHLORIDE SERPL-SCNC: 103 MMOL/L (ref 95–110)
CLARITY UR REFRACT.AUTO: ABNORMAL
CO2 SERPL-SCNC: 21 MMOL/L (ref 23–29)
COLOR UR AUTO: YELLOW
CREAT SERPL-MCNC: 1.1 MG/DL (ref 0.5–1.4)
EST. GFR  (AFRICAN AMERICAN): >60 ML/MIN/1.73 M^2
EST. GFR  (NON AFRICAN AMERICAN): 58.8 ML/MIN/1.73 M^2
GLUCOSE SERPL-MCNC: 131 MG/DL (ref 70–110)
GLUCOSE UR QL STRIP: NEGATIVE
HGB UR QL STRIP: ABNORMAL
KETONES UR QL STRIP: ABNORMAL
LEUKOCYTE ESTERASE UR QL STRIP: NEGATIVE
MICROSCOPIC COMMENT: ABNORMAL
NITRITE UR QL STRIP: NEGATIVE
PH UR STRIP: 5 [PH] (ref 5–8)
POCT GLUCOSE: 139 MG/DL (ref 70–110)
POCT GLUCOSE: 179 MG/DL (ref 70–110)
POCT GLUCOSE: 192 MG/DL (ref 70–110)
POTASSIUM SERPL-SCNC: 4.6 MMOL/L (ref 3.5–5.1)
PROT UR QL STRIP: NEGATIVE
RBC #/AREA URNS AUTO: 99 /HPF (ref 0–4)
SODIUM SERPL-SCNC: 137 MMOL/L (ref 136–145)
SP GR UR STRIP: 1.01 (ref 1–1.03)
SQUAMOUS #/AREA URNS AUTO: 0 /HPF
URN SPEC COLLECT METH UR: ABNORMAL
WBC #/AREA URNS AUTO: 7 /HPF (ref 0–5)

## 2021-12-03 PROCEDURE — 97530 THERAPEUTIC ACTIVITIES: CPT | Mod: HCNC

## 2021-12-03 PROCEDURE — 99233 PR SUBSEQUENT HOSPITAL CARE,LEVL III: ICD-10-PCS | Mod: HCNC,,, | Performed by: PHYSICIAN ASSISTANT

## 2021-12-03 PROCEDURE — A4216 STERILE WATER/SALINE, 10 ML: HCPCS | Mod: HCNC | Performed by: PHYSICIAN ASSISTANT

## 2021-12-03 PROCEDURE — 97110 THERAPEUTIC EXERCISES: CPT | Mod: HCNC

## 2021-12-03 PROCEDURE — 25000003 PHARM REV CODE 250: Mod: HCNC | Performed by: PHYSICIAN ASSISTANT

## 2021-12-03 PROCEDURE — 25000003 PHARM REV CODE 250: Mod: HCNC | Performed by: STUDENT IN AN ORGANIZED HEALTH CARE EDUCATION/TRAINING PROGRAM

## 2021-12-03 PROCEDURE — 11000001 HC ACUTE MED/SURG PRIVATE ROOM: Mod: HCNC

## 2021-12-03 PROCEDURE — 80048 BASIC METABOLIC PNL TOTAL CA: CPT | Mod: HCNC | Performed by: PHYSICIAN ASSISTANT

## 2021-12-03 PROCEDURE — 36415 COLL VENOUS BLD VENIPUNCTURE: CPT | Mod: HCNC | Performed by: PHYSICIAN ASSISTANT

## 2021-12-03 PROCEDURE — 81001 URINALYSIS AUTO W/SCOPE: CPT | Mod: HCNC | Performed by: PHYSICIAN ASSISTANT

## 2021-12-03 PROCEDURE — 99233 SBSQ HOSP IP/OBS HIGH 50: CPT | Mod: HCNC,,, | Performed by: PHYSICIAN ASSISTANT

## 2021-12-03 PROCEDURE — 97535 SELF CARE MNGMENT TRAINING: CPT | Mod: HCNC

## 2021-12-03 RX ORDER — TRAZODONE HYDROCHLORIDE 50 MG/1
TABLET ORAL
Qty: 60 TABLET | Refills: 1 | Status: SHIPPED | OUTPATIENT
Start: 2021-12-03 | End: 2021-12-07 | Stop reason: SDUPTHER

## 2021-12-03 RX ORDER — METHOCARBAMOL 500 MG/1
500 TABLET, FILM COATED ORAL 4 TIMES DAILY PRN
Status: DISCONTINUED | OUTPATIENT
Start: 2021-12-03 | End: 2021-12-07 | Stop reason: HOSPADM

## 2021-12-03 RX ORDER — BISACODYL 10 MG
10 SUPPOSITORY, RECTAL RECTAL DAILY PRN
Status: DISCONTINUED | OUTPATIENT
Start: 2021-12-03 | End: 2021-12-07 | Stop reason: HOSPADM

## 2021-12-03 RX ADMIN — ACETAMINOPHEN 1000 MG: 500 TABLET ORAL at 09:12

## 2021-12-03 RX ADMIN — Medication 10 ML: at 04:12

## 2021-12-03 RX ADMIN — Medication 2000 UNITS: at 09:12

## 2021-12-03 RX ADMIN — APIXABAN 2.5 MG: 2.5 TABLET, FILM COATED ORAL at 09:12

## 2021-12-03 RX ADMIN — INSULIN ASPART 8 UNITS: 100 INJECTION, SOLUTION INTRAVENOUS; SUBCUTANEOUS at 12:12

## 2021-12-03 RX ADMIN — OXYCODONE 5 MG: 5 TABLET ORAL at 02:12

## 2021-12-03 RX ADMIN — Medication 10 ML: at 05:12

## 2021-12-03 RX ADMIN — FLUTICASONE PROPIONATE 100 MCG: 50 SPRAY, METERED NASAL at 09:12

## 2021-12-03 RX ADMIN — Medication 250 MG: at 09:12

## 2021-12-03 RX ADMIN — SENNOSIDES 8.6 MG: 8.6 TABLET ORAL at 09:12

## 2021-12-03 RX ADMIN — Medication 10 ML: at 09:12

## 2021-12-03 RX ADMIN — INSULIN ASPART 8 UNITS: 100 INJECTION, SOLUTION INTRAVENOUS; SUBCUTANEOUS at 06:12

## 2021-12-03 RX ADMIN — ATORVASTATIN CALCIUM 40 MG: 40 TABLET, FILM COATED ORAL at 09:12

## 2021-12-03 RX ADMIN — CETIRIZINE HYDROCHLORIDE 5 MG: 5 TABLET ORAL at 09:12

## 2021-12-03 RX ADMIN — FINASTERIDE 5 MG: 5 TABLET, FILM COATED ORAL at 09:12

## 2021-12-03 RX ADMIN — OXYCODONE HYDROCHLORIDE 10 MG: 10 TABLET ORAL at 09:12

## 2021-12-03 RX ADMIN — ASPIRIN 81 MG: 81 TABLET, COATED ORAL at 09:12

## 2021-12-03 RX ADMIN — INSULIN ASPART 8 UNITS: 100 INJECTION, SOLUTION INTRAVENOUS; SUBCUTANEOUS at 09:12

## 2021-12-03 RX ADMIN — INSULIN DETEMIR 16 UNITS: 100 INJECTION, SOLUTION SUBCUTANEOUS at 09:12

## 2021-12-03 RX ADMIN — METHOCARBAMOL 500 MG: 500 TABLET ORAL at 04:12

## 2021-12-03 RX ADMIN — DULOXETINE 60 MG: 60 CAPSULE, DELAYED RELEASE ORAL at 09:12

## 2021-12-03 RX ADMIN — TRAZODONE HYDROCHLORIDE 50 MG: 50 TABLET ORAL at 09:12

## 2021-12-03 RX ADMIN — OXYCODONE 5 MG: 5 TABLET ORAL at 04:12

## 2021-12-03 RX ADMIN — LISINOPRIL 20 MG: 20 TABLET ORAL at 09:12

## 2021-12-03 RX ADMIN — METHOCARBAMOL 500 MG: 500 TABLET ORAL at 09:12

## 2021-12-04 LAB — POCT GLUCOSE: 282 MG/DL (ref 70–110)

## 2021-12-04 PROCEDURE — A4216 STERILE WATER/SALINE, 10 ML: HCPCS | Mod: HCNC | Performed by: PHYSICIAN ASSISTANT

## 2021-12-04 PROCEDURE — 97535 SELF CARE MNGMENT TRAINING: CPT | Mod: HCNC

## 2021-12-04 PROCEDURE — 25000003 PHARM REV CODE 250: Mod: HCNC | Performed by: STUDENT IN AN ORGANIZED HEALTH CARE EDUCATION/TRAINING PROGRAM

## 2021-12-04 PROCEDURE — 99233 SBSQ HOSP IP/OBS HIGH 50: CPT | Mod: HCNC,,, | Performed by: PHYSICIAN ASSISTANT

## 2021-12-04 PROCEDURE — 27000221 HC OXYGEN, UP TO 24 HOURS: Mod: HCNC

## 2021-12-04 PROCEDURE — 97530 THERAPEUTIC ACTIVITIES: CPT | Mod: HCNC

## 2021-12-04 PROCEDURE — 97530 THERAPEUTIC ACTIVITIES: CPT | Mod: HCNC,CQ

## 2021-12-04 PROCEDURE — 25000003 PHARM REV CODE 250: Mod: HCNC | Performed by: PHYSICIAN ASSISTANT

## 2021-12-04 PROCEDURE — 99233 PR SUBSEQUENT HOSPITAL CARE,LEVL III: ICD-10-PCS | Mod: HCNC,,, | Performed by: PHYSICIAN ASSISTANT

## 2021-12-04 PROCEDURE — 94761 N-INVAS EAR/PLS OXIMETRY MLT: CPT | Mod: HCNC

## 2021-12-04 PROCEDURE — 11000001 HC ACUTE MED/SURG PRIVATE ROOM: Mod: HCNC

## 2021-12-04 RX ORDER — OXYCODONE HYDROCHLORIDE 10 MG/1
10 TABLET ORAL EVERY 6 HOURS PRN
Status: DISCONTINUED | OUTPATIENT
Start: 2021-12-04 | End: 2021-12-05

## 2021-12-04 RX ADMIN — ATORVASTATIN CALCIUM 40 MG: 40 TABLET, FILM COATED ORAL at 08:12

## 2021-12-04 RX ADMIN — OXYCODONE HYDROCHLORIDE 10 MG: 10 TABLET ORAL at 03:12

## 2021-12-04 RX ADMIN — INSULIN ASPART 8 UNITS: 100 INJECTION, SOLUTION INTRAVENOUS; SUBCUTANEOUS at 08:12

## 2021-12-04 RX ADMIN — INSULIN DETEMIR 16 UNITS: 100 INJECTION, SOLUTION SUBCUTANEOUS at 08:12

## 2021-12-04 RX ADMIN — Medication 250 MG: at 08:12

## 2021-12-04 RX ADMIN — INSULIN ASPART 8 UNITS: 100 INJECTION, SOLUTION INTRAVENOUS; SUBCUTANEOUS at 05:12

## 2021-12-04 RX ADMIN — INSULIN ASPART 3 UNITS: 100 INJECTION, SOLUTION INTRAVENOUS; SUBCUTANEOUS at 09:12

## 2021-12-04 RX ADMIN — METHOCARBAMOL 500 MG: 500 TABLET ORAL at 03:12

## 2021-12-04 RX ADMIN — OXYCODONE 5 MG: 5 TABLET ORAL at 09:12

## 2021-12-04 RX ADMIN — APIXABAN 2.5 MG: 2.5 TABLET, FILM COATED ORAL at 09:12

## 2021-12-04 RX ADMIN — Medication 10 ML: at 05:12

## 2021-12-04 RX ADMIN — Medication 10 ML: at 09:12

## 2021-12-04 RX ADMIN — METHOCARBAMOL 500 MG: 500 TABLET ORAL at 11:12

## 2021-12-04 RX ADMIN — POLYETHYLENE GLYCOL 3350 17 G: 17 POWDER, FOR SOLUTION ORAL at 08:12

## 2021-12-04 RX ADMIN — OXYCODONE 5 MG: 5 TABLET ORAL at 11:12

## 2021-12-04 RX ADMIN — BISACODYL 10 MG: 10 SUPPOSITORY RECTAL at 09:12

## 2021-12-04 RX ADMIN — Medication 250 MG: at 09:12

## 2021-12-04 RX ADMIN — CETIRIZINE HYDROCHLORIDE 5 MG: 5 TABLET ORAL at 08:12

## 2021-12-04 RX ADMIN — LISINOPRIL 20 MG: 20 TABLET ORAL at 08:12

## 2021-12-04 RX ADMIN — SENNOSIDES 8.6 MG: 8.6 TABLET ORAL at 08:12

## 2021-12-04 RX ADMIN — FLUTICASONE PROPIONATE 100 MCG: 50 SPRAY, METERED NASAL at 08:12

## 2021-12-04 RX ADMIN — ACETAMINOPHEN 650 MG: 325 TABLET ORAL at 06:12

## 2021-12-04 RX ADMIN — DULOXETINE 60 MG: 60 CAPSULE, DELAYED RELEASE ORAL at 09:12

## 2021-12-04 RX ADMIN — Medication 10 ML: at 03:12

## 2021-12-04 RX ADMIN — METHOCARBAMOL 500 MG: 500 TABLET ORAL at 09:12

## 2021-12-04 RX ADMIN — Medication 2000 UNITS: at 08:12

## 2021-12-04 RX ADMIN — TRAZODONE HYDROCHLORIDE 50 MG: 50 TABLET ORAL at 09:12

## 2021-12-04 RX ADMIN — INSULIN ASPART 8 UNITS: 100 INJECTION, SOLUTION INTRAVENOUS; SUBCUTANEOUS at 11:12

## 2021-12-04 RX ADMIN — Medication 6 MG: at 09:12

## 2021-12-04 RX ADMIN — FINASTERIDE 5 MG: 5 TABLET, FILM COATED ORAL at 08:12

## 2021-12-04 RX ADMIN — ASPIRIN 81 MG: 81 TABLET, COATED ORAL at 08:12

## 2021-12-04 RX ADMIN — APIXABAN 2.5 MG: 2.5 TABLET, FILM COATED ORAL at 08:12

## 2021-12-05 LAB
ANION GAP SERPL CALC-SCNC: 9 MMOL/L (ref 8–16)
BUN SERPL-MCNC: 24 MG/DL (ref 8–23)
CALCIUM SERPL-MCNC: 8.6 MG/DL (ref 8.7–10.5)
CHLORIDE SERPL-SCNC: 101 MMOL/L (ref 95–110)
CO2 SERPL-SCNC: 21 MMOL/L (ref 23–29)
CREAT SERPL-MCNC: 1.1 MG/DL (ref 0.5–1.4)
EST. GFR  (AFRICAN AMERICAN): >60 ML/MIN/1.73 M^2
EST. GFR  (NON AFRICAN AMERICAN): 58.8 ML/MIN/1.73 M^2
GLUCOSE SERPL-MCNC: 251 MG/DL (ref 70–110)
HCT VFR BLD AUTO: 29.9 % (ref 40–54)
HGB BLD-MCNC: 9.1 G/DL (ref 14–18)
POCT GLUCOSE: 212 MG/DL (ref 70–110)
POCT GLUCOSE: 222 MG/DL (ref 70–110)
POCT GLUCOSE: 279 MG/DL (ref 70–110)
POCT GLUCOSE: 284 MG/DL (ref 70–110)
POTASSIUM SERPL-SCNC: 4.8 MMOL/L (ref 3.5–5.1)
SARS-COV-2 RNA RESP QL NAA+PROBE: NOT DETECTED
SODIUM SERPL-SCNC: 131 MMOL/L (ref 136–145)

## 2021-12-05 PROCEDURE — 25000003 PHARM REV CODE 250: Mod: HCNC | Performed by: STUDENT IN AN ORGANIZED HEALTH CARE EDUCATION/TRAINING PROGRAM

## 2021-12-05 PROCEDURE — 25000003 PHARM REV CODE 250: Mod: HCNC | Performed by: PHYSICIAN ASSISTANT

## 2021-12-05 PROCEDURE — U0003 INFECTIOUS AGENT DETECTION BY NUCLEIC ACID (DNA OR RNA); SEVERE ACUTE RESPIRATORY SYNDROME CORONAVIRUS 2 (SARS-COV-2) (CORONAVIRUS DISEASE [COVID-19]), AMPLIFIED PROBE TECHNIQUE, MAKING USE OF HIGH THROUGHPUT TECHNOLOGIES AS DESCRIBED BY CMS-2020-01-R: HCPCS | Mod: HCNC | Performed by: PHYSICIAN ASSISTANT

## 2021-12-05 PROCEDURE — 99233 SBSQ HOSP IP/OBS HIGH 50: CPT | Mod: HCNC,,, | Performed by: PHYSICIAN ASSISTANT

## 2021-12-05 PROCEDURE — A4216 STERILE WATER/SALINE, 10 ML: HCPCS | Mod: HCNC | Performed by: PHYSICIAN ASSISTANT

## 2021-12-05 PROCEDURE — 80048 BASIC METABOLIC PNL TOTAL CA: CPT | Mod: HCNC | Performed by: PHYSICIAN ASSISTANT

## 2021-12-05 PROCEDURE — 25000003 PHARM REV CODE 250: Mod: HCNC | Performed by: INTERNAL MEDICINE

## 2021-12-05 PROCEDURE — 99233 PR SUBSEQUENT HOSPITAL CARE,LEVL III: ICD-10-PCS | Mod: HCNC,,, | Performed by: PHYSICIAN ASSISTANT

## 2021-12-05 PROCEDURE — U0005 INFEC AGEN DETEC AMPLI PROBE: HCPCS | Performed by: PHYSICIAN ASSISTANT

## 2021-12-05 PROCEDURE — C9399 UNCLASSIFIED DRUGS OR BIOLOG: HCPCS | Mod: HCNC | Performed by: PHYSICIAN ASSISTANT

## 2021-12-05 PROCEDURE — 85014 HEMATOCRIT: CPT | Mod: HCNC | Performed by: PHYSICIAN ASSISTANT

## 2021-12-05 PROCEDURE — 36415 COLL VENOUS BLD VENIPUNCTURE: CPT | Mod: HCNC | Performed by: PHYSICIAN ASSISTANT

## 2021-12-05 PROCEDURE — 11000001 HC ACUTE MED/SURG PRIVATE ROOM: Mod: HCNC

## 2021-12-05 PROCEDURE — 85018 HEMOGLOBIN: CPT | Mod: HCNC | Performed by: PHYSICIAN ASSISTANT

## 2021-12-05 RX ORDER — SYRING-NEEDL,DISP,INSUL,0.3 ML 29 G X1/2"
296 SYRINGE, EMPTY DISPOSABLE MISCELLANEOUS ONCE AS NEEDED
Status: COMPLETED | OUTPATIENT
Start: 2021-12-05 | End: 2021-12-05

## 2021-12-05 RX ORDER — SYRING-NEEDL,DISP,INSUL,0.3 ML 29 G X1/2"
296 SYRINGE, EMPTY DISPOSABLE MISCELLANEOUS ONCE
Status: DISCONTINUED | OUTPATIENT
Start: 2021-12-05 | End: 2021-12-05

## 2021-12-05 RX ORDER — OXYCODONE HYDROCHLORIDE 10 MG/1
10 TABLET ORAL EVERY 4 HOURS PRN
Status: DISCONTINUED | OUTPATIENT
Start: 2021-12-05 | End: 2021-12-07 | Stop reason: HOSPADM

## 2021-12-05 RX ORDER — CELECOXIB 100 MG/1
100 CAPSULE ORAL 2 TIMES DAILY
Status: DISCONTINUED | OUTPATIENT
Start: 2021-12-05 | End: 2021-12-07 | Stop reason: HOSPADM

## 2021-12-05 RX ORDER — OXYCODONE HYDROCHLORIDE 5 MG/1
5 TABLET ORAL EVERY 4 HOURS PRN
Status: DISCONTINUED | OUTPATIENT
Start: 2021-12-05 | End: 2021-12-07 | Stop reason: HOSPADM

## 2021-12-05 RX ORDER — SENNOSIDES 8.6 MG/1
8.6 TABLET ORAL 2 TIMES DAILY
Status: DISCONTINUED | OUTPATIENT
Start: 2021-12-05 | End: 2021-12-07 | Stop reason: HOSPADM

## 2021-12-05 RX ORDER — SODIUM CHLORIDE 9 MG/ML
INJECTION, SOLUTION INTRAVENOUS CONTINUOUS
Status: ACTIVE | OUTPATIENT
Start: 2021-12-05 | End: 2021-12-06

## 2021-12-05 RX ADMIN — POLYETHYLENE GLYCOL 3350 17 G: 17 POWDER, FOR SOLUTION ORAL at 09:12

## 2021-12-05 RX ADMIN — OXYCODONE HYDROCHLORIDE 10 MG: 10 TABLET ORAL at 09:12

## 2021-12-05 RX ADMIN — Medication 250 MG: at 09:12

## 2021-12-05 RX ADMIN — CETIRIZINE HYDROCHLORIDE 5 MG: 5 TABLET ORAL at 09:12

## 2021-12-05 RX ADMIN — Medication 10 ML: at 05:12

## 2021-12-05 RX ADMIN — SODIUM CHLORIDE: 0.9 INJECTION, SOLUTION INTRAVENOUS at 05:12

## 2021-12-05 RX ADMIN — FINASTERIDE 5 MG: 5 TABLET, FILM COATED ORAL at 09:12

## 2021-12-05 RX ADMIN — Medication 2000 UNITS: at 09:12

## 2021-12-05 RX ADMIN — Medication 10 ML: at 09:12

## 2021-12-05 RX ADMIN — Medication 6 MG: at 09:12

## 2021-12-05 RX ADMIN — CELECOXIB 100 MG: 100 CAPSULE ORAL at 09:12

## 2021-12-05 RX ADMIN — ATORVASTATIN CALCIUM 40 MG: 40 TABLET, FILM COATED ORAL at 09:12

## 2021-12-05 RX ADMIN — INSULIN ASPART 5 UNITS: 100 INJECTION, SOLUTION INTRAVENOUS; SUBCUTANEOUS at 06:12

## 2021-12-05 RX ADMIN — ASPIRIN 81 MG: 81 TABLET, COATED ORAL at 09:12

## 2021-12-05 RX ADMIN — SENNOSIDES 8.6 MG: 8.6 TABLET, COATED ORAL at 09:12

## 2021-12-05 RX ADMIN — INSULIN ASPART 2 UNITS: 100 INJECTION, SOLUTION INTRAVENOUS; SUBCUTANEOUS at 11:12

## 2021-12-05 RX ADMIN — Medication 10 ML: at 02:12

## 2021-12-05 RX ADMIN — CELECOXIB 100 MG: 100 CAPSULE ORAL at 12:12

## 2021-12-05 RX ADMIN — APIXABAN 2.5 MG: 2.5 TABLET, FILM COATED ORAL at 09:12

## 2021-12-05 RX ADMIN — TRAZODONE HYDROCHLORIDE 50 MG: 50 TABLET ORAL at 09:12

## 2021-12-05 RX ADMIN — OXYCODONE 5 MG: 5 TABLET ORAL at 06:12

## 2021-12-05 RX ADMIN — Medication 296 ML: at 01:12

## 2021-12-05 RX ADMIN — INSULIN ASPART 6 UNITS: 100 INJECTION, SOLUTION INTRAVENOUS; SUBCUTANEOUS at 09:12

## 2021-12-05 RX ADMIN — Medication 1 ENEMA: at 09:12

## 2021-12-05 RX ADMIN — DULOXETINE 60 MG: 60 CAPSULE, DELAYED RELEASE ORAL at 09:12

## 2021-12-05 RX ADMIN — INSULIN ASPART 9 UNITS: 100 INJECTION, SOLUTION INTRAVENOUS; SUBCUTANEOUS at 01:12

## 2021-12-05 RX ADMIN — INSULIN DETEMIR 16 UNITS: 100 INJECTION, SOLUTION SUBCUTANEOUS at 09:12

## 2021-12-05 RX ADMIN — LISINOPRIL 20 MG: 20 TABLET ORAL at 09:12

## 2021-12-06 PROBLEM — R13.12 OROPHARYNGEAL DYSPHAGIA: Status: ACTIVE | Noted: 2021-12-06

## 2021-12-06 LAB
ANION GAP SERPL CALC-SCNC: 5 MMOL/L (ref 8–16)
BASOPHILS # BLD AUTO: 0.06 K/UL (ref 0–0.2)
BASOPHILS NFR BLD: 0.8 % (ref 0–1.9)
BUN SERPL-MCNC: 25 MG/DL (ref 8–23)
CALCIUM SERPL-MCNC: 8 MG/DL (ref 8.7–10.5)
CHLORIDE SERPL-SCNC: 105 MMOL/L (ref 95–110)
CO2 SERPL-SCNC: 25 MMOL/L (ref 23–29)
CREAT SERPL-MCNC: 1 MG/DL (ref 0.5–1.4)
DIFFERENTIAL METHOD: ABNORMAL
EOSINOPHIL # BLD AUTO: 0.1 K/UL (ref 0–0.5)
EOSINOPHIL NFR BLD: 2 % (ref 0–8)
ERYTHROCYTE [DISTWIDTH] IN BLOOD BY AUTOMATED COUNT: 14.6 % (ref 11.5–14.5)
EST. GFR  (AFRICAN AMERICAN): >60 ML/MIN/1.73 M^2
EST. GFR  (NON AFRICAN AMERICAN): >60 ML/MIN/1.73 M^2
GLUCOSE SERPL-MCNC: 238 MG/DL (ref 70–110)
HCT VFR BLD AUTO: 28.8 % (ref 40–54)
HGB BLD-MCNC: 8.4 G/DL (ref 14–18)
IMM GRANULOCYTES # BLD AUTO: 0.09 K/UL (ref 0–0.04)
IMM GRANULOCYTES NFR BLD AUTO: 1.3 % (ref 0–0.5)
LYMPHOCYTES # BLD AUTO: 1.6 K/UL (ref 1–4.8)
LYMPHOCYTES NFR BLD: 21.9 % (ref 18–48)
MAGNESIUM SERPL-MCNC: 2.1 MG/DL (ref 1.6–2.6)
MCH RBC QN AUTO: 28.5 PG (ref 27–31)
MCHC RBC AUTO-ENTMCNC: 29.2 G/DL (ref 32–36)
MCV RBC AUTO: 98 FL (ref 82–98)
MONOCYTES # BLD AUTO: 1.4 K/UL (ref 0.3–1)
MONOCYTES NFR BLD: 19.8 % (ref 4–15)
NEUTROPHILS # BLD AUTO: 3.8 K/UL (ref 1.8–7.7)
NEUTROPHILS NFR BLD: 54.2 % (ref 38–73)
NRBC BLD-RTO: 0 /100 WBC
PHOSPHATE SERPL-MCNC: 3.6 MG/DL (ref 2.7–4.5)
PLATELET # BLD AUTO: 356 K/UL (ref 150–450)
PMV BLD AUTO: 9.8 FL (ref 9.2–12.9)
POCT GLUCOSE: 246 MG/DL (ref 70–110)
POCT GLUCOSE: 265 MG/DL (ref 70–110)
POTASSIUM SERPL-SCNC: 5.2 MMOL/L (ref 3.5–5.1)
RBC # BLD AUTO: 2.95 M/UL (ref 4.6–6.2)
SODIUM SERPL-SCNC: 135 MMOL/L (ref 136–145)
WBC # BLD AUTO: 7.07 K/UL (ref 3.9–12.7)

## 2021-12-06 PROCEDURE — 92610 EVALUATE SWALLOWING FUNCTION: CPT | Mod: HCNC

## 2021-12-06 PROCEDURE — 99233 SBSQ HOSP IP/OBS HIGH 50: CPT | Mod: HCNC,,, | Performed by: PHYSICIAN ASSISTANT

## 2021-12-06 PROCEDURE — 97535 SELF CARE MNGMENT TRAINING: CPT | Mod: HCNC,CO

## 2021-12-06 PROCEDURE — A4216 STERILE WATER/SALINE, 10 ML: HCPCS | Mod: HCNC | Performed by: PHYSICIAN ASSISTANT

## 2021-12-06 PROCEDURE — 25000003 PHARM REV CODE 250: Mod: HCNC | Performed by: STUDENT IN AN ORGANIZED HEALTH CARE EDUCATION/TRAINING PROGRAM

## 2021-12-06 PROCEDURE — 25000242 PHARM REV CODE 250 ALT 637 W/ HCPCS: Mod: HCNC | Performed by: PHYSICIAN ASSISTANT

## 2021-12-06 PROCEDURE — 85025 COMPLETE CBC W/AUTO DIFF WBC: CPT | Mod: HCNC | Performed by: INTERNAL MEDICINE

## 2021-12-06 PROCEDURE — 86580 TB INTRADERMAL TEST: CPT | Mod: HCNC | Performed by: INTERNAL MEDICINE

## 2021-12-06 PROCEDURE — 84100 ASSAY OF PHOSPHORUS: CPT | Mod: HCNC | Performed by: INTERNAL MEDICINE

## 2021-12-06 PROCEDURE — 83735 ASSAY OF MAGNESIUM: CPT | Mod: HCNC | Performed by: INTERNAL MEDICINE

## 2021-12-06 PROCEDURE — 25000003 PHARM REV CODE 250: Mod: HCNC | Performed by: PHYSICIAN ASSISTANT

## 2021-12-06 PROCEDURE — 80048 BASIC METABOLIC PNL TOTAL CA: CPT | Mod: HCNC | Performed by: INTERNAL MEDICINE

## 2021-12-06 PROCEDURE — 36415 COLL VENOUS BLD VENIPUNCTURE: CPT | Mod: HCNC | Performed by: INTERNAL MEDICINE

## 2021-12-06 PROCEDURE — 94760 N-INVAS EAR/PLS OXIMETRY 1: CPT | Mod: HCNC

## 2021-12-06 PROCEDURE — 30200315 PPD INTRADERMAL TEST REV CODE 302: Mod: HCNC | Performed by: INTERNAL MEDICINE

## 2021-12-06 PROCEDURE — 11000001 HC ACUTE MED/SURG PRIVATE ROOM: Mod: HCNC

## 2021-12-06 PROCEDURE — 25000003 PHARM REV CODE 250: Mod: HCNC | Performed by: INTERNAL MEDICINE

## 2021-12-06 PROCEDURE — 97535 SELF CARE MNGMENT TRAINING: CPT | Mod: HCNC

## 2021-12-06 PROCEDURE — 99233 PR SUBSEQUENT HOSPITAL CARE,LEVL III: ICD-10-PCS | Mod: HCNC,,, | Performed by: PHYSICIAN ASSISTANT

## 2021-12-06 PROCEDURE — 97530 THERAPEUTIC ACTIVITIES: CPT | Mod: HCNC,CO

## 2021-12-06 RX ORDER — OXYCODONE HYDROCHLORIDE 5 MG/1
5 TABLET ORAL
Qty: 42 TABLET | Refills: 0 | Status: SHIPPED | OUTPATIENT
Start: 2021-12-06 | End: 2021-12-07

## 2021-12-06 RX ORDER — CELECOXIB 100 MG/1
100 CAPSULE ORAL 2 TIMES DAILY
Qty: 14 CAPSULE | Refills: 0 | Status: ON HOLD
Start: 2021-12-06 | End: 2021-12-13 | Stop reason: HOSPADM

## 2021-12-06 RX ORDER — METHOCARBAMOL 500 MG/1
500 TABLET, FILM COATED ORAL 4 TIMES DAILY PRN
Qty: 40 TABLET | Refills: 0 | Status: ON HOLD
Start: 2021-12-06 | End: 2021-12-13 | Stop reason: HOSPADM

## 2021-12-06 RX ORDER — OXYCODONE HYDROCHLORIDE 10 MG/1
10 TABLET ORAL
Qty: 42 TABLET | Refills: 0 | Status: SHIPPED | OUTPATIENT
Start: 2021-12-06 | End: 2021-12-07

## 2021-12-06 RX ORDER — SENNOSIDES 8.6 MG/1
1 TABLET ORAL DAILY
Qty: 10 TABLET | Refills: 0
Start: 2021-12-06 | End: 2021-12-16

## 2021-12-06 RX ORDER — POLYETHYLENE GLYCOL 3350 17 G/17G
17 POWDER, FOR SOLUTION ORAL DAILY
Qty: 10 EACH | Refills: 0
Start: 2021-12-06 | End: 2021-12-16

## 2021-12-06 RX ADMIN — ASPIRIN 81 MG: 81 TABLET, COATED ORAL at 09:12

## 2021-12-06 RX ADMIN — ATORVASTATIN CALCIUM 40 MG: 40 TABLET, FILM COATED ORAL at 09:12

## 2021-12-06 RX ADMIN — TUBERCULIN PURIFIED PROTEIN DERIVATIVE 5 UNITS: 5 INJECTION, SOLUTION INTRADERMAL at 12:12

## 2021-12-06 RX ADMIN — Medication 250 MG: at 09:12

## 2021-12-06 RX ADMIN — METHOCARBAMOL 500 MG: 500 TABLET ORAL at 09:12

## 2021-12-06 RX ADMIN — Medication 2000 UNITS: at 09:12

## 2021-12-06 RX ADMIN — LISINOPRIL 20 MG: 20 TABLET ORAL at 09:12

## 2021-12-06 RX ADMIN — SENNOSIDES 8.6 MG: 8.6 TABLET, COATED ORAL at 09:12

## 2021-12-06 RX ADMIN — SODIUM CHLORIDE: 0.9 INJECTION, SOLUTION INTRAVENOUS at 05:12

## 2021-12-06 RX ADMIN — CETIRIZINE HYDROCHLORIDE 5 MG: 5 TABLET ORAL at 09:12

## 2021-12-06 RX ADMIN — DULOXETINE 60 MG: 60 CAPSULE, DELAYED RELEASE ORAL at 09:12

## 2021-12-06 RX ADMIN — Medication 10 ML: at 10:12

## 2021-12-06 RX ADMIN — Medication 10 ML: at 05:12

## 2021-12-06 RX ADMIN — FLUTICASONE PROPIONATE 100 MCG: 50 SPRAY, METERED NASAL at 09:12

## 2021-12-06 RX ADMIN — TRAZODONE HYDROCHLORIDE 50 MG: 50 TABLET ORAL at 09:12

## 2021-12-06 RX ADMIN — FINASTERIDE 5 MG: 5 TABLET, FILM COATED ORAL at 09:12

## 2021-12-06 RX ADMIN — OXYCODONE HYDROCHLORIDE 10 MG: 10 TABLET ORAL at 12:12

## 2021-12-06 RX ADMIN — INSULIN ASPART 6 UNITS: 100 INJECTION, SOLUTION INTRAVENOUS; SUBCUTANEOUS at 09:12

## 2021-12-06 RX ADMIN — APIXABAN 2.5 MG: 2.5 TABLET, FILM COATED ORAL at 09:12

## 2021-12-06 RX ADMIN — CELECOXIB 100 MG: 100 CAPSULE ORAL at 09:12

## 2021-12-06 RX ADMIN — OXYCODONE HYDROCHLORIDE 10 MG: 10 TABLET ORAL at 09:12

## 2021-12-06 RX ADMIN — OXYCODONE HYDROCHLORIDE 10 MG: 10 TABLET ORAL at 05:12

## 2021-12-07 VITALS
HEIGHT: 71 IN | RESPIRATION RATE: 21 BRPM | OXYGEN SATURATION: 92 % | WEIGHT: 188.06 LBS | DIASTOLIC BLOOD PRESSURE: 63 MMHG | TEMPERATURE: 98 F | HEART RATE: 71 BPM | SYSTOLIC BLOOD PRESSURE: 133 MMHG | BODY MASS INDEX: 26.33 KG/M2

## 2021-12-07 LAB
POCT GLUCOSE: 150 MG/DL (ref 70–110)
POCT GLUCOSE: 204 MG/DL (ref 70–110)
POCT GLUCOSE: 243 MG/DL (ref 70–110)
POCT GLUCOSE: 84 MG/DL (ref 70–110)

## 2021-12-07 PROCEDURE — 63600175 PHARM REV CODE 636 W HCPCS: Mod: HCNC | Performed by: PHYSICIAN ASSISTANT

## 2021-12-07 PROCEDURE — 99239 PR HOSPITAL DISCHARGE DAY,>30 MIN: ICD-10-PCS | Mod: HCNC,,, | Performed by: PHYSICIAN ASSISTANT

## 2021-12-07 PROCEDURE — 25000003 PHARM REV CODE 250: Mod: HCNC | Performed by: PHYSICIAN ASSISTANT

## 2021-12-07 PROCEDURE — 25000003 PHARM REV CODE 250: Mod: HCNC | Performed by: INTERNAL MEDICINE

## 2021-12-07 PROCEDURE — 99239 HOSP IP/OBS DSCHRG MGMT >30: CPT | Mod: HCNC,,, | Performed by: PHYSICIAN ASSISTANT

## 2021-12-07 PROCEDURE — 25000003 PHARM REV CODE 250: Mod: HCNC | Performed by: STUDENT IN AN ORGANIZED HEALTH CARE EDUCATION/TRAINING PROGRAM

## 2021-12-07 PROCEDURE — 97535 SELF CARE MNGMENT TRAINING: CPT | Mod: HCNC

## 2021-12-07 PROCEDURE — 99900035 HC TECH TIME PER 15 MIN (STAT): Mod: HCNC

## 2021-12-07 PROCEDURE — 92611 MOTION FLUOROSCOPY/SWALLOW: CPT | Mod: HCNC

## 2021-12-07 PROCEDURE — A4216 STERILE WATER/SALINE, 10 ML: HCPCS | Mod: HCNC | Performed by: PHYSICIAN ASSISTANT

## 2021-12-07 PROCEDURE — 25500020 PHARM REV CODE 255: Mod: HCNC | Performed by: INTERNAL MEDICINE

## 2021-12-07 PROCEDURE — 94761 N-INVAS EAR/PLS OXIMETRY MLT: CPT | Mod: HCNC

## 2021-12-07 PROCEDURE — A9698 NON-RAD CONTRAST MATERIALNOC: HCPCS | Mod: HCNC | Performed by: INTERNAL MEDICINE

## 2021-12-07 RX ORDER — TRAZODONE HYDROCHLORIDE 50 MG/1
TABLET ORAL
Qty: 60 TABLET | Refills: 1 | Status: SHIPPED | OUTPATIENT
Start: 2021-12-07

## 2021-12-07 RX ORDER — OXYCODONE HYDROCHLORIDE 10 MG/1
10 TABLET ORAL EVERY 6 HOURS PRN
Qty: 42 TABLET | Refills: 0 | Status: SHIPPED | OUTPATIENT
Start: 2021-12-07 | End: 2021-12-14

## 2021-12-07 RX ORDER — INSULIN ASPART 100 [IU]/ML
10 INJECTION, SOLUTION INTRAVENOUS; SUBCUTANEOUS
Status: DISCONTINUED | OUTPATIENT
Start: 2021-12-07 | End: 2021-12-07 | Stop reason: HOSPADM

## 2021-12-07 RX ORDER — OXYCODONE HYDROCHLORIDE 5 MG/1
5 TABLET ORAL EVERY 6 HOURS PRN
Qty: 42 TABLET | Refills: 0 | Status: SHIPPED | OUTPATIENT
Start: 2021-12-07 | End: 2021-12-14

## 2021-12-07 RX ADMIN — Medication 10 ML: at 04:12

## 2021-12-07 RX ADMIN — OXYCODONE HYDROCHLORIDE 10 MG: 10 TABLET ORAL at 04:12

## 2021-12-07 RX ADMIN — CELECOXIB 100 MG: 100 CAPSULE ORAL at 08:12

## 2021-12-07 RX ADMIN — Medication 10 ML: at 08:12

## 2021-12-07 RX ADMIN — Medication 2000 UNITS: at 08:12

## 2021-12-07 RX ADMIN — ATORVASTATIN CALCIUM 40 MG: 40 TABLET, FILM COATED ORAL at 08:12

## 2021-12-07 RX ADMIN — Medication 250 MG: at 08:12

## 2021-12-07 RX ADMIN — FLUTICASONE PROPIONATE 100 MCG: 50 SPRAY, METERED NASAL at 01:12

## 2021-12-07 RX ADMIN — ASPIRIN 81 MG: 81 TABLET, COATED ORAL at 08:12

## 2021-12-07 RX ADMIN — BARIUM SULFATE 60 ML: 0.81 POWDER, FOR SUSPENSION ORAL at 09:12

## 2021-12-07 RX ADMIN — APIXABAN 2.5 MG: 2.5 TABLET, FILM COATED ORAL at 08:12

## 2021-12-07 RX ADMIN — OXYCODONE HYDROCHLORIDE 10 MG: 10 TABLET ORAL at 08:12

## 2021-12-07 RX ADMIN — INSULIN ASPART 10 UNITS: 100 INJECTION, SOLUTION INTRAVENOUS; SUBCUTANEOUS at 01:12

## 2021-12-07 RX ADMIN — INSULIN ASPART 8 UNITS: 100 INJECTION, SOLUTION INTRAVENOUS; SUBCUTANEOUS at 08:12

## 2021-12-07 RX ADMIN — METHOCARBAMOL 500 MG: 500 TABLET ORAL at 08:12

## 2021-12-07 RX ADMIN — METHOCARBAMOL 500 MG: 500 TABLET ORAL at 04:12

## 2021-12-07 RX ADMIN — FINASTERIDE 5 MG: 5 TABLET, FILM COATED ORAL at 08:12

## 2021-12-07 RX ADMIN — CETIRIZINE HYDROCHLORIDE 5 MG: 5 TABLET ORAL at 08:12

## 2021-12-07 RX ADMIN — POLYETHYLENE GLYCOL 3350 17 G: 17 POWDER, FOR SOLUTION ORAL at 08:12

## 2021-12-07 RX ADMIN — SENNOSIDES 8.6 MG: 8.6 TABLET, COATED ORAL at 08:12

## 2021-12-07 RX ADMIN — LISINOPRIL 20 MG: 20 TABLET ORAL at 08:12

## 2021-12-08 LAB — POCT GLUCOSE: 273 MG/DL (ref 70–110)

## 2021-12-09 ENCOUNTER — EXTERNAL HOSPITAL ADMISSION (OUTPATIENT)
Dept: SKILLED NURSING FACILITY | Facility: HOSPITAL | Age: 86
End: 2021-12-09
Payer: MEDICARE

## 2021-12-09 DIAGNOSIS — S32.591A CLOSED FRACTURE OF RAMUS OF RIGHT PUBIS, INITIAL ENCOUNTER: Primary | ICD-10-CM

## 2021-12-10 ENCOUNTER — HOSPITAL ENCOUNTER (INPATIENT)
Facility: HOSPITAL | Age: 86
LOS: 8 days | Discharge: HOME HEALTH | DRG: 698 | End: 2021-12-18
Attending: EMERGENCY MEDICINE | Admitting: EMERGENCY MEDICINE
Payer: MEDICARE

## 2021-12-10 DIAGNOSIS — R00.0 TACHYCARDIA: ICD-10-CM

## 2021-12-10 DIAGNOSIS — Z79.899 MEDICATION MANAGEMENT: ICD-10-CM

## 2021-12-10 DIAGNOSIS — R41.0 DELIRIUM: ICD-10-CM
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
## 2021-12-10 DIAGNOSIS — T83.511A URINARY TRACT INFECTION ASSOCIATED WITH INDWELLING URETHRAL CATHETER, INITIAL ENCOUNTER: ICD-10-CM

## 2021-12-10 DIAGNOSIS — R65.20 SEVERE SEPSIS: ICD-10-CM

## 2021-12-10 DIAGNOSIS — R07.9 CHEST PAIN: ICD-10-CM

## 2021-12-10 DIAGNOSIS — A41.9 SEVERE SEPSIS: ICD-10-CM

## 2021-12-10 DIAGNOSIS — A41.9 SEPSIS, DUE TO UNSPECIFIED ORGANISM, UNSPECIFIED WHETHER ACUTE ORGAN DYSFUNCTION PRESENT: Primary | ICD-10-CM

## 2021-12-10 DIAGNOSIS — S32.591D CLOSED FRACTURE OF RAMUS OF RIGHT PUBIS WITH ROUTINE HEALING, SUBSEQUENT ENCOUNTER: ICD-10-CM

## 2021-12-10 DIAGNOSIS — N39.0 URINARY TRACT INFECTION ASSOCIATED WITH INDWELLING URETHRAL CATHETER, INITIAL ENCOUNTER: ICD-10-CM

## 2021-12-10 DIAGNOSIS — S32.9XXA PELVIC FRACTURE: ICD-10-CM

## 2021-12-10 PROBLEM — N17.9 AKI (ACUTE KIDNEY INJURY): Status: ACTIVE | Noted: 2021-12-10

## 2021-12-10 PROBLEM — J90 PLEURAL EFFUSION: Status: ACTIVE | Noted: 2021-12-10

## 2021-12-10 PROBLEM — E55.9 VITAMIN D DEFICIENCY: Status: ACTIVE | Noted: 2021-12-10

## 2021-12-10 LAB
ALBUMIN SERPL BCP-MCNC: 2.9 G/DL (ref 3.5–5.2)
ALP SERPL-CCNC: 118 U/L (ref 55–135)
ALT SERPL W/O P-5'-P-CCNC: 18 U/L (ref 10–44)
ANION GAP SERPL CALC-SCNC: 22 MMOL/L (ref 8–16)
AST SERPL-CCNC: 35 U/L (ref 10–40)
BACTERIA #/AREA URNS AUTO: ABNORMAL /HPF
BASOPHILS # BLD AUTO: 0.05 K/UL (ref 0–0.2)
BASOPHILS NFR BLD: 0.6 % (ref 0–1.9)
BILIRUB SERPL-MCNC: 2.9 MG/DL (ref 0.1–1)
BILIRUB UR QL STRIP: NEGATIVE
BUN SERPL-MCNC: 27 MG/DL (ref 8–23)
CALCIUM SERPL-MCNC: 8.9 MG/DL (ref 8.7–10.5)
CHLORIDE SERPL-SCNC: 97 MMOL/L (ref 95–110)
CK SERPL-CCNC: 149 U/L (ref 20–200)
CLARITY UR REFRACT.AUTO: ABNORMAL
CO2 SERPL-SCNC: 18 MMOL/L (ref 23–29)
COLOR UR AUTO: ABNORMAL
CREAT SERPL-MCNC: 1.6 MG/DL (ref 0.5–1.4)
CTP QC/QA: YES
DIFFERENTIAL METHOD: ABNORMAL
EOSINOPHIL # BLD AUTO: 0 K/UL (ref 0–0.5)
EOSINOPHIL NFR BLD: 0 % (ref 0–8)
ERYTHROCYTE [DISTWIDTH] IN BLOOD BY AUTOMATED COUNT: 14.6 % (ref 11.5–14.5)
EST. GFR  (AFRICAN AMERICAN): 43.2 ML/MIN/1.73 M^2
EST. GFR  (NON AFRICAN AMERICAN): 37.4 ML/MIN/1.73 M^2
ESTIMATED AVG GLUCOSE: 212 MG/DL (ref 68–131)
GLUCOSE SERPL-MCNC: 225 MG/DL (ref 70–110)
GLUCOSE UR QL STRIP: NEGATIVE
HBA1C MFR BLD: 9 % (ref 4–5.6)
HCT VFR BLD AUTO: 29.4 % (ref 40–54)
HGB BLD-MCNC: 9.3 G/DL (ref 14–18)
HGB UR QL STRIP: ABNORMAL
HYALINE CASTS UR QL AUTO: 13 /LPF
IMM GRANULOCYTES # BLD AUTO: 0.09 K/UL (ref 0–0.04)
IMM GRANULOCYTES NFR BLD AUTO: 1.1 % (ref 0–0.5)
KETONES UR QL STRIP: ABNORMAL
LACTATE SERPL-SCNC: 3.2 MMOL/L (ref 0.5–2.2)
LACTATE SERPL-SCNC: 3.8 MMOL/L (ref 0.5–2.2)
LEUKOCYTE ESTERASE UR QL STRIP: ABNORMAL
LYMPHOCYTES # BLD AUTO: 0.4 K/UL (ref 1–4.8)
LYMPHOCYTES NFR BLD: 4.5 % (ref 18–48)
MCH RBC QN AUTO: 27.9 PG (ref 27–31)
MCHC RBC AUTO-ENTMCNC: 31.6 G/DL (ref 32–36)
MCV RBC AUTO: 88 FL (ref 82–98)
MICROSCOPIC COMMENT: ABNORMAL
MONOCYTES # BLD AUTO: 0.7 K/UL (ref 0.3–1)
MONOCYTES NFR BLD: 9.2 % (ref 4–15)
NEUTROPHILS # BLD AUTO: 6.7 K/UL (ref 1.8–7.7)
NEUTROPHILS NFR BLD: 84.6 % (ref 38–73)
NITRITE UR QL STRIP: NEGATIVE
NON-SQ EPI CELLS #/AREA URNS AUTO: <1 /HPF
NRBC BLD-RTO: 0 /100 WBC
PH UR STRIP: 5 [PH] (ref 5–8)
PLATELET # BLD AUTO: 686 K/UL (ref 150–450)
PMV BLD AUTO: 9.3 FL (ref 9.2–12.9)
POCT GLUCOSE: 221 MG/DL (ref 70–110)
POTASSIUM SERPL-SCNC: 5.4 MMOL/L (ref 3.5–5.1)
PROT SERPL-MCNC: 6.9 G/DL (ref 6–8.4)
PROT UR QL STRIP: ABNORMAL
RBC # BLD AUTO: 3.33 M/UL (ref 4.6–6.2)
RBC #/AREA URNS AUTO: >100 /HPF (ref 0–4)
SARS-COV-2 RDRP RESP QL NAA+PROBE: NEGATIVE
SODIUM SERPL-SCNC: 137 MMOL/L (ref 136–145)
SP GR UR STRIP: 1.02 (ref 1–1.03)
SQUAMOUS #/AREA URNS AUTO: 5 /HPF
TROPONIN I SERPL DL<=0.01 NG/ML-MCNC: 0.04 NG/ML (ref 0–0.03)
URN SPEC COLLECT METH UR: ABNORMAL
WBC # BLD AUTO: 7.96 K/UL (ref 3.9–12.7)
WBC #/AREA URNS AUTO: 24 /HPF (ref 0–5)

## 2021-12-10 PROCEDURE — 96365 THER/PROPH/DIAG IV INF INIT: CPT | Mod: HCNC

## 2021-12-10 PROCEDURE — 99285 EMERGENCY DEPT VISIT HI MDM: CPT | Mod: CS,,, | Performed by: EMERGENCY MEDICINE

## 2021-12-10 PROCEDURE — 85025 COMPLETE CBC W/AUTO DIFF WBC: CPT | Mod: HCNC | Performed by: STUDENT IN AN ORGANIZED HEALTH CARE EDUCATION/TRAINING PROGRAM

## 2021-12-10 PROCEDURE — 96367 TX/PROPH/DG ADDL SEQ IV INF: CPT | Mod: HCNC

## 2021-12-10 PROCEDURE — 96361 HYDRATE IV INFUSION ADD-ON: CPT | Mod: HCNC

## 2021-12-10 PROCEDURE — 83605 ASSAY OF LACTIC ACID: CPT | Mod: 91,HCNC | Performed by: STUDENT IN AN ORGANIZED HEALTH CARE EDUCATION/TRAINING PROGRAM

## 2021-12-10 PROCEDURE — 63600175 PHARM REV CODE 636 W HCPCS: Mod: HCNC | Performed by: STUDENT IN AN ORGANIZED HEALTH CARE EDUCATION/TRAINING PROGRAM

## 2021-12-10 PROCEDURE — 84484 ASSAY OF TROPONIN QUANT: CPT | Mod: HCNC | Performed by: STUDENT IN AN ORGANIZED HEALTH CARE EDUCATION/TRAINING PROGRAM

## 2021-12-10 PROCEDURE — U0002 COVID-19 LAB TEST NON-CDC: HCPCS | Mod: HCNC | Performed by: EMERGENCY MEDICINE

## 2021-12-10 PROCEDURE — 96375 TX/PRO/DX INJ NEW DRUG ADDON: CPT | Mod: HCNC

## 2021-12-10 PROCEDURE — 93005 ELECTROCARDIOGRAM TRACING: CPT | Mod: HCNC

## 2021-12-10 PROCEDURE — 80053 COMPREHEN METABOLIC PANEL: CPT | Mod: HCNC | Performed by: STUDENT IN AN ORGANIZED HEALTH CARE EDUCATION/TRAINING PROGRAM

## 2021-12-10 PROCEDURE — 99285 PR EMERGENCY DEPT VISIT,LEVEL V: ICD-10-PCS | Mod: CS,,, | Performed by: EMERGENCY MEDICINE

## 2021-12-10 PROCEDURE — 87086 URINE CULTURE/COLONY COUNT: CPT | Mod: HCNC | Performed by: STUDENT IN AN ORGANIZED HEALTH CARE EDUCATION/TRAINING PROGRAM

## 2021-12-10 PROCEDURE — 20600001 HC STEP DOWN PRIVATE ROOM: Mod: HCNC

## 2021-12-10 PROCEDURE — 25500020 PHARM REV CODE 255: Mod: HCNC | Performed by: EMERGENCY MEDICINE

## 2021-12-10 PROCEDURE — 93010 EKG 12-LEAD: ICD-10-PCS | Mod: HCNC,,, | Performed by: INTERNAL MEDICINE

## 2021-12-10 PROCEDURE — 99223 PR INITIAL HOSPITAL CARE,LEVL III: ICD-10-PCS | Mod: HCNC,AI,, | Performed by: INTERNAL MEDICINE

## 2021-12-10 PROCEDURE — 99285 EMERGENCY DEPT VISIT HI MDM: CPT | Mod: 25,HCNC

## 2021-12-10 PROCEDURE — 81001 URINALYSIS AUTO W/SCOPE: CPT | Mod: HCNC | Performed by: STUDENT IN AN ORGANIZED HEALTH CARE EDUCATION/TRAINING PROGRAM

## 2021-12-10 PROCEDURE — 25000003 PHARM REV CODE 250: Mod: HCNC | Performed by: STUDENT IN AN ORGANIZED HEALTH CARE EDUCATION/TRAINING PROGRAM

## 2021-12-10 PROCEDURE — 99223 1ST HOSP IP/OBS HIGH 75: CPT | Mod: HCNC,AI,, | Performed by: INTERNAL MEDICINE

## 2021-12-10 PROCEDURE — 51702 INSERT TEMP BLADDER CATH: CPT | Mod: HCNC

## 2021-12-10 PROCEDURE — 96376 TX/PRO/DX INJ SAME DRUG ADON: CPT | Mod: HCNC

## 2021-12-10 PROCEDURE — 93010 ELECTROCARDIOGRAM REPORT: CPT | Mod: HCNC,,, | Performed by: INTERNAL MEDICINE

## 2021-12-10 PROCEDURE — 82550 ASSAY OF CK (CPK): CPT | Mod: HCNC | Performed by: STUDENT IN AN ORGANIZED HEALTH CARE EDUCATION/TRAINING PROGRAM

## 2021-12-10 PROCEDURE — 87040 BLOOD CULTURE FOR BACTERIA: CPT | Mod: HCNC | Performed by: STUDENT IN AN ORGANIZED HEALTH CARE EDUCATION/TRAINING PROGRAM

## 2021-12-10 PROCEDURE — 83036 HEMOGLOBIN GLYCOSYLATED A1C: CPT | Mod: HCNC | Performed by: STUDENT IN AN ORGANIZED HEALTH CARE EDUCATION/TRAINING PROGRAM

## 2021-12-10 RX ORDER — NALOXONE HCL 0.4 MG/ML
0.02 VIAL (ML) INJECTION
Status: DISCONTINUED | OUTPATIENT
Start: 2021-12-10 | End: 2021-12-18 | Stop reason: HOSPADM

## 2021-12-10 RX ORDER — FINASTERIDE 5 MG/1
5 TABLET, FILM COATED ORAL DAILY
Status: DISCONTINUED | OUTPATIENT
Start: 2021-12-11 | End: 2021-12-18 | Stop reason: HOSPADM

## 2021-12-10 RX ORDER — GLUCAGON 1 MG
1 KIT INJECTION
Status: DISCONTINUED | OUTPATIENT
Start: 2021-12-10 | End: 2021-12-18 | Stop reason: HOSPADM

## 2021-12-10 RX ORDER — MORPHINE SULFATE 4 MG/ML
4 INJECTION, SOLUTION INTRAMUSCULAR; INTRAVENOUS
Status: COMPLETED | OUTPATIENT
Start: 2021-12-10 | End: 2021-12-10

## 2021-12-10 RX ORDER — SODIUM CHLORIDE 0.9 % (FLUSH) 0.9 %
10 SYRINGE (ML) INJECTION
Status: CANCELLED | OUTPATIENT
Start: 2021-12-10

## 2021-12-10 RX ORDER — SODIUM CHLORIDE 0.9 % (FLUSH) 0.9 %
10 SYRINGE (ML) INJECTION EVERY 12 HOURS PRN
Status: DISCONTINUED | OUTPATIENT
Start: 2021-12-10 | End: 2021-12-18 | Stop reason: HOSPADM

## 2021-12-10 RX ORDER — DULOXETIN HYDROCHLORIDE 60 MG/1
60 CAPSULE, DELAYED RELEASE ORAL DAILY
Status: DISCONTINUED | OUTPATIENT
Start: 2021-12-11 | End: 2021-12-18 | Stop reason: HOSPADM

## 2021-12-10 RX ORDER — OXYCODONE HYDROCHLORIDE 10 MG/1
10 TABLET ORAL EVERY 6 HOURS PRN
Status: DISCONTINUED | OUTPATIENT
Start: 2021-12-10 | End: 2021-12-14

## 2021-12-10 RX ORDER — ASPIRIN 81 MG/1
81 TABLET ORAL DAILY
Status: DISCONTINUED | OUTPATIENT
Start: 2021-12-11 | End: 2021-12-12

## 2021-12-10 RX ORDER — AMOXICILLIN 250 MG
1 CAPSULE ORAL DAILY
Status: DISCONTINUED | OUTPATIENT
Start: 2021-12-11 | End: 2021-12-18 | Stop reason: HOSPADM

## 2021-12-10 RX ORDER — TALC
6 POWDER (GRAM) TOPICAL NIGHTLY PRN
Status: CANCELLED | OUTPATIENT
Start: 2021-12-10

## 2021-12-10 RX ORDER — IBUPROFEN 200 MG
16 TABLET ORAL
Status: DISCONTINUED | OUTPATIENT
Start: 2021-12-10 | End: 2021-12-18 | Stop reason: HOSPADM

## 2021-12-10 RX ORDER — ATORVASTATIN CALCIUM 20 MG/1
40 TABLET, FILM COATED ORAL DAILY
Status: DISCONTINUED | OUTPATIENT
Start: 2021-12-11 | End: 2021-12-18 | Stop reason: HOSPADM

## 2021-12-10 RX ORDER — OXYCODONE HYDROCHLORIDE 5 MG/1
5 TABLET ORAL EVERY 6 HOURS PRN
Status: DISCONTINUED | OUTPATIENT
Start: 2021-12-10 | End: 2021-12-14

## 2021-12-10 RX ORDER — CEFTRIAXONE 1 G/1
1 INJECTION, POWDER, FOR SOLUTION INTRAMUSCULAR; INTRAVENOUS
Status: DISCONTINUED | OUTPATIENT
Start: 2021-12-11 | End: 2021-12-11

## 2021-12-10 RX ORDER — INSULIN ASPART 100 [IU]/ML
3 INJECTION, SOLUTION INTRAVENOUS; SUBCUTANEOUS
Status: DISCONTINUED | OUTPATIENT
Start: 2021-12-11 | End: 2021-12-15

## 2021-12-10 RX ORDER — CHOLECALCIFEROL (VITAMIN D3) 25 MCG
2000 TABLET ORAL DAILY
Status: DISCONTINUED | OUTPATIENT
Start: 2021-12-11 | End: 2021-12-18 | Stop reason: HOSPADM

## 2021-12-10 RX ORDER — INSULIN ASPART 100 [IU]/ML
0-5 INJECTION, SOLUTION INTRAVENOUS; SUBCUTANEOUS
Status: DISCONTINUED | OUTPATIENT
Start: 2021-12-10 | End: 2021-12-18 | Stop reason: HOSPADM

## 2021-12-10 RX ORDER — IBUPROFEN 200 MG
24 TABLET ORAL
Status: DISCONTINUED | OUTPATIENT
Start: 2021-12-10 | End: 2021-12-18 | Stop reason: HOSPADM

## 2021-12-10 RX ORDER — POLYETHYLENE GLYCOL 3350 17 G/17G
17 POWDER, FOR SOLUTION ORAL DAILY
Status: DISCONTINUED | OUTPATIENT
Start: 2021-12-11 | End: 2021-12-18 | Stop reason: HOSPADM

## 2021-12-10 RX ORDER — HEPARIN SODIUM 5000 [USP'U]/ML
5000 INJECTION, SOLUTION INTRAVENOUS; SUBCUTANEOUS EVERY 8 HOURS
Status: DISCONTINUED | OUTPATIENT
Start: 2021-12-10 | End: 2021-12-12

## 2021-12-10 RX ADMIN — SODIUM CHLORIDE, SODIUM LACTATE, POTASSIUM CHLORIDE, AND CALCIUM CHLORIDE 500 ML: .6; .31; .03; .02 INJECTION, SOLUTION INTRAVENOUS at 08:12

## 2021-12-10 RX ADMIN — SODIUM CHLORIDE, SODIUM LACTATE, POTASSIUM CHLORIDE, AND CALCIUM CHLORIDE 2559 ML: .6; .31; .03; .02 INJECTION, SOLUTION INTRAVENOUS at 03:12

## 2021-12-10 RX ADMIN — HEPARIN SODIUM 5000 UNITS: 5000 INJECTION INTRAVENOUS; SUBCUTANEOUS at 10:12

## 2021-12-10 RX ADMIN — PIPERACILLIN SODIUM AND TAZOBACTAM SODIUM 4.5 G: 4; .5 INJECTION, POWDER, FOR SOLUTION INTRAVENOUS at 04:12

## 2021-12-10 RX ADMIN — VANCOMYCIN HYDROCHLORIDE 2000 MG: 10 INJECTION, POWDER, LYOPHILIZED, FOR SOLUTION INTRAVENOUS at 05:12

## 2021-12-10 RX ADMIN — IOHEXOL 100 ML: 350 INJECTION, SOLUTION INTRAVENOUS at 05:12

## 2021-12-10 RX ADMIN — OXYCODONE HYDROCHLORIDE 10 MG: 10 TABLET ORAL at 10:12

## 2021-12-10 RX ADMIN — MORPHINE SULFATE 4 MG: 4 INJECTION INTRAVENOUS at 03:12

## 2021-12-10 RX ADMIN — MORPHINE SULFATE 4 MG: 4 INJECTION INTRAVENOUS at 04:12

## 2021-12-11 LAB
ALBUMIN SERPL BCP-MCNC: 2.4 G/DL (ref 3.5–5.2)
ALP SERPL-CCNC: 103 U/L (ref 55–135)
ALT SERPL W/O P-5'-P-CCNC: 12 U/L (ref 10–44)
ANION GAP SERPL CALC-SCNC: 9 MMOL/L (ref 8–16)
AST SERPL-CCNC: 29 U/L (ref 10–40)
BILIRUB SERPL-MCNC: 2 MG/DL (ref 0.1–1)
BUN SERPL-MCNC: 29 MG/DL (ref 8–23)
CALCIUM SERPL-MCNC: 8.4 MG/DL (ref 8.7–10.5)
CHLORIDE SERPL-SCNC: 102 MMOL/L (ref 95–110)
CO2 SERPL-SCNC: 23 MMOL/L (ref 23–29)
CREAT SERPL-MCNC: 1.4 MG/DL (ref 0.5–1.4)
EST. GFR  (AFRICAN AMERICAN): 50.8 ML/MIN/1.73 M^2
EST. GFR  (NON AFRICAN AMERICAN): 43.9 ML/MIN/1.73 M^2
GLUCOSE SERPL-MCNC: 146 MG/DL (ref 70–110)
LACTATE SERPL-SCNC: 1.2 MMOL/L (ref 0.5–2.2)
MAGNESIUM SERPL-MCNC: 2.1 MG/DL (ref 1.6–2.6)
PHOSPHATE SERPL-MCNC: 3.7 MG/DL (ref 2.7–4.5)
POCT GLUCOSE: 139 MG/DL (ref 70–110)
POCT GLUCOSE: 160 MG/DL (ref 70–110)
POCT GLUCOSE: 167 MG/DL (ref 70–110)
POCT GLUCOSE: 169 MG/DL (ref 70–110)
POTASSIUM SERPL-SCNC: 4.7 MMOL/L (ref 3.5–5.1)
PROT SERPL-MCNC: 5.6 G/DL (ref 6–8.4)
SODIUM SERPL-SCNC: 134 MMOL/L (ref 136–145)
TROPONIN I SERPL DL<=0.01 NG/ML-MCNC: 0.04 NG/ML (ref 0–0.03)

## 2021-12-11 PROCEDURE — 20600001 HC STEP DOWN PRIVATE ROOM: Mod: HCNC

## 2021-12-11 PROCEDURE — 83735 ASSAY OF MAGNESIUM: CPT | Mod: HCNC | Performed by: STUDENT IN AN ORGANIZED HEALTH CARE EDUCATION/TRAINING PROGRAM

## 2021-12-11 PROCEDURE — 63600175 PHARM REV CODE 636 W HCPCS: Mod: HCNC | Performed by: STUDENT IN AN ORGANIZED HEALTH CARE EDUCATION/TRAINING PROGRAM

## 2021-12-11 PROCEDURE — 99233 PR SUBSEQUENT HOSPITAL CARE,LEVL III: ICD-10-PCS | Mod: HCNC,,, | Performed by: INTERNAL MEDICINE

## 2021-12-11 PROCEDURE — 25000003 PHARM REV CODE 250: Mod: HCNC

## 2021-12-11 PROCEDURE — 80053 COMPREHEN METABOLIC PANEL: CPT | Mod: HCNC | Performed by: STUDENT IN AN ORGANIZED HEALTH CARE EDUCATION/TRAINING PROGRAM

## 2021-12-11 PROCEDURE — 63600175 PHARM REV CODE 636 W HCPCS: Mod: HCNC

## 2021-12-11 PROCEDURE — 84100 ASSAY OF PHOSPHORUS: CPT | Mod: HCNC | Performed by: STUDENT IN AN ORGANIZED HEALTH CARE EDUCATION/TRAINING PROGRAM

## 2021-12-11 PROCEDURE — 94761 N-INVAS EAR/PLS OXIMETRY MLT: CPT | Mod: HCNC

## 2021-12-11 PROCEDURE — 25000003 PHARM REV CODE 250: Mod: HCNC | Performed by: STUDENT IN AN ORGANIZED HEALTH CARE EDUCATION/TRAINING PROGRAM

## 2021-12-11 PROCEDURE — 99233 SBSQ HOSP IP/OBS HIGH 50: CPT | Mod: HCNC,,, | Performed by: INTERNAL MEDICINE

## 2021-12-11 PROCEDURE — 36415 COLL VENOUS BLD VENIPUNCTURE: CPT | Mod: HCNC | Performed by: STUDENT IN AN ORGANIZED HEALTH CARE EDUCATION/TRAINING PROGRAM

## 2021-12-11 PROCEDURE — 84484 ASSAY OF TROPONIN QUANT: CPT | Mod: HCNC | Performed by: STUDENT IN AN ORGANIZED HEALTH CARE EDUCATION/TRAINING PROGRAM

## 2021-12-11 PROCEDURE — C9399 UNCLASSIFIED DRUGS OR BIOLOG: HCPCS | Mod: HCNC | Performed by: STUDENT IN AN ORGANIZED HEALTH CARE EDUCATION/TRAINING PROGRAM

## 2021-12-11 RX ORDER — HYDROXYZINE HYDROCHLORIDE 25 MG/1
25 TABLET, FILM COATED ORAL 3 TIMES DAILY PRN
Status: DISCONTINUED | OUTPATIENT
Start: 2021-12-11 | End: 2021-12-11

## 2021-12-11 RX ADMIN — ASPIRIN 81 MG: 81 TABLET, COATED ORAL at 09:12

## 2021-12-11 RX ADMIN — INSULIN DETEMIR 15 UNITS: 100 INJECTION, SOLUTION SUBCUTANEOUS at 09:12

## 2021-12-11 RX ADMIN — FINASTERIDE 5 MG: 5 TABLET, FILM COATED ORAL at 09:12

## 2021-12-11 RX ADMIN — ATORVASTATIN CALCIUM 40 MG: 20 TABLET, FILM COATED ORAL at 09:12

## 2021-12-11 RX ADMIN — INSULIN ASPART 3 UNITS: 100 INJECTION, SOLUTION INTRAVENOUS; SUBCUTANEOUS at 08:12

## 2021-12-11 RX ADMIN — PIPERACILLIN SODIUM AND TAZOBACTAM SODIUM 4.5 G: 4; .5 INJECTION, POWDER, FOR SOLUTION INTRAVENOUS at 03:12

## 2021-12-11 RX ADMIN — Medication 2000 UNITS: at 09:12

## 2021-12-11 RX ADMIN — HEPARIN SODIUM 5000 UNITS: 5000 INJECTION INTRAVENOUS; SUBCUTANEOUS at 05:12

## 2021-12-11 RX ADMIN — SENNOSIDES AND DOCUSATE SODIUM 1 TABLET: 50; 8.6 TABLET ORAL at 09:12

## 2021-12-11 RX ADMIN — POLYETHYLENE GLYCOL 3350 17 G: 17 POWDER, FOR SOLUTION ORAL at 09:12

## 2021-12-11 RX ADMIN — HEPARIN SODIUM 5000 UNITS: 5000 INJECTION INTRAVENOUS; SUBCUTANEOUS at 02:12

## 2021-12-11 RX ADMIN — DULOXETINE 60 MG: 60 CAPSULE, DELAYED RELEASE ORAL at 09:12

## 2021-12-11 RX ADMIN — HEPARIN SODIUM 5000 UNITS: 5000 INJECTION INTRAVENOUS; SUBCUTANEOUS at 10:12

## 2021-12-12 LAB
ALBUMIN SERPL BCP-MCNC: 2.4 G/DL (ref 3.5–5.2)
ANION GAP SERPL CALC-SCNC: 7 MMOL/L (ref 8–16)
BACTERIA UR CULT: NO GROWTH
BASOPHILS # BLD AUTO: 0.03 K/UL (ref 0–0.2)
BASOPHILS # BLD AUTO: 0.05 K/UL (ref 0–0.2)
BASOPHILS NFR BLD: 0.3 % (ref 0–1.9)
BASOPHILS NFR BLD: 0.5 % (ref 0–1.9)
BUN SERPL-MCNC: 28 MG/DL (ref 8–23)
CALCIUM SERPL-MCNC: 8.2 MG/DL (ref 8.7–10.5)
CHLORIDE SERPL-SCNC: 106 MMOL/L (ref 95–110)
CO2 SERPL-SCNC: 22 MMOL/L (ref 23–29)
CREAT SERPL-MCNC: 1.4 MG/DL (ref 0.5–1.4)
DIFFERENTIAL METHOD: ABNORMAL
DIFFERENTIAL METHOD: ABNORMAL
EOSINOPHIL # BLD AUTO: 0 K/UL (ref 0–0.5)
EOSINOPHIL # BLD AUTO: 0 K/UL (ref 0–0.5)
EOSINOPHIL NFR BLD: 0 % (ref 0–8)
EOSINOPHIL NFR BLD: 0 % (ref 0–8)
ERYTHROCYTE [DISTWIDTH] IN BLOOD BY AUTOMATED COUNT: 14.8 % (ref 11.5–14.5)
ERYTHROCYTE [DISTWIDTH] IN BLOOD BY AUTOMATED COUNT: 14.9 % (ref 11.5–14.5)
EST. GFR  (AFRICAN AMERICAN): 50.8 ML/MIN/1.73 M^2
EST. GFR  (NON AFRICAN AMERICAN): 43.9 ML/MIN/1.73 M^2
GLUCOSE SERPL-MCNC: 114 MG/DL (ref 70–110)
HCT VFR BLD AUTO: 25.3 % (ref 40–54)
HCT VFR BLD AUTO: 26.1 % (ref 40–54)
HGB BLD-MCNC: 7.9 G/DL (ref 14–18)
HGB BLD-MCNC: 8.3 G/DL (ref 14–18)
IMM GRANULOCYTES # BLD AUTO: 0.05 K/UL (ref 0–0.04)
IMM GRANULOCYTES # BLD AUTO: 0.08 K/UL (ref 0–0.04)
IMM GRANULOCYTES NFR BLD AUTO: 0.6 % (ref 0–0.5)
IMM GRANULOCYTES NFR BLD AUTO: 0.8 % (ref 0–0.5)
LYMPHOCYTES # BLD AUTO: 0.9 K/UL (ref 1–4.8)
LYMPHOCYTES # BLD AUTO: 1.1 K/UL (ref 1–4.8)
LYMPHOCYTES NFR BLD: 10.5 % (ref 18–48)
LYMPHOCYTES NFR BLD: 9.8 % (ref 18–48)
MAGNESIUM SERPL-MCNC: 2 MG/DL (ref 1.6–2.6)
MCH RBC QN AUTO: 27.9 PG (ref 27–31)
MCH RBC QN AUTO: 28.3 PG (ref 27–31)
MCHC RBC AUTO-ENTMCNC: 31.2 G/DL (ref 32–36)
MCHC RBC AUTO-ENTMCNC: 31.8 G/DL (ref 32–36)
MCV RBC AUTO: 89 FL (ref 82–98)
MCV RBC AUTO: 89 FL (ref 82–98)
MONOCYTES # BLD AUTO: 0.8 K/UL (ref 0.3–1)
MONOCYTES # BLD AUTO: 1 K/UL (ref 0.3–1)
MONOCYTES NFR BLD: 9 % (ref 4–15)
MONOCYTES NFR BLD: 9.2 % (ref 4–15)
NEUTROPHILS # BLD AUTO: 7 K/UL (ref 1.8–7.7)
NEUTROPHILS # BLD AUTO: 8.2 K/UL (ref 1.8–7.7)
NEUTROPHILS NFR BLD: 79 % (ref 38–73)
NEUTROPHILS NFR BLD: 80.3 % (ref 38–73)
NRBC BLD-RTO: 0 /100 WBC
NRBC BLD-RTO: 0 /100 WBC
PHOSPHATE SERPL-MCNC: 3.4 MG/DL (ref 2.7–4.5)
PLATELET # BLD AUTO: 509 K/UL (ref 150–450)
PLATELET # BLD AUTO: 533 K/UL (ref 150–450)
PMV BLD AUTO: 9.2 FL (ref 9.2–12.9)
PMV BLD AUTO: 9.4 FL (ref 9.2–12.9)
POCT GLUCOSE: 110 MG/DL (ref 70–110)
POCT GLUCOSE: 202 MG/DL (ref 70–110)
POTASSIUM SERPL-SCNC: 4.5 MMOL/L (ref 3.5–5.1)
RBC # BLD AUTO: 2.83 M/UL (ref 4.6–6.2)
RBC # BLD AUTO: 2.93 M/UL (ref 4.6–6.2)
SODIUM SERPL-SCNC: 135 MMOL/L (ref 136–145)
WBC # BLD AUTO: 10.33 K/UL (ref 3.9–12.7)
WBC # BLD AUTO: 8.66 K/UL (ref 3.9–12.7)

## 2021-12-12 PROCEDURE — 36415 COLL VENOUS BLD VENIPUNCTURE: CPT | Mod: HCNC | Performed by: STUDENT IN AN ORGANIZED HEALTH CARE EDUCATION/TRAINING PROGRAM

## 2021-12-12 PROCEDURE — 80069 RENAL FUNCTION PANEL: CPT | Mod: HCNC | Performed by: STUDENT IN AN ORGANIZED HEALTH CARE EDUCATION/TRAINING PROGRAM

## 2021-12-12 PROCEDURE — 83735 ASSAY OF MAGNESIUM: CPT | Mod: HCNC | Performed by: STUDENT IN AN ORGANIZED HEALTH CARE EDUCATION/TRAINING PROGRAM

## 2021-12-12 PROCEDURE — S0166 INJ OLANZAPINE 2.5MG: HCPCS | Mod: HCNC | Performed by: STUDENT IN AN ORGANIZED HEALTH CARE EDUCATION/TRAINING PROGRAM

## 2021-12-12 PROCEDURE — 25000003 PHARM REV CODE 250: Mod: HCNC

## 2021-12-12 PROCEDURE — 97535 SELF CARE MNGMENT TRAINING: CPT | Mod: HCNC

## 2021-12-12 PROCEDURE — 63600175 PHARM REV CODE 636 W HCPCS: Mod: HCNC

## 2021-12-12 PROCEDURE — 99900035 HC TECH TIME PER 15 MIN (STAT): Mod: HCNC

## 2021-12-12 PROCEDURE — 20600001 HC STEP DOWN PRIVATE ROOM: Mod: HCNC

## 2021-12-12 PROCEDURE — 85025 COMPLETE CBC W/AUTO DIFF WBC: CPT | Mod: 91,HCNC | Performed by: STUDENT IN AN ORGANIZED HEALTH CARE EDUCATION/TRAINING PROGRAM

## 2021-12-12 PROCEDURE — 25000003 PHARM REV CODE 250: Mod: HCNC | Performed by: STUDENT IN AN ORGANIZED HEALTH CARE EDUCATION/TRAINING PROGRAM

## 2021-12-12 PROCEDURE — 99233 SBSQ HOSP IP/OBS HIGH 50: CPT | Mod: HCNC,,, | Performed by: INTERNAL MEDICINE

## 2021-12-12 PROCEDURE — C9399 UNCLASSIFIED DRUGS OR BIOLOG: HCPCS | Mod: HCNC | Performed by: STUDENT IN AN ORGANIZED HEALTH CARE EDUCATION/TRAINING PROGRAM

## 2021-12-12 PROCEDURE — 63600175 PHARM REV CODE 636 W HCPCS: Mod: HCNC | Performed by: STUDENT IN AN ORGANIZED HEALTH CARE EDUCATION/TRAINING PROGRAM

## 2021-12-12 PROCEDURE — 92610 EVALUATE SWALLOWING FUNCTION: CPT | Mod: HCNC

## 2021-12-12 PROCEDURE — 99233 PR SUBSEQUENT HOSPITAL CARE,LEVL III: ICD-10-PCS | Mod: HCNC,,, | Performed by: INTERNAL MEDICINE

## 2021-12-12 RX ORDER — LACTULOSE 10 G/15ML
20 SOLUTION ORAL
Status: DISPENSED | OUTPATIENT
Start: 2021-12-12 | End: 2021-12-12

## 2021-12-12 RX ORDER — ACETAMINOPHEN 325 MG/1
650 TABLET ORAL EVERY 6 HOURS PRN
Status: DISCONTINUED | OUTPATIENT
Start: 2021-12-12 | End: 2021-12-18 | Stop reason: HOSPADM

## 2021-12-12 RX ORDER — AMOXICILLIN AND CLAVULANATE POTASSIUM 875; 125 MG/1; MG/1
1 TABLET, FILM COATED ORAL EVERY 12 HOURS
Status: DISCONTINUED | OUTPATIENT
Start: 2021-12-12 | End: 2021-12-14

## 2021-12-12 RX ORDER — TRAZODONE HYDROCHLORIDE 50 MG/1
50 TABLET ORAL NIGHTLY PRN
Status: DISCONTINUED | OUTPATIENT
Start: 2021-12-12 | End: 2021-12-18 | Stop reason: HOSPADM

## 2021-12-12 RX ORDER — OLANZAPINE 10 MG/2ML
2.5 INJECTION, POWDER, FOR SOLUTION INTRAMUSCULAR ONCE AS NEEDED
Status: COMPLETED | OUTPATIENT
Start: 2021-12-12 | End: 2021-12-12

## 2021-12-12 RX ADMIN — OLANZAPINE 2.5 MG: 10 INJECTION, POWDER, FOR SOLUTION INTRAMUSCULAR at 06:12

## 2021-12-12 RX ADMIN — LACTULOSE 20 G: 20 SOLUTION ORAL at 06:12

## 2021-12-12 RX ADMIN — INSULIN ASPART 2 UNITS: 100 INJECTION, SOLUTION INTRAVENOUS; SUBCUTANEOUS at 12:12

## 2021-12-12 RX ADMIN — AMOXICILLIN AND CLAVULANATE POTASSIUM 1 TABLET: 875; 125 TABLET, FILM COATED ORAL at 09:12

## 2021-12-12 RX ADMIN — ATORVASTATIN CALCIUM 40 MG: 20 TABLET, FILM COATED ORAL at 09:12

## 2021-12-12 RX ADMIN — LACTULOSE 20 G: 20 SOLUTION ORAL at 03:12

## 2021-12-12 RX ADMIN — ASPIRIN 81 MG: 81 TABLET, COATED ORAL at 09:12

## 2021-12-12 RX ADMIN — HEPARIN SODIUM 5000 UNITS: 5000 INJECTION INTRAVENOUS; SUBCUTANEOUS at 06:12

## 2021-12-12 RX ADMIN — FINASTERIDE 5 MG: 5 TABLET, FILM COATED ORAL at 09:12

## 2021-12-12 RX ADMIN — PIPERACILLIN SODIUM AND TAZOBACTAM SODIUM 4.5 G: 4; .5 INJECTION, POWDER, FOR SOLUTION INTRAVENOUS at 12:12

## 2021-12-12 RX ADMIN — SENNOSIDES AND DOCUSATE SODIUM 1 TABLET: 50; 8.6 TABLET ORAL at 09:12

## 2021-12-12 RX ADMIN — Medication 2000 UNITS: at 09:12

## 2021-12-12 RX ADMIN — INSULIN DETEMIR 15 UNITS: 100 INJECTION, SOLUTION SUBCUTANEOUS at 10:12

## 2021-12-12 RX ADMIN — POLYETHYLENE GLYCOL 3350 17 G: 17 POWDER, FOR SOLUTION ORAL at 09:12

## 2021-12-12 RX ADMIN — INSULIN ASPART 3 UNITS: 100 INJECTION, SOLUTION INTRAVENOUS; SUBCUTANEOUS at 12:12

## 2021-12-12 RX ADMIN — DULOXETINE 60 MG: 60 CAPSULE, DELAYED RELEASE ORAL at 09:12

## 2021-12-12 RX ADMIN — AMOXICILLIN AND CLAVULANATE POTASSIUM 1 TABLET: 875; 125 TABLET, FILM COATED ORAL at 10:12

## 2021-12-13 PROBLEM — R41.0 DELIRIUM: Status: ACTIVE | Noted: 2021-12-13

## 2021-12-13 LAB
POCT GLUCOSE: 143 MG/DL (ref 70–110)
POCT GLUCOSE: 176 MG/DL (ref 70–110)
POCT GLUCOSE: 242 MG/DL (ref 70–110)

## 2021-12-13 PROCEDURE — 99233 PR SUBSEQUENT HOSPITAL CARE,LEVL III: ICD-10-PCS | Mod: HCNC,,, | Performed by: INTERNAL MEDICINE

## 2021-12-13 PROCEDURE — 99233 SBSQ HOSP IP/OBS HIGH 50: CPT | Mod: HCNC,,, | Performed by: INTERNAL MEDICINE

## 2021-12-13 PROCEDURE — 93010 EKG 12-LEAD: ICD-10-PCS | Mod: HCNC,,, | Performed by: INTERNAL MEDICINE

## 2021-12-13 PROCEDURE — 97161 PT EVAL LOW COMPLEX 20 MIN: CPT | Mod: HCNC

## 2021-12-13 PROCEDURE — 94761 N-INVAS EAR/PLS OXIMETRY MLT: CPT | Mod: HCNC

## 2021-12-13 PROCEDURE — 93005 ELECTROCARDIOGRAM TRACING: CPT | Mod: HCNC

## 2021-12-13 PROCEDURE — 93010 ELECTROCARDIOGRAM REPORT: CPT | Mod: HCNC,,, | Performed by: INTERNAL MEDICINE

## 2021-12-13 PROCEDURE — 25000003 PHARM REV CODE 250: Mod: HCNC

## 2021-12-13 PROCEDURE — 97165 OT EVAL LOW COMPLEX 30 MIN: CPT | Mod: HCNC

## 2021-12-13 PROCEDURE — 20600001 HC STEP DOWN PRIVATE ROOM: Mod: HCNC

## 2021-12-13 PROCEDURE — 25000003 PHARM REV CODE 250: Mod: HCNC | Performed by: STUDENT IN AN ORGANIZED HEALTH CARE EDUCATION/TRAINING PROGRAM

## 2021-12-13 PROCEDURE — 97112 NEUROMUSCULAR REEDUCATION: CPT | Mod: HCNC

## 2021-12-13 PROCEDURE — 97530 THERAPEUTIC ACTIVITIES: CPT | Mod: HCNC

## 2021-12-13 RX ORDER — QUETIAPINE FUMARATE 25 MG/1
25 TABLET, FILM COATED ORAL DAILY
Status: DISCONTINUED | OUTPATIENT
Start: 2021-12-13 | End: 2021-12-14

## 2021-12-13 RX ORDER — AMOXICILLIN AND CLAVULANATE POTASSIUM 875; 125 MG/1; MG/1
1 TABLET, FILM COATED ORAL EVERY 12 HOURS
Qty: 10 TABLET | Refills: 0 | Status: SHIPPED | OUTPATIENT
Start: 2021-12-13 | End: 2021-12-18 | Stop reason: HOSPADM

## 2021-12-13 RX ORDER — INSULIN GLARGINE 100 [IU]/ML
15 INJECTION, SOLUTION SUBCUTANEOUS DAILY
Refills: 0
Start: 2021-12-13 | End: 2021-12-18 | Stop reason: HOSPADM

## 2021-12-13 RX ORDER — INSULIN ASPART 100 [IU]/ML
3 INJECTION, SOLUTION INTRAVENOUS; SUBCUTANEOUS
Start: 2021-12-13

## 2021-12-13 RX ADMIN — TRAZODONE HYDROCHLORIDE 50 MG: 50 TABLET ORAL at 08:12

## 2021-12-13 RX ADMIN — INSULIN DETEMIR 15 UNITS: 100 INJECTION, SOLUTION SUBCUTANEOUS at 09:12

## 2021-12-13 RX ADMIN — INSULIN ASPART 3 UNITS: 100 INJECTION, SOLUTION INTRAVENOUS; SUBCUTANEOUS at 05:12

## 2021-12-13 RX ADMIN — QUETIAPINE FUMARATE 25 MG: 25 TABLET ORAL at 02:12

## 2021-12-13 RX ADMIN — OXYCODONE 5 MG: 5 TABLET ORAL at 10:12

## 2021-12-13 RX ADMIN — INSULIN ASPART 3 UNITS: 100 INJECTION, SOLUTION INTRAVENOUS; SUBCUTANEOUS at 12:12

## 2021-12-13 RX ADMIN — AMOXICILLIN AND CLAVULANATE POTASSIUM 1 TABLET: 875; 125 TABLET, FILM COATED ORAL at 09:12

## 2021-12-13 RX ADMIN — ATORVASTATIN CALCIUM 40 MG: 20 TABLET, FILM COATED ORAL at 09:12

## 2021-12-13 RX ADMIN — Medication 2000 UNITS: at 09:12

## 2021-12-13 RX ADMIN — INSULIN ASPART 3 UNITS: 100 INJECTION, SOLUTION INTRAVENOUS; SUBCUTANEOUS at 09:12

## 2021-12-13 RX ADMIN — FINASTERIDE 5 MG: 5 TABLET, FILM COATED ORAL at 09:12

## 2021-12-13 RX ADMIN — ACETAMINOPHEN 650 MG: 325 TABLET ORAL at 12:12

## 2021-12-13 RX ADMIN — INSULIN ASPART 2 UNITS: 100 INJECTION, SOLUTION INTRAVENOUS; SUBCUTANEOUS at 05:12

## 2021-12-13 RX ADMIN — DULOXETINE 60 MG: 60 CAPSULE, DELAYED RELEASE ORAL at 09:12

## 2021-12-13 RX ADMIN — AMOXICILLIN AND CLAVULANATE POTASSIUM 1 TABLET: 875; 125 TABLET, FILM COATED ORAL at 08:12

## 2021-12-13 RX ADMIN — TRAZODONE HYDROCHLORIDE 50 MG: 50 TABLET ORAL at 01:12

## 2021-12-14 LAB
ALBUMIN SERPL BCP-MCNC: 2.7 G/DL (ref 3.5–5.2)
ANION GAP SERPL CALC-SCNC: 9 MMOL/L (ref 8–16)
BASOPHILS # BLD AUTO: 0.04 K/UL (ref 0–0.2)
BASOPHILS NFR BLD: 0.4 % (ref 0–1.9)
BUN SERPL-MCNC: 16 MG/DL (ref 8–23)
CALCIUM SERPL-MCNC: 8.7 MG/DL (ref 8.7–10.5)
CHLORIDE SERPL-SCNC: 99 MMOL/L (ref 95–110)
CO2 SERPL-SCNC: 24 MMOL/L (ref 23–29)
CREAT SERPL-MCNC: 0.9 MG/DL (ref 0.5–1.4)
DIFFERENTIAL METHOD: ABNORMAL
EOSINOPHIL # BLD AUTO: 0 K/UL (ref 0–0.5)
EOSINOPHIL NFR BLD: 0 % (ref 0–8)
ERYTHROCYTE [DISTWIDTH] IN BLOOD BY AUTOMATED COUNT: 14.4 % (ref 11.5–14.5)
EST. GFR  (AFRICAN AMERICAN): >60 ML/MIN/1.73 M^2
EST. GFR  (NON AFRICAN AMERICAN): >60 ML/MIN/1.73 M^2
GLUCOSE SERPL-MCNC: 213 MG/DL (ref 70–110)
HCT VFR BLD AUTO: 29.3 % (ref 40–54)
HGB BLD-MCNC: 9.1 G/DL (ref 14–18)
IMM GRANULOCYTES # BLD AUTO: 0.04 K/UL (ref 0–0.04)
IMM GRANULOCYTES NFR BLD AUTO: 0.4 % (ref 0–0.5)
LYMPHOCYTES # BLD AUTO: 0.9 K/UL (ref 1–4.8)
LYMPHOCYTES NFR BLD: 10.1 % (ref 18–48)
MAGNESIUM SERPL-MCNC: 1.9 MG/DL (ref 1.6–2.6)
MCH RBC QN AUTO: 27.4 PG (ref 27–31)
MCHC RBC AUTO-ENTMCNC: 31.1 G/DL (ref 32–36)
MCV RBC AUTO: 88 FL (ref 82–98)
MONOCYTES # BLD AUTO: 1.2 K/UL (ref 0.3–1)
MONOCYTES NFR BLD: 13 % (ref 4–15)
NEUTROPHILS # BLD AUTO: 6.8 K/UL (ref 1.8–7.7)
NEUTROPHILS NFR BLD: 76.1 % (ref 38–73)
NRBC BLD-RTO: 0 /100 WBC
PHOSPHATE SERPL-MCNC: 2.5 MG/DL (ref 2.7–4.5)
PLATELET # BLD AUTO: 578 K/UL (ref 150–450)
PMV BLD AUTO: 9.5 FL (ref 9.2–12.9)
POCT GLUCOSE: 229 MG/DL (ref 70–110)
POCT GLUCOSE: 240 MG/DL (ref 70–110)
POCT GLUCOSE: 245 MG/DL (ref 70–110)
POCT GLUCOSE: 269 MG/DL (ref 70–110)
POTASSIUM SERPL-SCNC: 4.5 MMOL/L (ref 3.5–5.1)
RBC # BLD AUTO: 3.32 M/UL (ref 4.6–6.2)
SODIUM SERPL-SCNC: 132 MMOL/L (ref 136–145)
WBC # BLD AUTO: 8.98 K/UL (ref 3.9–12.7)

## 2021-12-14 PROCEDURE — 85025 COMPLETE CBC W/AUTO DIFF WBC: CPT | Mod: HCNC | Performed by: STUDENT IN AN ORGANIZED HEALTH CARE EDUCATION/TRAINING PROGRAM

## 2021-12-14 PROCEDURE — 99232 SBSQ HOSP IP/OBS MODERATE 35: CPT | Mod: HCNC,GC,, | Performed by: HOSPITALIST

## 2021-12-14 PROCEDURE — 36415 COLL VENOUS BLD VENIPUNCTURE: CPT | Mod: HCNC | Performed by: STUDENT IN AN ORGANIZED HEALTH CARE EDUCATION/TRAINING PROGRAM

## 2021-12-14 PROCEDURE — 25000003 PHARM REV CODE 250: Mod: HCNC | Performed by: INTERNAL MEDICINE

## 2021-12-14 PROCEDURE — C9399 UNCLASSIFIED DRUGS OR BIOLOG: HCPCS | Mod: HCNC | Performed by: STUDENT IN AN ORGANIZED HEALTH CARE EDUCATION/TRAINING PROGRAM

## 2021-12-14 PROCEDURE — 80069 RENAL FUNCTION PANEL: CPT | Mod: HCNC | Performed by: STUDENT IN AN ORGANIZED HEALTH CARE EDUCATION/TRAINING PROGRAM

## 2021-12-14 PROCEDURE — 63600175 PHARM REV CODE 636 W HCPCS: Mod: HCNC | Performed by: HOSPITALIST

## 2021-12-14 PROCEDURE — 25000003 PHARM REV CODE 250: Mod: HCNC | Performed by: HOSPITALIST

## 2021-12-14 PROCEDURE — 25000003 PHARM REV CODE 250: Mod: HCNC | Performed by: STUDENT IN AN ORGANIZED HEALTH CARE EDUCATION/TRAINING PROGRAM

## 2021-12-14 PROCEDURE — 94761 N-INVAS EAR/PLS OXIMETRY MLT: CPT | Mod: HCNC

## 2021-12-14 PROCEDURE — 92526 ORAL FUNCTION THERAPY: CPT | Mod: HCNC

## 2021-12-14 PROCEDURE — 25000003 PHARM REV CODE 250: Mod: HCNC

## 2021-12-14 PROCEDURE — 83735 ASSAY OF MAGNESIUM: CPT | Mod: HCNC | Performed by: STUDENT IN AN ORGANIZED HEALTH CARE EDUCATION/TRAINING PROGRAM

## 2021-12-14 PROCEDURE — 20600001 HC STEP DOWN PRIVATE ROOM: Mod: HCNC

## 2021-12-14 PROCEDURE — 99232 PR SUBSEQUENT HOSPITAL CARE,LEVL II: ICD-10-PCS | Mod: HCNC,GC,, | Performed by: HOSPITALIST

## 2021-12-14 PROCEDURE — 97535 SELF CARE MNGMENT TRAINING: CPT | Mod: HCNC

## 2021-12-14 PROCEDURE — 63600175 PHARM REV CODE 636 W HCPCS: Mod: HCNC

## 2021-12-14 RX ORDER — HYDRALAZINE HYDROCHLORIDE 20 MG/ML
10 INJECTION INTRAMUSCULAR; INTRAVENOUS EVERY 8 HOURS PRN
Status: DISCONTINUED | OUTPATIENT
Start: 2021-12-14 | End: 2021-12-18 | Stop reason: HOSPADM

## 2021-12-14 RX ORDER — MUPIROCIN 20 MG/G
OINTMENT TOPICAL 2 TIMES DAILY
Status: DISCONTINUED | OUTPATIENT
Start: 2021-12-14 | End: 2021-12-18 | Stop reason: HOSPADM

## 2021-12-14 RX ORDER — ASCORBIC ACID 250 MG
250 TABLET ORAL 2 TIMES DAILY
Status: ON HOLD | COMMUNITY
End: 2021-12-18 | Stop reason: HOSPADM

## 2021-12-14 RX ORDER — HYDROMORPHONE HYDROCHLORIDE 1 MG/ML
0.2 INJECTION, SOLUTION INTRAMUSCULAR; INTRAVENOUS; SUBCUTANEOUS EVERY 6 HOURS PRN
Status: DISCONTINUED | OUTPATIENT
Start: 2021-12-14 | End: 2021-12-14

## 2021-12-14 RX ORDER — NIFEDIPINE 30 MG/1
30 TABLET, EXTENDED RELEASE ORAL DAILY
Status: DISCONTINUED | OUTPATIENT
Start: 2021-12-14 | End: 2021-12-15

## 2021-12-14 RX ORDER — KETOROLAC TROMETHAMINE 15 MG/ML
15 INJECTION, SOLUTION INTRAMUSCULAR; INTRAVENOUS ONCE
Status: DISCONTINUED | OUTPATIENT
Start: 2021-12-14 | End: 2021-12-15

## 2021-12-14 RX ORDER — METHOCARBAMOL 500 MG/1
500 TABLET, FILM COATED ORAL 4 TIMES DAILY PRN
Status: ON HOLD | COMMUNITY
End: 2021-12-18 | Stop reason: HOSPADM

## 2021-12-14 RX ORDER — LISINOPRIL 20 MG/1
20 TABLET ORAL DAILY
Status: DISCONTINUED | OUTPATIENT
Start: 2021-12-14 | End: 2021-12-14

## 2021-12-14 RX ORDER — SODIUM CHLORIDE 9 MG/ML
INJECTION, SOLUTION INTRAVENOUS CONTINUOUS
Status: ACTIVE | OUTPATIENT
Start: 2021-12-14 | End: 2021-12-15

## 2021-12-14 RX ORDER — ONDANSETRON HYDROCHLORIDE 8 MG/1
8 TABLET, FILM COATED ORAL EVERY 8 HOURS PRN
COMMUNITY

## 2021-12-14 RX ADMIN — INSULIN ASPART 3 UNITS: 100 INJECTION, SOLUTION INTRAVENOUS; SUBCUTANEOUS at 04:12

## 2021-12-14 RX ADMIN — MUPIROCIN: 20 OINTMENT TOPICAL at 09:12

## 2021-12-14 RX ADMIN — INSULIN ASPART 2 UNITS: 100 INJECTION, SOLUTION INTRAVENOUS; SUBCUTANEOUS at 12:12

## 2021-12-14 RX ADMIN — AMPICILLIN SODIUM AND SULBACTAM SODIUM 3 G: 2; 1 INJECTION, POWDER, FOR SOLUTION INTRAMUSCULAR; INTRAVENOUS at 04:12

## 2021-12-14 RX ADMIN — HYDRALAZINE HYDROCHLORIDE 10 MG: 20 INJECTION, SOLUTION INTRAMUSCULAR; INTRAVENOUS at 06:12

## 2021-12-14 RX ADMIN — AMOXICILLIN AND CLAVULANATE POTASSIUM 1 TABLET: 875; 125 TABLET, FILM COATED ORAL at 08:12

## 2021-12-14 RX ADMIN — Medication 2000 UNITS: at 08:12

## 2021-12-14 RX ADMIN — NIFEDIPINE 30 MG: 30 TABLET, FILM COATED, EXTENDED RELEASE ORAL at 08:12

## 2021-12-14 RX ADMIN — SODIUM CHLORIDE: 0.9 INJECTION, SOLUTION INTRAVENOUS at 04:12

## 2021-12-14 RX ADMIN — AMPICILLIN SODIUM AND SULBACTAM SODIUM 3 G: 2; 1 INJECTION, POWDER, FOR SOLUTION INTRAMUSCULAR; INTRAVENOUS at 10:12

## 2021-12-14 RX ADMIN — FINASTERIDE 5 MG: 5 TABLET, FILM COATED ORAL at 08:12

## 2021-12-14 RX ADMIN — QUETIAPINE FUMARATE 25 MG: 25 TABLET ORAL at 08:12

## 2021-12-14 RX ADMIN — ATORVASTATIN CALCIUM 40 MG: 20 TABLET, FILM COATED ORAL at 08:12

## 2021-12-14 RX ADMIN — DULOXETINE 60 MG: 60 CAPSULE, DELAYED RELEASE ORAL at 08:12

## 2021-12-14 RX ADMIN — INSULIN DETEMIR 15 UNITS: 100 INJECTION, SOLUTION SUBCUTANEOUS at 09:12

## 2021-12-15 LAB
BACTERIA #/AREA URNS AUTO: NORMAL /HPF
BACTERIA BLD CULT: NORMAL
BACTERIA BLD CULT: NORMAL
BILIRUB UR QL STRIP: NEGATIVE
CLARITY UR REFRACT.AUTO: CLEAR
COLOR UR AUTO: YELLOW
GLUCOSE UR QL STRIP: ABNORMAL
HGB UR QL STRIP: ABNORMAL
KETONES UR QL STRIP: NEGATIVE
LEUKOCYTE ESTERASE UR QL STRIP: NEGATIVE
MICROSCOPIC COMMENT: NORMAL
NITRITE UR QL STRIP: NEGATIVE
PH UR STRIP: 6 [PH] (ref 5–8)
POCT GLUCOSE: 162 MG/DL (ref 70–110)
POCT GLUCOSE: 184 MG/DL (ref 70–110)
POCT GLUCOSE: 195 MG/DL (ref 70–110)
POCT GLUCOSE: 206 MG/DL (ref 70–110)
POCT GLUCOSE: 217 MG/DL (ref 70–110)
POCT GLUCOSE: 290 MG/DL (ref 70–110)
PROT UR QL STRIP: NEGATIVE
RBC #/AREA URNS AUTO: 1 /HPF (ref 0–4)
SP GR UR STRIP: 1.01 (ref 1–1.03)
URN SPEC COLLECT METH UR: ABNORMAL
WBC #/AREA URNS AUTO: 1 /HPF (ref 0–5)

## 2021-12-15 PROCEDURE — 99233 PR SUBSEQUENT HOSPITAL CARE,LEVL III: ICD-10-PCS | Mod: HCNC,GC,, | Performed by: HOSPITALIST

## 2021-12-15 PROCEDURE — 97116 GAIT TRAINING THERAPY: CPT | Mod: HCNC

## 2021-12-15 PROCEDURE — 20600001 HC STEP DOWN PRIVATE ROOM: Mod: HCNC

## 2021-12-15 PROCEDURE — 81001 URINALYSIS AUTO W/SCOPE: CPT | Mod: HCNC | Performed by: HOSPITALIST

## 2021-12-15 PROCEDURE — 63600175 PHARM REV CODE 636 W HCPCS: Mod: HCNC | Performed by: HOSPITALIST

## 2021-12-15 PROCEDURE — 99233 SBSQ HOSP IP/OBS HIGH 50: CPT | Mod: HCNC,GC,, | Performed by: HOSPITALIST

## 2021-12-15 PROCEDURE — 97535 SELF CARE MNGMENT TRAINING: CPT | Mod: HCNC

## 2021-12-15 PROCEDURE — 97530 THERAPEUTIC ACTIVITIES: CPT | Mod: HCNC

## 2021-12-15 PROCEDURE — 63600175 PHARM REV CODE 636 W HCPCS: Mod: HCNC

## 2021-12-15 PROCEDURE — 25000003 PHARM REV CODE 250: Mod: HCNC | Performed by: STUDENT IN AN ORGANIZED HEALTH CARE EDUCATION/TRAINING PROGRAM

## 2021-12-15 PROCEDURE — 36415 COLL VENOUS BLD VENIPUNCTURE: CPT | Mod: HCNC | Performed by: STUDENT IN AN ORGANIZED HEALTH CARE EDUCATION/TRAINING PROGRAM

## 2021-12-15 PROCEDURE — 63600175 PHARM REV CODE 636 W HCPCS: Mod: HCNC | Performed by: STUDENT IN AN ORGANIZED HEALTH CARE EDUCATION/TRAINING PROGRAM

## 2021-12-15 PROCEDURE — 87040 BLOOD CULTURE FOR BACTERIA: CPT | Mod: HCNC | Performed by: STUDENT IN AN ORGANIZED HEALTH CARE EDUCATION/TRAINING PROGRAM

## 2021-12-15 PROCEDURE — 25000003 PHARM REV CODE 250: Mod: HCNC | Performed by: HOSPITALIST

## 2021-12-15 RX ORDER — ACETAMINOPHEN 650 MG/1
650 SUPPOSITORY RECTAL EVERY 6 HOURS PRN
Status: DISCONTINUED | OUTPATIENT
Start: 2021-12-15 | End: 2021-12-17

## 2021-12-15 RX ORDER — SODIUM CHLORIDE 9 MG/ML
INJECTION, SOLUTION INTRAVENOUS CONTINUOUS
Status: ACTIVE | OUTPATIENT
Start: 2021-12-15 | End: 2021-12-16

## 2021-12-15 RX ORDER — LISINOPRIL 20 MG/1
20 TABLET ORAL DAILY
Status: DISCONTINUED | OUTPATIENT
Start: 2021-12-15 | End: 2021-12-18 | Stop reason: HOSPADM

## 2021-12-15 RX ORDER — KETOROLAC TROMETHAMINE 30 MG/ML
15 INJECTION, SOLUTION INTRAMUSCULAR; INTRAVENOUS ONCE
Status: COMPLETED | OUTPATIENT
Start: 2021-12-15 | End: 2021-12-15

## 2021-12-15 RX ORDER — CEFEPIME HYDROCHLORIDE 2 G/1
2 INJECTION, POWDER, FOR SOLUTION INTRAVENOUS EVERY 8 HOURS
Status: DISCONTINUED | OUTPATIENT
Start: 2021-12-15 | End: 2021-12-17

## 2021-12-15 RX ORDER — KETOROLAC TROMETHAMINE 15 MG/ML
15 INJECTION, SOLUTION INTRAMUSCULAR; INTRAVENOUS ONCE
Status: COMPLETED | OUTPATIENT
Start: 2021-12-15 | End: 2021-12-16

## 2021-12-15 RX ORDER — LIDOCAINE 50 MG/G
1 PATCH TOPICAL
Status: DISCONTINUED | OUTPATIENT
Start: 2021-12-15 | End: 2021-12-18 | Stop reason: HOSPADM

## 2021-12-15 RX ORDER — ENOXAPARIN SODIUM 100 MG/ML
40 INJECTION SUBCUTANEOUS EVERY 24 HOURS
Status: DISCONTINUED | OUTPATIENT
Start: 2021-12-15 | End: 2021-12-18 | Stop reason: HOSPADM

## 2021-12-15 RX ADMIN — KETOROLAC TROMETHAMINE 15 MG: 30 INJECTION, SOLUTION INTRAMUSCULAR; INTRAVENOUS at 09:12

## 2021-12-15 RX ADMIN — INSULIN ASPART 2 UNITS: 100 INJECTION, SOLUTION INTRAVENOUS; SUBCUTANEOUS at 12:12

## 2021-12-15 RX ADMIN — MUPIROCIN: 20 OINTMENT TOPICAL at 08:12

## 2021-12-15 RX ADMIN — MUPIROCIN: 20 OINTMENT TOPICAL at 09:12

## 2021-12-15 RX ADMIN — AMPICILLIN SODIUM AND SULBACTAM SODIUM 3 G: 2; 1 INJECTION, POWDER, FOR SOLUTION INTRAMUSCULAR; INTRAVENOUS at 05:12

## 2021-12-15 RX ADMIN — VANCOMYCIN HYDROCHLORIDE 1250 MG: 1.25 INJECTION, POWDER, LYOPHILIZED, FOR SOLUTION INTRAVENOUS at 01:12

## 2021-12-15 RX ADMIN — ENOXAPARIN SODIUM 40 MG: 100 INJECTION SUBCUTANEOUS at 05:12

## 2021-12-15 RX ADMIN — CEFEPIME 2 G: 2 INJECTION, POWDER, FOR SOLUTION INTRAVENOUS at 02:12

## 2021-12-15 RX ADMIN — INSULIN ASPART 2 UNITS: 100 INJECTION, SOLUTION INTRAVENOUS; SUBCUTANEOUS at 06:12

## 2021-12-15 RX ADMIN — LIDOCAINE 5% 1 PATCH: 700 PATCH TOPICAL at 01:12

## 2021-12-15 RX ADMIN — CEFEPIME 2 G: 2 INJECTION, POWDER, FOR SOLUTION INTRAVENOUS at 09:12

## 2021-12-15 RX ADMIN — SODIUM CHLORIDE: 0.9 INJECTION, SOLUTION INTRAVENOUS at 02:12

## 2021-12-16 LAB
ALBUMIN SERPL BCP-MCNC: 2.3 G/DL (ref 3.5–5.2)
ANION GAP SERPL CALC-SCNC: 10 MMOL/L (ref 8–16)
BASOPHILS # BLD AUTO: 0.07 K/UL (ref 0–0.2)
BASOPHILS NFR BLD: 1.4 % (ref 0–1.9)
BUN SERPL-MCNC: 23 MG/DL (ref 8–23)
CALCIUM SERPL-MCNC: 8.2 MG/DL (ref 8.7–10.5)
CHLORIDE SERPL-SCNC: 105 MMOL/L (ref 95–110)
CO2 SERPL-SCNC: 17 MMOL/L (ref 23–29)
CREAT SERPL-MCNC: 1 MG/DL (ref 0.5–1.4)
DIFFERENTIAL METHOD: ABNORMAL
EOSINOPHIL # BLD AUTO: 0 K/UL (ref 0–0.5)
EOSINOPHIL NFR BLD: 0 % (ref 0–8)
ERYTHROCYTE [DISTWIDTH] IN BLOOD BY AUTOMATED COUNT: 14.7 % (ref 11.5–14.5)
EST. GFR  (AFRICAN AMERICAN): >60 ML/MIN/1.73 M^2
EST. GFR  (NON AFRICAN AMERICAN): >60 ML/MIN/1.73 M^2
GLUCOSE SERPL-MCNC: 154 MG/DL (ref 70–110)
HCT VFR BLD AUTO: 32.5 % (ref 40–54)
HGB BLD-MCNC: 9.5 G/DL (ref 14–18)
IMM GRANULOCYTES # BLD AUTO: 0.04 K/UL (ref 0–0.04)
IMM GRANULOCYTES NFR BLD AUTO: 0.8 % (ref 0–0.5)
LYMPHOCYTES # BLD AUTO: 1 K/UL (ref 1–4.8)
LYMPHOCYTES NFR BLD: 18.8 % (ref 18–48)
MAGNESIUM SERPL-MCNC: 1.9 MG/DL (ref 1.6–2.6)
MCH RBC QN AUTO: 27.5 PG (ref 27–31)
MCHC RBC AUTO-ENTMCNC: 29.2 G/DL (ref 32–36)
MCV RBC AUTO: 94 FL (ref 82–98)
MONOCYTES # BLD AUTO: 0.9 K/UL (ref 0.3–1)
MONOCYTES NFR BLD: 18.4 % (ref 4–15)
NEUTROPHILS # BLD AUTO: 3.1 K/UL (ref 1.8–7.7)
NEUTROPHILS NFR BLD: 60.6 % (ref 38–73)
NRBC BLD-RTO: 0 /100 WBC
PHOSPHATE SERPL-MCNC: 2.5 MG/DL (ref 2.7–4.5)
PLATELET # BLD AUTO: 450 K/UL (ref 150–450)
PMV BLD AUTO: 9.6 FL (ref 9.2–12.9)
POCT GLUCOSE: 192 MG/DL (ref 70–110)
POCT GLUCOSE: 212 MG/DL (ref 70–110)
POCT GLUCOSE: 252 MG/DL (ref 70–110)
POTASSIUM SERPL-SCNC: 4.2 MMOL/L (ref 3.5–5.1)
RBC # BLD AUTO: 3.45 M/UL (ref 4.6–6.2)
SODIUM SERPL-SCNC: 132 MMOL/L (ref 136–145)
WBC # BLD AUTO: 5.06 K/UL (ref 3.9–12.7)

## 2021-12-16 PROCEDURE — 97535 SELF CARE MNGMENT TRAINING: CPT | Mod: HCNC

## 2021-12-16 PROCEDURE — 63600175 PHARM REV CODE 636 W HCPCS: Mod: HCNC | Performed by: STUDENT IN AN ORGANIZED HEALTH CARE EDUCATION/TRAINING PROGRAM

## 2021-12-16 PROCEDURE — 92526 ORAL FUNCTION THERAPY: CPT | Mod: HCNC

## 2021-12-16 PROCEDURE — 80069 RENAL FUNCTION PANEL: CPT | Mod: HCNC | Performed by: STUDENT IN AN ORGANIZED HEALTH CARE EDUCATION/TRAINING PROGRAM

## 2021-12-16 PROCEDURE — 25000003 PHARM REV CODE 250: Mod: HCNC | Performed by: HOSPITALIST

## 2021-12-16 PROCEDURE — 20600001 HC STEP DOWN PRIVATE ROOM: Mod: HCNC

## 2021-12-16 PROCEDURE — 25000003 PHARM REV CODE 250: Mod: HCNC

## 2021-12-16 PROCEDURE — 25000003 PHARM REV CODE 250: Mod: HCNC | Performed by: STUDENT IN AN ORGANIZED HEALTH CARE EDUCATION/TRAINING PROGRAM

## 2021-12-16 PROCEDURE — 85025 COMPLETE CBC W/AUTO DIFF WBC: CPT | Mod: HCNC | Performed by: STUDENT IN AN ORGANIZED HEALTH CARE EDUCATION/TRAINING PROGRAM

## 2021-12-16 PROCEDURE — 97530 THERAPEUTIC ACTIVITIES: CPT | Mod: HCNC

## 2021-12-16 PROCEDURE — 99232 PR SUBSEQUENT HOSPITAL CARE,LEVL II: ICD-10-PCS | Mod: HCNC,GC,, | Performed by: HOSPITALIST

## 2021-12-16 PROCEDURE — C9399 UNCLASSIFIED DRUGS OR BIOLOG: HCPCS | Mod: HCNC | Performed by: STUDENT IN AN ORGANIZED HEALTH CARE EDUCATION/TRAINING PROGRAM

## 2021-12-16 PROCEDURE — 83735 ASSAY OF MAGNESIUM: CPT | Mod: HCNC | Performed by: STUDENT IN AN ORGANIZED HEALTH CARE EDUCATION/TRAINING PROGRAM

## 2021-12-16 PROCEDURE — 63600175 PHARM REV CODE 636 W HCPCS: Mod: HCNC | Performed by: HOSPITALIST

## 2021-12-16 PROCEDURE — 63600175 PHARM REV CODE 636 W HCPCS: Mod: HCNC

## 2021-12-16 PROCEDURE — 97112 NEUROMUSCULAR REEDUCATION: CPT | Mod: HCNC

## 2021-12-16 PROCEDURE — 99232 SBSQ HOSP IP/OBS MODERATE 35: CPT | Mod: HCNC,GC,, | Performed by: HOSPITALIST

## 2021-12-16 PROCEDURE — 36415 COLL VENOUS BLD VENIPUNCTURE: CPT | Mod: HCNC | Performed by: STUDENT IN AN ORGANIZED HEALTH CARE EDUCATION/TRAINING PROGRAM

## 2021-12-16 RX ADMIN — INSULIN DETEMIR 10 UNITS: 100 INJECTION, SOLUTION SUBCUTANEOUS at 10:12

## 2021-12-16 RX ADMIN — ACETAMINOPHEN 650 MG: 325 TABLET ORAL at 09:12

## 2021-12-16 RX ADMIN — ENOXAPARIN SODIUM 40 MG: 100 INJECTION SUBCUTANEOUS at 04:12

## 2021-12-16 RX ADMIN — MUPIROCIN: 20 OINTMENT TOPICAL at 09:12

## 2021-12-16 RX ADMIN — CEFEPIME 2 G: 2 INJECTION, POWDER, FOR SOLUTION INTRAVENOUS at 04:12

## 2021-12-16 RX ADMIN — CEFEPIME 2 G: 2 INJECTION, POWDER, FOR SOLUTION INTRAVENOUS at 05:12

## 2021-12-16 RX ADMIN — INSULIN ASPART 1 UNITS: 100 INJECTION, SOLUTION INTRAVENOUS; SUBCUTANEOUS at 10:12

## 2021-12-16 RX ADMIN — KETOROLAC TROMETHAMINE 15 MG: 15 INJECTION, SOLUTION INTRAMUSCULAR; INTRAVENOUS at 12:12

## 2021-12-16 RX ADMIN — TRAZODONE HYDROCHLORIDE 50 MG: 50 TABLET ORAL at 09:12

## 2021-12-16 RX ADMIN — VANCOMYCIN HYDROCHLORIDE 1250 MG: 1.25 INJECTION, POWDER, LYOPHILIZED, FOR SOLUTION INTRAVENOUS at 04:12

## 2021-12-16 RX ADMIN — LIDOCAINE 5% 1 PATCH: 700 PATCH TOPICAL at 02:12

## 2021-12-16 RX ADMIN — INSULIN ASPART 3 UNITS: 100 INJECTION, SOLUTION INTRAVENOUS; SUBCUTANEOUS at 06:12

## 2021-12-16 RX ADMIN — CEFEPIME 2 G: 2 INJECTION, POWDER, FOR SOLUTION INTRAVENOUS at 09:12

## 2021-12-17 LAB
BASOPHILS # BLD AUTO: 0.05 K/UL (ref 0–0.2)
BASOPHILS NFR BLD: 1 % (ref 0–1.9)
DIFFERENTIAL METHOD: ABNORMAL
EOSINOPHIL # BLD AUTO: 0 K/UL (ref 0–0.5)
EOSINOPHIL NFR BLD: 0.2 % (ref 0–8)
ERYTHROCYTE [DISTWIDTH] IN BLOOD BY AUTOMATED COUNT: 14.3 % (ref 11.5–14.5)
HCT VFR BLD AUTO: 28.3 % (ref 40–54)
HGB BLD-MCNC: 9 G/DL (ref 14–18)
IMM GRANULOCYTES # BLD AUTO: 0.04 K/UL (ref 0–0.04)
IMM GRANULOCYTES NFR BLD AUTO: 0.8 % (ref 0–0.5)
LYMPHOCYTES # BLD AUTO: 0.8 K/UL (ref 1–4.8)
LYMPHOCYTES NFR BLD: 15.1 % (ref 18–48)
MCH RBC QN AUTO: 27.5 PG (ref 27–31)
MCHC RBC AUTO-ENTMCNC: 31.8 G/DL (ref 32–36)
MCV RBC AUTO: 87 FL (ref 82–98)
MONOCYTES # BLD AUTO: 0.7 K/UL (ref 0.3–1)
MONOCYTES NFR BLD: 13.9 % (ref 4–15)
NEUTROPHILS # BLD AUTO: 3.5 K/UL (ref 1.8–7.7)
NEUTROPHILS NFR BLD: 69 % (ref 38–73)
NRBC BLD-RTO: 0 /100 WBC
PLATELET # BLD AUTO: 474 K/UL (ref 150–450)
PMV BLD AUTO: 9.5 FL (ref 9.2–12.9)
POCT GLUCOSE: 178 MG/DL (ref 70–110)
POCT GLUCOSE: 216 MG/DL (ref 70–110)
POCT GLUCOSE: 218 MG/DL (ref 70–110)
POCT GLUCOSE: 221 MG/DL (ref 70–110)
POCT GLUCOSE: 234 MG/DL (ref 70–110)
RBC # BLD AUTO: 3.27 M/UL (ref 4.6–6.2)
WBC # BLD AUTO: 5.09 K/UL (ref 3.9–12.7)

## 2021-12-17 PROCEDURE — 25000003 PHARM REV CODE 250: Mod: HCNC | Performed by: STUDENT IN AN ORGANIZED HEALTH CARE EDUCATION/TRAINING PROGRAM

## 2021-12-17 PROCEDURE — 20600001 HC STEP DOWN PRIVATE ROOM: Mod: HCNC

## 2021-12-17 PROCEDURE — 85025 COMPLETE CBC W/AUTO DIFF WBC: CPT | Mod: HCNC | Performed by: STUDENT IN AN ORGANIZED HEALTH CARE EDUCATION/TRAINING PROGRAM

## 2021-12-17 PROCEDURE — 99232 SBSQ HOSP IP/OBS MODERATE 35: CPT | Mod: HCNC,GC,, | Performed by: HOSPITALIST

## 2021-12-17 PROCEDURE — 63600175 PHARM REV CODE 636 W HCPCS: Mod: HCNC | Performed by: STUDENT IN AN ORGANIZED HEALTH CARE EDUCATION/TRAINING PROGRAM

## 2021-12-17 PROCEDURE — 97530 THERAPEUTIC ACTIVITIES: CPT | Mod: HCNC,CQ

## 2021-12-17 PROCEDURE — 92526 ORAL FUNCTION THERAPY: CPT | Mod: HCNC

## 2021-12-17 PROCEDURE — 36415 COLL VENOUS BLD VENIPUNCTURE: CPT | Mod: HCNC | Performed by: STUDENT IN AN ORGANIZED HEALTH CARE EDUCATION/TRAINING PROGRAM

## 2021-12-17 PROCEDURE — 63600175 PHARM REV CODE 636 W HCPCS: Mod: HCNC

## 2021-12-17 PROCEDURE — 97535 SELF CARE MNGMENT TRAINING: CPT | Mod: HCNC

## 2021-12-17 PROCEDURE — 99232 PR SUBSEQUENT HOSPITAL CARE,LEVL II: ICD-10-PCS | Mod: HCNC,GC,, | Performed by: HOSPITALIST

## 2021-12-17 PROCEDURE — 25000003 PHARM REV CODE 250: Mod: HCNC | Performed by: HOSPITALIST

## 2021-12-17 RX ORDER — HYDROMORPHONE HYDROCHLORIDE 1 MG/ML
0.5 INJECTION, SOLUTION INTRAMUSCULAR; INTRAVENOUS; SUBCUTANEOUS ONCE
Status: COMPLETED | OUTPATIENT
Start: 2021-12-17 | End: 2021-12-17

## 2021-12-17 RX ORDER — DOXYCYCLINE HYCLATE 100 MG
100 TABLET ORAL EVERY 12 HOURS
Status: DISCONTINUED | OUTPATIENT
Start: 2021-12-17 | End: 2021-12-18 | Stop reason: HOSPADM

## 2021-12-17 RX ORDER — TAMSULOSIN HYDROCHLORIDE 0.4 MG/1
0.4 CAPSULE ORAL DAILY
Status: DISCONTINUED | OUTPATIENT
Start: 2021-12-17 | End: 2021-12-18 | Stop reason: HOSPADM

## 2021-12-17 RX ORDER — ACETAMINOPHEN 500 MG
1000 TABLET ORAL 3 TIMES DAILY
Status: DISCONTINUED | OUTPATIENT
Start: 2021-12-17 | End: 2021-12-18 | Stop reason: HOSPADM

## 2021-12-17 RX ADMIN — INSULIN ASPART 2 UNITS: 100 INJECTION, SOLUTION INTRAVENOUS; SUBCUTANEOUS at 06:12

## 2021-12-17 RX ADMIN — ACETAMINOPHEN 1000 MG: 500 TABLET ORAL at 12:12

## 2021-12-17 RX ADMIN — ATORVASTATIN CALCIUM 40 MG: 20 TABLET, FILM COATED ORAL at 08:12

## 2021-12-17 RX ADMIN — CEFEPIME 2 G: 2 INJECTION, POWDER, FOR SOLUTION INTRAVENOUS at 05:12

## 2021-12-17 RX ADMIN — ACETAMINOPHEN 1000 MG: 500 TABLET ORAL at 05:12

## 2021-12-17 RX ADMIN — SENNOSIDES AND DOCUSATE SODIUM 1 TABLET: 50; 8.6 TABLET ORAL at 08:12

## 2021-12-17 RX ADMIN — DOXYCYCLINE HYCLATE 100 MG: 100 TABLET, COATED ORAL at 08:12

## 2021-12-17 RX ADMIN — TRAZODONE HYDROCHLORIDE 50 MG: 50 TABLET ORAL at 08:12

## 2021-12-17 RX ADMIN — INSULIN ASPART 2 UNITS: 100 INJECTION, SOLUTION INTRAVENOUS; SUBCUTANEOUS at 01:12

## 2021-12-17 RX ADMIN — DULOXETINE 60 MG: 60 CAPSULE, DELAYED RELEASE ORAL at 08:12

## 2021-12-17 RX ADMIN — HYDROMORPHONE HYDROCHLORIDE 0.5 MG: 1 INJECTION, SOLUTION INTRAMUSCULAR; INTRAVENOUS; SUBCUTANEOUS at 02:12

## 2021-12-17 RX ADMIN — LIDOCAINE 5% 1 PATCH: 700 PATCH TOPICAL at 01:12

## 2021-12-17 RX ADMIN — LISINOPRIL 20 MG: 20 TABLET ORAL at 08:12

## 2021-12-17 RX ADMIN — TAMSULOSIN HYDROCHLORIDE 0.4 MG: 0.4 CAPSULE ORAL at 05:12

## 2021-12-17 RX ADMIN — ENOXAPARIN SODIUM 40 MG: 100 INJECTION SUBCUTANEOUS at 05:12

## 2021-12-17 RX ADMIN — INSULIN DETEMIR 10 UNITS: 100 INJECTION, SOLUTION SUBCUTANEOUS at 08:12

## 2021-12-17 RX ADMIN — Medication 2000 UNITS: at 08:12

## 2021-12-17 RX ADMIN — MUPIROCIN: 20 OINTMENT TOPICAL at 08:12

## 2021-12-17 RX ADMIN — FINASTERIDE 5 MG: 5 TABLET, FILM COATED ORAL at 08:12

## 2021-12-17 RX ADMIN — HYDRALAZINE HYDROCHLORIDE 10 MG: 20 INJECTION, SOLUTION INTRAMUSCULAR; INTRAVENOUS at 01:12

## 2021-12-17 RX ADMIN — HYDROMORPHONE HYDROCHLORIDE 0.5 MG: 1 INJECTION, SOLUTION INTRAMUSCULAR; INTRAVENOUS; SUBCUTANEOUS at 08:12

## 2021-12-17 RX ADMIN — HYDROMORPHONE HYDROCHLORIDE 0.5 MG: 1 INJECTION, SOLUTION INTRAMUSCULAR; INTRAVENOUS; SUBCUTANEOUS at 11:12

## 2021-12-17 RX ADMIN — POLYETHYLENE GLYCOL 3350 17 G: 17 POWDER, FOR SOLUTION ORAL at 08:12

## 2021-12-18 VITALS
BODY MASS INDEX: 24.04 KG/M2 | OXYGEN SATURATION: 98 % | WEIGHT: 172.38 LBS | HEART RATE: 105 BPM | TEMPERATURE: 97 F | SYSTOLIC BLOOD PRESSURE: 139 MMHG | DIASTOLIC BLOOD PRESSURE: 63 MMHG | RESPIRATION RATE: 20 BRPM

## 2021-12-18 LAB
ALBUMIN SERPL BCP-MCNC: 2.2 G/DL (ref 3.5–5.2)
ANION GAP SERPL CALC-SCNC: 7 MMOL/L (ref 8–16)
BUN SERPL-MCNC: 20 MG/DL (ref 8–23)
CALCIUM SERPL-MCNC: 8 MG/DL (ref 8.7–10.5)
CHLORIDE SERPL-SCNC: 107 MMOL/L (ref 95–110)
CO2 SERPL-SCNC: 22 MMOL/L (ref 23–29)
CREAT SERPL-MCNC: 0.9 MG/DL (ref 0.5–1.4)
EST. GFR  (AFRICAN AMERICAN): >60 ML/MIN/1.73 M^2
EST. GFR  (NON AFRICAN AMERICAN): >60 ML/MIN/1.73 M^2
GLUCOSE SERPL-MCNC: 129 MG/DL (ref 70–110)
MAGNESIUM SERPL-MCNC: 1.8 MG/DL (ref 1.6–2.6)
PHOSPHATE SERPL-MCNC: 2.1 MG/DL (ref 2.7–4.5)
POCT GLUCOSE: 155 MG/DL (ref 70–110)
POCT GLUCOSE: 187 MG/DL (ref 70–110)
POTASSIUM SERPL-SCNC: 3.4 MMOL/L (ref 3.5–5.1)
SODIUM SERPL-SCNC: 136 MMOL/L (ref 136–145)

## 2021-12-18 PROCEDURE — 83735 ASSAY OF MAGNESIUM: CPT | Mod: HCNC | Performed by: STUDENT IN AN ORGANIZED HEALTH CARE EDUCATION/TRAINING PROGRAM

## 2021-12-18 PROCEDURE — 36415 COLL VENOUS BLD VENIPUNCTURE: CPT | Mod: HCNC | Performed by: STUDENT IN AN ORGANIZED HEALTH CARE EDUCATION/TRAINING PROGRAM

## 2021-12-18 PROCEDURE — 99239 PR HOSPITAL DISCHARGE DAY,>30 MIN: ICD-10-PCS | Mod: HCNC,GC,, | Performed by: STUDENT IN AN ORGANIZED HEALTH CARE EDUCATION/TRAINING PROGRAM

## 2021-12-18 PROCEDURE — 63600175 PHARM REV CODE 636 W HCPCS: Mod: HCNC

## 2021-12-18 PROCEDURE — 99239 HOSP IP/OBS DSCHRG MGMT >30: CPT | Mod: HCNC,GC,, | Performed by: STUDENT IN AN ORGANIZED HEALTH CARE EDUCATION/TRAINING PROGRAM

## 2021-12-18 PROCEDURE — 25000003 PHARM REV CODE 250: Mod: HCNC | Performed by: STUDENT IN AN ORGANIZED HEALTH CARE EDUCATION/TRAINING PROGRAM

## 2021-12-18 PROCEDURE — 80069 RENAL FUNCTION PANEL: CPT | Mod: HCNC | Performed by: STUDENT IN AN ORGANIZED HEALTH CARE EDUCATION/TRAINING PROGRAM

## 2021-12-18 PROCEDURE — 25000003 PHARM REV CODE 250: Mod: HCNC | Performed by: HOSPITALIST

## 2021-12-18 RX ORDER — ACETAMINOPHEN 500 MG/1
2 CAPSULE, LIQUID FILLED ORAL EVERY 8 HOURS PRN
Qty: 60 CAPSULE | Refills: 0 | Status: SHIPPED | OUTPATIENT
Start: 2021-12-18 | End: 2021-12-28

## 2021-12-18 RX ORDER — TAMSULOSIN HYDROCHLORIDE 0.4 MG/1
0.4 CAPSULE ORAL DAILY
Qty: 30 CAPSULE | Refills: 1 | Status: SHIPPED | OUTPATIENT
Start: 2021-12-18 | End: 2022-12-18

## 2021-12-18 RX ORDER — DOXYCYCLINE HYCLATE 100 MG
100 TABLET ORAL EVERY 12 HOURS
Qty: 6 TABLET | Refills: 0 | Status: SHIPPED | OUTPATIENT
Start: 2021-12-18 | End: 2021-12-21

## 2021-12-18 RX ORDER — INSULIN GLARGINE 100 [IU]/ML
15 INJECTION, SOLUTION SUBCUTANEOUS DAILY
Qty: 4.5 ML | Refills: 11 | Status: SHIPPED | OUTPATIENT
Start: 2021-12-18 | End: 2022-12-18

## 2021-12-18 RX ORDER — LIDOCAINE 50 MG/G
1 PATCH TOPICAL DAILY
Qty: 10 PATCH | Refills: 0 | Status: SHIPPED | OUTPATIENT
Start: 2021-12-18 | End: 2021-12-22 | Stop reason: SDUPTHER

## 2021-12-18 RX ADMIN — ATORVASTATIN CALCIUM 40 MG: 20 TABLET, FILM COATED ORAL at 08:12

## 2021-12-18 RX ADMIN — DULOXETINE 60 MG: 60 CAPSULE, DELAYED RELEASE ORAL at 08:12

## 2021-12-18 RX ADMIN — DOXYCYCLINE HYCLATE 100 MG: 100 TABLET, COATED ORAL at 08:12

## 2021-12-18 RX ADMIN — MUPIROCIN: 20 OINTMENT TOPICAL at 08:12

## 2021-12-18 RX ADMIN — LISINOPRIL 20 MG: 20 TABLET ORAL at 08:12

## 2021-12-18 RX ADMIN — TAMSULOSIN HYDROCHLORIDE 0.4 MG: 0.4 CAPSULE ORAL at 08:12

## 2021-12-18 RX ADMIN — HYDRALAZINE HYDROCHLORIDE 10 MG: 20 INJECTION, SOLUTION INTRAMUSCULAR; INTRAVENOUS at 12:12

## 2021-12-18 RX ADMIN — SENNOSIDES AND DOCUSATE SODIUM 1 TABLET: 50; 8.6 TABLET ORAL at 08:12

## 2021-12-18 RX ADMIN — FINASTERIDE 5 MG: 5 TABLET, FILM COATED ORAL at 08:12

## 2021-12-18 RX ADMIN — POLYETHYLENE GLYCOL 3350 17 G: 17 POWDER, FOR SOLUTION ORAL at 08:12

## 2021-12-18 RX ADMIN — ACETAMINOPHEN 1000 MG: 500 TABLET ORAL at 08:12

## 2021-12-18 RX ADMIN — Medication 2000 UNITS: at 08:12

## 2021-12-20 ENCOUNTER — PATIENT OUTREACH (OUTPATIENT)
Dept: ADMINISTRATIVE | Facility: CLINIC | Age: 86
End: 2021-12-20
Payer: MEDICARE

## 2021-12-20 ENCOUNTER — TELEPHONE (OUTPATIENT)
Dept: INTERNAL MEDICINE | Facility: CLINIC | Age: 86
End: 2021-12-20
Payer: MEDICARE

## 2021-12-20 DIAGNOSIS — R41.0 DELIRIUM: ICD-10-CM

## 2021-12-20 DIAGNOSIS — S32.810S MULTIPLE CLOSED FRACTURES OF PELVIS WITH STABLE DISRUPTION OF PELVIC RING, SEQUELA: ICD-10-CM

## 2021-12-20 DIAGNOSIS — R26.81 UNSTEADY GAIT: ICD-10-CM

## 2021-12-20 DIAGNOSIS — G30.9 MIXED ALZHEIMER'S AND VASCULAR DEMENTIA: Primary | ICD-10-CM

## 2021-12-20 DIAGNOSIS — F02.80 MIXED ALZHEIMER'S AND VASCULAR DEMENTIA: Primary | ICD-10-CM

## 2021-12-20 DIAGNOSIS — F01.50 MIXED ALZHEIMER'S AND VASCULAR DEMENTIA: Primary | ICD-10-CM

## 2021-12-20 LAB
BACTERIA BLD CULT: NORMAL
BACTERIA BLD CULT: NORMAL

## 2021-12-20 PROCEDURE — G0180 PR HOME HEALTH MD CERTIFICATION: ICD-10-PCS | Mod: ,,, | Performed by: INTERNAL MEDICINE

## 2021-12-20 PROCEDURE — G0180 MD CERTIFICATION HHA PATIENT: HCPCS | Mod: ,,, | Performed by: INTERNAL MEDICINE

## 2021-12-21 DIAGNOSIS — S32.591D CLOSED FRACTURE OF RAMUS OF RIGHT PUBIS WITH ROUTINE HEALING, SUBSEQUENT ENCOUNTER: Primary | ICD-10-CM

## 2021-12-23 ENCOUNTER — PATIENT MESSAGE (OUTPATIENT)
Dept: INTERNAL MEDICINE | Facility: CLINIC | Age: 86
End: 2021-12-23
Payer: MEDICARE

## 2021-12-23 ENCOUNTER — TELEPHONE (OUTPATIENT)
Dept: INTERNAL MEDICINE | Facility: CLINIC | Age: 86
End: 2021-12-23
Payer: MEDICARE

## 2021-12-23 RX ORDER — DOXYCYCLINE HYCLATE 100 MG
100 TABLET ORAL 2 TIMES DAILY
Qty: 20 TABLET | Refills: 0 | Status: SHIPPED | OUTPATIENT
Start: 2021-12-23 | End: 2022-01-02

## 2021-12-23 RX ORDER — DULOXETIN HYDROCHLORIDE 60 MG/1
60 CAPSULE, DELAYED RELEASE ORAL DAILY
Qty: 90 CAPSULE | Refills: 0 | Status: SHIPPED | OUTPATIENT
Start: 2021-12-23

## 2021-12-23 RX ORDER — LIDOCAINE 50 MG/G
1 PATCH TOPICAL DAILY
Qty: 10 PATCH | Refills: 0 | Status: SHIPPED | OUTPATIENT
Start: 2021-12-23 | End: 2022-01-02 | Stop reason: SDUPTHER

## 2021-12-24 ENCOUNTER — TELEPHONE (OUTPATIENT)
Dept: INTERNAL MEDICINE | Facility: CLINIC | Age: 86
End: 2021-12-24
Payer: MEDICARE

## 2021-12-24 DIAGNOSIS — G30.9 MIXED ALZHEIMER'S AND VASCULAR DEMENTIA: Primary | ICD-10-CM

## 2021-12-24 DIAGNOSIS — N39.0 URINARY TRACT INFECTION WITHOUT HEMATURIA, SITE UNSPECIFIED: ICD-10-CM

## 2021-12-24 DIAGNOSIS — F02.80 MIXED ALZHEIMER'S AND VASCULAR DEMENTIA: Primary | ICD-10-CM

## 2021-12-24 DIAGNOSIS — S32.810S MULTIPLE CLOSED FRACTURES OF PELVIS WITH STABLE DISRUPTION OF PELVIC RING, SEQUELA: ICD-10-CM

## 2021-12-24 DIAGNOSIS — F01.50 MIXED ALZHEIMER'S AND VASCULAR DEMENTIA: Primary | ICD-10-CM

## 2021-12-28 ENCOUNTER — OUTPATIENT CASE MANAGEMENT (OUTPATIENT)
Dept: ADMINISTRATIVE | Facility: OTHER | Age: 86
End: 2021-12-28
Payer: MEDICARE

## 2021-12-29 ENCOUNTER — TELEPHONE (OUTPATIENT)
Dept: INTERNAL MEDICINE | Facility: CLINIC | Age: 86
End: 2021-12-29
Payer: MEDICARE

## 2021-12-29 DIAGNOSIS — S32.810S MULTIPLE CLOSED FRACTURES OF PELVIS WITH STABLE DISRUPTION OF PELVIC RING, SEQUELA: Primary | ICD-10-CM

## 2021-12-29 DIAGNOSIS — S32.591A CLOSED FRACTURE OF RAMUS OF RIGHT PUBIS, INITIAL ENCOUNTER: ICD-10-CM

## 2021-12-29 NOTE — TELEPHONE ENCOUNTER
Spoke to Cynthia she stated the pt caregiver stated the pt started complaining about groin and neck pain to pt family would like a NP to come out and see the pt because it is too hard to get the pt out of the bed and bring him in to the clinic please advice

## 2021-12-29 NOTE — TELEPHONE ENCOUNTER
----- Message from Christine Henley sent at 12/29/2021 11:34 AM CST -----  Contact: Cynthia [facility]   Cynthia from [facility] would like a call back. Nickolas is having groin & neck pain that the over the counter tylenol is not helping

## 2021-12-30 ENCOUNTER — PES CALL (OUTPATIENT)
Dept: HOME HEALTH SERVICES | Facility: CLINIC | Age: 86
End: 2021-12-30
Payer: MEDICARE

## 2022-01-02 DIAGNOSIS — S32.591D CLOSED FRACTURE OF RAMUS OF RIGHT PUBIS WITH ROUTINE HEALING, SUBSEQUENT ENCOUNTER: ICD-10-CM

## 2022-01-02 NOTE — TELEPHONE ENCOUNTER
He has a home visit scheduled on 1/13, but he needs to see ortho for follow up on the hip fracture.    Referral was placed already, please schedule.    Thank you.

## 2022-01-04 NOTE — TELEPHONE ENCOUNTER
Spoke to pt caregiver she stated pt is bed bound and can not go out to any appt can Dr. Bunn recommend a mobile ortho appt please advice

## 2022-01-05 RX ORDER — LIDOCAINE 50 MG/G
1 PATCH TOPICAL DAILY
Qty: 10 PATCH | Refills: 0 | Status: SHIPPED | OUTPATIENT
Start: 2022-01-05 | End: 2022-01-15

## 2022-01-05 NOTE — TELEPHONE ENCOUNTER
Spoke to pt caregiver she stated she found a transportation service that will transport him to his appt

## 2022-01-06 ENCOUNTER — EXTERNAL HOME HEALTH (OUTPATIENT)
Dept: HOME HEALTH SERVICES | Facility: HOSPITAL | Age: 87
End: 2022-01-06
Payer: MEDICARE

## 2022-01-06 ENCOUNTER — PATIENT MESSAGE (OUTPATIENT)
Dept: INTERNAL MEDICINE | Facility: CLINIC | Age: 87
End: 2022-01-06
Payer: MEDICARE

## 2022-01-13 ENCOUNTER — PES CALL (OUTPATIENT)
Dept: HOME HEALTH SERVICES | Facility: CLINIC | Age: 87
End: 2022-01-13
Payer: MEDICARE

## 2022-01-26 ENCOUNTER — TELEPHONE (OUTPATIENT)
Dept: PODIATRY | Facility: CLINIC | Age: 87
End: 2022-01-26
Payer: MEDICARE

## 2022-02-02 ENCOUNTER — PATIENT MESSAGE (OUTPATIENT)
Dept: INTERNAL MEDICINE | Facility: CLINIC | Age: 87
End: 2022-02-02
Payer: MEDICARE

## 2022-02-03 ENCOUNTER — TELEPHONE (OUTPATIENT)
Dept: INTERNAL MEDICINE | Facility: CLINIC | Age: 87
End: 2022-02-03
Payer: MEDICARE

## 2022-05-17 NOTE — ED PROVIDER NOTES
Encounter Date: 11/10/2019    SCRIBE #1 NOTE: I, Pooja Cox, am scribing for, and in the presence of,  Dr. Wyman. I have scribed the entire note.       History     Chief Complaint   Patient presents with    Near Syncope     Took insulin, then went eat breakfast, reports near syncope when he went to leave.      87 Y/O M, with comorbidities including IDDM, HTN, and HLD, C/O of a 1 minute witnessed episode of syncope this morning, with 2 other associated syncopal episodes in the past 2 weeks, with loss of vision and right arm spasms. This morning, he woke up at baseline, his blood sugar was 256 before breakfast. He took 24 Lantus and 12 NovoLog, then ate breakfast normally. After breakfast, he stood up and started walking to the car, and lost his vision and syncopized. His family members helped him to the ground. No trauma. No chest pain or palpitations. No abdominal pain. No headache. No black or bloody stools. Over the past few days, he denies nausea, vomiting, sore throat, cough, chest pain with exertion, dyspnea on exertion, or changes to daily routine. He currently has an event monitor, given his previous syncopal episodes in the last 2 weeks. He has history of angioplasty in 1994. No history of ulcers or bleeding. No history of seizures.    The history is provided by the patient and a relative.     Review of patient's allergies indicates:  No Known Allergies  Past Medical History:   Diagnosis Date    Anemia in stage 3 chronic kidney disease 11/10/2016    BPH (benign prostatic hyperplasia) 10/26/2012    Cataract     Coronary artery disease involving native coronary artery of native heart without angina pectoris     Degeneration of lumbar or lumbosacral intervertebral disc 2/4/2014    Essential hypertension 9/30/2015    GERD (gastroesophageal reflux disease)     Hyperlipidemia     Lumbar radiculopathy, right 12/9/2013    Major depressive disorder with single episode, in partial remission  Physical Therapy    Physical Therapy Treatment Note  Name: Chanda Conley MRN: [id number] :   1949   Date:  2022   Admission Date: 5/15/2022 Room:  [address] [place] [address]   Restrictions/Precautions:  Restrictions/Precautions  Restrictions/Precautions: Fall Risk,General Precautions  Required Braces or Orthoses?: No  Implants present? : Pacemaker           Communication with other providers:  RN requesting assistance with transferring patient back to bed    Subjective:  Pain:   Location, Type, Intensity (0/10 to 10/10): Denies; 0/10    Objective:    Observation:  Pt sitting upright in chair upon entry    Treatment, including education/measures:  Pt agreeable to participating in therapy at this time. Therapeutic Activity Training:   Therapeutic activity training was instructed today. Cues were given for safety, sequence, UE/LE placement, awareness, and balance. Activities performed today included bed mobility training, sup-sit, sit-stand, SPT. Pt completed stand pivot transfer from chair to bed with minAx2 with verbal and tactile cues for BUE and BLE placement throughout transfer. Pt completed sit to supine with modAx2. Pt scooted up in bed with maxAx2. Safety  Patient left safely in the bed, with call light/phone in reach with alarm applied. Gait belt and mask were used for transfers and gait. Assessment / Impression:    Patient's tolerance of treatment:  good  Adverse Reaction: none  Significant change in status and impact:  none  Barriers to improvement:  Generalized weakness    Plan for Next Session:    Continue progressing toward goals per plan of care. Progress independence with transfers and ambulation as tolerated and appropriate. Progress ambulation distance as tolerated and appropriate.     Time in: 1806  Time out:  1821  Timed treatment minutes:  15  Total treatment time:  15    Previously filed items:  Social/Functional History  Lives With: Significant other  Bathroom Shower/Tub: 12/7/2017    Mild cognitive impairment with memory loss 7/18/2016    Osteoarthritis of right hip 8/16/2016    Osteoarthritis of spine with radiculopathy, lumbar region     S/P percutaneous transluminal coronary angioplasty     Spinal stenosis of lumbar region at multiple levels 2/4/2014    Thoracic or lumbosacral neuritis or radiculitis, unspecified 4/21/2015    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 edema, with long-term current use of insulin 11/19/2015    Type 2 diabetes mellitus with diabetic polyneuropathy, with long-term current use of insulin 11/19/2015    Type 2 diabetes mellitus with stage 3 chronic kidney disease, with long-term current use of insulin 11/19/2015    Type 2 diabetes with peripheral circulatory disorder, controlled      Past Surgical History:   Procedure Laterality Date    ADENOIDECTOMY      CARDIAC CATHETERIZATION  1980    CATARACT EXTRACTION      CYST REMOVAL      rectum    EYE SURGERY      TONSILLECTOMY       Family History   Problem Relation Age of Onset    Breast cancer Mother     Cancer Brother     Heart attack Brother     No Known Problems Father     No Known Problems Sister     No Known Problems Maternal Aunt     No Known Problems Maternal Uncle     No Known Problems Paternal Aunt     No Known Problems Paternal Uncle     No Known Problems Maternal Grandmother     No Known Problems Maternal Grandfather     No Known Problems Paternal Grandmother     No Known Problems Paternal Grandfather     Colon cancer Neg Hx     Colon polyps Neg Hx     Amblyopia Neg Hx     Blindness Neg Hx     Cataracts Neg Hx     Diabetes Neg Hx     Glaucoma Neg Hx     Hypertension Neg Hx     Macular degeneration Neg Hx     Retinal detachment Neg Hx     Strabismus Neg Hx     Stroke Neg Hx     Thyroid disease Neg Hx      Social History     Tobacco Use    Smoking status: Former Smoker     Packs/day: 0.25      Tub/Shower unit  Bathroom Toilet: Standard  Home Equipment: [employer], 4 wheeled  Has the patient had two or more falls in the past year or any fall with injury in the past year?: Yes  Receives Help From: Friend(s)  ADL Assistance: Independent  Toiletin [facility] [address]: Independent  Homemaking Responsibilities: Yes  Ambulation Assistance: Independent  Transfer Assistance: Independent             Electronically signed by:      Dilan Tadeo PT, DPT  License #: [id number] Years: 50.00     Pack years: 12.50     Types: Cigarettes     Last attempt to quit: 1980     Years since quittin.7    Smokeless tobacco: Never Used   Substance Use Topics    Alcohol use: No    Drug use: No     Review of Systems  CONST: No fever, chills, weight change, or fatigue.  HEENT: No headache, blurry vision/change in vision, sore throat, ear pain, eye pain, otorrhea, rhinorrhea, tooth pain, swelling, or voice changes.  NECK: No pain, masses, trauma, or redness.  HEART: No pain, palpitations, diaphoresis, nausea, or vomiting  LUNG: No SOB, cough, orthopnea, LAUREN or other complaints.  ABDOMEN: No pain, nausea, vomiting, diarrhea, constipation, black or bloody stools, or flank pain  : No discharge, dysuria, lesions, rashes, masses, sores  EXTREMITIES: FROM with No swelling, redness, lesions, sores, weakness, numbness, or tingling. Slight abrasions on bilateral knees.   NEURO: Syncope. No dizziness, weakness, fatigue, tremors, headache, change in vision or disturbances of balance or coordination  SKIN: No lesions, rashes, trauma or other complaints    Physical Exam     Initial Vitals [11/10/19 0935]   BP Pulse Resp Temp SpO2   114/71 70 16 98 °F (36.7 °C) 98 %      MAP       --         Physical Exam  PHYSICAL EXAM:  GENERAL: Calm; Cooperative; Well-appearing and Non-Toxic; Well-Nourished; NAD.  HEENT: AT/NC; PERRL, EOMI, Acuity & Fields Grossly Intact; speaking full sentences with no slurring of speech or drooling/inability to tolerate oral secretions.  NECK: Supple, FROM with no meningismus, no accessory muscle use. No JVD or Carotid Bruits B/L.  HEART: +External Even/Holter Monitor in Place; Regular rate and rhythm, no M/G/T.  LUNGS: No Tachypnea, No Retractions, and CTA B/L with no W/R/R.  ABDOMEN: +BS, Soft, ND, NTTP.   BACK: Atraumatic, No midline TTP to C/T/LS spine; No CVA tenderness B/L.   EXTREMITIES: FROM. Strength 5/5.   SKIN: Warm, Dry, No Skin Tears or Rashes.  VASCULAR: 2+ pulses  Prox/Dist & Symmetrical with No delay.  NEUROLOGIC: AAOx3; No Receptive or Expressive Aphasia; Answering Questions Appropriately; Recent & Remote Memory Intact; No Visual or Tactile Agnosia to B/L; CN/PN Intact; Strength 5/5 and Sens Symmetrical to UE & LE; No truncal Ataxia.    ED Course   Procedures  Labs Reviewed   MAGNESIUM   PHOSPHORUS     EKG Readings: (Independently Interpreted)   Sinus rhythm with a ventricular rate of 78 beats per minute; normal ventricular axis; RI/QRS/QTC intervals within normal limits with no STEMI.  ____________________  Rip GALILEO Wyman MD, [facility]  Emergency Medicine Staff  11:01 AM 11/10/2019       ECG Results          EKG 12-lead (Final result)  Result time 11/11/19 08:23:54    Final result by Interface, Lab In [facility] (11/11/19 08:23:54)                 Narrative:    Test Reason : R07.9,    Vent. Rate : 078 BPM     Atrial Rate : 078 BPM     P-R Int : 182 ms          QRS Dur : 090 ms      QT Int : 370 ms       P-R-T Axes : 032 011 -27 degrees     QTc Int : 421 ms    Normal sinus rhythm  Cannot rule out Anterior infarct ,age undetermined  T wave abnormality, consider inferior ischemia  Abnormal ECG  When compared with ECG of 08-NOV-2019 14:04,  Premature ventricular complexes are no longer Present  lateral precordial lead placement is questionable  Confirmed by DOUGLAS JONES MD ([id number]) on 11/11/2019 8:23:45 AM    Referred By: [email or web address]   SELF           Confirmed By:DOUGLAS JONES MD                            Imaging Results          X-Ray Chest PA And Lateral (Final result)  Result time 11/10/19 11:00:14    Final result by Elio Pringle DO (11/10/19 11:00:14)                 Impression:      Small left pleural effusion with left basilar atelectasis.      Electronically signed by: Elio Pringle  Date:    11/10/2019  Time:    11:00             Narrative:    EXAMINATION:  XR CHEST PA AND LATERAL    CLINICAL HISTORY:  Chest Pain;    TECHNIQUE:  PA and lateral chest  radiographs.    COMPARISON:  Multiple prior radiographs of the chest, most recent from 06/04/2019.    FINDINGS:  The lungs are well expanded.  There is a small left pleural effusion with adjacent airspace opacity in the left lower lobe, likely passive atelectasis.  The right costophrenic angle is not excluded.  There is no pneumothorax.  The cardiac silhouette is obscured.  There is calcification of the aortic arch.  Visualized osseous structures are unremarkable.  Metallic hardware overlies the mandible.                               X-Ray Knee 1 or 2 View Bilateral (Final result)  Result time 11/10/19 10:57:57    Final result by Domonique Herrera MD (11/10/19 10:57:57)                 Impression:      No detrimental change since June 11, 2019.      Electronically signed by: Domonique Herrera MD  Date:    11/10/2019  Time:    10:57             Narrative:    EXAMINATION:  XR KNEE 1 OR 2 VIEW BILATERAL    CLINICAL HISTORY:  Unspecified fall, initial encounter    TECHNIQUE:  Knee 1 or two view bilateral.    COMPARISON:  June 11, 2019    FINDINGS:  No acute fracture or bony destructive process is seen.  There is mild narrowing of the knee joints bilaterally as previously.  Alignment is preserved.  No suprapatellar effusion.                                 Medical Decision Making:   History:   Old Medical Records: I decided to obtain old medical records.  Initial Assessment:   Afebrile, neurovascularly intact and hemodynamically stable, well appearing male presents s/p syncopal episode. No closed head injury or suspicion for reticular activating system CVA that would warrant CTA at this time. Patient has slight abrasions in 2 bilateral knees, for which we will get x-rays. He currently has an event monitor, given his 2 previous syncopal episodes in the last 2 weeks, which we will interrogate. He denies any black or bloody stools that would indicate GI bleed as the source of his syncopal episode. He denies any SOB and  was not hypoxic in ED triage that would indicate pulmonary emboli as the cause of his syncopal episode today. Will perform cardiac workup and monitor throughout ED stay.   ____________________  Rip Wyman MD, [facility]  Emergency Medicine Staff  9:55 AM 11/10/2019    F/U:  Patient feeling better at rest.  Ambulated him to the restroom, which led to subjective lightheadedness.  Orthostatics as documented warranting IV fluids.  Cardiology evaluated and will continue follow up as inpatient.  ____________________  Rip Wyman MD, [facility]  Emergency Medicine Staff  12:54 PM 11/10/2019      Independently Interpreted Test(s):   I have ordered and independently interpreted EKG Reading(s) - see prior notes  Clinical Tests:   Lab Tests: Ordered and Reviewed       <> Summary of Lab: WBC, H&H, and platelets within normal limits. BNP and troponin within normal limits. Sodium, potassium, chloride, and CO2 within normal limits. Calcium 6.4.   Radiological Study: Reviewed and Ordered  Medical Tests: Ordered and Reviewed  Other:   I have discussed this case with another health care provider.       <> Summary of the Discussion: 11:41 AM: Spoke with cardiology to interrogate his event monitor.             Scribe Attestation:   Scribe #1: I performed the above scribed service and the documentation accurately describes the services I performed. I attest to the accuracy of the note.                          Clinical Impression:       ICD-10-CM ICD-9-CM   1. Syncope, unspecified syncope type R55 780.2   2. Chest pain R07.9 786.50   3. Fall W19.XXXA E888.9   4. Recurrent left pleural effusion J90 511.9   5. Syncope R55 780.2         Disposition:   Disposition: Admitted                     Nadeem Wyman MD  11/12/19 0617

## 2022-07-19 ENCOUNTER — PATIENT MESSAGE (OUTPATIENT)
Dept: RESEARCH | Facility: CLINIC | Age: 87
End: 2022-07-19
Payer: MEDICARE

## 2022-10-27 ENCOUNTER — TELEPHONE (OUTPATIENT)
Dept: INTERNAL MEDICINE | Facility: CLINIC | Age: 87
End: 2022-10-27

## 2022-10-28 ENCOUNTER — PATIENT OUTREACH (OUTPATIENT)
Dept: ADMINISTRATIVE | Facility: HOSPITAL | Age: 87
End: 2022-10-28
Payer: MEDICARE

## 2022-10-28 NOTE — PROGRESS NOTES
Patient  as of 2022.     Marva Barron LPN   Clinical Care Coordinator  Primary Care and Wellness

## 2024-06-19 NOTE — LETTER
March 28, 2017      Joe Bunn MD  [address]  [facility] [address]           Andi [facility] - Endo/Diab/Metab  [address]  [facility] [address]  Phone: [phone]  Fax: [phone]          Patient: Nickolas Blake   MR Number: [id number]   YOB: 1931   Date of Visit: 3/28/2017       Dear Dr. Joe Bunn:    Thank you for referring Nickolas Blake to me for evaluation. Attached you will find relevant portions of my assessment and plan of care.    If you have questions, please do not hesitate to call me. I look forward to following Nickolas Blake along with you.    Sincerely,    Josephine Arias, NP    Enclosure  CC:  No Recipients    If you would like to receive this communication electronically, please contact [email or web address] or [phone] to request more information on [employer] access.    For providers and/or their staff who would like to refer a patient to [facility], please contact us through our one-stop-shop provider referral line, [facility], at [phone].    If you feel you have received this communication in error or would no longer like to receive these types of communications, please e-mail [email or web address]          Patient

## (undated) DEVICE — KWIRE RECON DRL TIP 3.2X400MM
Type: IMPLANTABLE DEVICE | Site: PELVIS | Status: NON-FUNCTIONAL
Removed: 2021-11-30

## (undated) DEVICE — SUT VICRYL PLUS 3-0 SH 18IN

## (undated) DEVICE — APPLICATOR CHLORAPREP ORN 26ML

## (undated) DEVICE — BIT DRILL ASNIS III 3.2X300MM

## (undated) DEVICE — DRAPE C-ARMOR EQUIPMENT COVER

## (undated) DEVICE — SYS LABEL CORRECT MED

## (undated) DEVICE — DRESSING TEGADERM 2 3/8 X 2.75

## (undated) DEVICE — GAUZE SPONGE 4X4 12PLY

## (undated) DEVICE — DRESSING GAUZE XEROFORM 5X9

## (undated) DEVICE — SEE MEDLINE ITEM 157131

## (undated) DEVICE — TAPE SURG DURAPORE 2 X10YD

## (undated) DEVICE — TRAY MINOR ORTHO

## (undated) DEVICE — ADHESIVE DERMABOND ADVANCED

## (undated) DEVICE — DRAPE INCISE IOBAN 2 23X17IN

## (undated) DEVICE — BIT DRILL TWST 4.9 FOR 6.5 SCR

## (undated) DEVICE — IMPLANTABLE DEVICE
Type: IMPLANTABLE DEVICE | Site: PELVIS | Status: NON-FUNCTIONAL
Removed: 2021-11-30

## (undated) DEVICE — DRESSING AQUACEL RIBBON 2X45CM

## (undated) DEVICE — DRAPE C ARM 42 X 120 10/BX

## (undated) DEVICE — DRAPE STERI-DRAPE 1000 17X11IN

## (undated) DEVICE — SUT VICRYL PLUS 0 CT1 18IN

## (undated) DEVICE — 5.6 DRILL

## (undated) DEVICE — PACK DRAPE UNIVERSAL CONVERTOR

## (undated) DEVICE — SUT MONOCRYL 3-0 PS-1

## (undated) DEVICE — DRESSING TRANS 4X4 3/4

## (undated) DEVICE — TOWEL OR DISP STRL BLUE 4/PK

## (undated) DEVICE — 8.0 DRILL